# Patient Record
Sex: FEMALE | Race: WHITE | NOT HISPANIC OR LATINO | Employment: FULL TIME | URBAN - METROPOLITAN AREA
[De-identification: names, ages, dates, MRNs, and addresses within clinical notes are randomized per-mention and may not be internally consistent; named-entity substitution may affect disease eponyms.]

---

## 2017-01-06 ENCOUNTER — OFFICE VISIT (OUTPATIENT)
Dept: SURGERY | Facility: CLINIC | Age: 37
End: 2017-01-06

## 2017-01-06 VITALS
RESPIRATION RATE: 20 BRPM | WEIGHT: 153.4 LBS | BODY MASS INDEX: 24.08 KG/M2 | TEMPERATURE: 98.4 F | OXYGEN SATURATION: 99 % | DIASTOLIC BLOOD PRESSURE: 68 MMHG | SYSTOLIC BLOOD PRESSURE: 98 MMHG | HEIGHT: 67 IN | HEART RATE: 68 BPM

## 2017-01-06 DIAGNOSIS — K64.8 INTERNAL HEMORRHOIDS WITH COMPLICATION: Primary | ICD-10-CM

## 2017-01-06 DIAGNOSIS — K59.00 CONSTIPATION, UNSPECIFIED CONSTIPATION TYPE: ICD-10-CM

## 2017-01-06 PROCEDURE — 99024 POSTOP FOLLOW-UP VISIT: CPT | Performed by: COLON & RECTAL SURGERY

## 2017-01-06 RX ORDER — LEVOTHYROXINE SODIUM 0.1 MG/1
100 TABLET ORAL
COMMUNITY
Start: 2016-11-18 | End: 2017-12-19

## 2017-01-06 NOTE — PROGRESS NOTES
"Taty Romo is a 36 y.o. female in for follow up of   s/p colonoscopy and hemorrhoidectomy done on 11/21/16    \"I'm a lot better than I was\"    Taking 2 SS (colace) & fiber daily    Has had 1 BM almost every day since last visit    Still straining with BM    Denies pain or bleeding    Visit Vitals   • BP 98/68 (BP Location: Left arm, Patient Position: Sitting, Cuff Size: Adult)   • Pulse 68   • Temp 98.4 °F (36.9 °C)   • Resp 20   • Ht 67\" (170.2 cm)   • Wt 153 lb 6.4 oz (69.6 kg)   • SpO2 99%   • BMI 24.03 kg/m2     Body mass index is 24.03 kg/(m^2).       PE:  Physical Exam   Constitutional: She is oriented to person, place, and time. She appears well-developed. No distress.   HENT:   Head: Normocephalic and atraumatic.   Abdominal: Soft. She exhibits no distension.   Genitourinary:   Genitourinary Comments: -left lateral, right anterior, right posterior healing scar tissue  -posterior healing fissure  -no edema or erythema.  No fluctuance or induration.  No blood or drainage   Musculoskeletal: Normal range of motion.   Neurological: She is alert and oriented to person, place, and time.   Psychiatric: Thought content normal.   Vitals reviewed.        Assessment:   1. Anal fissure    2. Internal hemorrhoids with complication    3. Constipation, unspecified constipation type     s/p colonoscopy and hemorrhoidectomy done on 11/21/16    Plan:    -patient to try MiraLAX stool softener instead of Colace  -recall colonoscopy in 5 years: family history: polyps mother and father, colon cancer grandfather  -RTC prn    Scribed for Natalya Farias MD by Veronica Hunter PA-C. 1/6/2017  4:04 PM    EMR Dragon/Transcription disclaimer:   Much of this encounter note is an electronic transcription/translation of spoken language to printed text. The electronic translation of spoken language may permit erroneous, or at times, nonsensical words or phrases to be inadvertently transcribed; Although I have reviewed the note for " such errors, some may still exist.

## 2017-07-18 RX ORDER — DULOXETIN HYDROCHLORIDE 20 MG/1
20 CAPSULE, DELAYED RELEASE ORAL 2 TIMES DAILY
Qty: 60 CAPSULE | Refills: 11 | Status: SHIPPED | OUTPATIENT
Start: 2018-04-23 | End: 2018-08-22 | Stop reason: SDUPTHER

## 2017-08-16 RX ORDER — VALACYCLOVIR HYDROCHLORIDE 1 G/1
1000 TABLET, FILM COATED ORAL DAILY
Qty: 30 TABLET | Refills: 11 | Status: SHIPPED | OUTPATIENT
Start: 2017-08-16 | End: 2017-12-19

## 2017-08-16 RX ORDER — VALACYCLOVIR HYDROCHLORIDE 1 G/1
1000 TABLET, FILM COATED ORAL DAILY
Qty: 30 TABLET | Refills: 11 | Status: SHIPPED | OUTPATIENT
Start: 2017-08-16 | End: 2017-08-16 | Stop reason: SDUPTHER

## 2017-12-19 ENCOUNTER — OFFICE VISIT (OUTPATIENT)
Dept: SPORTS MEDICINE | Facility: CLINIC | Age: 37
End: 2017-12-19

## 2017-12-19 VITALS
OXYGEN SATURATION: 98 % | HEART RATE: 85 BPM | BODY MASS INDEX: 24.96 KG/M2 | HEIGHT: 67 IN | SYSTOLIC BLOOD PRESSURE: 102 MMHG | WEIGHT: 159 LBS | DIASTOLIC BLOOD PRESSURE: 70 MMHG

## 2017-12-19 DIAGNOSIS — M54.41 CHRONIC RIGHT-SIDED LOW BACK PAIN WITH RIGHT-SIDED SCIATICA: Primary | ICD-10-CM

## 2017-12-19 DIAGNOSIS — G89.29 CHRONIC RIGHT-SIDED LOW BACK PAIN WITH RIGHT-SIDED SCIATICA: Primary | ICD-10-CM

## 2017-12-19 DIAGNOSIS — M41.27 OTHER IDIOPATHIC SCOLIOSIS, LUMBOSACRAL REGION: ICD-10-CM

## 2017-12-19 DIAGNOSIS — R93.7 ABNORMAL X-RAY OF LUMBAR SPINE: ICD-10-CM

## 2017-12-19 PROCEDURE — 72100 X-RAY EXAM L-S SPINE 2/3 VWS: CPT | Performed by: FAMILY MEDICINE

## 2017-12-19 PROCEDURE — 99204 OFFICE O/P NEW MOD 45 MIN: CPT | Performed by: FAMILY MEDICINE

## 2017-12-19 RX ORDER — PREDNISONE 10 MG/1
TABLET ORAL
Qty: 30 TABLET | Refills: 0 | Status: SHIPPED | OUTPATIENT
Start: 2017-12-19 | End: 2018-05-14

## 2017-12-19 NOTE — PROGRESS NOTES
"Taty is a 37 y.o. year old female    Chief Complaint   Patient presents with   • Establish Care       History of Present Illness   HPI   Patient has had a long history of intermittent episodes of right-sided low back pain, no known trauma.  Patient sees a chiropractor and has been told she has scoliosis.  The chiropractic manipulation is somewhat helpful but not long lasting.  Patient also has noted that massage therapy every other week is somewhat helpful but not long lasting.  Pain is located in the right lower back described as a dull ache or throb, worse with forward flexion for any period of time.  Patient has also noted over the past couple weeks if she is having intermittent episodes of radicular pain down the right buttock to the mid time.  No GI or  complaints.      Works at [employer] with Dr De Los Santos.     I have reviewed the patient's medical, family, and social history in detail and updated the computerized patient record.    Review of Systems   Constitutional: Negative for fever.   Musculoskeletal: Positive for back pain.   Skin: Negative for wound.   Neurological: Positive for numbness.   All other systems reviewed and are negative.      /70  Pulse 85  Ht 170.2 cm (67\")  Wt 72.1 kg (159 lb)  SpO2 98%  BMI 24.9 kg/m2     Physical Exam   Constitutional: She is oriented to person, place, and time. She appears well-developed and well-nourished.   HENT:   Head: Normocephalic and atraumatic.   Eyes: Conjunctivae and EOM are normal. Pupils are equal, round, and reactive to light.   Cardiovascular:   No peripheral edema   Pulmonary/Chest: Effort normal.   Musculoskeletal:   Examination the lumbar spine, her right hip appears to sit higher than her left while standing.  In the supine position length was measured in her left leg measures 34-1/2 cancer right measures 30 for one quarter inch.  Negative straight leg raise.  Negative slump test.  Patient has no pain with back extension or lateral bending but " has pain on the right lower back and just above the sacrum with forward flexion past 30°.  Motor 5 out of 5 lower extremities.  Negative JOSSELYN testing.    Neurological: She is alert and oriented to person, place, and time.   Skin: Skin is warm and dry.   Psychiatric: She has a normal mood and affect. Her behavior is normal.   Vitals reviewed.  Lumbar Spine X-Ray  Indication: Pain  Views: AP and Lateral    Findings:  Lumbar scoliosis with apex to the left at approximately L2-L3.  Mild disc space narrowing between L5 and S1.  IUD in place.    No prior studies were available for comparison.       Diagnoses and all orders for this visit:    Chronic right-sided low back pain with right-sided sciatica  -     XR Spine Lumbar 2 or 3 View  -     MRI Lumbar Spine Without Contrast; Future  -     predniSONE (DELTASONE) 10 MG tablet; 4 tabs qd x 3 d, then 3 tabs qd x 3 d, then 2 tabs qd x 3 d, then 1 tab qd  x 3 d    Other idiopathic scoliosis, lumbosacral region  -     MRI Lumbar Spine Without Contrast; Future    Abnormal x-ray of lumbar spine  -     MRI Lumbar Spine Without Contrast; Future    Radicular low back pain, has not responded to conservative treatment.  We'll treat with tapering prednisone and check MRI lumbar spine.  Also suggest extra insert in her right shoe.  Follow-up after MRI.    EMR Dragon/Transcription disclaimer:    Much of this encounter note is an electronic transcription/translation of spoken language to printed text.  The electronic translation of spoken language may permit erroneous, or at times, nonsensical words or phrases to be inadvertently transcribed.  Although I have reviewed the note for such errors some may still exist.

## 2018-01-03 ENCOUNTER — TELEPHONE (OUTPATIENT)
Dept: SPORTS MEDICINE | Facility: CLINIC | Age: 38
End: 2018-01-03

## 2018-01-03 NOTE — TELEPHONE ENCOUNTER
Pt called and was wondering about the status of MRI. I checked the chart there hasn't been one input, can you please do so?

## 2018-01-04 DIAGNOSIS — R93.7 ABNORMAL X-RAY OF LUMBAR SPINE: ICD-10-CM

## 2018-01-04 DIAGNOSIS — M41.27 OTHER IDIOPATHIC SCOLIOSIS, LUMBOSACRAL REGION: ICD-10-CM

## 2018-01-04 DIAGNOSIS — M54.41 CHRONIC RIGHT-SIDED LOW BACK PAIN WITH RIGHT-SIDED SCIATICA: Primary | ICD-10-CM

## 2018-01-04 DIAGNOSIS — G89.29 CHRONIC RIGHT-SIDED LOW BACK PAIN WITH RIGHT-SIDED SCIATICA: Primary | ICD-10-CM

## 2018-01-10 RX ORDER — OSELTAMIVIR PHOSPHATE 75 MG/1
75 CAPSULE ORAL 2 TIMES DAILY
Qty: 10 CAPSULE | Refills: 0 | Status: SHIPPED | OUTPATIENT
Start: 2018-01-10 | End: 2018-05-14

## 2018-01-25 ENCOUNTER — HOSPITAL ENCOUNTER (OUTPATIENT)
Dept: MRI IMAGING | Facility: HOSPITAL | Age: 38
Discharge: HOME | End: 2018-01-25
Admitting: FAMILY MEDICINE

## 2018-01-25 DIAGNOSIS — M41.27 OTHER IDIOPATHIC SCOLIOSIS, LUMBOSACRAL REGION: ICD-10-CM

## 2018-01-25 DIAGNOSIS — R93.7 ABNORMAL X-RAY OF LUMBAR SPINE: ICD-10-CM

## 2018-01-25 DIAGNOSIS — G89.29 CHRONIC RIGHT-SIDED LOW BACK PAIN WITH RIGHT-SIDED SCIATICA: ICD-10-CM

## 2018-01-25 DIAGNOSIS — M54.41 CHRONIC RIGHT-SIDED LOW BACK PAIN WITH RIGHT-SIDED SCIATICA: ICD-10-CM

## 2018-01-25 PROCEDURE — 72148 MRI LUMBAR SPINE W/O DYE: CPT

## 2018-02-08 DIAGNOSIS — M47.816 FACET ARTHRITIS OF LUMBAR REGION: ICD-10-CM

## 2018-02-08 DIAGNOSIS — G89.29 CHRONIC RIGHT-SIDED LOW BACK PAIN WITH RIGHT-SIDED SCIATICA: Primary | ICD-10-CM

## 2018-02-08 DIAGNOSIS — M54.41 CHRONIC RIGHT-SIDED LOW BACK PAIN WITH RIGHT-SIDED SCIATICA: Primary | ICD-10-CM

## 2018-02-08 DIAGNOSIS — M51.36 BULGING OF LUMBAR INTERVERTEBRAL DISC: ICD-10-CM

## 2018-02-08 DIAGNOSIS — M41.27 OTHER IDIOPATHIC SCOLIOSIS, LUMBOSACRAL REGION: ICD-10-CM

## 2018-05-14 ENCOUNTER — HOSPITAL ENCOUNTER (EMERGENCY)
Facility: HOSPITAL | Age: 38
Discharge: HOME | End: 2018-05-14
Attending: EMERGENCY MEDICINE | Admitting: EMERGENCY MEDICINE

## 2018-05-14 VITALS
SYSTOLIC BLOOD PRESSURE: 120 MMHG | WEIGHT: 150 LBS | DIASTOLIC BLOOD PRESSURE: 70 MMHG | OXYGEN SATURATION: 99 % | BODY MASS INDEX: 24.99 KG/M2 | HEART RATE: 104 BPM | HEIGHT: 65 IN | RESPIRATION RATE: 15 BRPM | TEMPERATURE: 98.2 F

## 2018-05-14 DIAGNOSIS — M54.42 ACUTE RIGHT-SIDED LOW BACK PAIN WITH BILATERAL SCIATICA: Primary | ICD-10-CM

## 2018-05-14 DIAGNOSIS — G89.29 CHRONIC RIGHT-SIDED LOW BACK PAIN WITH RIGHT-SIDED SCIATICA: ICD-10-CM

## 2018-05-14 DIAGNOSIS — M54.41 CHRONIC RIGHT-SIDED LOW BACK PAIN WITH RIGHT-SIDED SCIATICA: ICD-10-CM

## 2018-05-14 DIAGNOSIS — M54.41 ACUTE RIGHT-SIDED LOW BACK PAIN WITH BILATERAL SCIATICA: Primary | ICD-10-CM

## 2018-05-14 PROCEDURE — 99282 EMERGENCY DEPT VISIT SF MDM: CPT

## 2018-05-14 RX ORDER — PREDNISONE 10 MG/1
TABLET ORAL
Qty: 30 TABLET | Refills: 0 | Status: SHIPPED | OUTPATIENT
Start: 2018-05-14 | End: 2019-01-30

## 2018-05-14 NOTE — ED NOTES
Pt c/o lower back pain that started on Saturday night. Denies injury. Pain radiates down bilateral legs.  Denies loss bowel or bladder function.     Ailyn Parikh RN  05/14/18 [id number]

## 2018-05-14 NOTE — ED TRIAGE NOTES
"Patient presents to ER via private vehicle from work.  Patient reports bilateral lower back pain without injury.  Describes pain as burning and radiates down bilateral legs.  \"the only comfortable position is laying flat\"  "

## 2018-05-14 NOTE — ED TRIAGE NOTES
Patient to er with c/o lower back pain, patient has already had an MRI on her back two months ago because of back pain. Patient reported increase in lower back pain since she woke up Sunday morning.

## 2018-05-14 NOTE — ED PROVIDER NOTES
[facility] EMERGENCY DEPARTMENT ENCOUNTER    CHIEF COMPLAINT  Chief Complaint: back pain  History given by: patient  History limited by: nothing  [facility] Room Number: 53/53  PMD: Stephane Florez MD      HPI:  Pt is a 38 y.o. female who presents complaining of worsening of her chronic back pain for the last three days. Pt states that the pain radiates down her posterior BLE. Pt denies fever, chills, abd pain, or recent injury. Pt states that her pain is improved with lying supine. Pt states that she has taken one Hydrocodone with mild relief and ibuprofen without relief.    Onset: gradual  Duration: chronic, worse over the last 3 days  Severity: severe  Associated symptoms: none  Previous treatment: Pt states that she has taken one Hydrocodone with mild relief and ibuprofen without relief.    PAST MEDICAL HISTORY  Active Ambulatory Problems     Diagnosis Date Noted   • No Active Ambulatory Problems     Resolved Ambulatory Problems     Diagnosis Date Noted   • No Resolved Ambulatory Problems     Past Medical History:   Diagnosis Date   • Anxiety    • Colon polyp    • Depression    • Disease of thyroid gland    • Frequent UTI    • Hemorrhoids    • Hypothyroidism    • Lupus    • Lupus    • Migraine    • Thrombocytopenia 2012       PAST SURGICAL HISTORY  Past Surgical History:   Procedure Laterality Date   • ANAL SPHINCTEROTOMY  2003       • APPENDECTOMY  2010   • COLONOSCOPY N/A 11/21/2016    Procedure: COLONOSCOPY;  Surgeon: Natalya Farias MD;  Location: [facility] OR;  Service:    • HEMORRHOIDECTOMY  2003       • HEMORRHOIDECTOMY N/A 11/21/2016    Procedure: HEMORRHOIDECTOMY;  Surgeon: Natalya Farias MD;  Location: [facility] OR;  Service:    • INTRAUTERINE DEVICE INSERTION  08/2016   • THYROIDECTOMY, PARTIAL Right 06/2015   • TONSILLECTOMY         FAMILY HISTORY  Family History   Problem Relation Age of Onset   • Colon polyps Mother    • Heart disease Mother    • Diabetes Mother    • Colon polyps  Father    • Atrial fibrillation Father    • Arrhythmia Father    • Hypertension Father    • No Known Problems Brother    • No Known Problems Daughter    • Dementia Maternal Uncle    • Dementia Maternal Grandmother    • Colon cancer Paternal Grandfather    • Colon cancer Maternal Uncle    • No Known Problems Daughter        SOCIAL HISTORY  Social History     Social History   • Marital status:      Spouse name: N/A   • Number of children: N/A   • Years of education: N/A     Occupational History   • Not on file.     Social History Main Topics   • Smoking status: Never Smoker   • Smokeless tobacco: Never Used   • Alcohol use Yes      Comment: RARELY   • Drug use: No   • Sexual activity: Defer     Other Topics Concern   • Not on file     Social History Narrative   • No narrative on file       ALLERGIES  Oxycodone    REVIEW OF SYSTEMS  Review of Systems   Constitutional: Negative for fever.   HENT: Negative for sore throat.    Eyes: Negative.    Respiratory: Negative for cough and shortness of breath.    Cardiovascular: Negative for chest pain.   Gastrointestinal: Negative for abdominal pain, diarrhea and vomiting.   Genitourinary: Negative for dysuria.   Musculoskeletal: Positive for back pain (lower back) and myalgias (BLE from lower back). Negative for neck pain.   Skin: Negative for rash.   Allergic/Immunologic: Negative.    Neurological: Negative for weakness, numbness and headaches.   Hematological: Negative.    Psychiatric/Behavioral: Negative.    All other systems reviewed and are negative.      PHYSICAL EXAM  ED Triage Vitals   Temp Heart Rate Resp BP SpO2   05/14/18 1003 05/14/18 1003 05/14/18 1003 05/14/18 1018 05/14/18 1003   98.2 °F (36.8 °C) 104 15 120/70 99 %      Temp src Heart Rate Source Patient Position BP Location FiO2 (%)   05/14/18 1003 05/14/18 1003 05/14/18 1018 -- --   Tympanic Monitor Sitting         Physical Exam   Constitutional: She is oriented to person, place, and time and  well-developed, well-nourished, and in no distress. No distress.   HENT:   Head: Normocephalic and atraumatic.   Eyes: EOM are normal. Pupils are equal, round, and reactive to light.   Neck: Normal range of motion. Neck supple.   Cardiovascular: Normal rate, regular rhythm, normal heart sounds and intact distal pulses.    Pulmonary/Chest: Effort normal and breath sounds normal. No respiratory distress.   Abdominal: Soft. There is no tenderness. There is no rebound and no guarding.   Musculoskeletal: Normal range of motion. She exhibits no edema.        Thoracic back: She exhibits no tenderness.   Tenderness over the right SI joint   Neurological: She is alert and oriented to person, place, and time. She has normal sensation and normal strength. She has an abnormal Straight Leg Raise Test (BLE).   Skin: Skin is warm and dry. No rash noted.   Psychiatric: Mood and affect normal.   Nursing note and vitals reviewed.    PROCEDURES  Procedures      PROGRESS AND CONSULTS  ED Course     1143- Notified pt and family that the pt's symptoms are c/w sciatica. Discussed the plan to discharge the pt home with a prescription for steroids for the pt's sciatica. I instructed the pt to follow up with Dr. Holbrook (sports medicine). Pt understands and agrees with the plan, all questions answered.        MEDICAL DECISION MAKING  Pt also made aware that follow up with PMD is necessary.     MDM  Number of Diagnoses or Management Options  Acute right-sided low back pain with bilateral sciatica:      Amount and/or Complexity of Data Reviewed  Decide to obtain previous medical records or to obtain history from someone other than the patient: yes  Obtain history from someone other than the patient: yes (spouse)  Review and summarize past medical records: yes (Pt had a MRI L-Spine on 1/25/18 that showed very mild lumbar spondylosis with mild bilateral facet overgrowth L4-S1, mild disc space narrowing, degenerative endplate changes at L5-S1  with 3 mm retrolisthesis of L5 on S1 and posterior central annular tear with the posterior central to left paracentral bulging disc material extending along the superior endplate of S1 that abuts the anterior medial aspect of the traversing left S1 nerve root but does not to deflect or compress it. There is minimal spurring into the left foramen with minimal left foraminal narrowing at L5-S1)    Patient Progress  Patient progress: stable         DIAGNOSIS  Final diagnoses:   Acute right-sided low back pain with bilateral sciatica       DISPOSITION  DISCHARGE    Patient discharged in stable condition.    Reviewed implications of results, diagnosis, meds, responsibility to follow up, warning signs and symptoms of possible worsening, potential complications and reasons to return to ER, including fever, worsening pain or any concerns.    Patient/Family voiced understanding of above instructions.    Discussed plan for discharge, as there is no emergent indication for admission. Patient referred to primary care provider for BP management due to today's BP. Pt/family is agreeable and understands need for follow up and repeat testing.  Pt is aware that discharge does not mean that nothing is wrong but it indicates no emergency is present that requires admission and they must continue care with follow-up as given below or physician of their choice.     FOLLOW-UP  Stephane Holbrook MD  [address]  [place] [address]  [phone]    Schedule an appointment as soon as possible for a visit            Medication List      Stop    HAVRIX 1440 EL U/ML vaccine  Generic drug:  hepatitis A     oseltamivir 75 MG capsule  Commonly known as:  TAMIFLU              Latest Documented Vital Signs:  As of 11:47 AM  BP- 120/70 HR- 104 Temp- 98.2 °F (36.8 °C) (Tympanic) O2 sat- 99%    --  Documentation assistance provided by london Daniels for Dr. Zavala.  Information recorded by the london was done at my  direction and has been verified and validated by me.     Lou Daniels  05/14/18 1147       Cyrus Zavala MD  05/16/18 0044

## 2018-08-22 RX ORDER — DULOXETIN HYDROCHLORIDE 20 MG/1
20 CAPSULE, DELAYED RELEASE ORAL 2 TIMES DAILY
Qty: 60 CAPSULE | Refills: 11 | Status: SHIPPED | OUTPATIENT
Start: 2018-08-22 | End: 2018-10-02 | Stop reason: SDUPTHER

## 2018-10-02 RX ORDER — DULOXETIN HYDROCHLORIDE 20 MG/1
20 CAPSULE, DELAYED RELEASE ORAL 2 TIMES DAILY
Qty: 180 CAPSULE | Refills: 3 | Status: SHIPPED | OUTPATIENT
Start: 2018-10-02 | End: 2019-01-30

## 2018-11-13 RX ORDER — LEVOTHYROXINE SODIUM 0.1 MG/1
100 TABLET ORAL DAILY
Qty: 30 TABLET | Refills: 6 | Status: CANCELLED | OUTPATIENT
Start: 2018-11-13

## 2018-11-19 RX ORDER — LEVOTHYROXINE SODIUM 0.1 MG/1
100 TABLET ORAL DAILY
Qty: 90 TABLET | Refills: 2 | Status: SHIPPED | OUTPATIENT
Start: 2018-11-19 | End: 2019-08-15 | Stop reason: SDUPTHER

## 2019-01-30 ENCOUNTER — OFFICE VISIT (OUTPATIENT)
Dept: INTERNAL MEDICINE | Age: 39
End: 2019-01-30

## 2019-01-30 VITALS
OXYGEN SATURATION: 98 % | HEART RATE: 64 BPM | DIASTOLIC BLOOD PRESSURE: 62 MMHG | TEMPERATURE: 98.4 F | WEIGHT: 158 LBS | SYSTOLIC BLOOD PRESSURE: 102 MMHG | HEIGHT: 65 IN | BODY MASS INDEX: 26.33 KG/M2

## 2019-01-30 DIAGNOSIS — F41.8 DEPRESSION WITH ANXIETY: Chronic | ICD-10-CM

## 2019-01-30 DIAGNOSIS — E06.3 HYPOTHYROIDISM DUE TO HASHIMOTO'S THYROIDITIS: Primary | Chronic | ICD-10-CM

## 2019-01-30 DIAGNOSIS — K58.1 IRRITABLE BOWEL SYNDROME WITH CONSTIPATION: ICD-10-CM

## 2019-01-30 DIAGNOSIS — F99 INSOMNIA DUE TO OTHER MENTAL DISORDER: Chronic | ICD-10-CM

## 2019-01-30 DIAGNOSIS — E03.8 HYPOTHYROIDISM DUE TO HASHIMOTO'S THYROIDITIS: Primary | Chronic | ICD-10-CM

## 2019-01-30 DIAGNOSIS — M32.9 SYSTEMIC LUPUS ERYTHEMATOSUS, UNSPECIFIED SLE TYPE, UNSPECIFIED ORGAN INVOLVEMENT STATUS (HCC): Chronic | ICD-10-CM

## 2019-01-30 DIAGNOSIS — F51.05 INSOMNIA DUE TO OTHER MENTAL DISORDER: Chronic | ICD-10-CM

## 2019-01-30 PROBLEM — F41.9 ANXIETY: Chronic | Status: ACTIVE | Noted: 2019-01-30

## 2019-01-30 PROBLEM — F32.A DEPRESSION: Status: ACTIVE | Noted: 2019-01-30

## 2019-01-30 PROBLEM — F41.9 ANXIETY: Status: ACTIVE | Noted: 2019-01-30

## 2019-01-30 PROCEDURE — 99204 OFFICE O/P NEW MOD 45 MIN: CPT | Performed by: INTERNAL MEDICINE

## 2019-01-30 RX ORDER — TRAZODONE HYDROCHLORIDE 50 MG/1
50 TABLET ORAL NIGHTLY
Qty: 30 TABLET | Refills: 2 | Status: SHIPPED | OUTPATIENT
Start: 2019-01-30 | End: 2019-04-26 | Stop reason: SDUPTHER

## 2019-01-30 RX ORDER — DULOXETIN HYDROCHLORIDE 20 MG/1
60 CAPSULE, DELAYED RELEASE ORAL
Qty: 180 CAPSULE | Refills: 3
Start: 2019-01-30 | End: 2019-02-18 | Stop reason: SDUPTHER

## 2019-01-30 NOTE — ASSESSMENT & PLAN NOTE
Stop diphenhydramine (taking 3 nightly) due to constipation and dry eyes/mouth.   Trial of trazodone 50 mg. Start 1/2 and titrate up as needed up to 50 mg. Start taking duloxetine 60 mg in the morning (was taking 20 mg BID).

## 2019-01-30 NOTE — PROGRESS NOTES
[facility] [place] [facility]  LIV MARTIN M.D.      Taty Romo / 38 y.o. / female  01/30/2019      ASSESSMENT & PLAN:    Problem List Items Addressed This Visit        High    Depression with anxiety (Chronic)    Overview     Has taken medication since 19.          Current Assessment & Plan     Suboptimal control. Increase duloxetine to 60 mg qAM (was taking 20 mg BID)         Relevant Medications    DULoxetine (CYMBALTA) 20 MG capsule       Medium    Hypothyroidism due to Hashimoto's thyroiditis - Primary (Chronic)    Overview     S/p partial thyroidectomy (right side) 2015 for adenoma.          Current Assessment & Plan     Check thyroid labs. May need to change dose of medication (currently on levothyroxine 100 mcg).          Relevant Medications    levothyroxine (SYNTHROID, LEVOTHROID) 100 MCG tablet    Other Relevant Orders    TSH+Free T4    Insomnia (Chronic)    Current Assessment & Plan     Stop diphenhydramine (taking 3 nightly) due to constipation and dry eyes/mouth.   Trial of trazodone 50 mg. Start 1/2 and titrate up as needed up to 50 mg. Start taking duloxetine 60 mg in the morning (was taking 20 mg BID).          Relevant Medications    traZODone (DESYREL) 50 MG tablet    Systemic lupus erythematosus (CMS/HCC) (Chronic)    Overview     Dx 2007 started on Plaquenil  *[place]    Clinically stable. Continue Plaquenil.          Current Assessment & Plan     Maintain regular eye exams. Discuss Restasis. Suggested omega-3 fish oil for dry eyes.          Relevant Orders    Comprehensive Metabolic Panel    CBC & Differential    Urinalysis With Microscopic If Indicated (No Culture) - Urine, Clean Catch    Irritable bowel syndrome with constipation (Chronic)    Current Assessment & Plan     Stop diphenhydramine. Start Miralax +/- colace daily. Consider Rx medication if not improved.              Orders Placed This Encounter   Procedures   • Comprehensive Metabolic Panel   • TSH+Free T4   • Urinalysis With  "Microscopic If Indicated (No Culture) - Urine, Clean Catch   • CBC & Differential     New Medications Ordered This Visit   Medications   • DULoxetine (CYMBALTA) 20 MG capsule     Sig: Take 3 capsules by mouth Every Morning Before Breakfast.     Dispense:  180 capsule     Refill:  3   • traZODone (DESYREL) 50 MG tablet     Sig: Take 1 tablet by mouth Every Night.     Dispense:  30 tablet     Refill:  2       Summary/Discussion:  Spent 45 minutes in face-to-face encounter time and >50% of time spent in counseling regarding above medical problems/issues.      Return in about 3 months (around 4/30/2019) for Reassess chronic medical problems.    ____________________________________________________________________    VITALS:    Visit Vitals  /62   Pulse 64   Temp 98.4 °F (36.9 °C)   Ht 165.1 cm (65\")   Wt 71.7 kg (158 lb)   SpO2 98%   BMI 26.29 kg/m²       BP Readings from Last 3 Encounters:   01/30/19 102/62   05/14/18 120/70   12/19/17 102/70     Wt Readings from Last 3 Encounters:   01/30/19 71.7 kg (158 lb)   05/14/18 68 kg (150 lb)   01/25/18 72.1 kg (159 lb)      Body mass index is 26.29 kg/m².    CC: Main reason(s) for today's visit[facility]      HPI:    Patient is a 38 y.o. female who is here to establish care.     SLE: diagnosed 2007, sees Dr. Blanoc, on Plaquenil, sees eye care specialist. Complications include ITP. Complains of chronic dry eyes/mouth, arthralgias.     Hypothyroidism: history of Hashimoto's, status post partial thyroidectomy for adenoma. On levothyroxine 100 mcg daily. Complains of chronic constipation, hair loss.    Depression/anxiety since age 19, history of postpartum depression, on duloxetine 20 mg BID. Complains of chronic sleeping problems. Takes diphenhydramine 25 mg 3 tabs nightly but still does not sleep well.     Complains of chronic constipation, hard/lumpy stool, cramps/discomfort, no diarrhea or blood in stool. Prior " hemorrhoid/fissure surgery related to constipation.       Patient Care Team:  Jeremy Mcdonald MD as PCP - General (Internal Medicine)  Shaheed Blanco MD as Consulting Physician (Rheumatology)  Angy Hunter MD as Consulting Physician (Obstetrics and Gynecology)  ____________________________________________________________________      REVIEW OF SYSTEMS    Review of Systems   Constitutional: Negative.    HENT: Negative.         Dry mouth, aphthous ulcers (recurrent)   Eyes: Negative.         Dry eyes   Respiratory: Negative.    Cardiovascular: Negative.    Gastrointestinal: Positive for constipation.   Endocrine: Negative.  Negative for cold intolerance and heat intolerance.   Genitourinary: Negative.  Negative for hematuria.   Musculoskeletal: Positive for arthralgias.   Skin: Negative.    Allergic/Immunologic: Negative.    Neurological: Negative.    Hematological: Negative.  Negative for adenopathy. Does not bruise/bleed easily.        History of low platelets, WBC   Psychiatric/Behavioral: Positive for sleep disturbance. Dysphoric mood: mild dysphoria.       PHYSICAL EXAMINATION    Physical Exam   Constitutional: She is oriented to person, place, and time. She appears well-developed and well-nourished. No distress.   HENT:   Head: Normocephalic and atraumatic.   Right Ear: Tympanic membrane normal.   Left Ear: Tympanic membrane normal.   Mouth/Throat: Oropharynx is clear and moist and mucous membranes are normal. No oral lesions.   Eyes: Conjunctivae are normal. Pupils are equal, round, and reactive to light. No scleral icterus.   Neck: Neck supple. No tracheal deviation present. No thyroid mass and no thyromegaly present.   Cardiovascular: Normal rate, regular rhythm, normal heart sounds and intact distal pulses.   Pulmonary/Chest: Effort normal and breath sounds normal.   Abdominal: Soft. Bowel sounds are normal. She exhibits no distension and no mass. There is no tenderness. No hernia.   Musculoskeletal:  She exhibits no edema.   Lymphadenopathy:     She has no cervical adenopathy.        Right: No supraclavicular adenopathy present.        Left: No supraclavicular adenopathy present.   Neurological: She is alert and oriented to person, place, and time. She has normal reflexes. No cranial nerve deficit. She exhibits normal muscle tone. Coordination normal.   Skin: Skin is warm. No rash noted. No pallor.   Psychiatric: She has a normal mood and affect. Her behavior is normal. Judgment and thought content normal.         REVIEWED DATA:    Labs:     2/2018 TSH 0.622, Free T 1.76  1/31/18 WBC 3.18, hemoglobin 13.3, platelets 111    Imaging:        Medical Tests:        Summary of old records / correspondence / consultant report:        Request outside records:        ______________________________________________________________________    ALLERGIES  Allergies   Allergen Reactions   • Oxycodone Nausea And Vomiting        MEDICATIONS  Current Outpatient Medications on File Prior to Visit   Medication Sig Dispense Refill   • Cholecalciferol (VITAMIN D) 2000 UNITS capsule Take 3,000 Units by mouth Every Evening.     • DiphenhydrAMINE HCl (BENADRYL ALLERGY PO) Take  by mouth.     • levothyroxine (SYNTHROID, LEVOTHROID) 100 MCG tablet Take 1 tablet by mouth Daily. 90 tablet 2   • Probiotic Product (PROBIOTIC-10 PO) Take  by mouth.     • [DISCONTINUED] DULoxetine (CYMBALTA) 20 MG capsule Take 1 capsule by mouth 2 (Two) Times a Day. 180 capsule 3   • [DISCONTINUED] hydroxychloroquine (PLAQUENIL) 200 MG tablet Take 1 tablet by mouth 2 (Two) Times a Day. 60 tablet 3       PFSH:     The following portions of the patient's history were reviewed and updated as appropriate: Allergies / Current Medications / Past Medical History / Surgical History / Social History / Family History    PROBLEM LIST   Patient Active Problem List   Diagnosis   • Depression with anxiety   • Hypothyroidism due to Hashimoto's thyroiditis   • Insomnia   •  Systemic lupus erythematosus (CMS/HCC)   • Vitamin D deficiency   • Irritable bowel syndrome with constipation       PAST MEDICAL HISTORY  Past Medical History:   Diagnosis Date   • Anxiety    • Colon polyp    • Depression    • Disease of thyroid gland     HYPOTHYROIDISM D/T PARTIAL THYROIDECTOMY   • Frequent UTI    • Hemorrhoids    • Hypothyroidism    • Lupus     DX 3-2007   • Migraine    • Thrombocytopenia (CMS/HCC) 2012    due to ITP       SURGICAL HISTORY  Past Surgical History:   Procedure Laterality Date   • ANAL SPHINCTEROTOMY  2003       • APPENDECTOMY  2010   • COLONOSCOPY N/A 11/21/2016    Procedure: COLONOSCOPY;  Surgeon: Natalya Farias MD;  Location: [facility] OR;  Service:    • HEMORRHOIDECTOMY  2003       • HEMORRHOIDECTOMY N/A 11/21/2016    Procedure: HEMORRHOIDECTOMY;  Surgeon: Natalya Farias MD;  Location: [facility];  Service:    • INTRAUTERINE DEVICE INSERTION  08/2016   • THYROIDECTOMY, PARTIAL Right 06/2015   • TONSILLECTOMY         SOCIAL HISTORY  Social History     Socioeconomic History   • Marital status:      Spouse name: Alexander   • Number of children: 2   • Years of education: Not on file   • Highest education level: Not on file   Occupational History   • Occupation: (Medical Assistant) [employer]   Tobacco Use   • Smoking status: Never Smoker   • Smokeless tobacco: Never Used   Substance and Sexual Activity   • Alcohol use: Yes     Frequency: Monthly or less     Comment: RARELY   • Drug use: No   • Sexual activity: Yes     Partners: Male     Birth control/protection: IUD       FAMILY HISTORY  Family History   Problem Relation Age of Onset   • Colon polyps Mother    • Coronary artery disease Mother 60        non-smokers   • Valvular heart disease Mother    • Diabetes type II Mother    • Other Mother         pulmonary hypertension   • Clotting disorder Mother         superficial dvt   • Depression Mother    • Colon polyps Father    • Atrial  fibrillation Father    • Hypertension Father    • Deep vein thrombosis Father    • Autoimmune disease Brother    • Depression Brother    • No Known Problems Daughter    • Dementia Maternal Uncle    • Dementia Maternal Grandmother    • Colon cancer Paternal Grandfather    • Colon cancer Maternal Uncle    • No Known Problems Daughter          **Olegario Disclaimer:   Much of this encounter note is an electronic transcription/translation of spoken language to printed text. The electronic translation of spoken language may permit erroneous, or at times, nonsensical words or phrases to be inadvertently transcribed. Although I have reviewed the note for such errors, some may still exist.

## 2019-01-31 LAB
ALBUMIN SERPL-MCNC: 4.8 G/DL (ref 3.5–5.5)
ALBUMIN/GLOB SERPL: 2.3 {RATIO} (ref 1.2–2.2)
ALP SERPL-CCNC: 48 IU/L (ref 39–117)
ALT SERPL-CCNC: 32 IU/L (ref 0–32)
APPEARANCE UR: ABNORMAL
AST SERPL-CCNC: 22 IU/L (ref 0–40)
BACTERIA #/AREA URNS HPF: ABNORMAL /[HPF]
BASOPHILS # BLD AUTO: 0 X10E3/UL (ref 0–0.2)
BASOPHILS NFR BLD AUTO: 0 %
BILIRUB SERPL-MCNC: 0.3 MG/DL (ref 0–1.2)
BILIRUB UR QL STRIP: NEGATIVE
BUN SERPL-MCNC: 13 MG/DL (ref 6–20)
BUN/CREAT SERPL: 14 (ref 9–23)
CALCIUM SERPL-MCNC: 9.2 MG/DL (ref 8.7–10.2)
CHLORIDE SERPL-SCNC: 104 MMOL/L (ref 96–106)
CO2 SERPL-SCNC: 21 MMOL/L (ref 20–29)
COLOR UR: YELLOW
CREAT SERPL-MCNC: 0.91 MG/DL (ref 0.57–1)
CRYSTALS URNS MICRO: ABNORMAL
EOSINOPHIL # BLD AUTO: 0.1 X10E3/UL (ref 0–0.4)
EOSINOPHIL NFR BLD AUTO: 2 %
EPI CELLS #/AREA URNS HPF: >10 /HPF
ERYTHROCYTE [DISTWIDTH] IN BLOOD BY AUTOMATED COUNT: 13.6 % (ref 12.3–15.4)
GLOBULIN SER CALC-MCNC: 2.1 G/DL (ref 1.5–4.5)
GLUCOSE SERPL-MCNC: 88 MG/DL (ref 65–99)
GLUCOSE UR QL: NEGATIVE
HCT VFR BLD AUTO: 40.7 % (ref 34–46.6)
HGB BLD-MCNC: 13.8 G/DL (ref 11.1–15.9)
HGB UR QL STRIP: NEGATIVE
IMM GRANULOCYTES # BLD AUTO: 0 X10E3/UL (ref 0–0.1)
IMM GRANULOCYTES NFR BLD AUTO: 0 %
KETONES UR QL STRIP: NEGATIVE
LEUKOCYTE ESTERASE UR QL STRIP: ABNORMAL
LYMPHOCYTES # BLD AUTO: 1.1 X10E3/UL (ref 0.7–3.1)
LYMPHOCYTES NFR BLD AUTO: 37 %
MCH RBC QN AUTO: 31 PG (ref 26.6–33)
MCHC RBC AUTO-ENTMCNC: 33.9 G/DL (ref 31.5–35.7)
MCV RBC AUTO: 92 FL (ref 79–97)
MICRO URNS: ABNORMAL
MONOCYTES # BLD AUTO: 0.4 X10E3/UL (ref 0.1–0.9)
MONOCYTES NFR BLD AUTO: 12 %
MUCOUS THREADS URNS QL MICRO: PRESENT
NEUTROPHILS # BLD AUTO: 1.4 X10E3/UL (ref 1.4–7)
NEUTROPHILS NFR BLD AUTO: 49 %
NITRITE UR QL STRIP: NEGATIVE
PH UR STRIP: 6 [PH] (ref 5–7.5)
PLATELET # BLD AUTO: 124 X10E3/UL (ref 150–379)
POTASSIUM SERPL-SCNC: 4.1 MMOL/L (ref 3.5–5.2)
PROT SERPL-MCNC: 6.9 G/DL (ref 6–8.5)
PROT UR QL STRIP: NEGATIVE
RBC # BLD AUTO: 4.45 X10E6/UL (ref 3.77–5.28)
RBC #/AREA URNS HPF: ABNORMAL /HPF
SODIUM SERPL-SCNC: 140 MMOL/L (ref 134–144)
SP GR UR: 1.02 (ref 1–1.03)
T4 FREE SERPL-MCNC: 1.35 NG/DL (ref 0.82–1.77)
TSH SERPL DL<=0.005 MIU/L-ACNC: 0.47 UIU/ML (ref 0.45–4.5)
UNIDENT CRYS URNS QL MICRO: PRESENT
UROBILINOGEN UR STRIP-MCNC: 0.2 MG/DL (ref 0.2–1)
WBC # BLD AUTO: 2.9 X10E3/UL (ref 3.4–10.8)
WBC #/AREA URNS HPF: ABNORMAL /HPF

## 2019-01-31 NOTE — PROGRESS NOTES
"MyChart:    Taty, here are the result(s) of your test(s):     Overall your labs are stable. WBC (white blood count) is somewhat lower but not critical. Platelets are stable.   Kidney and liver labs are within normal.   Thyroid is fine.   Urinalysis was not \"clean\" but no significant findings noted.     Will forward results to Dr. Blanco.    Please do not hesitate to contact me if you have questions.   "

## 2019-02-18 DIAGNOSIS — F41.8 DEPRESSION WITH ANXIETY: Chronic | ICD-10-CM

## 2019-02-19 ENCOUNTER — TELEPHONE (OUTPATIENT)
Dept: INTERNAL MEDICINE | Age: 39
End: 2019-02-19

## 2019-02-19 RX ORDER — DULOXETIN HYDROCHLORIDE 20 MG/1
60 CAPSULE, DELAYED RELEASE ORAL
Qty: 180 CAPSULE | Refills: 1 | Status: SHIPPED | OUTPATIENT
Start: 2019-02-19 | End: 2019-04-30

## 2019-02-19 NOTE — TELEPHONE ENCOUNTER
Regarding: Prescription Question  Contact: [phone]  ----- Message from [employer], Generic sent at 2/19/2019 10:10 AM EST -----    Good Morning!    Just writing to let you know so far so good with dose increase on Cymbalta and sleeping well tramadol.  Can you please send a new script to the [facility] for Cymbalta 60mg in AM.    Thank you so much!  See you for f/u in April.  Have a wonderful day!  TOMAS Romo

## 2019-02-22 ENCOUNTER — TELEPHONE (OUTPATIENT)
Dept: INTERNAL MEDICINE | Age: 39
End: 2019-02-22

## 2019-02-22 NOTE — TELEPHONE ENCOUNTER
Regarding: Non-Urgent Medical Question  Contact: [phone]  ----- Message from Mychart, Generic sent at 2/22/2019 10:06 AM EST -----    Dr. Mcdonald, my  is needing a new PCP, as his has retired.  I know that you are not technically accepting new patients, but I was wondering if you would be able to see him?  Pretty healthy, treated for HTN, HLD & ADHD.      Thank you in advance.  TOMAS Romo

## 2019-04-26 DIAGNOSIS — F99 INSOMNIA DUE TO OTHER MENTAL DISORDER: Chronic | ICD-10-CM

## 2019-04-26 DIAGNOSIS — F51.05 INSOMNIA DUE TO OTHER MENTAL DISORDER: Chronic | ICD-10-CM

## 2019-04-26 RX ORDER — TRAZODONE HYDROCHLORIDE 50 MG/1
50 TABLET ORAL NIGHTLY
Qty: 30 TABLET | Refills: 2 | Status: SHIPPED | OUTPATIENT
Start: 2019-04-26 | End: 2019-07-30 | Stop reason: SDUPTHER

## 2019-04-30 ENCOUNTER — OFFICE VISIT (OUTPATIENT)
Dept: INTERNAL MEDICINE | Age: 39
End: 2019-04-30

## 2019-04-30 VITALS
TEMPERATURE: 98.2 F | HEART RATE: 62 BPM | DIASTOLIC BLOOD PRESSURE: 64 MMHG | WEIGHT: 160 LBS | SYSTOLIC BLOOD PRESSURE: 108 MMHG | BODY MASS INDEX: 26.66 KG/M2 | HEIGHT: 65 IN | OXYGEN SATURATION: 98 %

## 2019-04-30 DIAGNOSIS — F99 INSOMNIA DUE TO OTHER MENTAL DISORDER: Chronic | ICD-10-CM

## 2019-04-30 DIAGNOSIS — M32.9 SYSTEMIC LUPUS ERYTHEMATOSUS, UNSPECIFIED SLE TYPE, UNSPECIFIED ORGAN INVOLVEMENT STATUS (HCC): Chronic | ICD-10-CM

## 2019-04-30 DIAGNOSIS — F51.05 INSOMNIA DUE TO OTHER MENTAL DISORDER: Chronic | ICD-10-CM

## 2019-04-30 DIAGNOSIS — J30.1 SEASONAL ALLERGIC RHINITIS DUE TO POLLEN: ICD-10-CM

## 2019-04-30 DIAGNOSIS — F41.8 DEPRESSION WITH ANXIETY: Primary | Chronic | ICD-10-CM

## 2019-04-30 DIAGNOSIS — M51.36 DDD (DEGENERATIVE DISC DISEASE), LUMBAR: ICD-10-CM

## 2019-04-30 DIAGNOSIS — E06.3 HYPOTHYROIDISM DUE TO HASHIMOTO'S THYROIDITIS: Chronic | ICD-10-CM

## 2019-04-30 DIAGNOSIS — E03.8 HYPOTHYROIDISM DUE TO HASHIMOTO'S THYROIDITIS: Chronic | ICD-10-CM

## 2019-04-30 PROBLEM — M51.369 DDD (DEGENERATIVE DISC DISEASE), LUMBAR: Chronic | Status: ACTIVE | Noted: 2019-04-30

## 2019-04-30 PROBLEM — M51.369 DDD (DEGENERATIVE DISC DISEASE), LUMBAR: Status: ACTIVE | Noted: 2019-04-30

## 2019-04-30 PROCEDURE — 99214 OFFICE O/P EST MOD 30 MIN: CPT | Performed by: INTERNAL MEDICINE

## 2019-04-30 RX ORDER — SENNOSIDES 8.6 MG
650 CAPSULE ORAL EVERY 8 HOURS PRN
COMMUNITY
Start: 2019-04-30 | End: 2020-05-03 | Stop reason: SDUPTHER

## 2019-04-30 RX ORDER — DULOXETIN HYDROCHLORIDE 60 MG/1
60 CAPSULE, DELAYED RELEASE ORAL
Qty: 90 CAPSULE | Refills: 1 | Status: SHIPPED | OUTPATIENT
Start: 2019-04-30 | End: 2019-11-06 | Stop reason: SDUPTHER

## 2019-04-30 RX ORDER — CETIRIZINE HYDROCHLORIDE 10 MG/1
10 TABLET ORAL NIGHTLY
COMMUNITY
Start: 2019-04-30 | End: 2020-05-22

## 2019-04-30 RX ORDER — AZELASTINE HYDROCHLORIDE, FLUTICASONE PROPIONATE 137; 50 UG/1; UG/1
2 SPRAY, METERED NASAL 2 TIMES DAILY
Qty: 23 G | Refills: 5 | Status: SHIPPED | OUTPATIENT
Start: 2019-04-30 | End: 2019-05-22

## 2019-04-30 NOTE — PATIENT INSTRUCTIONS
** IMPORTANT MESSAGE FROM DR. MARTIN **    In our office, your satisfaction is VERY important to us.     You may receive a survey from [facility] by mail or E-mail for you to provide feedback about your visit. This information is invaluable for me to know what we can do to improve our services.     I ask that you please take a few minutes to complete the survey and let us know how we are doing in serving your needs. (You may receive the survey more than once for multiple visits)    Thank You !    Dr. Martin & Staff    _____________________________________________________________________

## 2019-04-30 NOTE — ASSESSMENT & PLAN NOTE
With persistent PND and dependence on Afrin.   Symptoms of LPR.   Advised to discontinue Afrin.   Take Zyrtec D 12 hours BID for now.   Dymista nasal spray 2 sprays BID.

## 2019-04-30 NOTE — PROGRESS NOTES
[facility] [place] [facility] MEDICINE  LIV MARTIN M.D.      Taty Nolan Demetrius / 39 y.o. / female  04/30/2019    ASSESSMENT & PLAN     Problem List Items Addressed This Visit        High    Depression with anxiety - Primary (Chronic)    Overview     Has taken medication since 19.          Current Assessment & Plan     Stable. Continue duloxetine at 60 mg daily. New Rx sent to pharmacy for 60 mg dose.          Relevant Medications    DULoxetine (CYMBALTA) 60 MG capsule       Medium    Hypothyroidism due to Hashimoto's thyroiditis (Chronic)    Overview     S/p partial thyroidectomy (right side) 2015 for adenoma.     Stable. Continue current dose of levothyroxine at 100 mcg.           Relevant Medications    levothyroxine (SYNTHROID, LEVOTHROID) 100 MCG tablet    Insomnia (Chronic)    Current Assessment & Plan     Doing better with trazodone. May titrate up 100 mg qHS if needed.          Relevant Medications    traZODone (DESYREL) 50 MG tablet    Seasonal allergic rhinitis due to pollen (Chronic)    Current Assessment & Plan     With persistent PND and dependence on Afrin.   Symptoms of LPR.   Advised to discontinue Afrin.   Take Zyrtec D 12 hours BID for now.   Dymista nasal spray 2 sprays BID.            Relevant Medications    cetirizine (zyrTEC) 10 MG tablet    Azelastine-Fluticasone 137-50 MCG/ACT suspension       Low    Systemic lupus erythematosus (CMS/HCC) (Chronic)    Overview     Dx 2007 started on Plaquenil  *[place]    Clinically stable. Continue Plaquenil.          DDD (degenerative disc disease), lumbar (Chronic)    Overview     + Scoliosis of lumbar spine         Current Assessment & Plan     Tylenol Arthritis BID. Use Aleve PRN. Discussed ergonomics. May work with chiro or PT if helpful.          Relevant Medications    acetaminophen (TYLENOL 8 HOUR ARTHRITIS PAIN) 650 MG 8 hr tablet        No orders of the defined types were placed in this encounter.    New Medications Ordered This Visit   Medications   •  "cetirizine (zyrTEC) 10 MG tablet     Sig: Take 1 tablet by mouth Every Night.   • Azelastine-Fluticasone 137-50 MCG/ACT suspension     Sig: Place 2 sprays into the nostril(s) as directed by provider 2 (Two) Times a Day.     Dispense:  23 g     Refill:  5   • acetaminophen (TYLENOL 8 HOUR ARTHRITIS PAIN) 650 MG 8 hr tablet     Sig: Take 1 tablet by mouth Every 8 (Eight) Hours As Needed for Mild Pain  or Moderate Pain .   • DULoxetine (CYMBALTA) 60 MG capsule     Sig: Take 1 capsule by mouth Every Morning Before Breakfast.     Dispense:  90 capsule     Refill:  1       Summary/Discussion:  ·     Next Appointment with me: 8/30/2019    Return in about 4 months (around 8/30/2019) for Depression and chronic low back pain.      MEDICATIONS  Current Outpatient Medications   Medication Sig Dispense Refill   • Cholecalciferol (VITAMIN D) 2000 UNITS capsule Take 3,000 Units by mouth Every Evening.     • DiphenhydrAMINE HCl (BENADRYL ALLERGY PO) Take  by mouth.     • DULoxetine (CYMBALTA) 20 MG capsule Take 3 capsules by mouth Every Morning Before Breakfast. 90 capsule 1   • hydroxychloroquine (PLAQUENIL) 200 MG tablet Take 1 tablet by mouth 2 (Two) Times a Day. 60 tablet 11   • levothyroxine (SYNTHROID, LEVOTHROID) 100 MCG tablet Take 1 tablet by mouth Daily. 90 tablet 2   • Probiotic Product (PROBIOTIC-10 PO) Take  by mouth.     • traZODone (DESYREL) 50 MG tablet Take 1 tablet by mouth Every Night. 30 tablet 2         VITALS:    Visit Vitals  /64   Pulse 62   Temp 98.2 °F (36.8 °C)   Ht 165.1 cm (65\")   Wt 72.6 kg (160 lb)   SpO2 98%   BMI 26.63 kg/m²       BP Readings from Last 3 Encounters:   04/30/19 108/64   01/30/19 102/62   05/14/18 120/70     Wt Readings from Last 3 Encounters:   04/30/19 72.6 kg (160 lb)   01/30/19 71.7 kg (158 lb)   05/14/18 68 kg (150 lb)      Body mass index is 26.63 kg/m².        CC:  Main reason(s) for today's visit: Depression; Anxiety; and Hypothyroidism      HPI:     Overall depression " is stable on duloxetine 60 mg daily.   Sleep is better with start of trazodone.     Complains of oral dysphagia and globus, has chronic PND from allergic rhinitis. Dependent on Afrin. Takes Benadryl PRN.   History of Hashimoto's hypothyroidism s/p partial thyroidectomy in the past.     SLE is stable overall on plaquenil. Sees Dr. Blanco.     Complains of chronic low back pain from scoliosis and DDD. Takes Aleve as needed. Impact quality of life significantly.    Patient Care Team:  Jeremy Mcdonald MD as PCP - General (Internal Medicine)  Shaheed Blanco MD as Consulting Physician (Rheumatology)  Angy Hunter MD as Consulting Physician (Obstetrics and Gynecology)      REVIEW OF SYSTEMS     Review of Systems  Constitutional neg  Resp neg  CV neg  GI denies heartburn      PHYSICAL EXAMINATION     Physical Exam  Constitutional  No distress  Neck: No mass, thyroid nodule, thyromegaly or cervical LAD   Psychiatric  Alert. Judgment and thought content normal. Mood normal      REVIEWED DATA     Labs:     Lab Results   Component Value Date     01/30/2019    K 4.1 01/30/2019    CALCIUM 9.2 01/30/2019    AST 22 01/30/2019    ALT 32 01/30/2019    BUN 13 01/30/2019    CREATININE 0.91 01/30/2019    CREATININE 0.8 01/30/2018    CREATININE 0.91 01/04/2016    EGFRIFNONA 81 01/30/2019    EGFRIFAFRI 93 01/30/2019       Lab Results   Component Value Date    GLUCOSE 85 01/04/2016       No results found for: LDL, HDL, TRIG, CHOLHDLRATIO    Lab Results   Component Value Date    TSH 0.473 01/30/2019    FREET4 1.35 01/30/2019       Lab Results   Component Value Date    WBC 2.9 (L) 01/30/2019    HGB 13.8 01/30/2019    HGB 13.1 11/15/2016    HGB 13.6 01/04/2016     (L) 01/30/2019       Lab Results   Component Value Date    PROTEIN Negative 01/30/2019    GLUCOSEU Negative 01/30/2019    BLOODU Negative 01/30/2019    NITRITEU Negative 01/30/2019    LEUKOCYTESUR Trace (A) 01/30/2019       Imaging:   MRI OF LUMBAR SPINE WITHOUT  CONTRAST 01/25/2018     CLINICAL HISTORY: Chronic right-sided low back pain, pain that radiates  down legs, right-sided sciatica, idiopathic scoliosis lumbosacral  region.     TECHNIQUE: Sagittal T1, proton density and fat-suppressed T2-weighted  images were obtained of the lumbar spine. In addition axial T2-weighted  images were obtained from L1 to S1. Thin cut axial T1-weighted images  were obtained angled through the interspaces from L1 to S1.     There are no prior lumbar spine imaging studies from [facility] for comparison.     FINDINGS: The distal thoracic cord and the conus is normal in signal  intensity. The conus terminates at the level of the superior endplate of  L2 which is within normal limits. There is very mild levoscoliotic  curvature of the lumbar spine, its apex is at the L3 lumbar level.     The T11-12, T12-L1, L1-2, L2-3, L3-4 disc spaces and facets are normal  with no canal or foraminal narrowing from T11 to L4.     At L4-5 there is mild bilateral facet overgrowth. The disc space is  normal. There is no canal or foraminal narrowing.     At L5-S1 there is mild bilateral facet overgrowth. There is mild disc  space narrowing, diffuse disc desiccation, a 3 mm retrolisthesis of L5  on S1. There is a posterior central to left paracentral annular tear  with a minimal posterior central left paracentral broad-based disc  bulge, comes close the anteromedial aspect of the traversing S1 nerve  roots and may contact the anteromedial left S1 nerve root. There is no  canal narrowing and no foraminal narrowing.     IMPRESSION:  1.Very mild lumbar spondylosis with mild bilateral facet overgrowth  L4-S1, mild disc space narrowing, degenerative endplate changes at L5-S1  with 3 mm retrolisthesis of L5 on S1 and posterior central annular tear  with the posterior central to left paracentral bulging disc material  extending along the superior endplate of S1 that abuts the anterior  medial aspect of  the traversing left S1 nerve root but does not to  deflect or compress it. There is minimal spurring into the left foramen  with minimal left foraminal narrowing at L5-S1.     2. The remainder of the lumbar spine MRI is within normal limits.  Specifically no lumbar disc herniation, no lumbar canal or significant  foraminal narrowing is seen. The etiology of the lumbar radiculopathy is  not established on this exam.     This report was finalized on 1/26/2018      Medical Tests:         Summary of old records / correspondence / consultant report:         Request outside records:         ALLERGIES  Allergies   Allergen Reactions   • Oxycodone Nausea And Vomiting        PFSH:     The following portions of the patient's history were reviewed and updated as appropriate: Allergies / Current Medications / Past Medical History / Surgical History / Social History / Family History    PROBLEM LIST   Patient Active Problem List   Diagnosis   • Depression with anxiety   • Hypothyroidism due to Hashimoto's thyroiditis   • Insomnia   • Systemic lupus erythematosus (CMS/HCC)   • Vitamin D deficiency   • Irritable bowel syndrome with constipation   • Seasonal allergic rhinitis due to pollen   • DDD (degenerative disc disease), lumbar       PAST MEDICAL HISTORY  Past Medical History:   Diagnosis Date   • Anxiety    • Colon polyp    • Depression    • Disease of thyroid gland     HYPOTHYROIDISM D/T PARTIAL THYROIDECTOMY   • Frequent UTI    • Hemorrhoids    • Hypothyroidism    • Lupus     DX 3-2007   • Migraine    • Thrombocytopenia (CMS/HCC) 2012    due to ITP       SURGICAL HISTORY  Past Surgical History:   Procedure Laterality Date   • ANAL SPHINCTEROTOMY  2003       • APPENDECTOMY  2010   • COLONOSCOPY N/A 11/21/2016    Procedure: COLONOSCOPY;  Surgeon: Natalya Farias MD;  Location: [facility];  Service:    • HEMORRHOIDECTOMY  2003       • HEMORRHOIDECTOMY N/A 11/21/2016    Procedure: HEMORRHOIDECTOMY;   Surgeon: Natalya Farias MD;  Location: [facility] OR;  Service:    • INTRAUTERINE DEVICE INSERTION  08/2016   • THYROIDECTOMY, PARTIAL Right 06/2015   • TONSILLECTOMY         SOCIAL HISTORY  Social History     Socioeconomic History   • Marital status:      Spouse name: Alexander   • Number of children: 2   • Years of education: Not on file   • Highest education level: Not on file   Occupational History   • Occupation: (Medical Assistant) [employer]   Tobacco Use   • Smoking status: Never Smoker   • Smokeless tobacco: Never Used   Substance and Sexual Activity   • Alcohol use: Yes     Frequency: Monthly or less     Comment: RARELY   • Drug use: No   • Sexual activity: Yes     Partners: Male     Birth control/protection: IUD       FAMILY HISTORY  Family History   Problem Relation Age of Onset   • Colon polyps Mother    • Coronary artery disease Mother 60        non-smokers   • Valvular heart disease Mother    • Diabetes type II Mother    • Other Mother         pulmonary hypertension   • Clotting disorder Mother         superficial dvt   • Depression Mother    • Colon polyps Father    • Atrial fibrillation Father    • Hypertension Father    • Deep vein thrombosis Father    • Autoimmune disease Brother    • Depression Brother    • No Known Problems Daughter    • Dementia Maternal Uncle    • Dementia Maternal Grandmother    • Colon cancer Paternal Grandfather    • Colon cancer Maternal Uncle    • No Known Problems Daughter          **Dragon Disclaimer:   Much of this encounter note is an electronic transcription/translation of spoken language to printed text. The electronic translation of spoken language may permit erroneous, or at times, nonsensical words or phrases to be inadvertently transcribed. Although I have reviewed the note for such errors, some may still exist.       Template created by Adan Mcdonald MD

## 2019-04-30 NOTE — ASSESSMENT & PLAN NOTE
Tylenol Arthritis BID. Use Aleve PRN. Discussed ergonomics. May work with chiro or PT if helpful.

## 2019-05-13 DIAGNOSIS — K80.50 BILIARY COLIC: Primary | ICD-10-CM

## 2019-05-14 ENCOUNTER — TELEPHONE (OUTPATIENT)
Dept: INTERNAL MEDICINE | Age: 39
End: 2019-05-14

## 2019-05-14 NOTE — TELEPHONE ENCOUNTER
Regarding: Non-Urgent Medical Question  Contact: [phone]  ----- Message from [employer], Generic sent at 5/13/2019  7:43 PM EDT -----    Hi Dr Mcdonald!     I’ve been having some issues the last few weeks and quite a lot of pain as a matter of fact.   Dr. De Los Santos thinks it’s my gallbladder so he ordered an US which I’m having done at [facility] on Weds morning.   I just wanted to give you a heads up about this.  Dr. De Los Santos recommended seeing Dr Reardon for the surgery, but I guess that  all depends on test results.

## 2019-05-15 ENCOUNTER — HOSPITAL ENCOUNTER (OUTPATIENT)
Dept: ULTRASOUND IMAGING | Facility: HOSPITAL | Age: 39
Discharge: HOME | End: 2019-05-15
Admitting: INTERNAL MEDICINE

## 2019-05-15 DIAGNOSIS — K80.50 BILIARY COLIC: ICD-10-CM

## 2019-05-15 PROCEDURE — 76705 ECHO EXAM OF ABDOMEN: CPT

## 2019-05-21 ENCOUNTER — TELEPHONE (OUTPATIENT)
Dept: INTERNAL MEDICINE | Age: 39
End: 2019-05-21

## 2019-05-21 NOTE — TELEPHONE ENCOUNTER
Okay to split them into fluticasone nasal 1 spray BID and azelastine 0.1 % nasal spray 2 sprays BID. (dx: allergic rhinitis)

## 2019-05-21 NOTE — TELEPHONE ENCOUNTER
Regarding: Prescription Question  Contact: [phone]  ----- Message from [employer], Generic sent at 5/21/2019  2:31 PM EDT -----    Dr. Mcdonald, [facility] will not pay for the combination nose spray, but will pay for them separately.  Can you send in script for the 2 separate sprays to the [facility].    Thank you!

## 2019-05-22 ENCOUNTER — TELEPHONE (OUTPATIENT)
Dept: INTERNAL MEDICINE | Age: 39
End: 2019-05-22

## 2019-05-22 DIAGNOSIS — J30.89 ALLERGIC RHINITIS DUE TO OTHER ALLERGIC TRIGGER, UNSPECIFIED SEASONALITY: Primary | ICD-10-CM

## 2019-05-22 PROBLEM — J30.9 ALLERGIC RHINITIS: Status: ACTIVE | Noted: 2019-05-22

## 2019-05-22 RX ORDER — AZELASTINE 1 MG/ML
2 SPRAY, METERED NASAL 2 TIMES DAILY
Qty: 30 ML | Refills: 5 | Status: SHIPPED | OUTPATIENT
Start: 2019-05-22 | End: 2022-01-11

## 2019-07-30 DIAGNOSIS — F99 INSOMNIA DUE TO OTHER MENTAL DISORDER: Chronic | ICD-10-CM

## 2019-07-30 DIAGNOSIS — F51.05 INSOMNIA DUE TO OTHER MENTAL DISORDER: Chronic | ICD-10-CM

## 2019-07-30 RX ORDER — TRAZODONE HYDROCHLORIDE 100 MG/1
100 TABLET ORAL NIGHTLY
Qty: 30 TABLET | Refills: 2 | Status: SHIPPED | OUTPATIENT
Start: 2019-07-30 | End: 2019-10-07 | Stop reason: SDUPTHER

## 2019-08-14 RX ORDER — LEVOTHYROXINE SODIUM 0.1 MG/1
100 TABLET ORAL DAILY
Qty: 90 TABLET | Refills: 2 | OUTPATIENT
Start: 2019-08-14

## 2019-08-15 DIAGNOSIS — E03.8 HYPOTHYROIDISM DUE TO HASHIMOTO'S THYROIDITIS: Primary | Chronic | ICD-10-CM

## 2019-08-15 DIAGNOSIS — E06.3 HYPOTHYROIDISM DUE TO HASHIMOTO'S THYROIDITIS: Primary | Chronic | ICD-10-CM

## 2019-08-15 RX ORDER — LEVOTHYROXINE SODIUM 0.1 MG/1
100 TABLET ORAL DAILY
Qty: 90 TABLET | Refills: 2 | Status: SHIPPED | OUTPATIENT
Start: 2019-08-15 | End: 2019-11-06 | Stop reason: SDUPTHER

## 2019-09-18 ENCOUNTER — OFFICE VISIT (OUTPATIENT)
Dept: INTERNAL MEDICINE | Age: 39
End: 2019-09-18

## 2019-09-18 VITALS
DIASTOLIC BLOOD PRESSURE: 64 MMHG | TEMPERATURE: 97.5 F | SYSTOLIC BLOOD PRESSURE: 110 MMHG | HEART RATE: 71 BPM | BODY MASS INDEX: 25.96 KG/M2 | OXYGEN SATURATION: 98 % | WEIGHT: 156 LBS

## 2019-09-18 DIAGNOSIS — F41.8 DEPRESSION WITH ANXIETY: Primary | Chronic | ICD-10-CM

## 2019-09-18 DIAGNOSIS — M51.36 DDD (DEGENERATIVE DISC DISEASE), LUMBAR: ICD-10-CM

## 2019-09-18 DIAGNOSIS — M32.9 SYSTEMIC LUPUS ERYTHEMATOSUS, UNSPECIFIED SLE TYPE, UNSPECIFIED ORGAN INVOLVEMENT STATUS (HCC): Chronic | ICD-10-CM

## 2019-09-18 DIAGNOSIS — M54.41 LOW BACK PAIN WITH RIGHT-SIDED SCIATICA, UNSPECIFIED BACK PAIN LATERALITY, UNSPECIFIED CHRONICITY: ICD-10-CM

## 2019-09-18 DIAGNOSIS — E03.8 HYPOTHYROIDISM DUE TO HASHIMOTO'S THYROIDITIS: Chronic | ICD-10-CM

## 2019-09-18 DIAGNOSIS — E06.3 HYPOTHYROIDISM DUE TO HASHIMOTO'S THYROIDITIS: Chronic | ICD-10-CM

## 2019-09-18 LAB
T4 FREE SERPL-MCNC: 1.43 NG/DL (ref 0.93–1.7)
TSH SERPL DL<=0.005 MIU/L-ACNC: 1.78 UIU/ML (ref 0.27–4.2)

## 2019-09-18 PROCEDURE — 99214 OFFICE O/P EST MOD 30 MIN: CPT | Performed by: INTERNAL MEDICINE

## 2019-09-18 RX ORDER — DULOXETIN HYDROCHLORIDE 30 MG/1
30 CAPSULE, DELAYED RELEASE ORAL DAILY
Qty: 90 CAPSULE | Refills: 1 | Status: SHIPPED | OUTPATIENT
Start: 2019-09-18 | End: 2019-11-06 | Stop reason: SDUPTHER

## 2019-09-18 NOTE — PROGRESS NOTES
[facility]  LIV MARTIN M.D.      Taty Romo / 39 y.o. / female  09/18/2019      CHIEF COMPLAINT     Depression (5 mo f/u); Hypothyroidism; Insomnia; and Back Pain      ASSESSMENT & PLAN     Problem List Items Addressed This Visit        High    Depression with anxiety - Primary (Chronic)    Overview     Has taken medication since 19.          Current Assessment & Plan     Mildly decompensated.   Add duloxetine 30 mg to 60 mg daily.          Relevant Medications    DULoxetine (CYMBALTA) 60 MG capsule    DULoxetine (CYMBALTA) 30 MG capsule    Low back pain with right-sided sciatica    Current Assessment & Plan     Educational handout given on low back pain exercise.   Continue Tylenol and use NSAIDS OTC as needed.   Suggested PT if not improving.   Discussed ergonomics.             Medium    Hypothyroidism due to Hashimoto's thyroiditis (Chronic)    Overview     S/p partial thyroidectomy (right side) 2015 for adenoma.     Continue current dose of levothyroxine at 100 mcg.           Relevant Medications    levothyroxine (SYNTHROID, LEVOTHROID) 100 MCG tablet    Other Relevant Orders    TSH+Free T4       Low    Systemic lupus erythematosus (CMS/HCC) (Chronic)    Overview     Dx 2007 started on Plaquenil  *Bella    Clinically stable. Continue Plaquenil.          Current Assessment & Plan     To see Dr. Blanco tomorrow.          DDD (degenerative disc disease), lumbar (Chronic)    Overview     + Scoliosis of lumbar spine         Relevant Medications    acetaminophen (TYLENOL 8 HOUR ARTHRITIS PAIN) 650 MG 8 hr tablet    DULoxetine (CYMBALTA) 30 MG capsule        Orders Placed This Encounter   Procedures   • TSH+Free T4     New Medications Ordered This Visit   Medications   • DULoxetine (CYMBALTA) 30 MG capsule     Sig: Take 1 capsule by mouth Daily.     Dispense:  90 capsule     Refill:  1     To be taken together with 60 mg dose.       Summary/Discussion:  ·       Next Appointment with me:  Visit date not found    Return in about 4 months (around 1/18/2020) for Reassess chronic medical problems.      MEDICATIONS     Current Outpatient Medications   Medication Sig Dispense Refill   • acetaminophen (TYLENOL 8 HOUR ARTHRITIS PAIN) 650 MG 8 hr tablet Take 1 tablet by mouth Every 8 (Eight) Hours As Needed for Mild Pain  or Moderate Pain .     • azelastine (ASTELIN) 0.1 % nasal spray Place 2 sprays into the nostril(s) as directed by provider 2 (Two) Times a Day. 30 mL 5   • cetirizine (zyrTEC) 10 MG tablet Take 1 tablet by mouth Every Night.     • Cholecalciferol (VITAMIN D) 2000 UNITS capsule Take 3,000 Units by mouth Every Evening.     • DULoxetine (CYMBALTA) 60 MG capsule Take 1 capsule by mouth Every Morning Before Breakfast. 90 capsule 1   • fluticasone (VERAMYST) 27.5 MCG/SPRAY nasal spray Place 1 spray into the nostril(s) as directed by provider 2 (Two) Times a Day. 9.1 mL 5   • hydroxychloroquine (PLAQUENIL) 200 MG tablet Take 1 tablet by mouth 2 (Two) Times a Day. 60 tablet 11   • levothyroxine (SYNTHROID, LEVOTHROID) 100 MCG tablet Take 1 tablet by mouth Daily. 90 tablet 2   • Probiotic Product (PROBIOTIC-10 PO) Take  by mouth.     • traZODone (DESYREL) 100 MG tablet Take 1 tablet by mouth Every Night. 30 tablet 2     No current facility-administered medications for this visit.           VITAL SIGNS     Visit Vitals  /64   Pulse 71   Temp 97.5 °F (36.4 °C)   Wt 70.8 kg (156 lb)   SpO2 98%   BMI 25.96 kg/m²       BP Readings from Last 3 Encounters:   09/18/19 110/64   04/30/19 108/64   01/30/19 102/62     Wt Readings from Last 3 Encounters:   09/18/19 70.8 kg (156 lb)   04/30/19 72.6 kg (160 lb)   01/30/19 71.7 kg (158 lb)      Body mass index is 25.96 kg/m².      HISTORY OF PRESENT ILLNESS     Reviewed chief complaint and details of complaint as documented by staff.     Complains of persistent low back pain with right side sciatic symptoms. Prior MRI from 2018 reviewed with her. Standing or  pushing wheelchairs aggravate symptoms. Leaning forward for EKG also aggravates it. Takes Tylenol which helps modestly and uses NSAIDS as needed. Has appointment to see rheumatologist tomorrow for SLE. She complains of some night sweats and increased fatigue. Depression is mildly decompensated on duloxetine 60 mg daily. Sleeping fine with trazodone.       Patient Care Team:  Jeremy Mcdonald MD as PCP - General (Internal Medicine)  Shaheed Blanco MD as Consulting Physician (Rheumatology)  Angy Hunter MD as Consulting Physician (Obstetrics and Gynecology)      REVIEW OF SYSTEMS     Review of Systems  Constitutional neg x fatigue   Resp neg  CV neg  GI neg   neg  MuSk low back pain  Neuro right sciatic symptoms  Psych neg for suicidal ideation     PHYSICAL EXAMINATION     Physical Exam  Constitutional  No distress  Neck: No mass, thyroid nodule, thyromegaly or cervical LAD   Cardiovascular Rate  normal . Rhythm: regular . Heart sounds:  Normal  Pulmonary/Chest: Effort normal. Breath sounds normal.    MuSk no muscle weakness of legs   Neuro DTR achilles and patellar normal and strength normal bilateral lower extremities   Psychiatric  Alert. Judgment intact. Thought content normal. Mood mildly dysphoric       REVIEWED DATA     Labs:     Lab Results   Component Value Date     01/30/2019    K 4.1 01/30/2019    CALCIUM 9.2 01/30/2019    AST 22 01/30/2019    ALT 32 01/30/2019    BUN 13 01/30/2019    CREATININE 0.91 01/30/2019    CREATININE 0.8 01/30/2018    CREATININE 0.91 01/04/2016    EGFRIFNONA 81 01/30/2019    EGFRIFAFRI 93 01/30/2019       Lab Results   Component Value Date    GLU 88 01/30/2019       No results found for: LDL, HDL, TRIG, CHOLHDLRATIO    Lab Results   Component Value Date    TSH 0.473 01/30/2019    FREET4 1.35 01/30/2019       Lab Results   Component Value Date    WBC 2.9 (L) 01/30/2019    HGB 13.8 01/30/2019    HGB 13.1 11/15/2016    HGB 13.6 01/04/2016     (L) 01/30/2019       Lab  Results   Component Value Date    PROTEIN Negative 01/30/2019    GLUCOSEU Negative 01/30/2019    BLOODU Negative 01/30/2019    NITRITEU Negative 01/30/2019    LEUKOCYTESUR Trace (A) 01/30/2019       Imaging:   MRI OF LUMBAR SPINE WITHOUT CONTRAST 01/25/2018     CLINICAL HISTORY: Chronic right-sided low back pain, pain that radiates  down legs, right-sided sciatica, idiopathic scoliosis lumbosacral  region.     TECHNIQUE: Sagittal T1, proton density and fat-suppressed T2-weighted  images were obtained of the lumbar spine. In addition axial T2-weighted  images were obtained from L1 to S1. Thin cut axial T1-weighted images  were obtained angled through the interspaces from L1 to S1.     There are no prior lumbar spine imaging studies from [facility] for comparison.     FINDINGS: The distal thoracic cord and the conus is normal in signal  intensity. The conus terminates at the level of the superior endplate of  L2 which is within normal limits. There is very mild levoscoliotic  curvature of the lumbar spine, its apex is at the L3 lumbar level.     The T11-12, T12-L1, L1-2, L2-3, L3-4 disc spaces and facets are normal  with no canal or foraminal narrowing from T11 to L4.     At L4-5 there is mild bilateral facet overgrowth. The disc space is  normal. There is no canal or foraminal narrowing.     At L5-S1 there is mild bilateral facet overgrowth. There is mild disc  space narrowing, diffuse disc desiccation, a 3 mm retrolisthesis of L5  on S1. There is a posterior central to left paracentral annular tear  with a minimal posterior central left paracentral broad-based disc  bulge, comes close the anteromedial aspect of the traversing S1 nerve  roots and may contact the anteromedial left S1 nerve root. There is no  canal narrowing and no foraminal narrowing.     IMPRESSION:  1.Very mild lumbar spondylosis with mild bilateral facet overgrowth  L4-S1, mild disc space narrowing, degenerative endplate changes at  L5-S1  with 3 mm retrolisthesis of L5 on S1 and posterior central annular tear  with the posterior central to left paracentral bulging disc material  extending along the superior endplate of S1 that abuts the anterior  medial aspect of the traversing left S1 nerve root but does not to  deflect or compress it. There is minimal spurring into the left foramen  with minimal left foraminal narrowing at L5-S1.     2. The remainder of the lumbar spine MRI is within normal limits.  Specifically no lumbar disc herniation, no lumbar canal or significant  foraminal narrowing is seen. The etiology of the lumbar radiculopathy is  not established on this exam.     This report was finalized on 1/26/2018       Medical Tests:         Summary of old records / correspondence / consultant report:         Request outside records:         ALLERGIES  Allergies   Allergen Reactions   • Oxycodone Nausea And Vomiting        PFSH:     The following portions of the patient's history were reviewed and updated as appropriate: Allergies / Current Medications / Past Medical History / Surgical History / Social History / Family History    PROBLEM LIST   Patient Active Problem List   Diagnosis   • Depression with anxiety   • Hypothyroidism due to Hashimoto's thyroiditis   • Insomnia   • Systemic lupus erythematosus (CMS/HCC)   • Vitamin D deficiency   • Irritable bowel syndrome with constipation   • Seasonal allergic rhinitis due to pollen   • DDD (degenerative disc disease), lumbar   • Allergic rhinitis   • Low back pain with right-sided sciatica       PAST MEDICAL HISTORY  Past Medical History:   Diagnosis Date   • Anxiety    • Colon polyp    • Depression    • Disease of thyroid gland     HYPOTHYROIDISM D/T PARTIAL THYROIDECTOMY   • Frequent UTI    • Hemorrhoids    • Hypothyroidism    • Lupus     DX 3-2007   • Migraine    • Thrombocytopenia (CMS/HCC) 2012    due to ITP       SURGICAL HISTORY  Past Surgical History:   Procedure Laterality Date   •  ANAL SPHINCTEROTOMY  2003       • APPENDECTOMY  2010   • COLONOSCOPY N/A 11/21/2016    Procedure: COLONOSCOPY;  Surgeon: Natalya Farias MD;  Location: [facility] OR;  Service:    • HEMORRHOIDECTOMY  2003       • HEMORRHOIDECTOMY N/A 11/21/2016    Procedure: HEMORRHOIDECTOMY;  Surgeon: Natalya Farias MD;  Location: [facility] OR;  Service:    • INTRAUTERINE DEVICE INSERTION  08/2016   • THYROIDECTOMY, PARTIAL Right 06/2015   • TONSILLECTOMY         SOCIAL HISTORY  Social History     Socioeconomic History   • Marital status:      Spouse name: Alexander   • Number of children: 2   • Years of education: Not on file   • Highest education level: Not on file   Occupational History   • Occupation: (Medical Assistant) [employer]   Tobacco Use   • Smoking status: Never Smoker   • Smokeless tobacco: Never Used   Substance and Sexual Activity   • Alcohol use: Yes     Frequency: Monthly or less     Comment: RARELY   • Drug use: No   • Sexual activity: Yes     Partners: Male     Birth control/protection: IUD       FAMILY HISTORY  Family History   Problem Relation Age of Onset   • Colon polyps Mother    • Coronary artery disease Mother 60        non-smokers   • Valvular heart disease Mother    • Diabetes type II Mother    • Other Mother         pulmonary hypertension   • Clotting disorder Mother         superficial dvt   • Depression Mother    • Colon polyps Father    • Atrial fibrillation Father    • Hypertension Father    • Deep vein thrombosis Father    • Autoimmune disease Brother    • Depression Brother    • No Known Problems Daughter    • Dementia Maternal Uncle    • Dementia Maternal Grandmother    • Colon cancer Paternal Grandfather    • Colon cancer Maternal Uncle    • No Known Problems Daughter          **Dragon Disclaimer:   Much of this encounter note is an electronic transcription/translation of spoken language to printed text. The electronic translation of spoken language may  permit erroneous, or at times, nonsensical words or phrases to be inadvertently transcribed. Although I have reviewed the note for such errors, some may still exist.       Template created by Adan Mcdonald MD

## 2019-09-18 NOTE — ASSESSMENT & PLAN NOTE
Educational handout given on low back pain exercise.   Continue Tylenol and use NSAIDS OTC as needed.   Suggested PT if not improving.   Discussed ergonomics.

## 2019-10-07 ENCOUNTER — TELEPHONE (OUTPATIENT)
Dept: INTERNAL MEDICINE | Age: 39
End: 2019-10-07

## 2019-10-07 DIAGNOSIS — F99 INSOMNIA DUE TO OTHER MENTAL DISORDER: Chronic | ICD-10-CM

## 2019-10-07 DIAGNOSIS — F51.05 INSOMNIA DUE TO OTHER MENTAL DISORDER: Chronic | ICD-10-CM

## 2019-10-07 RX ORDER — TRAZODONE HYDROCHLORIDE 150 MG/1
150 TABLET ORAL NIGHTLY
Qty: 30 TABLET | Refills: 3 | Status: SHIPPED | OUTPATIENT
Start: 2019-10-07 | End: 2020-03-24 | Stop reason: SDUPTHER

## 2019-10-07 NOTE — TELEPHONE ENCOUNTER
Pt was taking 150 mg of trazodone instead 100 mg . Need new rx to be sent to [facility]  Last seen 9/18/19  Next maame 1/20/20

## 2019-10-07 NOTE — TELEPHONE ENCOUNTER
Regarding: Prescription Question  Contact: [phone]  ----- Message from [employer], Generic sent at 10/4/2019 10:19 PM EDT -----    I would like to request new script for trazodone. I been needing to take 150mg at night, so I have run out early. Could you please send a new script for 150mg every night to [facility] pharmacy.     Thank you!   TOMAS Romo

## 2019-10-10 ENCOUNTER — TRANSCRIBE ORDERS (OUTPATIENT)
Dept: PHYSICAL THERAPY | Facility: HOSPITAL | Age: 39
End: 2019-10-10

## 2019-10-10 DIAGNOSIS — M51.36 DEGENERATION OF LUMBAR INTERVERTEBRAL DISC: Primary | ICD-10-CM

## 2019-10-28 DIAGNOSIS — F41.8 DEPRESSION WITH ANXIETY: Chronic | ICD-10-CM

## 2019-10-28 RX ORDER — DULOXETIN HYDROCHLORIDE 60 MG/1
60 CAPSULE, DELAYED RELEASE ORAL
Qty: 90 CAPSULE | Refills: 1 | OUTPATIENT
Start: 2019-10-28

## 2019-10-29 ENCOUNTER — TELEPHONE (OUTPATIENT)
Dept: INTERNAL MEDICINE | Age: 39
End: 2019-10-29

## 2019-10-29 NOTE — TELEPHONE ENCOUNTER
Regarding: Non-Urgent Medical Question  Contact: [phone]  ----- Message from My[facility], Generic sent at 10/28/2019  8:13 PM EDT -----    I know we discussed my back pain at previous visits, but I’ve noticed that I have been swelling from the knees down, this is new. I’ve never experienced swelling before. My legs feel like they are the size of an elephant, and feel so tight that they could pop at any moment. Do you think this could be caused from the pinched nerve/sciatica that I have been experiencing? I am starting PT for my back on Friday. I saw Dr Blanco a few days after I saw you and discussed this with him, and he confirmed that I do have loss of sensation from my knees down. He ordered an MRI but insurance is making me do PT first. I’d just like your opinion on the swelling and if you think it could be back related.     Thank you.

## 2019-10-31 ENCOUNTER — OFFICE VISIT (OUTPATIENT)
Dept: INTERNAL MEDICINE | Age: 39
End: 2019-10-31

## 2019-10-31 VITALS
DIASTOLIC BLOOD PRESSURE: 60 MMHG | WEIGHT: 160 LBS | OXYGEN SATURATION: 98 % | HEART RATE: 81 BPM | BODY MASS INDEX: 26.66 KG/M2 | SYSTOLIC BLOOD PRESSURE: 100 MMHG | TEMPERATURE: 96.2 F | HEIGHT: 65 IN

## 2019-10-31 DIAGNOSIS — G62.9 PERIPHERAL POLYNEUROPATHY: ICD-10-CM

## 2019-10-31 DIAGNOSIS — R60.0 EDEMA OF BOTH LEGS: Primary | ICD-10-CM

## 2019-10-31 DIAGNOSIS — G89.29 CHRONIC RIGHT-SIDED LOW BACK PAIN WITH RIGHT-SIDED SCIATICA: ICD-10-CM

## 2019-10-31 DIAGNOSIS — M54.41 CHRONIC RIGHT-SIDED LOW BACK PAIN WITH RIGHT-SIDED SCIATICA: ICD-10-CM

## 2019-10-31 PROCEDURE — 99214 OFFICE O/P EST MOD 30 MIN: CPT | Performed by: INTERNAL MEDICINE

## 2019-10-31 NOTE — ASSESSMENT & PLAN NOTE
This is a new problem to this examiner.  Probably dependent edema. Reduce sodium. Taking Aleve is probably contributing. Recent renal function, ESR/CRP were normal.     Advised to wear compression stockings, reduce sodium, elevate legs.

## 2019-10-31 NOTE — ASSESSMENT & PLAN NOTE
Symptoms of neuropathy of BUE's. Referral to neurology.   Consider MRI Cspine and NCV/EMG studies.

## 2019-10-31 NOTE — PROGRESS NOTES
[facility] [place] [facility]ICINE  LIV MARTIN M.D.      Taty Romo / 39 y.o. / female  10/31/2019      CHIEF COMPLAINT     Leg Swelling (off and on for month , pain feel like going to pop from knee down)      ASSESSMENT & PLAN     Problem List Items Addressed This Visit        High    Low back pain with right-sided sciatica (Chronic)    Current Assessment & Plan     Reviewed MRI of lumbar spine from 2017. Symptoms do not correlate with findings.   Consider piriformis syndrome. To start PT this week. If not improving, consider either repeating MRI or referral to pain mgmt. Discussed.          Peripheral polyneuropathy    Current Assessment & Plan     Symptoms of neuropathy of BUE's. Referral to neurology.   Consider MRI Cspine and NCV/EMG studies.          Relevant Orders    Ambulatory Referral to Neurology    Edema of both legs - Primary    Current Assessment & Plan     This is a new problem to this examiner.  Probably dependent edema. Reduce sodium. Taking Aleve is probably contributing. Recent renal function, ESR/CRP were normal.     Advised to wear compression stockings, reduce sodium, elevate legs.              Orders Placed This Encounter   Procedures   • Ambulatory Referral to Neurology     No orders of the defined types were placed in this encounter.      Summary/Discussion:  ·       Next Appointment with me: 1/20/2020    Return for Return/call if not improving, Next scheduled follow up.      MEDICATIONS     Current Outpatient Medications   Medication Sig Dispense Refill   • acetaminophen (TYLENOL 8 HOUR ARTHRITIS PAIN) 650 MG 8 hr tablet Take 1 tablet by mouth Every 8 (Eight) Hours As Needed for Mild Pain  or Moderate Pain .     • azelastine (ASTELIN) 0.1 % nasal spray Place 2 sprays into the nostril(s) as directed by provider 2 (Two) Times a Day. 30 mL 5   • cetirizine (zyrTEC) 10 MG tablet Take 1 tablet by mouth Every Night.     • Cholecalciferol (VITAMIN D) 2000 UNITS capsule Take 3,000 Units by mouth Every  "Evening.     • DULoxetine (CYMBALTA) 30 MG capsule Take 1 capsule by mouth Daily. 90 capsule 1   • DULoxetine (CYMBALTA) 60 MG capsule Take 1 capsule by mouth Every Morning Before Breakfast. 90 capsule 1   • fluticasone (VERAMYST) 27.5 MCG/SPRAY nasal spray Place 1 spray into the nostril(s) as directed by provider 2 (Two) Times a Day. 9.1 mL 5   • hydroxychloroquine (PLAQUENIL) 200 MG tablet Take 1 tablet by mouth 2 (Two) Times a Day. 60 tablet 11   • KRILL OIL OMEGA-3 PO Take 350 mg by mouth Daily.     • levothyroxine (SYNTHROID, LEVOTHROID) 100 MCG tablet Take 1 tablet by mouth Daily. 90 tablet 2   • Probiotic Product (PROBIOTIC-10 PO) Take  by mouth.     • traZODone (DESYREL) 150 MG tablet Take 1 tablet by mouth Every Night. 30 tablet 3     No current facility-administered medications for this visit.           VITAL SIGNS     Visit Vitals  /60   Pulse 81   Temp 96.2 °F (35.7 °C)   Ht 165.1 cm (65\")   Wt 72.6 kg (160 lb)   SpO2 98%   BMI 26.63 kg/m²       BP Readings from Last 3 Encounters:   10/31/19 100/60   09/18/19 110/64   04/30/19 108/64     Wt Readings from Last 3 Encounters:   10/31/19 72.6 kg (160 lb)   09/18/19 70.8 kg (156 lb)   04/30/19 72.6 kg (160 lb)      Body mass index is 26.63 kg/m².      HISTORY OF PRESENT ILLNESS     Reviewed chief complaint and details of complaint as documented by staff.     Complains of edema of bilateral lower extremities worse by end of day and better by morning. On her feet all day at work.   Saw rheumatologist in September and creatinine, ESR/crp were normal.     Complains of BUE's feeling fatigable/weak worse with use. Also has numbness, paresthesia and pain. Has never had cervical MRI or seen by neurologist.     Complains of chronic right sacral pain with right side sciatica symptoms. Prior 2017 MRI of lumbar spine does not show abnormalities corresponding to her symptoms. She is to start PT tomorrow.       Patient Care Team:  Jeremy Mcdonald MD as PCP - General " (Internal Medicine)  Shaheed Blanco MD as Consulting Physician (Rheumatology)  Angy Hunter MD as Consulting Physician (Obstetrics and Gynecology)      REVIEW OF SYSTEMS     Review of Systems  Constitutional complains of weight gain   Resp neg  CV neg  MuSk per HPI   Neuro per HPI   Psych stable depression     PHYSICAL EXAMINATION     Physical Exam   Musculoskeletal:        Back:      Constitutional  No distress  MuSk : bilateral lower extremities trace edema up to mid-shin   Psychiatric  Alert. Judgment intact. Thought content normal. Mood normal      REVIEWED DATA     Labs:     9/2019: ESR, CRP, CPK , creatinine normal    Lab Results   Component Value Date     01/30/2019    K 4.1 01/30/2019    CALCIUM 9.2 01/30/2019    AST 26 09/19/2019    ALT 24 09/19/2019    BUN 13 01/30/2019    CREATININE 0.8 09/19/2019    CREATININE 0.91 01/30/2019    CREATININE 0.8 01/30/2018    EGFRIFNONA 81 01/30/2019    EGFRIFAFRI 93 01/30/2019       Lab Results   Component Value Date    GLU 88 01/30/2019       No results found for: LDL, HDL, TRIG, CHOLHDLRATIO    Lab Results   Component Value Date    TSH 1.780 09/18/2019    FREET4 1.43 09/18/2019       Lab Results   Component Value Date    WBC 2.65 (L) 09/19/2019    HGB 13.4 09/19/2019    HGB 13.8 01/30/2019    HGB 13.1 11/15/2016     (L) 09/19/2019       Lab Results   Component Value Date    PROTEIN Negative 01/30/2019    GLUCOSEU Negative 01/30/2019    BLOODU Negative 01/30/2019    NITRITEU Negative 01/30/2019    LEUKOCYTESUR Trace (A) 01/30/2019       Imaging:     MRI OF LUMBAR SPINE WITHOUT CONTRAST 01/25/2018     CLINICAL HISTORY: Chronic right-sided low back pain, pain that radiates  down legs, right-sided sciatica, idiopathic scoliosis lumbosacral  region.     TECHNIQUE: Sagittal T1, proton density and fat-suppressed T2-weighted  images were obtained of the lumbar spine. In addition axial T2-weighted  images were obtained from L1 to S1. Thin cut axial  T1-weighted images  were obtained angled through the interspaces from L1 to S1.     There are no prior lumbar spine imaging studies from [facility] for comparison.     FINDINGS: The distal thoracic cord and the conus is normal in signal  intensity. The conus terminates at the level of the superior endplate of  L2 which is within normal limits. There is very mild levoscoliotic  curvature of the lumbar spine, its apex is at the L3 lumbar level.     The T11-12, T12-L1, L1-2, L2-3, L3-4 disc spaces and facets are normal  with no canal or foraminal narrowing from T11 to L4.     At L4-5 there is mild bilateral facet overgrowth. The disc space is  normal. There is no canal or foraminal narrowing.     At L5-S1 there is mild bilateral facet overgrowth. There is mild disc  space narrowing, diffuse disc desiccation, a 3 mm retrolisthesis of L5  on S1. There is a posterior central to left paracentral annular tear  with a minimal posterior central left paracentral broad-based disc  bulge, comes close the anteromedial aspect of the traversing S1 nerve  roots and may contact the anteromedial left S1 nerve root. There is no  canal narrowing and no foraminal narrowing.     IMPRESSION:  1.Very mild lumbar spondylosis with mild bilateral facet overgrowth  L4-S1, mild disc space narrowing, degenerative endplate changes at L5-S1  with 3 mm retrolisthesis of L5 on S1 and posterior central annular tear  with the posterior central to left paracentral bulging disc material  extending along the superior endplate of S1 that abuts the anterior  medial aspect of the traversing left S1 nerve root but does not to  deflect or compress it. There is minimal spurring into the left foramen  with minimal left foraminal narrowing at L5-S1.     2. The remainder of the lumbar spine MRI is within normal limits.  Specifically no lumbar disc herniation, no lumbar canal or significant  foraminal narrowing is seen. The etiology of the lumbar  radiculopathy is  not established on this exam.     This report was finalized on 1/26/2018     Medical Tests:         Summary of old records / correspondence / consultant report:     Rheumatology note re: SLE    Request outside records:         ALLERGIES  Allergies   Allergen Reactions   • Oxycodone Nausea And Vomiting        PFSH:     The following portions of the patient's history were reviewed and updated as appropriate: Allergies / Current Medications / Past Medical History / Surgical History / Social History / Family History    PROBLEM LIST   Patient Active Problem List   Diagnosis   • Depression with anxiety   • Hypothyroidism due to Hashimoto's thyroiditis   • Insomnia   • Systemic lupus erythematosus (CMS/HCC)   • Vitamin D deficiency   • Irritable bowel syndrome with constipation   • Seasonal allergic rhinitis due to pollen   • DDD (degenerative disc disease), lumbar   • Allergic rhinitis   • Low back pain with right-sided sciatica   • Peripheral polyneuropathy   • Edema of both legs       PAST MEDICAL HISTORY  Past Medical History:   Diagnosis Date   • Anxiety    • Colon polyp    • Depression    • Disease of thyroid gland     HYPOTHYROIDISM D/T PARTIAL THYROIDECTOMY   • Frequent UTI    • Hemorrhoids    • Hypothyroidism    • Lupus (CMS/HCC)     DX 3-2007   • Migraine    • Thrombocytopenia (CMS/[facility]) 2012    due to ITP       SURGICAL HISTORY  Past Surgical History:   Procedure Laterality Date   • ANAL SPHINCTEROTOMY  2003       • APPENDECTOMY  2010   • COLONOSCOPY N/A 11/21/2016    Procedure: COLONOSCOPY;  Surgeon: Natalya Farias MD;  Location: [facility] OR;  Service:    • HEMORRHOIDECTOMY  2003       • HEMORRHOIDECTOMY N/A 11/21/2016    Procedure: HEMORRHOIDECTOMY;  Surgeon: Natalya Farias MD;  Location: [facility] OR;  Service:    • INTRAUTERINE DEVICE INSERTION  08/2016   • THYROIDECTOMY, PARTIAL Right 06/2015   • TONSILLECTOMY         SOCIAL HISTORY  Social History     Socioeconomic  History   • Marital status:      Spouse name: Alexander   • Number of children: 2   • Years of education: Not on file   • Highest education level: Not on file   Occupational History   • Occupation: (Medical Assistant) [employer]   Tobacco Use   • Smoking status: Never Smoker   • Smokeless tobacco: Never Used   Substance and Sexual Activity   • Alcohol use: Yes     Frequency: Monthly or less     Comment: RARELY   • Drug use: No   • Sexual activity: Yes     Partners: Male     Birth control/protection: IUD       FAMILY HISTORY  Family History   Problem Relation Age of Onset   • Colon polyps Mother    • Coronary artery disease Mother 60        non-smokers   • Valvular heart disease Mother    • Diabetes type II Mother    • Other Mother         pulmonary hypertension   • Clotting disorder Mother         superficial dvt   • Depression Mother    • Colon polyps Father    • Atrial fibrillation Father    • Hypertension Father    • Deep vein thrombosis Father    • Autoimmune disease Brother    • Depression Brother    • No Known Problems Daughter    • Dementia Maternal Uncle    • Dementia Maternal Grandmother    • Colon cancer Paternal Grandfather    • Colon cancer Maternal Uncle    • No Known Problems Daughter          **Dragon Disclaimer:   Much of this encounter note is an electronic transcription/translation of spoken language to printed text. The electronic translation of spoken language may permit erroneous, or at times, nonsensical words or phrases to be inadvertently transcribed. Although I have reviewed the note for such errors, some may still exist.       Template created by Adan Mcdonald MD

## 2019-10-31 NOTE — ASSESSMENT & PLAN NOTE
Reviewed MRI of lumbar spine from 2017. Symptoms do not correlate with findings.   Consider piriformis syndrome. To start PT this week. If not improving, consider either repeating MRI or referral to pain mgmt. Discussed.

## 2019-11-01 ENCOUNTER — HOSPITAL ENCOUNTER (OUTPATIENT)
Dept: PHYSICAL THERAPY | Facility: HOSPITAL | Age: 39
Setting detail: THERAPIES SERIES
Discharge: HOME | End: 2019-11-01

## 2019-11-01 DIAGNOSIS — M54.41 CHRONIC BILATERAL LOW BACK PAIN WITH BILATERAL SCIATICA: Primary | ICD-10-CM

## 2019-11-01 DIAGNOSIS — M54.42 CHRONIC BILATERAL LOW BACK PAIN WITH BILATERAL SCIATICA: Primary | ICD-10-CM

## 2019-11-01 DIAGNOSIS — G89.29 CHRONIC BILATERAL LOW BACK PAIN WITH BILATERAL SCIATICA: Primary | ICD-10-CM

## 2019-11-01 DIAGNOSIS — M79.89 SWELLING OF LOWER EXTREMITY: ICD-10-CM

## 2019-11-01 PROCEDURE — 97162 PT EVAL MOD COMPLEX 30 MIN: CPT

## 2019-11-01 PROCEDURE — 97110 THERAPEUTIC EXERCISES: CPT

## 2019-11-01 NOTE — THERAPY EVALUATION
Outpatient Physical Therapy Ortho Initial Evaluation  [facility]     Patient Name: Taty Romo  : 1980  MRN: [id number]  Today's Date: 2019      Visit Date: 2019    Patient Active Problem List   Diagnosis   • Depression with anxiety   • Hypothyroidism due to Hashimoto's thyroiditis   • Insomnia   • Systemic lupus erythematosus (CMS/HCC)   • Vitamin D deficiency   • Irritable bowel syndrome with constipation   • Seasonal allergic rhinitis due to pollen   • DDD (degenerative disc disease), lumbar   • Allergic rhinitis   • Low back pain with right-sided sciatica   • Peripheral polyneuropathy   • Edema of both legs        Past Medical History:   Diagnosis Date   • Anxiety    • Colon polyp    • Depression    • Disease of thyroid gland     HYPOTHYROIDISM D/T PARTIAL THYROIDECTOMY   • Frequent UTI    • Hemorrhoids    • Hypothyroidism    • Lupus (CMS/HCC)     DX 3-   • Migraine    • Thrombocytopenia (CMS/HCC)     due to ITP        Past Surgical History:   Procedure Laterality Date   • ANAL SPHINCTEROTOMY         • APPENDECTOMY     • COLONOSCOPY N/A 2016    Procedure: COLONOSCOPY;  Surgeon: Natalya Farias MD;  Location: [facility];  Service:    • HEMORRHOIDECTOMY         • HEMORRHOIDECTOMY N/A 2016    Procedure: HEMORRHOIDECTOMY;  Surgeon: Natalya Farias MD;  Location: [facility];  Service:    • INTRAUTERINE DEVICE INSERTION  2016   • THYROIDECTOMY, PARTIAL Right 2015   • TONSILLECTOMY         Visit Dx:     ICD-10-CM ICD-9-CM   1. Chronic bilateral low back pain with bilateral sciatica M54.42 724.2    M54.41 724.3    G89.29 338.29   2. Swelling of lower extremity M79.89 729.81         Patient History     Row Name 19 0700             History    Chief Complaint  Pain  -LB      Type of Pain  Back pain  -LB      Date Current Problem(s) Began  19  -LB      Brief Description of Current Complaint  Pt reports chronic RLE  "pain > LLE that was intermittent pain in BLE's for many years. She reports multiple falls down basement stairs and several hard landings on back but no fx or certain injury. One fall was harder than others and she reports BLE numbness that was instant on impact but did go away. Now she reports BLE pain that is \"chronic\" R > L to feet described as pain and numbness. The only relief she gets is lying flat on her back. She does wake up at night due to pain. Heat helps. SHe occasionally takes Tylenol Arthritis and was taking Aleve. In last 2 weeks she has also noticed BLE swelling of no known cause. She stopped taking Aleve to see if this would reduce swelling.  -LB      Previous treatment for THIS PROBLEM  Medication  -LB      Patient/Caregiver Goals  Relieve pain;Return to prior level of function;Know what to do to help the symptoms  -LB      Hand Dominance  right-handed  -LB      Occupation/sports/leisure activities  Pt works in [employer] building in [employer].  -LB      Patient seeing anyone else for problem(s)?  yes  -LB      How has patient tried to help current problem?  rest, medication  -LB      What clinical tests have you had for this problem?  MRI  -LB      Results of Clinical Tests  see chart symptoms do not correlate with findings per MD report  -LB      Surgery/Hospitalization  no prior surgeries  -LB         Pain     Pain Location  Back  -LB      Pain at Present  6  -LB      Pain at Best  6  -LB      Pain at Worst  6  -LB      Pain Frequency  Constant/continuous  -LB      Pain Description  Shooting;Discomfort;Numbness  -LB      What Performance Factors Make the Current Problem(s) WORSE?  sitting, bending forward, lifting   -LB      What Performance Factors Make the Current Problem(s) BETTER?  laying supine  -LB      Tolerance Time- Standing  no issue  -LB      Tolerance Time- Sitting  inc pain   -LB      Tolerance Time- Walking  no issue  -LB      Tolerance Time- Lying  reduced pain in supine   -LB      Is " your sleep disturbed?  Yes  -LB      What position do you sleep in?  Supine  -LB      Difficulties at work?  Pain with forward bending over patients.  -LB      Difficulties with ADL's?  Pain with lifting, cleaning tasks.  -LB      Difficulties with recreational activities?  Pt does not participate due to pain.  -LB         Fall Risk Assessment    Any falls in the past year:  No  -LB         Services    Prior Rehab/Home Health Experiences  No  -LB      Are you currently receiving Home Health services  No  -LB      Do you plan to receive Home Health services in the near future  No  -LB         Daily Activities    Primary Language  English  -LB      Pt Participated in POC and Goals  Yes  -LB         Safety    Are you being hurt, hit, or frightened by anyone at home or in your life?  No  -LB      Are you being neglected by a caregiver  No  -LB        User Key  (r) = Recorded By, (t) = Taken By, (c) = Cosigned By    Initials Name Provider Type    Janice Can PT Physical Therapist          PT Ortho     Row Name 11/01/19 0700       Subjective Comments    Subjective Comments  I have recently been having swelling in my lower legs. My LE's are sore.  -LB       Subjective Pain    Pre-Treatment Pain Level  6  -LB       Posture/Observations    Posture/Observations Comments  slight anterior pelvic tilt  -LB       DTR- Lower Quarter Clearing    Patellar tendon (L2-4)  Bilateral:;2- Normal response  -LB       Sensory Screen for Light Touch- Lower Quarter Clearing    L2 (anterior mid thigh)  Intact  -LB    L3 (distal anterior thigh)  Intact  -LB    L4 (medial lower leg/foot)  Bilateral:;Diminished  -LB    L5 (lateral lower leg/great toe)  Bilateral:;Diminished  -LB    S1 (bottom of foot)  Bilateral:;Diminished  -LB       Myotomal Screen- Lower Quarter Clearing    Hip flexion (L2)  Bilateral:;4 (Good)  -LB    Knee extension (L3)  Bilateral:;5 (Normal)  -LB    Ankle DF (L4)  Bilateral:;5 (Normal)  -LB    Ankle PF (S1)   Bilateral:;4 (Good)  -LB    Knee flexion (S2)  Bilateral:;5 (Normal)  -LB       Lumbar ROM Screen- Lower Quarter Clearing    Lumbar Flexion  Impaired full ROM but painful  -LB    Lumbar Extension  Normal reduced symptoms  -LB    Lumbar Lateral Flexion  Normal  -LB    Lumbar Rotation  Normal  -LB       Lumbar/SI Special Tests    Slump Test (Neural Tension)  Bilateral:;Positive  -LB    SLR (Neural Tension)  Right:;Positive  -LB    JOSSELYN (hip vs. SI Dysfunction)  Right:;Positive  -LB       Lumbosacral Palpation    Spinous Process  Tender  -LB    Piriformis  Guarded/taut  -LB    Gluteus Bassam  Guarded/taut  -LB    Erector Spinae (Paraspinals)  Guarded/taut  -LB       General ROM    GENERAL ROM COMMENTS  BLE WFL  -LB       Sensation    Light Touch  Partial deficits in the RLE;Partial deficits in the LLE  -LB       Lower Extremity Flexibility    Hamstrings  Bilateral:;Mildly limited  -LB    Hip External Rotators  Mildly limited  -LB    Hip Internal Rotators  WNL  -LB    Gastrocnemius  Bilateral:  -LB       Gait/Stairs Assessment/Training    Ocean Level (Gait)  independent  -LB      User Key  (r) = Recorded By, (t) = Taken By, (c) = Cosigned By    Initials Name Provider Type    LB Janice Mcfarland, PT Physical Therapist                      Therapy Education  Education Details: discussed changing position for work duties, incorporating standing extension into work day to reduce symptoms, stretching without overstretching/causing pain  Given: HEP, Symptoms/condition management, Posture/body mechanics, Mobility training  Program: New  How Provided: Verbal, Demonstration, Written  Provided to: Patient  Level of Understanding: Teach back education performed, Verbalized, Demonstrated     PT OP Goals     Row Name 11/01/19 0700          PT Short Term Goals    STG Date to Achieve  11/15/19  -LB     STG 1  Pt will demonstrate understanding and compliance with initial HEP.  -LB     STG 1 Progress  New  -LB     STG 2  Pt will  report modifications to work space when appropriate and posture during work day to reduce stress on lumbar spine.  -LB     STG 2 Progress  New  -LB     STG 3  Pt will trial compression socks or garment to reduce LE swelling and tenderness.  -LB     STG 3 Progress  New  -LB        Long Term Goals    LTG Date to Achieve  12/01/19  -LB     LTG 1  Pt will report pain at worse 4/10 or better.  -LB     LTG 1 Progress  New  -LB     LTG 2  Pt will demonstrate AROM lumbar flexion without inc LBP.  -LB     LTG 2 Progress  New  -LB     LTG 3  Pt will report centralization of symptoms from B feet to B knees or more proximal.  -LB     LTG 3 Progress  New  -LB        Time Calculation    PT Goal Re-Cert Due Date  01/30/20  -LB       User Key  (r) = Recorded By, (t) = Taken By, (c) = Cosigned By    Initials Name Provider Type    Janice Can, PT Physical Therapist          PT Assessment/Plan     Row Name 11/01/19 1205          PT Assessment    Functional Limitations  Limitation in home management;Performance in leisure activities;Performance in work activities;Limitations in community activities;Performance in self-care ADL;Impaired locomotion;Limitations in functional capacity and performance  -LB     Impairments  Edema;Impaired flexibility;Sensation;Poor body mechanics;Range of motion;Posture;Pain;Muscle strength;Joint mobility  -LB     Assessment Comments  Pt is 39 y.o. female referred to outpatient physical therapy for evaluation and treatment of  evolving  bilateral LE pain, numbness, swelling.  Patient presents with painful lumbar flexion AROM, slight LE flexibility deficits, dec core strength, and poor body mechanics with work duties. PMHx consistent with SLE, Hashimoto's, chronic LE pain. Personal factors affecting her care include complex nature of symptoms, job duties requiring forward flexion over patients for extended periods of time, sedentary lifestyle. Pt demonstrates signs and symptoms  consistent with lumbar  radiculopathy.  Pt scored 60% disability on the Modified Oswestry. Pt is limited in their ability to participate in recreational activities, tolerate sitting for prolonged periods, work duties, sleeping. She will benefit from continued skilled PT services to address functional deficits. Thank you for this referral.  -LB     Please refer to paper survey for additional self-reported information  Yes  -LB     Rehab Potential  Good  -LB     Patient/caregiver participated in establishment of treatment plan and goals  Yes  -LB     Patient would benefit from skilled therapy intervention  Yes  -LB        PT Plan    PT Frequency  2x/week  -LB     Predicted Duration of Therapy Intervention (Therapy Eval)  4 weeks   -LB     Planned CPT's?  PT EVAL MOD COMPLELITY: 56369;PT RE-EVAL: 17758;PT THER ACT EA 15 MIN: 72502;PT THER PROC EA 15 MIN: 93857;PT MANUAL THERAPY EA 15 MIN: 59101;PT GAIT TRAINING EA 15 MIN: 02758;PT HOT OR COLD PACK TREAT MCARE;PT HOT/COLD PACK WC NONMCARE: 67103;PT NEUROMUSC RE-EDUCATION EA 15 MIN: 93660;PT ELECTRICAL STIM UNATTEND: ;PT ULTRASOUND EA 15 MIN: 64867  -LB     PT Plan Comments  Assess tolerance to HEP, continue core/hip stabilization, education to improve body mechanics with work duties, consider extension based exercises, modalities for pain control.   -LB       User Key  (r) = Recorded By, (t) = Taken By, (c) = Cosigned By    Initials Name Provider Type    Janice Can, PT Physical Therapist            OP Exercises     Row Name 11/01/19 0700             Subjective Comments    Subjective Comments  I have recently been having swelling in my lower legs. My LE's are sore.  -LB         Subjective Pain    Able to rate subjective pain?  yes  -LB      Pre-Treatment Pain Level  6  -LB         Total Minutes    37066 - PT Therapeutic Exercise Minutes  15  -LB         Exercise 1    Exercise Name 1  piriformis stretch  -LB      Reps 1  3  -LB      Time 1  20  -LB         Exercise 2    Exercise Name  2  90/90 sciatic   -LB      Reps 2  3  -LB      Time 2  20  -LB         Exercise 3    Exercise Name 3  glute set + mini bridge  -LB      Reps 3  10  -LB      Time 3  5  -LB         Exercise 4    Exercise Name 4  TA activation in HL  -LB      Reps 4  10  -LB      Time 4  5  -LB        User Key  (r) = Recorded By, (t) = Taken By, (c) = Cosigned By    Initials Name Provider Type    Janice Can, PT Physical Therapist                        Outcome Measure Options: Modifed Owestry  Modified Oswestry  Modified Oswestry Score/Comments: 60% disability       Time Calculation:     Start Time: 0745  Stop Time: 0830  Time Calculation (min): 45 min  Total Timed Code Minutes- PT: 15 minute(s)     Therapy Charges for Today     Code Description Service Date Service Provider Modifiers Qty    28121749017 HC PT THER PROC EA 15 MIN 11/1/2019 Janice Mcfarland, PT GP 1    77547906998 HC PT EVAL MOD COMPLEXITY 2 11/1/2019 Janice Mcfarland, PT GP 1          PT G-Codes  Outcome Measure Options: Modifed Owestry  Modified Oswestry Score/Comments: 60% disability          Janice Mcfarland PT  11/1/2019

## 2019-11-06 DIAGNOSIS — M51.36 DDD (DEGENERATIVE DISC DISEASE), LUMBAR: ICD-10-CM

## 2019-11-06 DIAGNOSIS — E03.8 HYPOTHYROIDISM DUE TO HASHIMOTO'S THYROIDITIS: Chronic | ICD-10-CM

## 2019-11-06 DIAGNOSIS — F41.8 DEPRESSION WITH ANXIETY: Chronic | ICD-10-CM

## 2019-11-06 DIAGNOSIS — E06.3 HYPOTHYROIDISM DUE TO HASHIMOTO'S THYROIDITIS: Chronic | ICD-10-CM

## 2019-11-06 RX ORDER — DULOXETIN HYDROCHLORIDE 60 MG/1
60 CAPSULE, DELAYED RELEASE ORAL
Qty: 90 CAPSULE | Refills: 1 | Status: SHIPPED | OUTPATIENT
Start: 2019-11-06 | End: 2020-03-12 | Stop reason: SDUPTHER

## 2019-11-06 RX ORDER — LEVOTHYROXINE SODIUM 0.1 MG/1
100 TABLET ORAL DAILY
Qty: 90 TABLET | Refills: 3 | Status: SHIPPED | OUTPATIENT
Start: 2019-11-06 | End: 2020-11-05 | Stop reason: SDUPTHER

## 2019-11-06 RX ORDER — DULOXETIN HYDROCHLORIDE 30 MG/1
30 CAPSULE, DELAYED RELEASE ORAL DAILY
Qty: 90 CAPSULE | Refills: 1 | Status: SHIPPED | OUTPATIENT
Start: 2019-11-06 | End: 2020-03-12 | Stop reason: SDUPTHER

## 2019-11-06 NOTE — TELEPHONE ENCOUNTER
Regarding: Prescription Question  Contact: [phone]  ----- Message from [employer], Generic sent at 11/6/2019 11:05 AM EST -----    Recently Dr. Mcdonald added Cymbalta 30mg to take in addition to Cymbalta 60mg.  I am ready for refill on 60mg, but still have some 30mg, but would like to get them synchronized at the pharmacy.  Can you please send refill for both the 60mg and 30mg of Cymbalta, as well as refill levothyroxine.  Just trying to limit my pharmacy trips if possible.    Thank you!  TOMAS Romo

## 2019-11-12 ENCOUNTER — HOSPITAL ENCOUNTER (OUTPATIENT)
Dept: PHYSICAL THERAPY | Facility: HOSPITAL | Age: 39
Setting detail: THERAPIES SERIES
Discharge: HOME | End: 2019-11-12

## 2019-11-12 DIAGNOSIS — M79.89 SWELLING OF LOWER EXTREMITY: ICD-10-CM

## 2019-11-12 DIAGNOSIS — M54.42 CHRONIC BILATERAL LOW BACK PAIN WITH BILATERAL SCIATICA: Primary | ICD-10-CM

## 2019-11-12 DIAGNOSIS — G89.29 CHRONIC BILATERAL LOW BACK PAIN WITH BILATERAL SCIATICA: Primary | ICD-10-CM

## 2019-11-12 DIAGNOSIS — M54.41 CHRONIC BILATERAL LOW BACK PAIN WITH BILATERAL SCIATICA: Primary | ICD-10-CM

## 2019-11-12 PROCEDURE — 97110 THERAPEUTIC EXERCISES: CPT

## 2019-11-12 NOTE — THERAPY TREATMENT NOTE
Outpatient Physical Therapy Ortho Treatment Note  [facility]     Patient Name: Taty Romo  : 1980  MRN: [id number]  Today's Date: 2019      Visit Date: 2019    Visit Dx:    ICD-10-CM ICD-9-CM   1. Chronic bilateral low back pain with bilateral sciatica M54.42 724.2    M54.41 724.3    G89.29 338.29   2. Swelling of lower extremity M79.89 729.81       Patient Active Problem List   Diagnosis   • Depression with anxiety   • Hypothyroidism due to Hashimoto's thyroiditis   • Insomnia   • Systemic lupus erythematosus (CMS/HCC)   • Vitamin D deficiency   • Irritable bowel syndrome with constipation   • Seasonal allergic rhinitis due to pollen   • DDD (degenerative disc disease), lumbar   • Allergic rhinitis   • Low back pain with right-sided sciatica   • Peripheral polyneuropathy   • Edema of both legs        Past Medical History:   Diagnosis Date   • Anxiety    • Colon polyp    • Depression    • Disease of thyroid gland     HYPOTHYROIDISM D/T PARTIAL THYROIDECTOMY   • Frequent UTI    • Hemorrhoids    • Hypothyroidism    • Lupus (CMS/HCC)     DX 3-2007   • Migraine    • Thrombocytopenia (CMS/[facility])     due to ITP        Past Surgical History:   Procedure Laterality Date   • ANAL SPHINCTEROTOMY         • APPENDECTOMY     • COLONOSCOPY N/A 2016    Procedure: COLONOSCOPY;  Surgeon: Natalya Farias MD;  Location: [facility];  Service:    • HEMORRHOIDECTOMY         • HEMORRHOIDECTOMY N/A 2016    Procedure: HEMORRHOIDECTOMY;  Surgeon: Natalya Farias MD;  Location: [facility];  Service:    • INTRAUTERINE DEVICE INSERTION  2016   • THYROIDECTOMY, PARTIAL Right 2015   • TONSILLECTOMY                         PT Assessment/Plan     Row Name 19 0700          PT Assessment    Assessment Comments  Pt returns for first f/u visit stating no issues with initial HEP.  She did require verbal and tactile cuing for form and intensity but notes  that she feels some mild improvement from doing the HEP.  Progressed stretching and strengthening/stabilization and instructed in seated poitions for neural glide and piri stretch that she can perform at work periodically through her day.  Practiced body mechanics with reaching and discussed strategies for applying the EKG electrodes that could be less stressful to her LB.  Pt notes she began wearing comporerssion socks as recommended to her and that seems to also have helped.  -JA        PT Plan    PT Plan Comments  assess response to last visit.  add tband to lateral step if ready; work on body mechanics with work and home-related tasks; cont manual rolling, MH prn in 90/90 position  -XOCHILT       User Key  (r) = Recorded By, (t) = Taken By, (c) = Cosigned By    Initials Name Provider Type    Mar Tripp, PT Physical Therapist          Modalities     Row Name 11/12/19 0700             Moist Heat    MH Applied  Yes  -XOCHILT        User Key  (r) = Recorded By, (t) = Taken By, (c) = Cosigned By    Initials Name Provider Type    Mar Tripp, PT Physical Therapist        OP Exercises     Row Name 11/12/19 0700             Subjective Comments    Subjective Comments  No problem with the exercises.  -JA         Subjective Pain    Able to rate subjective pain?  yes  -JA      Pre-Treatment Pain Level  6  -JA         Total Minutes    77792 - PT Therapeutic Exercise Minutes  38  -JA         Exercise 1    Exercise Name 1  Nustep UE/LE  -JA      Time 1  5min  -JA      Additional Comments  L4  -JA         Exercise 2    Exercise Name 2  TA activation in HL  -JA      Cueing 2  Tactile;Verbal  -JA      Reps 2  10  -JA      Time 2  5sec  -JA         Exercise 3    Exercise Name 3  piriformis stretch  -JA      Cueing 3  Verbal;Tactile  -JA      Reps 3  3  -JA      Time 3  20sec  -JA      Additional Comments  jaun  -JA         Exercise 4    Exercise Name 4  90/90 sciatic   -JA      Reps 4  3  -JA      Time 4  20sec  -JA       Additional Comments  jaun  -JA         Exercise 5    Exercise Name 5  glute set + mini bridge  -JA      Reps 5  10  -JA      Time 5  5sec  -JA      Additional Comments  cued feet close together   -JA         Exercise 6    Exercise Name 6  LTR  -JA      Cueing 6  Verbal;Tactile  -JA      Reps 6  5  -JA      Time 6  5sec  -JA         Exercise 7    Exercise Name 7  added seated piriformis stretch  -JA      Cueing 7  Demo;Verbal  -JA      Reps 7  3  -JA      Time 7  20sec  -JA         Exercise 8    Exercise Name 8  added seated neural glide for sciatic n. LAQ position  -JA      Reps 8  3  -JA      Time 8  20sec  -JA      Additional Comments  fleex foot 10-20 times during 20 sec hold  -JA         Exercise 9    Exercise Name 9  lateral stepping (no resistance yet--pt felt the target muscle fatigue)  -JA      Reps 9  3 for 10'  -JA         Exercise 10    Exercise Name 10  prone over pillow for foam pool noodle rolling   -JA      Time 10  5 min  -JA      Additional Comments  very TTP jaun glutes/piri/lumbosacral pvm  -JA        User Key  (r) = Recorded By, (t) = Taken By, (c) = Cosigned By    Initials Name Provider Type    Mar Tripp, PT Physical Therapist                       PT OP Goals     Row Name 11/12/19 0800          PT Short Term Goals    STG Date to Achieve  11/15/19  -XOCHILT     STG 1  Pt will demonstrate understanding and compliance with initial HEP.  -XOCHILT     STG 1 Progress  Ongoing;Progressing  -XOCHILT     STG 1 Progress Comments  cuing for form and intensity, added positional options  -     STG 2  Pt will report modifications to work space when appropriate and posture during work day to reduce stress on lumbar spine.  -XOCHILT     STG 2 Progress  Ongoing  -     STG 2 Progress Comments  discussed with patient to explore changes in posture/body mechanics  -     STG 3  Pt will trial compression socks or garment to reduce LE swelling and tenderness.  -JA     STG 3 Progress  Met  -     STG 3 Progress  "Comments  pt is wearing compression socks now and reports good response  -XOCHILT        Long Term Goals    LTG Date to Achieve  12/01/19  -XOCHILT     LTG 1  Pt will report pain at worse 4/10 or better.  -XOCHILT     LTG 1 Progress  New  -XOCHILT     LTG 2  Pt will demonstrate AROM lumbar flexion without inc LBP.  -XOCHILT     LTG 2 Progress  New  -XOCHILT     LTG 3  Pt will report centralization of symptoms from B feet to B knees or more proximal.  -XOCHILT     LTG 3 Progress  New  -XOCHILT       User Key  (r) = Recorded By, (t) = Taken By, (c) = Cosigned By    Initials Name Provider Type    Mar Tripp, PT Physical Therapist          Therapy Education  Education Details: Discussed and practiced body mechanics for reaching across pt to apply electrodes and problem solved perhaps having some pts sit to allow her to apply them to L side before thy lie down and she stated she could also ask the pt to roll toward her if they are lying down. Also had her stand with one foot back to reduce stress across LB--she noted the difference. Added LTR, and added option of seated piri stretch and nerve glides and added lateral stepping -no resistance yet (pt noted feeling the muscle \"burn\").  Instruccted in 90/90 decompression.  Given: HEP, Symptoms/condition management, Posture/body mechanics, Mobility training  Program: Reinforced, Progressed  How Provided: Verbal, Demonstration, Written  Provided to: Patient  Level of Understanding: Teach back education performed              Time Calculation:   Start Time: 0700  Stop Time: 0746  Time Calculation (min): 46 min  Therapy Charges for Today     Code Description Service Date Service Provider Modifiers Qty    41838598111 HC PT THER PROC EA 15 MIN 11/12/2019 Mar Murphy, PT GP 3    [id number]  PT HOT OR COLD PACK TREAT MCARE 11/12/2019 Mar Murphy PT GP 1                    Mar Murphy PT  11/12/2019     "

## 2019-11-14 ENCOUNTER — HOSPITAL ENCOUNTER (OUTPATIENT)
Dept: PHYSICAL THERAPY | Facility: HOSPITAL | Age: 39
Setting detail: THERAPIES SERIES
Discharge: HOME | End: 2019-11-14

## 2019-11-14 DIAGNOSIS — M54.42 CHRONIC BILATERAL LOW BACK PAIN WITH BILATERAL SCIATICA: Primary | ICD-10-CM

## 2019-11-14 DIAGNOSIS — M54.41 CHRONIC BILATERAL LOW BACK PAIN WITH BILATERAL SCIATICA: Primary | ICD-10-CM

## 2019-11-14 DIAGNOSIS — M79.89 SWELLING OF LOWER EXTREMITY: ICD-10-CM

## 2019-11-14 DIAGNOSIS — G89.29 CHRONIC BILATERAL LOW BACK PAIN WITH BILATERAL SCIATICA: Primary | ICD-10-CM

## 2019-11-14 PROCEDURE — 97110 THERAPEUTIC EXERCISES: CPT

## 2019-11-14 PROCEDURE — 97012 MECHANICAL TRACTION THERAPY: CPT

## 2019-11-14 NOTE — THERAPY TREATMENT NOTE
Outpatient Physical Therapy Ortho Treatment Note  [facility]     Patient Name: Taty Romo  : 1980  MRN: [id number]  Today's Date: 2019      Visit Date: 2019    Visit Dx:    ICD-10-CM ICD-9-CM   1. Chronic bilateral low back pain with bilateral sciatica M54.42 724.2    M54.41 724.3    G89.29 338.29   2. Swelling of lower extremity M79.89 729.81       Patient Active Problem List   Diagnosis   • Depression with anxiety   • Hypothyroidism due to Hashimoto's thyroiditis   • Insomnia   • Systemic lupus erythematosus (CMS/HCC)   • Vitamin D deficiency   • Irritable bowel syndrome with constipation   • Seasonal allergic rhinitis due to pollen   • DDD (degenerative disc disease), lumbar   • Allergic rhinitis   • Low back pain with right-sided sciatica   • Peripheral polyneuropathy   • Edema of both legs        Past Medical History:   Diagnosis Date   • Anxiety    • Colon polyp    • Depression    • Disease of thyroid gland     HYPOTHYROIDISM D/T PARTIAL THYROIDECTOMY   • Frequent UTI    • Hemorrhoids    • Hypothyroidism    • Lupus (CMS/HCC)     DX 3-   • Migraine    • Thrombocytopenia (CMS/[facility])     due to ITP        Past Surgical History:   Procedure Laterality Date   • ANAL SPHINCTEROTOMY         • APPENDECTOMY     • COLONOSCOPY N/A 2016    Procedure: COLONOSCOPY;  Surgeon: Natalya Farias MD;  Location: [facility];  Service:    • HEMORRHOIDECTOMY         • HEMORRHOIDECTOMY N/A 2016    Procedure: HEMORRHOIDECTOMY;  Surgeon: Natalya Farias MD;  Location: [facility];  Service:    • INTRAUTERINE DEVICE INSERTION  2016   • THYROIDECTOMY, PARTIAL Right 2015   • TONSILLECTOMY                         PT Assessment/Plan     Row Name 19 0700          PT Assessment    Assessment Comments  Pt reports not sleeping well due to pain, wakes up every 2 hours and in the morning it takes her 20-30 minutes to get moving.  Added Lumbar  traction today.  She demonstrates good understanding of ther ex thus far and is receptive to education for body mechanics.  Pt reports performing the seated stretches at work.  -JA        PT Plan    PT Plan Comments  assess response to L-tx; cont foam rolling, L-tx, core andlumbar strength/stabilization; Next visit add quadruped t.a. if tolerated, add tband lateral stepping if tolerated  -JA       User Key  (r) = Recorded By, (t) = Taken By, (c) = Cosigned By    Initials Name Provider Type    Mar Tripp, PT Physical Therapist          Modalities     Row Name 11/14/19 0700             Moist Heat    MH Applied  Yes  -JA      Location  LB in  supine 90/90  -JA      Rx Minutes  12 mins  -JA         Traction 77222    Traction Type  Lumbar  -JA      Rx Minutes  12  -JA      Duration  Intermittent  -JA      Position  Supine  -JA      Weight  65 30#  -JA      Hold  40  -JA      Relax  10  -JA        User Key  (r) = Recorded By, (t) = Taken By, (c) = Cosigned By    Initials Name Provider Type    Mar Tripp, PT Physical Therapist        OP Exercises     Row Name 11/14/19 0700             Subjective Comments    Subjective Comments  The pain wakes me up every two hours.  -JA         Subjective Pain    Able to rate subjective pain?  yes  -JA      Pre-Treatment Pain Level  5  -JA         Total Minutes    47427 - PT Therapeutic Exercise Minutes  35  -JA         Exercise 1    Exercise Name 1  Nustep UE/LE  -JA      Time 1  5min  -JA      Additional Comments  L5  -JA         Exercise 2    Exercise Name 2  TA activation in HL  -JA      Cueing 2  Tactile;Verbal  -JA      Reps 2  10  -JA      Time 2  5sec  -JA         Exercise 3    Exercise Name 3  seated piriformis stretch  -JA      Cueing 3  Verbal;Tactile  -JA      Reps 3  3  -JA      Time 3  20sec  -JA         Exercise 4    Exercise Name 4  seated 90/90 sciatic in LAQ position  -JA      Reps 4  3  -JA      Time 4  20sec  -JA         Exercise 5    Exercise  Name 5  glute set + mini bridge  -JA      Reps 5  10  -JA      Time 5  5sec  -JA         Exercise 6    Exercise Name 6  LTR  -JA      Cueing 6  Verbal;Tactile  -JA      Reps 6  5  -JA      Time 6  5sec  -JA         Exercise 7    Exercise Name 7  LTR stretch  -JA      Cueing 7  --  -JA      Reps 7  3  -JA      Time 7  10-20sec  -JA         Exercise 8    Exercise Name 8  --  -JA      Reps 8  --  -JA      Time 8  --  -JA         Exercise 9    Exercise Name 9  added alt LE's w/t.a.  -JA      Reps 9  10 ea  -JA      Time 9  1sec ea  -JA         Exercise 10    Exercise Name 10  --  -JA      Time 10  --  -JA         Exercise 11    Exercise Name 11  lateral stepping (no resistance yet--pt felt the target muscle fatigue)  -JA      Sets 11  3 for 10'  -JA         Exercise 12    Exercise Name 12  prone over pillow for blue foam roller rolling jaun LB/glutes  -JA      Time 12  5 min  -JA        User Key  (r) = Recorded By, (t) = Taken By, (c) = Cosigned By    Initials Name Provider Type    Mar Tripp, PT Physical Therapist                           Therapy Education  Education Details: Discussed importance of flexibility in lumbar spine as well as muscular support from core muscles.  Used spine model for education of lumbar spine.  Given: Symptoms/condition management, Pain management, Posture/body mechanics, Mobility training  Program: Reinforced, Progressed  How Provided: Verbal, Demonstration, Written  Provided to: Patient  Level of Understanding: Teach back education performed              Time Calculation:   Start Time: 0700  Stop Time: 0755  Time Calculation (min): 55 min  Therapy Charges for Today     Code Description Service Date Service Provider Modifiers Qty    92878096504  PT THER PROC EA 15 MIN 11/14/2019 Mar Murphy, PT GP 2    [id number] HC PT TRACTION LUMBAR 11/14/2019 Mar Murphy, PT GP 1    [id number]  PT HOT OR COLD PACK TREAT MCARE 11/14/2019 Mar Murphy, PT GP 1                     Mar Murphy, PT  11/14/2019

## 2019-11-19 ENCOUNTER — APPOINTMENT (OUTPATIENT)
Dept: PHYSICAL THERAPY | Facility: HOSPITAL | Age: 39
End: 2019-11-19

## 2019-11-21 ENCOUNTER — APPOINTMENT (OUTPATIENT)
Dept: PHYSICAL THERAPY | Facility: HOSPITAL | Age: 39
End: 2019-11-21

## 2019-11-23 ENCOUNTER — HOSPITAL ENCOUNTER (EMERGENCY)
Facility: HOSPITAL | Age: 39
Discharge: HOME | End: 2019-11-23
Attending: EMERGENCY MEDICINE | Admitting: EMERGENCY MEDICINE

## 2019-11-23 ENCOUNTER — APPOINTMENT (OUTPATIENT)
Dept: GENERAL RADIOLOGY | Facility: HOSPITAL | Age: 39
End: 2019-11-23

## 2019-11-23 VITALS
RESPIRATION RATE: 16 BRPM | TEMPERATURE: 98.1 F | BODY MASS INDEX: 24.48 KG/M2 | SYSTOLIC BLOOD PRESSURE: 112 MMHG | OXYGEN SATURATION: 98 % | HEIGHT: 67 IN | DIASTOLIC BLOOD PRESSURE: 58 MMHG | HEART RATE: 67 BPM | WEIGHT: 156 LBS

## 2019-11-23 DIAGNOSIS — M54.42 ACUTE BILATERAL LOW BACK PAIN WITH BILATERAL SCIATICA: Primary | ICD-10-CM

## 2019-11-23 DIAGNOSIS — M54.41 ACUTE BILATERAL LOW BACK PAIN WITH BILATERAL SCIATICA: Primary | ICD-10-CM

## 2019-11-23 PROCEDURE — 99283 EMERGENCY DEPT VISIT LOW MDM: CPT

## 2019-11-23 PROCEDURE — 72110 X-RAY EXAM L-2 SPINE 4/>VWS: CPT

## 2019-11-23 RX ORDER — DIAZEPAM 5 MG/1
5 TABLET ORAL ONCE
Status: COMPLETED | OUTPATIENT
Start: 2019-11-23 | End: 2019-11-23

## 2019-11-23 RX ORDER — IBUPROFEN 600 MG/1
600 TABLET ORAL EVERY 8 HOURS PRN
Qty: 21 TABLET | Refills: 0 | Status: SHIPPED | OUTPATIENT
Start: 2019-11-23 | End: 2020-05-22

## 2019-11-23 RX ORDER — HYDROCODONE BITARTRATE AND ACETAMINOPHEN 5; 325 MG/1; MG/1
1 TABLET ORAL EVERY 6 HOURS PRN
Qty: 12 TABLET | Refills: 0 | Status: SHIPPED | OUTPATIENT
Start: 2019-11-23 | End: 2020-05-22

## 2019-11-23 RX ORDER — IBUPROFEN 800 MG/1
800 TABLET ORAL ONCE
Status: COMPLETED | OUTPATIENT
Start: 2019-11-23 | End: 2019-11-23

## 2019-11-23 RX ORDER — HYDROCODONE BITARTRATE AND ACETAMINOPHEN 7.5; 325 MG/1; MG/1
1 TABLET ORAL ONCE
Status: COMPLETED | OUTPATIENT
Start: 2019-11-23 | End: 2019-11-23

## 2019-11-23 RX ORDER — CYCLOBENZAPRINE HCL 10 MG
10 TABLET ORAL 3 TIMES DAILY PRN
Qty: 21 TABLET | Refills: 0 | Status: SHIPPED | OUTPATIENT
Start: 2019-11-23 | End: 2020-05-22

## 2019-11-23 RX ADMIN — HYDROCODONE BITARTRATE AND ACETAMINOPHEN 1 TABLET: 7.5; 325 TABLET ORAL at 10:10

## 2019-11-23 RX ADMIN — DIAZEPAM 5 MG: 5 TABLET ORAL at 08:31

## 2019-11-23 RX ADMIN — IBUPROFEN 800 MG: 800 TABLET, FILM COATED ORAL at 08:31

## 2019-11-23 NOTE — ED TRIAGE NOTES
"Pt reports chronic low back pain however yesterday turned weird and \"kind of jerked it\" and now hard to move. Tingling in BLE.   "

## 2019-11-23 NOTE — ED PROVIDER NOTES
" EMERGENCY DEPARTMENT ENCOUNTER    CHIEF COMPLAINT  Chief Complaint: back pain  History given by: patient  History limited by: none  Room Number: 02/02  PMD: Jeremy Mcdonald MD      HPI:  Pt is a 39 y.o. female with Hx of chronic back pain and Lupus, who presents complaining of acute-on-chronic lower back pain, described that began yesterday. She reports that she was in her garage when he foot slid a little, causing her to \"jerk my back\". She did not fall backwards, causing any direct trauma. Since then, she reports feeling like \"there is a cinder block on my lower back making it hard to move\". The pain is worsened with any movement. Pt also reports BLE tingling, but reports that she had had chronic radiculopathy for the past few months. Per pt, she has Hx of back pain and that she is currently in physical therapy, and is scheduled to see a Neurologist in the future. Currently, she denies urinary/fecal incontinence, but she does not that it is difficult to have a BM due to pain with sitting. She also denies saddle paraesthesia. Denies CP and SOA. Pt reports that she has only taken Tylenol arthritis for her pain. There are no other complaints.      PAST MEDICAL HISTORY  Active Ambulatory Problems     Diagnosis Date Noted   • Depression with anxiety 01/30/2019   • Hypothyroidism due to Hashimoto's thyroiditis 01/30/2019   • Insomnia 03/24/2015   • Systemic lupus erythematosus (CMS/[facility]) 03/01/2007   • Vitamin D deficiency 08/29/2015   • Irritable bowel syndrome with constipation 01/30/2019   • Seasonal allergic rhinitis due to pollen 04/30/2019   • DDD (degenerative disc disease), lumbar 04/30/2019   • Allergic rhinitis 05/22/2019   • Low back pain with right-sided sciatica 09/18/2019   • Peripheral polyneuropathy 10/31/2019   • Edema of both legs 10/31/2019     Resolved Ambulatory Problems     Diagnosis Date Noted   • No Resolved Ambulatory Problems     Past Medical History:   Diagnosis Date   • Anxiety    • Colon polyp  "   • Depression    • Disease of thyroid gland    • Frequent UTI    • Hemorrhoids    • Hypothyroidism    • Lupus (CMS/HCC)    • Migraine    • Thrombocytopenia (CMS/HCC) 2012       PAST SURGICAL HISTORY  Past Surgical History:   Procedure Laterality Date   • ANAL SPHINCTEROTOMY  2003       • APPENDECTOMY  2010   • COLONOSCOPY N/A 11/21/2016    Procedure: COLONOSCOPY;  Surgeon: Natalya Farias MD;  Location: [facility] OR;  Service:    • HEMORRHOIDECTOMY  2003       • HEMORRHOIDECTOMY N/A 11/21/2016    Procedure: HEMORRHOIDECTOMY;  Surgeon: Natalya Farias MD;  Location: [facility];  Service:    • INTRAUTERINE DEVICE INSERTION  08/2016   • THYROIDECTOMY, PARTIAL Right 06/2015   • TONSILLECTOMY         FAMILY HISTORY  Family History   Problem Relation Age of Onset   • Colon polyps Mother    • Coronary artery disease Mother 60        non-smokers   • Valvular heart disease Mother    • Diabetes type II Mother    • Other Mother         pulmonary hypertension   • Clotting disorder Mother         superficial dvt   • Depression Mother    • Colon polyps Father    • Atrial fibrillation Father    • Hypertension Father    • Deep vein thrombosis Father    • Autoimmune disease Brother    • Depression Brother    • No Known Problems Daughter    • Dementia Maternal Uncle    • Dementia Maternal Grandmother    • Colon cancer Paternal Grandfather    • Colon cancer Maternal Uncle    • No Known Problems Daughter        SOCIAL HISTORY  Social History     Socioeconomic History   • Marital status:      Spouse name: Alexander   • Number of children: 2   • Years of education: Not on file   • Highest education level: Not on file   Occupational History   • Occupation: (Medical Assistant) [employer]   Tobacco Use   • Smoking status: Never Smoker   • Smokeless tobacco: Never Used   Substance and Sexual Activity   • Alcohol use: Yes     Frequency: Monthly or less     Comment: RARELY   • Drug use: No   • Sexual  activity: Yes     Partners: Male     Birth control/protection: IUD       ALLERGIES  Oxycodone    REVIEW OF SYSTEMS  Review of Systems   Constitutional: Negative for fever.   HENT: Negative for sore throat.    Eyes: Negative.    Respiratory: Negative for cough and shortness of breath.    Cardiovascular: Negative for chest pain.   Gastrointestinal: Negative for abdominal pain, diarrhea and vomiting.   Genitourinary: Negative for dysuria.   Musculoskeletal: Positive for back pain (lower). Negative for neck pain.   Skin: Negative for rash.   Allergic/Immunologic: Negative.    Neurological: Positive for numbness (tingling, BLE). Negative for weakness and headaches.   Hematological: Negative.    Psychiatric/Behavioral: Negative.    All other systems reviewed and are negative.      PHYSICAL EXAM  ED Triage Vitals   Temp Heart Rate Resp BP SpO2   11/23/19 0812 11/23/19 0812 11/23/19 0812 11/23/19 0818 11/23/19 0812   98.1 °F (36.7 °C) 97 18 121/67 97 %      Temp src Heart Rate Source Patient Position BP Location FiO2 (%)   11/23/19 0812 11/23/19 0812 -- -- --   Tympanic Monitor            Physical Exam   Constitutional: She is oriented to person, place, and time. No distress.   HENT:   Head: Normocephalic and atraumatic.   Eyes: EOM are normal. Pupils are equal, round, and reactive to light.   Neck: Normal range of motion. Neck supple.   Cardiovascular: Normal rate, regular rhythm and normal heart sounds.   Pulmonary/Chest: Effort normal and breath sounds normal. No respiratory distress.   Abdominal: Soft. Bowel sounds are normal. There is no tenderness. There is no rebound and no guarding.   Musculoskeletal: She exhibits no edema.        Cervical back: She exhibits no tenderness.        Thoracic back: She exhibits no tenderness.        Lumbar back: She exhibits decreased range of motion and tenderness.   Paralumbar tenderness bilaterally with decreased ROM of L spine. Tenderness of the gluteus meatus bilaterally.     Neurological: She is alert and oriented to person, place, and time. She has normal sensation and normal strength. No sensory deficit. She has an abnormal Straight Leg Raise Test (90 degress bilaterally).   Reflex Scores:       Patellar reflexes are 2+ on the right side and 2+ on the left side.  Leg strength is 5/5 bilaterally.    Skin: Skin is warm and dry. No rash noted.   Psychiatric: Mood and affect normal.   Nursing note and vitals reviewed.    RADIOLOGY  Xr Spine Lumbar Complete 4+vw    Result Date: 11/23/2019  XR SPINE LUMBAR COMPLETE 4+VW-  INDICATIONS: Low back pain    TECHNIQUE: 5 views of the lumbar spine  COMPARISON: MRI from 01/25/2018  FINDINGS:  No acute is identified. Vertebral body heights appear preserved. Mild disc space narrowing at L5/S1. Slight/borderline retrolisthesis of L5 on S1. Mild lumbar levoscoliosis is apparent, as positioned.       No acute fracture is identified. Mild disc space narrowing at L5/S1. If there is further clinical concern, follow-up MRI could be considered for further evaluation.  This report was finalized on 11/23/2019 9:44 AM by Dr. Jairon Soni M.D.        I ordered the above noted radiological studies. Reviewed by me. See dictation for official radiology interpretation.      PROCEDURES  Procedures      PROGRESS AND CONSULTS     0828- Ordered Valium and ibuprofen for pt pain.     0953- Rechecked pt. Pt is resting comfortably. Her pain is unchanged with medication. Will order additional pain medication. Notified pt of her imaging results. Discussed the plan to discharge the pt home with prescriptions for Ibuprofen, Norco, and Flexeril. I instructed the pt to f/u with her previously set-up appointments. Advised a pt to use a foam roller/deep massage and ice to help with her pain. RTED instructions given. Pt understands and agrees with the plan, all questions answered.    MEDICAL DECISION MAKING  Results were reviewed/discussed with the patient and they were also  "made aware of online access. Pt also made aware that some labs, such as cultures, will not be resulted during ER visit and follow up with PMD is necessary.     MDM  Number of Diagnoses or Management Options     Amount and/or Complexity of Data Reviewed  Tests in the radiology section of CPT®: ordered and reviewed (XR L spine- No acute fracture is identified. Mild disc space narrowing at L5/S1)  Decide to obtain previous medical records or to obtain history from someone other than the patient: yes  Review and summarize past medical records: yes (Pt had an MRI of 1/2018 that showed \"a posterior, central annular tear with a posterior central to left paracentral disc bulge at L5-S1\".    Her last PT was 11/14/19 for chronic bilateral lower back pain with bilateral sciatica. )  Independent visualization of images, tracings, or specimens: yes    Patient Progress  Patient progress: stable         DIAGNOSIS  Final diagnoses:   Acute bilateral low back pain with bilateral sciatica       DISPOSITION  DISCHARGE    Patient discharged in stable condition.    Reviewed implications of results, diagnosis, meds, responsibility to follow up, warning signs and symptoms of possible worsening, potential complications and reasons to return to ER.    Patient/Family voiced understanding of above instructions.    Discussed plan for discharge, as there is no emergent indication for admission. Patient referred to primary care provider for BP management due to today's BP. Pt/family is agreeable and understands need for follow up and repeat testing.  Pt is aware that discharge does not mean that nothing is wrong but it indicates no emergency is present that requires admission and they must continue care with follow-up as given below or physician of their choice.     FOLLOW-UP  Jeremy Mcdonald MD  [address] [address]  [phone]    Schedule an appointment as soon as possible for a visit            Medication List      New " Prescriptions    cyclobenzaprine 10 MG tablet  Commonly known as:  FLEXERIL  Take 1 tablet by mouth 3 (Three) Times a Day As Needed for Muscle Spasms.     HYDROcodone-acetaminophen 5-325 MG per tablet  Commonly known as:  NORCO  Take 1 tablet by mouth Every 6 (Six) Hours As Needed for Moderate Pain .     ibuprofen 600 MG tablet  Commonly known as:  ADVIL,MOTRIN  Take 1 tablet by mouth Every 8 (Eight) Hours As Needed for Moderate Pain .            Latest Documented Vital Signs:  As of 4:27 PM  BP- 112/58 HR- 67 Temp- 98.1 °F (36.7 °C) (Tympanic) O2 sat- 98%    --  Documentation assistance provided by london Peterson for Dr. Ismael MD.  Information recorded by the scribe was done at my direction and has been verified and validated by me.     James Peterson  11/23/19 1000       Livan Guevara MD  11/23/19 9479

## 2019-11-23 NOTE — DISCHARGE INSTRUCTIONS
Take medications as directed and follow-up with your family doctor.  Do not lift anything greater than 10 pounds for the next 4 days.  Do try the physical therapy exercises given to you by your physical therapist.  It will help with the muscle spasm that is occurring.  Return to the emergency department if you develop a fever, weakness to either leg or if you develop urinary incontinence.

## 2019-11-23 NOTE — ED NOTES
"Pt states \"I have chronic back pain but yesterday I slipped and jerked my back, now I can't move. I feel like I have a concrete block across my lower back.\" Patient ambulated from triage without assistance, currently in NAD. Denies additional symptoms.     Beata Barron, RN  11/23/19 6223    "

## 2019-12-02 ENCOUNTER — APPOINTMENT (OUTPATIENT)
Dept: PHYSICAL THERAPY | Facility: HOSPITAL | Age: 39
End: 2019-12-02

## 2019-12-02 ENCOUNTER — OFFICE VISIT (OUTPATIENT)
Dept: NEUROLOGY | Facility: CLINIC | Age: 39
End: 2019-12-02

## 2019-12-02 VITALS
HEART RATE: 83 BPM | SYSTOLIC BLOOD PRESSURE: 110 MMHG | OXYGEN SATURATION: 98 % | HEIGHT: 67 IN | WEIGHT: 162 LBS | DIASTOLIC BLOOD PRESSURE: 74 MMHG | BODY MASS INDEX: 25.43 KG/M2

## 2019-12-02 DIAGNOSIS — M54.16 BILATERAL LUMBAR RADICULOPATHY: Primary | ICD-10-CM

## 2019-12-02 PROCEDURE — 99244 OFF/OP CNSLTJ NEW/EST MOD 40: CPT | Performed by: PSYCHIATRY & NEUROLOGY

## 2019-12-02 RX ORDER — NAPROXEN 500 MG/1
500 TABLET ORAL 2 TIMES DAILY WITH MEALS
Qty: 60 TABLET | Refills: 2 | Status: SHIPPED | OUTPATIENT
Start: 2019-12-02 | End: 2020-05-22

## 2019-12-02 RX ORDER — FEXOFENADINE HCL 180 MG/1
180 TABLET ORAL DAILY
COMMUNITY
End: 2022-01-11

## 2019-12-02 NOTE — PROGRESS NOTES
CC: Bilateral sciatica    HPI:  Taty Romo is a  39 y.o.  right-handed white female who was sent for neurological consultation by Dr. Mcdonald regarding bilateral sciatica.  The patient has previous history of sciatica and was evaluated January 2018 with MRI of lumbar spine.  I reviewed those films and the report which demonstrates some degenerative disc disease and bulging at L5/S1.  To my review there appears to be some retrolisthesis of L5 on S1.  The patient indicates that off-and-on she would strain her back but symptoms would martha.  Since March of this year she has had no relief and the pain is quite severe.  The pain is in the low back and it radiates down the buttocks into the legs down to the feet.  She notices some electrical feelings in the fronts of the legs also.  There is some numbness in both great toes.  She denies bowel or bladder trouble other than there is increased pain with Valsalva maneuver.  Most recently 3 days ago she slipped and jerked with notable increase in symptoms for 2 days followed by today which is a little better.  She has an apparent history of fibromyalgia    She has been going to physical therapy but she does not see any improvement.  She does her stretches every day on her own.    She has history of lupus and takes Plaquenil per Dr. Blanco.  She indicates most of her lupus symptoms involve joint pain.  She has no history of motor vehicle accidents or significant head traumas.  No history of meningitis, seizure stroke or syncope.  Her family history is positive for stroke in a maternal uncle and maternal grandfather.        Past Medical History:   Diagnosis Date   • Anxiety    • Colon polyp    • Depression    • Disease of thyroid gland     HYPOTHYROIDISM D/T PARTIAL THYROIDECTOMY   • Fibromyalgia, primary    • Frequent UTI    • Headache, tension-type    • Hemorrhoids    • Hypothyroidism    • Lupus (CMS/[facility])     DX 3-2007   • Migraine    • Peripheral neuropathy 2017   •  Thrombocytopenia (CMS/HCC) 2012    due to ITP         Past Surgical History:   Procedure Laterality Date   • ANAL SPHINCTEROTOMY  2003       • APPENDECTOMY  2010   • COLONOSCOPY N/A 11/21/2016    Procedure: COLONOSCOPY;  Surgeon: Natalya Farias MD;  Location: [facility] OR;  Service:    • HEMORRHOIDECTOMY  2003       • HEMORRHOIDECTOMY N/A 11/21/2016    Procedure: HEMORRHOIDECTOMY;  Surgeon: Natalya Farias MD;  Location: [facility] OR;  Service:    • INTRAUTERINE DEVICE INSERTION  08/2016   • THYROIDECTOMY, PARTIAL Right 06/2015   • TONSILLECTOMY             Current Outpatient Medications:   •  acetaminophen (TYLENOL 8 HOUR ARTHRITIS PAIN) 650 MG 8 hr tablet, Take 1 tablet by mouth Every 8 (Eight) Hours As Needed for Mild Pain  or Moderate Pain ., Disp: , Rfl:   •  azelastine (ASTELIN) 0.1 % nasal spray, Place 2 sprays into the nostril(s) as directed by provider 2 (Two) Times a Day., Disp: 30 mL, Rfl: 5  •  DULoxetine (CYMBALTA) 30 MG capsule, Take 1 capsule by mouth Daily., Disp: 90 capsule, Rfl: 1  •  DULoxetine (CYMBALTA) 60 MG capsule, Take 1 capsule by mouth Every Morning Before Breakfast., Disp: 90 capsule, Rfl: 1  •  fexofenadine (ALLEGRA) 180 MG tablet, Take 180 mg by mouth Daily., Disp: , Rfl:   •  hydroxychloroquine (PLAQUENIL) 200 MG tablet, Take 1 tablet by mouth 2 (Two) Times a Day., Disp: 60 tablet, Rfl: 11  •  levothyroxine (SYNTHROID, LEVOTHROID) 100 MCG tablet, Take 1 tablet by mouth Daily., Disp: 90 tablet, Rfl: 3  •  Omega-3 Fatty Acids (FISH OIL PO), Take 1,200 mg by mouth., Disp: , Rfl:   •  traZODone (DESYREL) 150 MG tablet, Take 1 tablet by mouth Every Night., Disp: 30 tablet, Rfl: 3  •  cetirizine (zyrTEC) 10 MG tablet, Take 1 tablet by mouth Every Night., Disp: , Rfl:   •  Cholecalciferol (VITAMIN D) 2000 UNITS capsule, Take 3,000 Units by mouth Every Evening., Disp: , Rfl:   •  cyclobenzaprine (FLEXERIL) 10 MG tablet, Take 1 tablet by mouth 3 (Three) Times a Day As  Needed for Muscle Spasms., Disp: 21 tablet, Rfl: 0  •  fluticasone (VERAMYST) 27.5 MCG/SPRAY nasal spray, Place 1 spray into the nostril(s) as directed by provider 2 (Two) Times a Day., Disp: 9.1 mL, Rfl: 5  •  HYDROcodone-acetaminophen (NORCO) 5-325 MG per tablet, Take 1 tablet by mouth Every 6 (Six) Hours As Needed for Moderate Pain ., Disp: 12 tablet, Rfl: 0  •  ibuprofen (ADVIL,MOTRIN) 600 MG tablet, Take 1 tablet by mouth Every 8 (Eight) Hours As Needed for Moderate Pain ., Disp: 21 tablet, Rfl: 0  •  KRILL OIL OMEGA-3 PO, Take 350 mg by mouth Daily., Disp: , Rfl:   •  naproxen (EC NAPROSYN) 500 MG EC tablet, Take 1 tablet by mouth 2 (Two) Times a Day With Meals., Disp: 60 tablet, Rfl: 2  •  Probiotic Product (PROBIOTIC-10 PO), Take  by mouth., Disp: , Rfl:       Family History   Problem Relation Age of Onset   • Colon polyps Mother    • Coronary artery disease Mother 60        non-smokers   • Valvular heart disease Mother    • Diabetes type II Mother    • Other Mother         pulmonary hypertension   • Clotting disorder Mother         superficial dvt   • Depression Mother    • Colon polyps Father    • Atrial fibrillation Father    • Hypertension Father    • Deep vein thrombosis Father    • Other Father         spinal stenosis, Degenerative disc disease    • Autoimmune disease Brother    • Depression Brother    • No Known Problems Daughter    • Dementia Maternal Uncle    • Dementia Maternal Grandmother    • Stroke Maternal Grandmother    • Colon cancer Paternal Grandfather    • Colon cancer Maternal Uncle    • No Known Problems Daughter          Social History     Socioeconomic History   • Marital status:      Spouse name: Alexander   • Number of children: 2   • Years of education: Not on file   • Highest education level: Not on file   Occupational History   • Occupation: (Medical Assistant) [employer]   Tobacco Use   • Smoking status: Never Smoker   • Smokeless tobacco: Never Used   Substance and  "Sexual Activity   • Alcohol use: Yes     Frequency: Monthly or less     Comment: Few times a year   • Drug use: No   • Sexual activity: Yes     Partners: Male     Birth control/protection: IUD         Allergies   Allergen Reactions   • Oxycodone Nausea And Vomiting     States can take as long as has zofran          Pain Scale: 6/10        ROS:  Review of Systems   Constitutional: Positive for activity change, appetite change, diaphoresis and fatigue.   HENT: Positive for mouth sores and rhinorrhea. Negative for ear discharge, ear pain and facial swelling.    Eyes: Negative for pain, redness and itching.   Respiratory: Negative for cough, choking and shortness of breath.    Cardiovascular: Positive for leg swelling. Negative for chest pain.   Gastrointestinal: Negative for abdominal pain, nausea and vomiting.   Endocrine: Negative for cold intolerance, heat intolerance and polydipsia.   Musculoskeletal: Positive for arthralgias, back pain, joint swelling, myalgias, neck pain and neck stiffness.   Skin: Negative for color change, pallor, rash and wound.   Allergic/Immunologic: Negative for environmental allergies and food allergies.   Neurological: Positive for weakness and numbness. Negative for dizziness, tremors, seizures, syncope, facial asymmetry, speech difficulty, light-headedness and headaches.   Psychiatric/Behavioral: Positive for agitation, decreased concentration and sleep disturbance. Negative for behavioral problems, confusion, dysphoric mood, hallucinations, self-injury and suicidal ideas. The patient is not nervous/anxious and is not hyperactive.          I have reviewed and agree with the above ROS completed by the medical assistant.      Physical Exam:  Vitals:    12/02/19 1500   BP: 110/74   Pulse: 83   SpO2: 98%   Weight: 73.5 kg (162 lb)   Height: 170.2 cm (67\")     Orthostatic BP:    Body mass index is 25.37 kg/m².    Physical Exam  General: Well-developed white female who appears to have " moderate back/bilateral leg pain  HEENT: Normocephalic no evidence of trauma.  Discs flat.  No AV nicking.  Throat negative  Neck: Supple.  No thyromegaly.  No cervical bruits.  Heart: Regular rate and rhythm no murmurs  Extremities: Radial pulses strong and simultaneous.  No pedal edema.  Bent leg raising was normal bilaterally.  Straight leg raising was abnormal on the right at 45 degrees and abnormal on the left at 30 degrees.  There is tenderness at the lumbosacral junction but not really in the paraspinals.  The sacroiliac and sciatic notch areas are tender on both sides.      Neurological Exam:   Mental Status: Awake, alert, oriented to person, place and time.  Conversant without evidence of an affective disorder, thought disorder, delusions or hallucinations.  Attention span and concentration are normal.  HCF: No aphasia, apraxia or dysarthria.  Recent and remote memory intact.  Knowledge of recent events intact.  CN: I:   II: Visual fields full without left inattention   III, IV, VI: Eye movements intact without nystagmus or ptosis.  Pupils equal round and reactive to light.   V,VII: Light touch and pinprick intact all 3 divisions of V.  Facial muscles symmetrical.   VIII: Hearing intact to finger rub   IX,X: Soft palate elevates symmetrically   XI: Sternomastoid and trapezius are strong.   XII: Tongue midline without atrophy or fasciculations  Motor: Normal tone and bulk in the upper and lower extremities   Power testing: Normal power in all muscles tested in the upper extremities.  Weakness noted in the great toe extensors.  She has pain inhibition with psoas and gluteus medius muscles.  Otherwise powerful in muscles tested in the legs  Reflexes: Upper extremities: +2 diffusely        Lower extremities: +2 diffusely        Toe signs: Downgoing  Sensory: Light touch: Reduced on the big toes otherwise normal in the arms and legs        Pinprick: Reduced on the big toes otherwise normal in the arms and  legs        Vibration: Intact at the ankles        Position: Intact at the great toes    Cerebellar: Finger-to-nose: Normal           Rapid movement: Normal           Heel-to-shin: Normal  Gait and Station: Casual, toe, heel and tandem walk intact no Romberg no drift    Results:      Lab Results   Component Value Date    GLUCOSE 85 01/04/2016    BUN 13 01/30/2019    CREATININE 0.8 09/19/2019    EGFRIFNONA 81 01/30/2019    EGFRIFAFRI 93 01/30/2019    BCR 14 01/30/2019    CO2 21 01/30/2019    CALCIUM 9.2 01/30/2019    PROTENTOTREF 6.9 01/30/2019    ALBUMIN 4.8 01/30/2019    LABIL2 2.3 (H) 01/30/2019    AST 26 09/19/2019    ALT 24 09/19/2019       Lab Results   Component Value Date    WBC 2.65 (L) 09/19/2019    HGB 13.4 09/19/2019    HCT 41.3 09/19/2019    MCV 95.4 09/19/2019     (L) 09/19/2019         .No results found for: RPR      Lab Results   Component Value Date    TSH 1.780 09/18/2019         No results found for: [id number]      No results found for: FOLATE      No results found for: HGBA1C      Lab Results   Component Value Date    GLUCOSE 85 01/04/2016    BUN 13 01/30/2019    CREATININE 0.8 09/19/2019    EGFRIFNONA 81 01/30/2019    EGFRIFAFRI 93 01/30/2019    BCR 14 01/30/2019    K 4.1 01/30/2019    CO2 21 01/30/2019    CALCIUM 9.2 01/30/2019    PROTENTOTREF 6.9 01/30/2019    ALBUMIN 4.8 01/30/2019    LABIL2 2.3 (H) 01/30/2019    AST 26 09/19/2019    ALT 24 09/19/2019         Lab Results   Component Value Date    WBC 2.65 (L) 09/19/2019    HGB 13.4 09/19/2019    HCT 41.3 09/19/2019    MCV 95.4 09/19/2019     (L) 09/19/2019       Previous MRI lumbar spine 1/2018 films reviewed as noted above      Assessment:   1.  Bilateral sciatica with bilateral L5 radiculopathies          Plan:  1.  Trial of naproxen 500 mg twice daily with food.  The patient indicates previously trying Aleve which caused some swelling in her feet.  I asked that she try it again and if not to severe to persist for now.  Side  effects reviewed  2.  Continue her physical therapy  3.  Lumbar spine x-rays with flexion and extension views  4.  MRI lumbar spine. She has a Mirena which is MRI compatible per website  5.  EMG both legs  6.  To follow-up at time of her EMG.                          Dictated utilizing Dragon dictation.

## 2019-12-05 ENCOUNTER — PROCEDURE VISIT (OUTPATIENT)
Dept: NEUROLOGY | Facility: CLINIC | Age: 39
End: 2019-12-05

## 2019-12-05 VITALS — BODY MASS INDEX: 25.43 KG/M2 | HEIGHT: 67 IN | WEIGHT: 162 LBS

## 2019-12-05 DIAGNOSIS — M54.16 BILATERAL LUMBAR RADICULOPATHY: ICD-10-CM

## 2019-12-05 PROCEDURE — 95886 MUSC TEST DONE W/N TEST COMP: CPT | Performed by: PSYCHIATRY & NEUROLOGY

## 2019-12-05 PROCEDURE — 95909 NRV CNDJ TST 5-6 STUDIES: CPT | Performed by: PSYCHIATRY & NEUROLOGY

## 2019-12-05 NOTE — PROGRESS NOTES
EMG and Nerve Conduction Studies    I.      Instrument used: Neuromax 1002  II.     Please see data sheets for tabular summary of NCS and details on methods, temperatures and lab standards.   III.    EMG muscles tested for upper extremity studies include the deltoid, biceps, triceps, pronator teres, extensor digitorum communis, first dorsal interosseous and abductor pollicis brevis.    IV.   EMG muscles tested for lower extremity studies include the vastus lateralis, tibialis anterior, peroneus longus, medial gastrocnemius and extensor digitorum brevis.    V.    Additional muscles tested as needed.  Paraspinal muscles tested as needed.   VI.   Please see data sheets for tabular summary of EMG findings.   VII. The complete report includes the data sheets.      Indication: Bilateral lumbar radiculopathies  History: 39-year-old white female with low back pain radiating into both legs.  X-rays show some retrolisthesis L5 on S1.      Ht: 170.2 cm  Wt: 73.5 kg; BMI 25.4  HbA1C: No results found for: HGBA1C  TSH:   Lab Results   Component Value Date    TSH 1.780 09/18/2019       Technical summary:  Nerve conduction studies were obtained in the left leg with 1 comparison on the right.  The feet were warmed prior to study but temperatures were still a bit cold below 32 °C.  No temperature correction was needed since the distal latencies were normal.  Needle examination was obtained on selected muscles in both legs.    Results:  1.  Normal left sural sensory study.  2.  Normal superficial peroneal sensory studies bilaterally.  3.  Normal left peroneal motor study.  4.  Normal left tibial motor study.  5.  Needle examination of selected muscles in both legs showed normal insertional activities throughout.  There were normal motor units and recruitment patterns.  Lumbar paraspinals at L5/S1 showed a few positive sharp waves on the left with no abnormalities on the right.    Impression:  Mildly abnormal study showing some  irritability in the left low lumbar paraspinal region.  Although this can be seen in lumbosacral radiculopathy, without confirmatory evidence on needle exam of muscles of the leg it is not entirely clear of its significance.  Clinical correlation is suggested.    Vijay Fragoso M.D.        Addendum: The patient has lumbar spine x-rays with flexion and extension along with MRI of the lumbar spine to be performed and she will call back for results on these tests.  GNS      Dictated utilizing Dragon dictation.

## 2019-12-23 ENCOUNTER — APPOINTMENT (OUTPATIENT)
Dept: MRI IMAGING | Facility: HOSPITAL | Age: 39
End: 2019-12-23

## 2020-01-10 ENCOUNTER — HOSPITAL ENCOUNTER (OUTPATIENT)
Dept: GENERAL RADIOLOGY | Facility: HOSPITAL | Age: 40
Discharge: HOME | End: 2020-01-10

## 2020-01-10 ENCOUNTER — HOSPITAL ENCOUNTER (OUTPATIENT)
Dept: MRI IMAGING | Facility: HOSPITAL | Age: 40
Discharge: HOME | End: 2020-01-10
Admitting: PSYCHIATRY & NEUROLOGY

## 2020-01-10 DIAGNOSIS — M54.16 BILATERAL LUMBAR RADICULOPATHY: ICD-10-CM

## 2020-01-10 PROCEDURE — 72148 MRI LUMBAR SPINE W/O DYE: CPT

## 2020-01-10 PROCEDURE — 72114 X-RAY EXAM L-S SPINE BENDING: CPT

## 2020-01-15 ENCOUNTER — TELEPHONE (OUTPATIENT)
Dept: NEUROLOGY | Facility: CLINIC | Age: 40
End: 2020-01-15

## 2020-01-15 DIAGNOSIS — M54.32 BILATERAL SCIATICA: Primary | ICD-10-CM

## 2020-01-15 DIAGNOSIS — M54.31 BILATERAL SCIATICA: Primary | ICD-10-CM

## 2020-01-15 NOTE — TELEPHONE ENCOUNTER
----- Message from Vijay Fragoso MD sent at 1/14/2020  7:09 PM EST -----  Dementia,  I reviewed the MRI.  I think she should be seen by neurosurgery.  She has had previous MRIs but I cannot find evidence she was seen by neurosurgery anywhere.  Please contact her and indicate that I think she probably should be seen by neurosurgery and if she had seen one previously I would send her back    GNS

## 2020-01-15 NOTE — TELEPHONE ENCOUNTER
S/w pt she has never been seen by a neurosurgeon but has agreed to see one if a referral can be placed.

## 2020-01-16 NOTE — TELEPHONE ENCOUNTER
Francisco Javier Arredondo the Dragon got your name wrong on prior note.  I placed consult to [facility] group    adrianas

## 2020-01-20 ENCOUNTER — OFFICE VISIT (OUTPATIENT)
Dept: INTERNAL MEDICINE | Age: 40
End: 2020-01-20

## 2020-01-20 VITALS
WEIGHT: 159 LBS | DIASTOLIC BLOOD PRESSURE: 50 MMHG | SYSTOLIC BLOOD PRESSURE: 92 MMHG | OXYGEN SATURATION: 97 % | HEART RATE: 90 BPM | BODY MASS INDEX: 24.96 KG/M2 | TEMPERATURE: 97.1 F | HEIGHT: 67 IN

## 2020-01-20 DIAGNOSIS — M51.36 DDD (DEGENERATIVE DISC DISEASE), LUMBAR: Chronic | ICD-10-CM

## 2020-01-20 DIAGNOSIS — G89.29 CHRONIC BILATERAL LOW BACK PAIN WITH RIGHT-SIDED SCIATICA: Primary | Chronic | ICD-10-CM

## 2020-01-20 DIAGNOSIS — F51.05 INSOMNIA DUE TO OTHER MENTAL DISORDER: Chronic | ICD-10-CM

## 2020-01-20 DIAGNOSIS — E06.3 HYPOTHYROIDISM DUE TO HASHIMOTO'S THYROIDITIS: Chronic | ICD-10-CM

## 2020-01-20 DIAGNOSIS — F99 INSOMNIA DUE TO OTHER MENTAL DISORDER: Chronic | ICD-10-CM

## 2020-01-20 DIAGNOSIS — M54.41 CHRONIC BILATERAL LOW BACK PAIN WITH RIGHT-SIDED SCIATICA: Primary | Chronic | ICD-10-CM

## 2020-01-20 DIAGNOSIS — F41.8 DEPRESSION WITH ANXIETY: Chronic | ICD-10-CM

## 2020-01-20 DIAGNOSIS — E03.8 HYPOTHYROIDISM DUE TO HASHIMOTO'S THYROIDITIS: Chronic | ICD-10-CM

## 2020-01-20 PROBLEM — M43.17 SPONDYLOLISTHESIS OF LUMBOSACRAL REGION: Chronic | Status: ACTIVE | Noted: 2020-01-20

## 2020-01-20 PROCEDURE — 99214 OFFICE O/P EST MOD 30 MIN: CPT | Performed by: INTERNAL MEDICINE

## 2020-01-20 NOTE — PROGRESS NOTES
[facility] [place]  LIV MARTIN M.D.      Taty Nolan Demetrius / 39 y.o. / female  01/20/2020      CHIEF COMPLAINT     Depression; Anxiety; Hypothyroidism; and Chronic low back pain with sciatica      ASSESSMENT & PLAN     Problem List Items Addressed This Visit        High    DDD (degenerative disc disease), lumbar (Chronic)    Overview     + Scoliosis of lumbar spine         Relevant Medications    acetaminophen (TYLENOL 8 HOUR ARTHRITIS PAIN) 650 MG 8 hr tablet    DULoxetine (CYMBALTA) 30 MG capsule    Chronic bilateral low back pain with right-sided sciatica - Primary (Chronic)       Medium    Depression with anxiety (Chronic)    Overview     Has taken medication since 19.          Current Assessment & Plan     Better. Continue duloxetine 90 mg daily (60+30).         Relevant Medications    traZODone (DESYREL) 150 MG tablet    DULoxetine (CYMBALTA) 30 MG capsule    DULoxetine (CYMBALTA) 60 MG capsule    Hypothyroidism due to Hashimoto's thyroiditis (Chronic)    Overview     S/p partial thyroidectomy (right side) 2015 for adenoma.     Continue current dose of levothyroxine         Relevant Medications    levothyroxine (SYNTHROID, LEVOTHROID) 100 MCG tablet    ibuprofen (ADVIL,MOTRIN) 600 MG tablet    naproxen (NAPROSYN) 500 MG tablet       Low    Insomnia (Chronic)    Overview     Continue trazodone         Current Assessment & Plan     Continue trazodone at 150 mg qHS.          Relevant Medications    traZODone (DESYREL) 150 MG tablet        No orders of the defined types were placed in this encounter.    No orders of the defined types were placed in this encounter.      Summary/Discussion:  ·       Next Appointment with me: Visit date not found    Return in about 6 months (around 7/20/2020) for Reassess chronic medical problems.      MEDICATIONS     Current Outpatient Medications   Medication Sig Dispense Refill   • acetaminophen (TYLENOL 8 HOUR ARTHRITIS PAIN) 650 MG 8 hr tablet Take 1 tablet by mouth Every  8 (Eight) Hours As Needed for Mild Pain  or Moderate Pain .     • azelastine (ASTELIN) 0.1 % nasal spray Place 2 sprays into the nostril(s) as directed by provider 2 (Two) Times a Day. 30 mL 5   • CBD (cannabidiol) oral oil 1 mL Daily.     • Cholecalciferol (VITAMIN D) 2000 UNITS capsule Take 3,000 Units by mouth Every Evening.     • DULoxetine (CYMBALTA) 30 MG capsule Take 1 capsule by mouth Daily. 90 capsule 1   • DULoxetine (CYMBALTA) 60 MG capsule Take 1 capsule by mouth Every Morning Before Breakfast. 90 capsule 1   • fexofenadine (ALLEGRA) 180 MG tablet Take 180 mg by mouth Daily.     • fluticasone (VERAMYST) 27.5 MCG/SPRAY nasal spray Place 1 spray into the nostril(s) as directed by provider 2 (Two) Times a Day. 9.1 mL 5   • hydroxychloroquine (PLAQUENIL) 200 MG tablet Take 1 tablet by mouth 2 (Two) Times a Day. 60 tablet 11   • ibuprofen (ADVIL,MOTRIN) 600 MG tablet Take 1 tablet by mouth Every 8 (Eight) Hours As Needed for Moderate Pain . 21 tablet 0   • KRILL OIL OMEGA-3 PO Take 350 mg by mouth Daily.     • levothyroxine (SYNTHROID, LEVOTHROID) 100 MCG tablet Take 1 tablet by mouth Daily. 90 tablet 3   • Omega-3 Fatty Acids (FISH OIL PO) Take 1,200 mg by mouth.     • Probiotic Product (PROBIOTIC-10 PO) Take  by mouth.     • traZODone (DESYREL) 150 MG tablet Take 1 tablet by mouth Every Night. 30 tablet 3   • cetirizine (zyrTEC) 10 MG tablet Take 1 tablet by mouth Every Night.     • cyclobenzaprine (FLEXERIL) 10 MG tablet Take 1 tablet by mouth 3 (Three) Times a Day As Needed for Muscle Spasms. 21 tablet 0   • HYDROcodone-acetaminophen (NORCO) 5-325 MG per tablet Take 1 tablet by mouth Every 6 (Six) Hours As Needed for Moderate Pain . 12 tablet 0   • naproxen (NAPROSYN) 500 MG tablet Take 1 tablet by mouth 2 (Two) Times a Day With Meals. 60 tablet 2     No current facility-administered medications for this visit.           VITAL SIGNS     Visit Vitals  BP 92/50   Pulse 90   Temp 97.1 °F (36.2 °C)   Ht 170.2  "cm (67\")   Wt 72.1 kg (159 lb)   SpO2 97%   BMI 24.90 kg/m²       BP Readings from Last 3 Encounters:   01/20/20 92/50   12/02/19 110/74   11/23/19 112/58     Wt Readings from Last 3 Encounters:   01/20/20 72.1 kg (159 lb)   12/05/19 73.5 kg (162 lb)   12/02/19 73.5 kg (162 lb)      Body mass index is 24.9 kg/m².      HISTORY OF PRESENT ILLNESS     Overall chronic low back pain is doing better. 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   Saw neurologist and was referred to neurosurgery for spondylolisthesis at L5-S1 with bilateral sciatic pain.   Previously increased duloxetine to 90 mg daily for depression but this may also be helping with the sciatic / back pain.   Sleep is stable on trazodone 150 mg nightly.   Hypothyroidism is stable on 100 mcg levothyroxine.       Patient Care Team:  Jeremy Mcdonald MD as PCP - General (Internal Medicine)  Shaheed Blanco MD as Consulting Physician (Rheumatology)  Angy Hunter MD as Consulting Physician (Obstetrics and Gynecology)      REVIEW OF SYSTEMS     Review of Systems  Constitutional neg  Resp neg  CV neg  Psych improved depression and sleep  MuSk improved low back pain       PHYSICAL EXAMINATION     Physical Exam  Constitutional  No distressPsychiatric  Alert. Judgment intact. Thought content normal. Mood normal      REVIEWED DATA     Labs:     Lab Results   Component Value Date     01/30/2019    K 4.1 01/30/2019    CALCIUM 9.2 01/30/2019    AST 26 09/19/2019    ALT 24 09/19/2019    BUN 13 01/30/2019    CREATININE 0.8 09/19/2019    CREATININE 0.91 01/30/2019    CREATININE 0.8 01/30/2018    EGFRIFNONA 81 01/30/2019    EGFRIFAFRI 93 01/30/2019       Lab Results   Component Value Date    GLU 88 01/30/2019       No results found for: LDL, HDL, TRIG, CHOLHDLRATIO    Lab Results   Component Value Date    TSH 1.780 09/18/2019    FREET4 1.43 09/18/2019       Lab Results   Component Value Date    WBC 2.65 (L) 09/19/2019    HGB 13.4 09/19/2019    HGB " 13.8 01/30/2019    HGB 13.3 01/30/2018     (L) 09/19/2019       Lab Results   Component Value Date    PROTEIN Negative 01/30/2019    GLUCOSEU Negative 01/30/2019    BLOODU Negative 01/30/2019    NITRITEU Negative 01/30/2019    LEUKOCYTESUR Trace (A) 01/30/2019       Imaging:     MRI OF THE LUMBAR SPINE WITHOUT CONTRAST 01/10/2020     CLINICAL HISTORY: Low back pain since October 2019, has bilateral lumbar  radiculopathy. Symptoms have been ongoing for greater than 6 weeks with  conservative treatment and are persistent and progressive and the  patient is a surgical candidate.     TECHNIQUE: Sagittal T1, proton density and fat-suppressed T2-weighted  images were obtained of the lumbar spine. In addition axial T2-weighted  images were obtained from L1 to S1. Thin cut axial T1-weighted images  were obtained angled through the interspaces from L2 to S1.     This is correlated to prior MRI of the lumbar spine [facility] 01/25/2018 and prior lumbar spine plain film series on  11/23/2019 and today 01/10/2020.       FINDINGS: The distal thoracic cord and the conus is normal in signal  intensity. The conus terminates at the L1-2 interspace level which is  normal.     There is a 1.6 cm T2 hyperintense lesion posterior aspect of the midpole  of the right kidney unchanged when compared to 01/25/2018 felt to be a  benign cyst.     The T11-12, T12-L1, L1-L2, L2-L3, L3-L4 posterior disc margin and facets  are normal with no canal or foraminal narrowing from T11 to L4.     At L4-5 there is mild bilateral facet overgrowth, very minimal posterior  disc bulge. There is no canal or foraminal narrowing.     At L5-S1 there is mild bilateral facet overgrowth. There is some disc  space narrowing and degenerative endplate changes and there is a 3-4 mm  retrolisthesis of L5 with respect to S1 with uncovering of the posterior  inferior aspect of the L5-S1 disc space and disc material diffusely  bulging or  protruding along the posterior superior endplate of S1  eccentric left paracentrally where left paracentral protruding disc  material measures up to 10 x 5 mm in mediolateral and anteroposterior  dimension, abuts the anterior medial aspect of the traversing left S1  nerve root and may also contact the far medial edge of the traversing  right S1 nerve root. There is no central canal narrowing and there is no  foraminal narrowing.     IMPRESSION:  At L5-S1 there is mild bilateral facet overgrowth. There is mild disc  space narrowing, diffuse disc desiccation, degenerative endplate changes  and there is a 3-4 mm retrolisthesis of L5 with respect to S1 and there  is a posterior central left paracentral annular tear and there is  uncovering of the posterior inferior aspect of L5-S1 disc space and  there is disc material diffusely bulging or protruding along the  posterior superior endplate of S1 eccentric posterior central to left  paracentral and it clearly abuts the anteromedial aspect of the  traversing left S1 nerve root and may contact the medial aspect of the  traversing right S1 nerve root. The degree of bulging or protruding disc  material along the posterior superior endplate of S1 is unchanged to  equivocally slightly increased when compared to prior MRI 01/25/2018.  The remainder of the lumbar spine MRI is unremarkable.     This report was finalized on 1/14/2020     Medical Tests:         Summary of old records / correspondence / consultant report:         Request outside records:         ALLERGIES  Allergies   Allergen Reactions   • Oxycodone Nausea And Vomiting     States can take as long as has zofran         PFSH:     The following portions of the patient's history were reviewed and updated as appropriate: Allergies / Current Medications / Past Medical History / Surgical History / Social History / Family History    PROBLEM LIST   Patient Active Problem List   Diagnosis   • Depression with anxiety   •  Hypothyroidism due to Hashimoto's thyroiditis   • Insomnia   • Systemic lupus erythematosus (CMS/HCC)   • Vitamin D deficiency   • Irritable bowel syndrome with constipation   • Seasonal allergic rhinitis due to pollen   • DDD (degenerative disc disease), lumbar   • Allergic rhinitis   • Chronic bilateral low back pain with right-sided sciatica   • Spondylolisthesis of lumbosacral region       PAST MEDICAL HISTORY  Past Medical History:   Diagnosis Date   • Anxiety    • Colon polyp    • Depression    • Disease of thyroid gland     HYPOTHYROIDISM D/T PARTIAL THYROIDECTOMY   • Fibromyalgia, primary    • Frequent UTI    • Headache, tension-type    • Hemorrhoids    • Hypothyroidism    • Lupus (CMS/HCC)     DX 3-2007   • Migraine    • Peripheral neuropathy 2017   • Thrombocytopenia (CMS/[facility]) 2012    due to ITP       SURGICAL HISTORY  Past Surgical History:   Procedure Laterality Date   • ANAL SPHINCTEROTOMY  2003       • APPENDECTOMY  2010   • COLONOSCOPY N/A 11/21/2016    Procedure: COLONOSCOPY;  Surgeon: Natalya Farias MD;  Location: [facility];  Service:    • HEMORRHOIDECTOMY  2003       • HEMORRHOIDECTOMY N/A 11/21/2016    Procedure: HEMORRHOIDECTOMY;  Surgeon: Natalya Farias MD;  Location: [facility];  Service:    • INTRAUTERINE DEVICE INSERTION  08/2016   • THYROIDECTOMY, PARTIAL Right 06/2015   • TONSILLECTOMY         SOCIAL HISTORY  Social History     Socioeconomic History   • Marital status:      Spouse name: Alexander*   • Number of children: 2   • Years of education: Not on file   • Highest education level: Not on file   Occupational History   • Occupation: (Medical Assistant) [employer]   Tobacco Use   • Smoking status: Never Smoker   • Smokeless tobacco: Never Used   Substance and Sexual Activity   • Alcohol use: Yes     Frequency: Monthly or less     Comment: Few times a year   • Drug use: No   • Sexual activity: Yes     Partners: Male     Birth control/protection:  IUD       FAMILY HISTORY  Family History   Problem Relation Age of Onset   • Colon polyps Mother    • Coronary artery disease Mother 60        non-smokers   • Valvular heart disease Mother    • Diabetes type II Mother    • Other Mother         pulmonary hypertension   • Clotting disorder Mother         superficial dvt   • Depression Mother    • Colon polyps Father    • Atrial fibrillation Father    • Hypertension Father    • Deep vein thrombosis Father    • Other Father         spinal stenosis, Degenerative disc disease    • Autoimmune disease Brother    • Depression Brother    • No Known Problems Daughter    • Dementia Maternal Uncle    • Dementia Maternal Grandmother    • Stroke Maternal Grandmother    • Colon cancer Paternal Grandfather    • Colon cancer Maternal Uncle    • No Known Problems Daughter          **Dragon Disclaimer:   Much of this encounter note is an electronic transcription/translation of spoken language to printed text. The electronic translation of spoken language may permit erroneous, or at times, nonsensical words or phrases to be inadvertently transcribed. Although I have reviewed the note for such errors, some may still exist.       Template created by Adan Mcdonald MD

## 2020-01-22 ENCOUNTER — APPOINTMENT (OUTPATIENT)
Dept: GENERAL RADIOLOGY | Facility: HOSPITAL | Age: 40
End: 2020-01-22

## 2020-01-22 ENCOUNTER — HOSPITAL ENCOUNTER (EMERGENCY)
Facility: HOSPITAL | Age: 40
Discharge: HOME | End: 2020-01-22
Attending: EMERGENCY MEDICINE | Admitting: EMERGENCY MEDICINE

## 2020-01-22 VITALS
HEART RATE: 92 BPM | BODY MASS INDEX: 24.96 KG/M2 | OXYGEN SATURATION: 98 % | WEIGHT: 159 LBS | HEIGHT: 67 IN | RESPIRATION RATE: 19 BRPM | SYSTOLIC BLOOD PRESSURE: 118 MMHG | DIASTOLIC BLOOD PRESSURE: 78 MMHG | TEMPERATURE: 97.2 F

## 2020-01-22 DIAGNOSIS — M25.332 SCAPHOLUNATE DISSOCIATION OF LEFT WRIST: ICD-10-CM

## 2020-01-22 DIAGNOSIS — S52.592A OTHER CLOSED FRACTURE OF DISTAL END OF LEFT RADIUS, INITIAL ENCOUNTER: Primary | ICD-10-CM

## 2020-01-22 PROCEDURE — 73110 X-RAY EXAM OF WRIST: CPT

## 2020-01-22 PROCEDURE — 99283 EMERGENCY DEPT VISIT LOW MDM: CPT

## 2020-01-22 RX ORDER — IBUPROFEN 800 MG/1
800 TABLET ORAL ONCE
Status: COMPLETED | OUTPATIENT
Start: 2020-01-22 | End: 2020-01-22

## 2020-01-22 RX ADMIN — IBUPROFEN 800 MG: 800 TABLET, FILM COATED ORAL at 10:01
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
## 2020-01-23 DIAGNOSIS — S62.102D CLOSED FRACTURE OF LEFT WRIST WITH ROUTINE HEALING, SUBSEQUENT ENCOUNTER: Primary | ICD-10-CM

## 2020-01-23 RX ORDER — ACETAMINOPHEN AND CODEINE PHOSPHATE 300; 30 MG/1; MG/1
1 TABLET ORAL EVERY 4 HOURS PRN
Qty: 10 TABLET | Refills: 0 | Status: SHIPPED | OUTPATIENT
Start: 2020-01-23 | End: 2020-05-22

## 2020-01-23 RX ORDER — ONDANSETRON 4 MG/1
4 TABLET, FILM COATED ORAL EVERY 8 HOURS PRN
Qty: 15 TABLET | Refills: 0 | Status: SHIPPED | OUTPATIENT
Start: 2020-01-23 | End: 2020-05-22

## 2020-01-23 NOTE — TELEPHONE ENCOUNTER
Pt broke her wrist yesterday and went to  ER. They offered to give her pain medication but she is now having a lot more pain today. She wanted to see if Dr. Mcdonald would send in just a few pain pills until she can see the hand surgeon tomorrow?

## 2020-01-23 NOTE — TELEPHONE ENCOUNTER
Per pt can take tylenol with codeine ,but she need phenergan  with it .. [facility] pharmacy

## 2020-01-23 NOTE — TELEPHONE ENCOUNTER
Can send zofran but I don't like prescribing phenergan with narcotic as can be sedating, increased risk of respiratory depression.

## 2020-03-12 DIAGNOSIS — M51.36 DDD (DEGENERATIVE DISC DISEASE), LUMBAR: ICD-10-CM

## 2020-03-12 DIAGNOSIS — F41.8 DEPRESSION WITH ANXIETY: Chronic | ICD-10-CM

## 2020-03-12 RX ORDER — DULOXETIN HYDROCHLORIDE 30 MG/1
30 CAPSULE, DELAYED RELEASE ORAL DAILY
Qty: 90 CAPSULE | Refills: 1 | Status: SHIPPED | OUTPATIENT
Start: 2020-03-12 | End: 2020-09-03 | Stop reason: SDUPTHER

## 2020-03-12 RX ORDER — DULOXETIN HYDROCHLORIDE 60 MG/1
60 CAPSULE, DELAYED RELEASE ORAL
Qty: 90 CAPSULE | Refills: 1 | Status: SHIPPED | OUTPATIENT
Start: 2020-03-12 | End: 2020-09-21

## 2020-03-12 NOTE — TELEPHONE ENCOUNTER
I need to get a refill on Cymbalta 30mg tablet please.  I take 1 per day in addition to the 60mg tablet.        lov 1/20/20  Nov 7/20/20

## 2020-03-24 ENCOUNTER — TELEPHONE (OUTPATIENT)
Dept: INTERNAL MEDICINE | Age: 40
End: 2020-03-24

## 2020-03-24 DIAGNOSIS — F99 INSOMNIA DUE TO OTHER MENTAL DISORDER: Chronic | ICD-10-CM

## 2020-03-24 DIAGNOSIS — F51.05 INSOMNIA DUE TO OTHER MENTAL DISORDER: Chronic | ICD-10-CM

## 2020-03-24 RX ORDER — TRAZODONE HYDROCHLORIDE 100 MG/1
200 TABLET ORAL NIGHTLY
Qty: 60 TABLET | Refills: 5 | Status: SHIPPED | OUTPATIENT
Start: 2020-03-24 | End: 2020-10-27 | Stop reason: SDUPTHER

## 2020-03-24 NOTE — TELEPHONE ENCOUNTER
----- Message from Taty Romo sent at 3/24/2020  1:44 PM EDT -----  Regarding: Prescription Question  Contact: [phone]  Dr. Mcdonald - I hope you are doing well and staying healthy. I wanted to request a refill on trazadone a little early. I have been taking 200mg lately, so I am running out early. Can you send a new script for 2 of the 100mg tabs to take each night. As previously discussed the 150mg tabs are awkward and large and have a really hard time swallowing them even after cutting into 1/3. Thank you very much!

## 2020-04-10 NOTE — PROGRESS NOTES
Subjective   History of Present Illness: Taty Romo is a 39 y.o. female is being seen for consultation today at the request of Vijay Fragoso MD for constant severe  low back and bilateral leg pain, bilateral leg weakness and numbness intermittently bilateral feet.  She denies urinary incontinence or problems with her balance and gait.  Patient states that she has had these symptoms intermittently for years, however, they have increased over the past year.  She has tried PT which made her pain worse.  She has not been to pain management or had GUILLAUME.      Patient had lumbar MRI and plain films at [facility]  1.10.20    She is not currently taking any pain meds or muscle relaxants    This very nice lady is referred for a long history of 10 years of low back pain, and more recently over the last year bilateral leg pain. 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 She generally has been very active, but when he legs started to both her a year ago, her activity level has diminished. She does have a history of lupus. The back still hurts her the worse. Physical therapy has been tried, but she did not seem to get much of it. She says that extension does not bother her that much, it is flexion. She has no motor deficits. We discussed the MRI and the nature of her problem, which is certainly mechanical with nerve root compression. I told her the goal is to treat her nonoperatively. An L5-S1 fusion is the ultimate treatment, but I would like to avoid that right now. Normally we would try some epidural blocks, but the pain clinics are all closed down because of the pandemic. She said that Flexeril and antiinflammatories really did not help her. I suggested a trial of gabapentin to see if that helps at least a bit of the leg symptoms. Overall, she says the back actually hurts worse than the legs. I suggested that she go on the Internet and look at some Marianne exercises which stress extension and to try  those, as well as to try and get back to things like core strengthening and planks. We will schedule a 2 month visit as a televisit, but when we can get an epidural block with the pain clinic, we will order that.       Back Pain   The current episode started more than 1 year ago. The problem occurs constantly. The pain is present in the lumbar spine. The pain radiates to the left thigh and right thigh. The pain is at a severity of 10/10. The pain is severe. The symptoms are aggravated by sitting, standing, twisting, lying down, position and bending. Associated symptoms include headaches, leg pain and weakness. Pertinent negatives include no abdominal pain, chest pain or fever.   Leg Pain    The incident occurred more than 1 week ago. The pain is present in the left leg and right leg. The pain is at a severity of 10/10. The pain is severe. The pain has been constant since onset.   Extremity Weakness    The pain is present in the right upper leg, right lower leg, left lower leg and left upper leg. The current episode started more than 1 year ago. The problem occurs intermittently. Pertinent negatives include no fever.       The following portions of the patient's history were reviewed and updated as appropriate: allergies, current medications, past family history, past medical history, past social history, past surgical history and problem list.    Review of Systems   Constitutional: Positive for diaphoresis and fatigue. Negative for fever.   Respiratory: Negative for apnea and shortness of breath.    Cardiovascular: Negative for chest pain and palpitations.   Gastrointestinal: Negative for abdominal pain, nausea and vomiting.   Genitourinary: Negative for urgency.   Musculoskeletal: Positive for back pain, extremity weakness and neck pain. Negative for arthralgias, gait problem and myalgias.   Neurological: Positive for weakness, light-headedness and headaches. Negative for dizziness and syncope.  "  Psychiatric/Behavioral: Negative for confusion.       Objective     Vitals:    04/15/20 1013   BP: 100/72   Pulse: 68   Temp: 98.2 °F (36.8 °C)   TempSrc: Tympanic   Weight: 69.4 kg (153 lb)   Height: 170 cm (66.93\")     Body mass index is 24.01 kg/m².      Physical Exam   Constitutional: She is oriented to person, place, and time. She appears well-developed and well-nourished.   HENT:   Head: Normocephalic and atraumatic.   Eyes: Pupils are equal, round, and reactive to light. Conjunctivae and EOM are normal.   Fundoscopic exam:       The right eye shows no papilledema. The right eye shows venous pulsations.        The left eye shows no papilledema. The left eye shows venous pulsations.   Neck: Carotid bruit is not present.   Neurological: She is oriented to person, place, and time. She has a normal Finger-Nose-Finger Test and a normal Heel to Shin Test. Gait normal.   Reflex Scores:       Tricep reflexes are 2+ on the right side and 2+ on the left side.       Bicep reflexes are 2+ on the right side and 2+ on the left side.       Brachioradialis reflexes are 2+ on the right side and 2+ on the left side.       Patellar reflexes are 2+ on the right side and 2+ on the left side.       Achilles reflexes are 2+ on the right side and 2+ on the left side.  Psychiatric: Her speech is normal.     Neurologic Exam     Mental Status   Oriented to person, place, and time.   Registration of memory: Good recent and remote memory.   Attention: normal. Concentration: normal.   Speech: speech is normal   Level of consciousness: alert  Knowledge: consistent with education.     Cranial Nerves     CN II   Visual fields full to confrontation.   Visual acuity: normal    CN III, IV, VI   Pupils are equal, round, and reactive to light.  Extraocular motions are normal.     CN V   Facial sensation intact.   Right corneal reflex: normal  Left corneal reflex: normal    CN VII   Facial expression full, symmetric.   Right facial weakness: " none  Left facial weakness: none    CN VIII   Hearing: intact    CN IX, X   Palate: symmetric    CN XI   Right sternocleidomastoid strength: normal  Left sternocleidomastoid strength: normal    CN XII   Tongue: not atrophic  Tongue deviation: none    Motor Exam   Muscle bulk: normal  Right arm tone: normal  Left arm tone: normal  Right leg tone: normal  Left leg tone: normal    Strength   Strength 5/5 except as noted.     Sensory Exam   Light touch normal.     Gait, Coordination, and Reflexes     Gait  Gait: normal    Coordination   Finger to nose coordination: normal  Heel to shin coordination: normal    Reflexes   Right brachioradialis: 2+  Left brachioradialis: 2+  Right biceps: 2+  Left biceps: 2+  Right triceps: 2+  Left triceps: 2+  Right patellar: 2+  Left patellar: 2+  Right achilles: 2+  Left achilles: 2+  Right : 2+  Left : 2+          Assessment/Plan   Independent Review of Radiographic Studies:      I personally reviewed the images from the following studies.    I reviewed the lumbar MRI and plain x-rays done on 1/10/2020.  There is a retrolisthesis of L5 upon S1.  There is a left paracentral annular tear and disc bulge.  There is some foraminal stenosis on both sides and some mild disc bulging at L4-L5.  Agree with the report.        Medical Decision Making:      Again, we are limited as to what we can do for her in terms of pain management but I told her to try some gabapentin at 1/2 mg twice daily and to look up Neymar exercises on the Internet and to try them and to do other core strengthening exercises such as planks.  We will schedule a total of visit in 2 months.  When the pain clinics open up we can try an epidural block.      Taty was seen today for back pain, leg pain, extremity weakness and numbness.    Diagnoses and all orders for this visit:    DDD (degenerative disc disease), lumbar  -     gabapentin (NEURONTIN) 100 MG capsule; Take 1 capsule by mouth every night at bedtime for  one week, then 1 capsule twice daily thereafter.    Herniation of lumbar intervertebral disc with radiculopathy  -     gabapentin (NEURONTIN) 100 MG capsule; Take 1 capsule by mouth every night at bedtime for one week, then 1 capsule twice daily thereafter.    Spondylolisthesis at L5-S1 level  -     gabapentin (NEURONTIN) 100 MG capsule; Take 1 capsule by mouth every night at bedtime for one week, then 1 capsule twice daily thereafter.      Return in about 2 months (around 6/15/2020) for as a televisit.         Answers for HPI/ROS submitted by the patient on 4/8/2020   Neurologic complaint  What is the primary reason for your visit?: Neurological Problem

## 2020-04-15 ENCOUNTER — OFFICE VISIT (OUTPATIENT)
Dept: NEUROSURGERY | Facility: CLINIC | Age: 40
End: 2020-04-15

## 2020-04-15 VITALS
TEMPERATURE: 98.2 F | WEIGHT: 153 LBS | HEART RATE: 68 BPM | HEIGHT: 67 IN | SYSTOLIC BLOOD PRESSURE: 100 MMHG | DIASTOLIC BLOOD PRESSURE: 72 MMHG | BODY MASS INDEX: 24.01 KG/M2

## 2020-04-15 DIAGNOSIS — M51.36 DDD (DEGENERATIVE DISC DISEASE), LUMBAR: Primary | ICD-10-CM

## 2020-04-15 DIAGNOSIS — M51.16 HERNIATION OF LUMBAR INTERVERTEBRAL DISC WITH RADICULOPATHY: ICD-10-CM

## 2020-04-15 DIAGNOSIS — M43.17 SPONDYLOLISTHESIS AT L5-S1 LEVEL: ICD-10-CM

## 2020-04-15 PROCEDURE — 99244 OFF/OP CNSLTJ NEW/EST MOD 40: CPT | Performed by: NEUROLOGICAL SURGERY

## 2020-04-15 RX ORDER — GABAPENTIN 100 MG/1
100 CAPSULE ORAL 2 TIMES DAILY
Qty: 60 CAPSULE | Refills: 3 | Status: SHIPPED | OUTPATIENT
Start: 2020-04-15 | End: 2020-08-28 | Stop reason: CLARIF

## 2020-05-03 DIAGNOSIS — M51.36 DDD (DEGENERATIVE DISC DISEASE), LUMBAR: ICD-10-CM

## 2020-05-04 DIAGNOSIS — M54.16 BILATERAL LUMBAR RADICULOPATHY: Primary | ICD-10-CM

## 2020-05-04 DIAGNOSIS — M54.32 BILATERAL SCIATICA: ICD-10-CM

## 2020-05-04 DIAGNOSIS — M54.31 BILATERAL SCIATICA: ICD-10-CM

## 2020-05-04 DIAGNOSIS — M51.16 HERNIATION OF LUMBAR INTERVERTEBRAL DISC WITH RADICULOPATHY: Primary | ICD-10-CM

## 2020-05-04 RX ORDER — NAPROXEN 500 MG/1
500 TABLET ORAL 2 TIMES DAILY WITH MEALS
Qty: 60 TABLET | Refills: 0 | Status: SHIPPED | OUTPATIENT
Start: 2020-05-04 | End: 2020-06-04

## 2020-05-04 RX ORDER — SENNOSIDES 8.6 MG
650 CAPSULE ORAL EVERY 8 HOURS PRN
Qty: 100 TABLET | Refills: 2 | COMMUNITY
Start: 2020-05-04

## 2020-05-04 NOTE — TELEPHONE ENCOUNTER
Please advise. Pt requesting refill on naproxen 500 mg take 1 tablet bid. Pt last seen on 12/05/2019 no f/u scheduled.     Sent in 1 month message put for pt to schedule an apt.

## 2020-05-22 ENCOUNTER — ANESTHESIA EVENT (OUTPATIENT)
Dept: PAIN MEDICINE | Facility: HOSPITAL | Age: 40
End: 2020-05-22

## 2020-05-22 ENCOUNTER — HOSPITAL ENCOUNTER (OUTPATIENT)
Dept: PAIN MEDICINE | Facility: HOSPITAL | Age: 40
Discharge: HOME | End: 2020-05-22
Admitting: ANESTHESIOLOGY

## 2020-05-22 ENCOUNTER — ANESTHESIA (OUTPATIENT)
Dept: PAIN MEDICINE | Facility: HOSPITAL | Age: 40
End: 2020-05-22

## 2020-05-22 ENCOUNTER — HOSPITAL ENCOUNTER (OUTPATIENT)
Dept: GENERAL RADIOLOGY | Facility: HOSPITAL | Age: 40
Discharge: HOME | End: 2020-05-22

## 2020-05-22 VITALS
HEART RATE: 76 BPM | RESPIRATION RATE: 16 BRPM | DIASTOLIC BLOOD PRESSURE: 63 MMHG | TEMPERATURE: 98.4 F | HEIGHT: 67 IN | OXYGEN SATURATION: 96 % | BODY MASS INDEX: 24.33 KG/M2 | SYSTOLIC BLOOD PRESSURE: 114 MMHG | WEIGHT: 155 LBS

## 2020-05-22 DIAGNOSIS — R52 PAIN: ICD-10-CM

## 2020-05-22 DIAGNOSIS — M43.17 SPONDYLOLISTHESIS AT L5-S1 LEVEL: Primary | Chronic | ICD-10-CM

## 2020-05-22 PROCEDURE — 25010000002 MIDAZOLAM PER 1 MG: Performed by: ANESTHESIOLOGY

## 2020-05-22 PROCEDURE — 25010000002 METHYLPREDNISOLONE PER 80 MG: Performed by: ANESTHESIOLOGY

## 2020-05-22 PROCEDURE — C1755 CATHETER, INTRASPINAL: HCPCS

## 2020-05-22 PROCEDURE — 77003 FLUOROGUIDE FOR SPINE INJECT: CPT

## 2020-05-22 PROCEDURE — 0 IOPAMIDOL 41 % SOLUTION: Performed by: ANESTHESIOLOGY

## 2020-05-22 RX ORDER — LIDOCAINE HYDROCHLORIDE 10 MG/ML
1 INJECTION, SOLUTION INFILTRATION; PERINEURAL ONCE
Status: DISCONTINUED | OUTPATIENT
Start: 2020-05-22 | End: 2020-05-23 | Stop reason: HOSPADM

## 2020-05-22 RX ORDER — FENTANYL CITRATE 50 UG/ML
50 INJECTION, SOLUTION INTRAMUSCULAR; INTRAVENOUS AS NEEDED
Status: DISCONTINUED | OUTPATIENT
Start: 2020-05-22 | End: 2020-05-23 | Stop reason: HOSPADM

## 2020-05-22 RX ORDER — METHYLPREDNISOLONE ACETATE 80 MG/ML
80 INJECTION, SUSPENSION INTRA-ARTICULAR; INTRALESIONAL; INTRAMUSCULAR; SOFT TISSUE ONCE
Status: COMPLETED | OUTPATIENT
Start: 2020-05-22 | End: 2020-05-22

## 2020-05-22 RX ORDER — MIDAZOLAM HYDROCHLORIDE 1 MG/ML
1 INJECTION INTRAMUSCULAR; INTRAVENOUS
Status: DISCONTINUED | OUTPATIENT
Start: 2020-05-22 | End: 2020-05-23 | Stop reason: HOSPADM

## 2020-05-22 RX ORDER — SODIUM CHLORIDE 0.9 % (FLUSH) 0.9 %
3 SYRINGE (ML) INJECTION EVERY 12 HOURS SCHEDULED
Status: DISCONTINUED | OUTPATIENT
Start: 2020-05-22 | End: 2020-05-23 | Stop reason: HOSPADM

## 2020-05-22 RX ORDER — SODIUM CHLORIDE 0.9 % (FLUSH) 0.9 %
3-10 SYRINGE (ML) INJECTION AS NEEDED
Status: DISCONTINUED | OUTPATIENT
Start: 2020-05-22 | End: 2020-05-23 | Stop reason: HOSPADM

## 2020-05-22 RX ADMIN — IOPAMIDOL 10 ML: 408 INJECTION, SOLUTION INTRATHECAL at 13:44

## 2020-05-22 RX ADMIN — METHYLPREDNISOLONE ACETATE 80 MG: 80 INJECTION, SUSPENSION INTRA-ARTICULAR; INTRALESIONAL; INTRAMUSCULAR; SOFT TISSUE at 13:45

## 2020-05-22 RX ADMIN — MIDAZOLAM 2 MG: 1 INJECTION INTRAMUSCULAR; INTRAVENOUS at 13:42

## 2020-05-22 NOTE — ANESTHESIA PROCEDURE NOTES
PAIN Epidural block    Pre-sedation assessment completed: 5/22/2020 1:28 PM    Patient reassessed immediately prior to procedure    Patient location during procedure: pain clinic  Start Time: 5/22/2020 1:28 PM  Stop Time: 5/22/2020 1:46 PM  Indication:procedure for pain  Performed By  Anesthesiologist: Sandro Mckeon MD  Preanesthetic Checklist  Completed: patient identified, site marked, surgical consent, pre-op evaluation, timeout performed, risks and benefits discussed and monitors and equipment checked  Additional Notes  Depomedrol - 80mg    Needle position confirmed by fluoroscopy and epidurogram using 2cc of [id number].    Diagnosis  Post-Op Diagnosis Codes:     * Lumbar radiculopathy (M54.16)     * Lumbar disc disease (M51.9)    Prep:  Pt Position:prone  Sterile Tech:cap, gloves, mask and sterile barrier  Prep:chlorhexidine gluconate and isopropyl alcohol  Monitoring:blood pressure monitoring, continuous pulse oximetry and EKG  Procedure:Sedation: yes     Approach:left paramedian  Guidance: fluoroscopy  Location:lumbar  Level:L5-S1  Needle Type:Tuohy  Needle Gauge:20  Aspiration:negative  Medications:  Depomedrol:80 mg  Preservative Free Saline:2mL  Isovue:2mL    Post Assessment:  Post-procedure: bandaide.  Pt Tolerance:patient tolerated the procedure well with no apparent complications  Complications:no

## 2020-05-22 NOTE — H&P
Psychiatric    History and Physical    Patient Name: Taty Romo  :  1980  MRN:  [id number]  Date of Admission: 2020    Subjective     Patient is a 40 y.o. female presents with chief complaint of chronic, intermitent, moderate low back and leg: bilateral pain.  Onset of symptoms was gradual starting several months ago.  Symptoms are associated/aggravated by activity, exercise, lifting or standing. Symptoms improve with rest    The following portions of the patients history were reviewed and updated as appropriate: current medications, allergies, past medical history, past surgical history, past family history, past social history and problem list                Objective     Past Medical History:   Past Medical History:   Diagnosis Date   • Anxiety    • Colon polyp    • Depression    • Disease of thyroid gland     HYPOTHYROIDISM D/T PARTIAL THYROIDECTOMY   • Fibromyalgia, primary    • Frequent UTI    • Headache, tension-type    • Hemorrhoids    • Hypothyroidism    • Lupus (CMS/[facility])     DX 3-   • Migraine    • Peripheral neuropathy    • Thrombocytopenia (CMS/[facility])     due to ITP     Past Surgical History:   Past Surgical History:   Procedure Laterality Date   • ANAL SPHINCTEROTOMY         • APPENDECTOMY     • COLONOSCOPY N/A 2016    Procedure: COLONOSCOPY;  Surgeon: Natalya Farias MD;  Location: [facility];  Service:    • HEMORRHOIDECTOMY         • HEMORRHOIDECTOMY N/A 2016    Procedure: HEMORRHOIDECTOMY;  Surgeon: Natalya Farias MD;  Location: [facility];  Service:    • INTRAUTERINE DEVICE INSERTION  2016   • THYROIDECTOMY, PARTIAL Right 2015   • TONSILLECTOMY       Family History:   Family History   Problem Relation Age of Onset   • Colon polyps Mother    • Coronary artery disease Mother 60        non-smokers   • Valvular heart disease Mother    • Diabetes type II Mother    • Other Mother         pulmonary hypertension   •  "Clotting disorder Mother         superficial dvt   • Depression Mother    • Colon polyps Father    • Atrial fibrillation Father    • Hypertension Father    • Deep vein thrombosis Father    • Other Father         spinal stenosis, Degenerative disc disease    • Autoimmune disease Brother    • Depression Brother    • No Known Problems Daughter    • Dementia Maternal Uncle    • Dementia Maternal Grandmother    • Stroke Maternal Grandmother    • Colon cancer Paternal Grandfather    • Colon cancer Maternal Uncle    • No Known Problems Daughter      Social History:   Social History     Tobacco Use   • Smoking status: Never Smoker   • Smokeless tobacco: Never Used   Substance Use Topics   • Alcohol use: Yes     Frequency: Monthly or less     Comment: Few times a year   • Drug use: No       Vital Signs Range for the last 24 hours  Temperature:     Temp Source:     BP:     Pulse:     Respirations:     SPO2:     O2 Amount (l/min):     O2 Devices     Weight: Weight:  [70.3 kg (155 lb)] 70.3 kg (155 lb)     Flowsheet Rows      First Filed Value   Admission Height  170.2 cm (67\") Documented at 05/22/2020 1318   Admission Weight  70.3 kg (155 lb) Documented at 05/22/2020 1318          --------------------------------------------------------------------------------    Current Outpatient Medications   Medication Sig Dispense Refill   • acetaminophen (Tylenol 8 Hour Arthritis Pain) 650 MG 8 hr tablet Take 1 tablet by mouth Every 8 (Eight) Hours As Needed for Mild Pain  or Moderate Pain . 100 tablet 2   • azelastine (ASTELIN) 0.1 % nasal spray Place 2 sprays into the nostril(s) as directed by provider 2 (Two) Times a Day. 30 mL 5   • CBD (cannabidiol) oral oil 1 mL Daily.     • Cholecalciferol (VITAMIN D) 2000 UNITS capsule Take 3,000 Units by mouth Every Evening.     • DULoxetine (CYMBALTA) 30 MG capsule Take 1 capsule by mouth Daily. 90 capsule 1   • DULoxetine (CYMBALTA) 60 MG capsule Take 1 capsule by mouth Every Morning Before " Breakfast. 90 capsule 1   • fexofenadine (ALLEGRA) 180 MG tablet Take 180 mg by mouth Daily.     • fluticasone (VERAMYST) 27.5 MCG/SPRAY nasal spray Place 1 spray into the nostril(s) as directed by provider 2 (Two) Times a Day. 9.1 mL 5   • gabapentin (NEURONTIN) 100 MG capsule Take 1 capsule by mouth every night at bedtime for one week, then 1 capsule twice daily thereafter. 60 capsule 3   • hydroxychloroquine (PLAQUENIL) 200 MG tablet Take 1 tablet by mouth 2 (Two) times a day. 180 tablet 0   • levothyroxine (SYNTHROID, LEVOTHROID) 100 MCG tablet Take 1 tablet by mouth Daily. 90 tablet 3   • naproxen (NAPROSYN) 500 MG tablet Take 1 tablet by mouth 2 (Two) Times a Day With Meals for 30 days. 60 tablet 0   • traZODone (DESYREL) 100 MG tablet Take 2 tablets by mouth Every Night. 60 tablet 5   • Omega-3 Fatty Acids (FISH OIL PO) Take 1,200 mg by mouth.       No current facility-administered medications for this encounter.        --------------------------------------------------------------------------------  Assessment/Plan      Anesthesia Evaluation     Patient summary reviewed and Nursing notes reviewed         Pain impairs ability to perform ADLs: Sleeping  Modalities previously tried to control pain with limited effectiveness within the last 4-6 weeks: Rest     Airway   Mallampati: II  TM distance: >3 FB  Neck ROM: full  Dental - normal exam     Pulmonary - negative pulmonary ROS and normal exam   Cardiovascular - negative cardio ROS and normal exam        Neuro/Psych- neuro exam normal  (+) headaches, numbness, psychiatric history,     GI/Hepatic/Renal/Endo - negative ROS     Musculoskeletal (-) normal exam    (+) back pain, myalgias, radiculopathy  Abdominal  - normal exam   Substance History - negative use     OB/GYN negative ob/gyn ROS         Other   arthritis,                 Diagnosis and Plan    Treatment Plan  ASA 2      Procedures: Lumbar Epidural Steroid Injection(LESI), With fluoroscopy,        Anesthetic plan and risks discussed with patient.          Diagnosis     * Lumbar radiculopathy [M54.16]     * Lumbar disc disease [M51.9]

## 2020-06-11 NOTE — PROGRESS NOTES
Subjective   History of Present Illness: Taty Romo is a 40 y.o. female for televisit follow up after lumbar GUILLAUME at [facility]     Patient was last seen 4.15.20 for low back and bilateral leg pain, weakness and numbness in her feet.    She reports that she received 50% relief from the L-GUILLAUME with the bilateral leg pain. She reports that she is still having lower back pain. She denies leg pain, numbness, tingling and weakness.     You have chosen to receive care through a telephone visit. Do you consent to use a telephone visit for your medical care today? Yes    This was a TeleVisit from my office.  The patient was at home.  The visit lasted 10 minutes.  We have been following her for an L5-S1 spondylolisthesis with a superimposed annular tear and disk bulge.  The block helped quite a bit.  The gabapentin, now at 100 mg twice daily, seems to be helping as well.  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  I told her to keep doing the exercises and to hold another block in reserve.  If things flare-up, she can certainly call the pain clinic to get another one, but I asked her to let me know.  We will keep an eye on her and have her come in in about 6 months.  I have been giving her the gabapentin.  If things worsen, she will let us know.      Back Pain   The current episode started more than 1 month ago. The problem occurs constantly. The pain is present in the lumbar spine. The quality of the pain is described as aching, cramping, shooting and stabbing. The pain does not radiate. The pain is at a severity of 5/10. The pain is moderate. The symptoms are aggravated by position, sitting, standing and lying down. Pertinent negatives include no bladder incontinence, bowel incontinence, chest pain, leg pain, numbness, tingling or weakness. Treatments tried: L-ESIx1. The treatment provided significant relief.        The following portions of the patient's history were reviewed and updated as appropriate: allergies, current medications, past family history, past medical history, past social history, past surgical history and problem list.    Review of Systems   Respiratory: Negative for chest tightness and shortness of breath.    Cardiovascular: Negative for chest pain.   Gastrointestinal: Negative for bowel incontinence.   Genitourinary: Negative for bladder incontinence.   Musculoskeletal: Positive for back pain.   Neurological: Negative for tingling, weakness and numbness.       Objective             Physical Exam   Deferred  Neurologic Exam   Deferred        Assessment/Plan   Independent Review of Radiographic Studies:      I personally reviewed the images from the following studies.    I reviewed the lumbar MRI and plain x-rays done on 1/10/2020.  There is a retrolisthesis of L5 upon S1.  There is a left paracentral annular tear and disc bulge.  There is some foraminal stenosis on both sides and some mild disc bulging at L4-L5.  Agree with the report.    Medical Decision Making:      She is doing much better and will keep an eye on her.  I asked her to come and see me in 6 months for a face-to-face visit.      Taty was seen today for follow-up and back pain.    Diagnoses and all orders for this visit:    Herniation of lumbar intervertebral disc with radiculopathy    DDD (degenerative disc disease), lumbar    Spondylolisthesis at L5-S1 level      Return in about 6 months (around 12/17/2020) for Face-to-face.

## 2020-06-17 ENCOUNTER — OFFICE VISIT (OUTPATIENT)
Dept: NEUROSURGERY | Facility: CLINIC | Age: 40
End: 2020-06-17

## 2020-06-17 DIAGNOSIS — M43.17 SPONDYLOLISTHESIS AT L5-S1 LEVEL: ICD-10-CM

## 2020-06-17 DIAGNOSIS — M51.36 DDD (DEGENERATIVE DISC DISEASE), LUMBAR: ICD-10-CM

## 2020-06-17 DIAGNOSIS — M51.16 HERNIATION OF LUMBAR INTERVERTEBRAL DISC WITH RADICULOPATHY: Primary | ICD-10-CM

## 2020-06-17 PROCEDURE — 99441 PR PHYS/QHP TELEPHONE EVALUATION 5-10 MIN: CPT | Performed by: NEUROLOGICAL SURGERY

## 2020-07-30 ENCOUNTER — OFFICE VISIT (OUTPATIENT)
Dept: INTERNAL MEDICINE | Age: 40
End: 2020-07-30

## 2020-07-30 VITALS
BODY MASS INDEX: 24.8 KG/M2 | DIASTOLIC BLOOD PRESSURE: 60 MMHG | HEIGHT: 67 IN | OXYGEN SATURATION: 97 % | TEMPERATURE: 97.8 F | SYSTOLIC BLOOD PRESSURE: 100 MMHG | HEART RATE: 94 BPM | WEIGHT: 158 LBS

## 2020-07-30 DIAGNOSIS — E03.8 HYPOTHYROIDISM DUE TO HASHIMOTO'S THYROIDITIS: Chronic | ICD-10-CM

## 2020-07-30 DIAGNOSIS — F41.8 DEPRESSION WITH ANXIETY: Chronic | ICD-10-CM

## 2020-07-30 DIAGNOSIS — R53.83 FATIGUE, UNSPECIFIED TYPE: Primary | ICD-10-CM

## 2020-07-30 DIAGNOSIS — M32.9 SYSTEMIC LUPUS ERYTHEMATOSUS, UNSPECIFIED SLE TYPE, UNSPECIFIED ORGAN INVOLVEMENT STATUS (HCC): Chronic | ICD-10-CM

## 2020-07-30 DIAGNOSIS — E06.3 HYPOTHYROIDISM DUE TO HASHIMOTO'S THYROIDITIS: Chronic | ICD-10-CM

## 2020-07-30 LAB
BASOPHILS # BLD AUTO: 0.01 10*3/MM3 (ref 0–0.2)
BASOPHILS NFR BLD AUTO: 0.3 % (ref 0–1.5)
CRP SERPL-MCNC: <0.03 MG/DL (ref 0–0.5)
EOSINOPHIL # BLD AUTO: 0.06 10*3/MM3 (ref 0–0.4)
EOSINOPHIL NFR BLD AUTO: 2 % (ref 0.3–6.2)
ERYTHROCYTE [DISTWIDTH] IN BLOOD BY AUTOMATED COUNT: 12.7 % (ref 12.3–15.4)
ERYTHROCYTE [SEDIMENTATION RATE] IN BLOOD BY WESTERGREN METHOD: 6 MM/HR (ref 0–20)
HCT VFR BLD AUTO: 38 % (ref 34–46.6)
HGB BLD-MCNC: 13 G/DL (ref 12–15.9)
IMM GRANULOCYTES # BLD AUTO: 0.01 10*3/MM3 (ref 0–0.05)
IMM GRANULOCYTES NFR BLD AUTO: 0.3 % (ref 0–0.5)
LYMPHOCYTES # BLD AUTO: 0.85 10*3/MM3 (ref 0.7–3.1)
LYMPHOCYTES NFR BLD AUTO: 28.4 % (ref 19.6–45.3)
MCH RBC QN AUTO: 32.1 PG (ref 26.6–33)
MCHC RBC AUTO-ENTMCNC: 34.2 G/DL (ref 31.5–35.7)
MCV RBC AUTO: 93.8 FL (ref 79–97)
MONOCYTES # BLD AUTO: 0.3 10*3/MM3 (ref 0.1–0.9)
MONOCYTES NFR BLD AUTO: 10 % (ref 5–12)
NEUTROPHILS # BLD AUTO: 1.76 10*3/MM3 (ref 1.7–7)
NEUTROPHILS NFR BLD AUTO: 59 % (ref 42.7–76)
NRBC BLD AUTO-RTO: 0 /100 WBC (ref 0–0.2)
PLATELET # BLD AUTO: 105 10*3/MM3 (ref 140–450)
RBC # BLD AUTO: 4.05 10*6/MM3 (ref 3.77–5.28)
T4 FREE SERPL-MCNC: 1.49 NG/DL (ref 0.93–1.7)
TSH SERPL DL<=0.005 MIU/L-ACNC: 2.53 UIU/ML (ref 0.27–4.2)
WBC # BLD AUTO: 2.99 10*3/MM3 (ref 3.4–10.8)

## 2020-07-30 PROCEDURE — 99214 OFFICE O/P EST MOD 30 MIN: CPT | Performed by: INTERNAL MEDICINE

## 2020-07-30 NOTE — PROGRESS NOTES
"[facility] [place] INTERNAL MEDICINE  LIV MARTIN M.D.      Taty Nolan Demetrius / 40 y.o. / female  07/30/2020    Examiner was wearing KN95 mask, face shield and exam gloves during the entire duration of the visit.   Patient was masked the entire time.   Minimum social distance of 6 ft maintained entire visit except if physical contact was necessary as documented.      VITAL SIGNS     Visit Vitals  /60   Pulse 94   Temp 97.8 °F (36.6 °C)   Ht 170.2 cm (67\")   Wt 71.7 kg (158 lb)   SpO2 97%   BMI 24.75 kg/m²       BP Readings from Last 3 Encounters:   07/30/20 100/60   05/22/20 114/63   04/15/20 100/72     Wt Readings from Last 3 Encounters:   07/30/20 71.7 kg (158 lb)   05/22/20 70.3 kg (155 lb)   04/15/20 69.4 kg (153 lb)     Body mass index is 24.75 kg/m².      MEDICATIONS     Current Outpatient Medications   Medication Sig Dispense Refill   • acetaminophen (Tylenol 8 Hour Arthritis Pain) 650 MG 8 hr tablet Take 1 tablet by mouth Every 8 (Eight) Hours As Needed for Mild Pain  or Moderate Pain . 100 tablet 2   • azelastine (ASTELIN) 0.1 % nasal spray Place 2 sprays into the nostril(s) as directed by provider 2 (Two) Times a Day. 30 mL 5   • CBD (cannabidiol) oral oil 1 mL Daily.     • Cholecalciferol (VITAMIN D) 2000 UNITS capsule Take 3,000 Units by mouth Every Evening.     • DULoxetine (CYMBALTA) 30 MG capsule Take 1 capsule by mouth Daily. 90 capsule 1   • DULoxetine (CYMBALTA) 60 MG capsule Take 1 capsule by mouth Every Morning Before Breakfast. 90 capsule 1   • fexofenadine (ALLEGRA) 180 MG tablet Take 180 mg by mouth Daily.     • fluticasone (VERAMYST) 27.5 MCG/SPRAY nasal spray Place 1 spray into the nostril(s) as directed by provider 2 (Two) Times a Day. 9.1 mL 5   • gabapentin (NEURONTIN) 100 MG capsule Take 1 capsule by mouth every night at bedtime for one week, then 1 capsule twice daily thereafter. 60 capsule 3   • hydroxychloroquine (PLAQUENIL) 200 MG tablet Take 1 tablet by mouth 2 (Two) times a day. 180 " tablet 0   • levothyroxine (SYNTHROID, LEVOTHROID) 100 MCG tablet Take 1 tablet by mouth Daily. 90 tablet 3   • traZODone (DESYREL) 100 MG tablet Take 2 tablets by mouth Every Night. 60 tablet 5   • Omega-3 Fatty Acids (FISH OIL PO) Take 1,200 mg by mouth.       No current facility-administered medications for this visit.        CHIEF COMPLAINT     Increased fatigue; Hypothyroidism due to Hashimoto's thyroiditis; Depression; and Lupus      ASSESSMENT & PLAN     Problem List Items Addressed This Visit        Medium    Depression with anxiety (Chronic)    Overview     Has taken medication since 19.   - caregiver stress (mom)         Current Assessment & Plan     Caregiver stress (mom with CHF and ESRD)    Continue duloxetine. Consider increasing dose if needed.          Relevant Medications    DULoxetine (CYMBALTA) 30 MG capsule    DULoxetine (CYMBALTA) 60 MG capsule    traZODone (DESYREL) 100 MG tablet    Hypothyroidism due to Hashimoto's thyroiditis (Chronic)    Overview     S/p partial thyroidectomy (right side) 2015 for adenoma.          Current Assessment & Plan     Increased fatigue. Check TSH and Free T4.          Relevant Medications    levothyroxine (SYNTHROID, LEVOTHROID) 100 MCG tablet    Other Relevant Orders    TSH+Free T4       Low    Systemic lupus erythematosus (CMS/HCC) (Chronic)    Overview     Dx 2007 started on Plaquenil  *Takagishi         Current Assessment & Plan     Increased fatigue. Check ESR and CRP.          Relevant Orders    C-reactive Protein    Sedimentation Rate    CBC & Differential      Other Visit Diagnoses     Fatigue, unspecified type    -  Primary        Orders Placed This Encounter   Procedures   • C-reactive Protein   • Sedimentation Rate   • TSH+Free T4   • CBC & Differential     No orders of the defined types were placed in this encounter.      Summary/Discussion:  • Increased fatigue is likely multifactorial. Check ESR/CRP, complete blood count, TSH and Free T4. May be  related to start of gabapentin in May for back pain.     Next Appointment with me: Visit date not found    Return in about 6 months (around 1/30/2021) for Reassess chronic medical problems.    _____________________________________________________________________________________    HISTORY OF PRESENT ILLNESS     Started gabapentin for spine related pain by neurosurgery. Had GUILLAUME previously.   Complains of increased fatigue and some cognitive slowing.     Depression: increased caregiver stress with mom who has end stage heart failure and ESRD.     Hypothyroidism: on levothyroxine 100 mcg.   Lab Results   Component Value Date    TSH 1.780 09/18/2019    TSH 0.473 01/30/2019    TSH 0.622 02/09/2018    FREET4 1.43 09/18/2019    FREET4 1.35 01/30/2019    FREET4 1.76 (H) 02/09/2018      SLE: sees rheumatologist on plaquenil. Denies worsening joint stiffness/pain.     Patient Care Team:  Jeremy Mcdonald MD as PCP - General (Internal Medicine)  Shaheed Blanco MD as Consulting Physician (Rheumatology)  Angy Hnuter MD as Consulting Physician (Obstetrics and Gynecology)      REVIEW OF SYSTEMS     Review of Systems  Fatigue, no fever or chills  Mild cognitive slowing  Increased stress      PHYSICAL EXAMINATION     Physical Exam  Alert with normal thought and judgment. Depression somewhat worse.     REVIEWED DATA     Labs:     Lab Results   Component Value Date     01/30/2019    K 4.1 01/30/2019    CALCIUM 9.2 01/30/2019    AST 26 01/28/2020    ALT 23 01/28/2020    BUN 13 01/30/2019    CREATININE 0.8 01/28/2020    CREATININE 0.8 09/19/2019    CREATININE 0.91 01/30/2019    EGFRIFNONA 81 01/30/2019    EGFRIFAFRI 93 01/30/2019       Lab Results   Component Value Date    GLU 88 01/30/2019       No results found for: LDL, HDL, TRIG, CHOLHDLRATIO    Lab Results   Component Value Date    TSH 1.780 09/18/2019    FREET4 1.43 09/18/2019       Lab Results   Component Value Date    WBC 2.91 (L) 01/28/2020    HGB 13.0 01/28/2020     HGB 13.4 09/19/2019    HGB 13.8 01/30/2019     (L) 01/28/2020       Lab Results   Component Value Date    PROTEIN Negative 01/30/2019    GLUCOSEU Negative 01/30/2019    BLOODU Negative 01/30/2019    NITRITEU Negative 01/30/2019    LEUKOCYTESUR Trace (A) 01/30/2019       Imaging:         Medical Tests:         Summary of old records / correspondence / consultant report:         Request outside records:         ALLERGIES  Allergies   Allergen Reactions   • Oxycodone Nausea And Vomiting     States can take as long as has zofran         PFSH:     The following portions of the patient's history were reviewed and updated as appropriate: Allergies / Current Medications / Past Medical History / Surgical History / Social History / Family History    PROBLEM LIST   Patient Active Problem List   Diagnosis   • Depression with anxiety   • Hypothyroidism due to Hashimoto's thyroiditis   • Insomnia   • Systemic lupus erythematosus (CMS/HCC)   • Vitamin D deficiency   • Irritable bowel syndrome with constipation   • Seasonal allergic rhinitis due to pollen   • DDD (degenerative disc disease), lumbar   • Allergic rhinitis   • Chronic bilateral low back pain with right-sided sciatica   • Spondylolisthesis at L5-S1 level   • Herniation of lumbar intervertebral disc with radiculopathy       PAST MEDICAL HISTORY  Past Medical History:   Diagnosis Date   • Anxiety    • Colon polyp    • Depression    • Disease of thyroid gland     HYPOTHYROIDISM D/T PARTIAL THYROIDECTOMY   • Fibromyalgia, primary    • Frequent UTI    • Headache, tension-type    • Hemorrhoids    • Hypothyroidism    • Lupus (CMS/HCC)     DX 3-2007   • Migraine    • Peripheral neuropathy 2017   • Thrombocytopenia (CMS/HCC) 2012    due to ITP       SURGICAL HISTORY  Past Surgical History:   Procedure Laterality Date   • ANAL SPHINCTEROTOMY  2003       • APPENDECTOMY  2010   • COLONOSCOPY N/A 11/21/2016    Procedure: COLONOSCOPY;  Surgeon: Natalya Farias MD;   Location: [facility] OR;  Service:    • HEMORRHOIDECTOMY  2003       • HEMORRHOIDECTOMY N/A 11/21/2016    Procedure: HEMORRHOIDECTOMY;  Surgeon: Natalya Farias MD;  Location: [facility];  Service:    • INTRAUTERINE DEVICE INSERTION  08/2016   • THYROIDECTOMY, PARTIAL Right 06/2015   • TONSILLECTOMY         SOCIAL HISTORY  Social History     Socioeconomic History   • Marital status:      Spouse name: Alexander*   • Number of children: 2   • Years of education: Not on file   • Highest education level: Not on file   Occupational History   • Occupation: (Medical Assistant) [employer]   Tobacco Use   • Smoking status: Never Smoker   • Smokeless tobacco: Never Used   Substance and Sexual Activity   • Alcohol use: Yes     Frequency: Monthly or less     Comment: Few times a year   • Drug use: No   • Sexual activity: Yes     Partners: Male     Birth control/protection: IUD       FAMILY HISTORY  Family History   Problem Relation Age of Onset   • Colon polyps Mother    • Coronary artery disease Mother 60        non-smokers   • Valvular heart disease Mother    • Diabetes type II Mother    • Other Mother         pulmonary hypertension   • Clotting disorder Mother         superficial dvt   • Depression Mother    • Kidney failure Mother         due to diabetes   • Colon polyps Father    • Atrial fibrillation Father    • Hypertension Father    • Deep vein thrombosis Father    • Other Father         spinal stenosis, Degenerative disc disease    • Autoimmune disease Brother    • Depression Brother    • No Known Problems Daughter    • Dementia Maternal Uncle    • Dementia Maternal Grandmother    • Stroke Maternal Grandmother    • Colon cancer Paternal Grandfather    • Colon cancer Maternal Uncle    • No Known Problems Daughter          **Dragon Disclaimer:   Much of this encounter note is an electronic transcription/translation of spoken language to printed text. The electronic translation of spoken  language may permit erroneous, or at times, nonsensical words or phrases to be inadvertently transcribed. Although I have reviewed the note for such errors, some may still exist.     Template created by Adan Mcdonald MD

## 2020-07-30 NOTE — ASSESSMENT & PLAN NOTE
Caregiver stress (mom with CHF and ESRD)    Continue duloxetine. Consider increasing dose if needed.    No answer.  Message left to call office for x-ray results.

## 2020-08-02 ENCOUNTER — PATIENT MESSAGE (OUTPATIENT)
Dept: NEUROSURGERY | Facility: CLINIC | Age: 40
End: 2020-08-02

## 2020-08-02 DIAGNOSIS — M54.2 NECK PAIN: Primary | ICD-10-CM

## 2020-08-03 RX ORDER — METHYLPREDNISOLONE 4 MG/1
TABLET ORAL
Qty: 21 TABLET | Refills: 0 | Status: SHIPPED | OUTPATIENT
Start: 2020-08-03 | End: 2020-08-14

## 2020-08-03 NOTE — TELEPHONE ENCOUNTER
Go ahead and give her a Medrol Dosepak and order AP and lateral cervical spine x-rays and have her do a tele-visit with me sometime next week.

## 2020-08-04 ENCOUNTER — HOSPITAL ENCOUNTER (OUTPATIENT)
Dept: GENERAL RADIOLOGY | Facility: HOSPITAL | Age: 40
Discharge: HOME | End: 2020-08-04
Admitting: NEUROLOGICAL SURGERY

## 2020-08-04 DIAGNOSIS — M54.2 NECK PAIN: ICD-10-CM

## 2020-08-04 PROCEDURE — 72050 X-RAY EXAM NECK SPINE 4/5VWS: CPT

## 2020-08-06 NOTE — PROGRESS NOTES
Subjective   History of Present Illness: Taty Romo is a 40 y.o. female for televisit follow-up for intermittent N/T bilateral hands and fingers and left arm is numb at night, she has also noticed numbness in her neck and back of head    Patient had televisit 6.17.20 for low back and bilateral leg pain    You have chosen to receive care through a telephone visit. Do you consent to use a telephone visit for your medical care today? Yes    This was a televisit from my office. The patient was at home. It lasted 8 minutes. I had been following her for some low back pain from a degenerative listhesis at L5-S1. Over the last 2 or 3 months, she has been having more and more neck pain. It bothers her at night, in fact keeps her up. She has numbness and tingling, but not pain in her arms and her hands.  She does feel that her head is too heavy for her neck, but she does not have any outright weakness in the limbs. We gave her a Medrol Dosepak and it helped while she was on it, but the pain is coming back. Aleve makes her swell too much, she says. I suggested that she try some ibuprofen and some Flexeril. I discussed the nature of the x-rays. I think this is a manifestation of some degenerative disc disease. I suggested trying some physical therapy. I do not think it is necessary to get an MRI just yet, but that might be necessary if she does not get much better. We will do a televisit for follow up in about 3 months.       History of Present Illness    The following portions of the patient's history were reviewed and updated as appropriate: allergies, current medications, past family history, past medical history, past social history, past surgical history and problem list.    Review of Systems   Respiratory: Negative for chest tightness and shortness of breath.    Cardiovascular: Negative for chest pain.   Musculoskeletal: Positive for back pain.        Bilateral leg pain   Neurological: Negative.        Objective              Physical Exam   Deferred  Neurologic Exam   Deferred        Assessment/Plan   Independent Review of Radiographic Studies:      I personally reviewed the images from the following studies.    The plain x-rays we did on 8/4/2020 show some disc degeneration with a mild retrolisthesis at C4 on C5.  There is some loss of the cervical lordosis.  Agree with the report.        Medical Decision Making:      We will try something simple and start with some physical therapy.  She will switch over to ibuprofen and try some Flexeril as needed.  Will do a tele-visit in about 3 months.  If there is not much improvement will get a cervical MRI.  I do not think it is necessary quite yet.      Taty was seen today for follow-up.    Diagnoses and all orders for this visit:    Neck pain  -     Ambulatory Referral to Physical Therapy Evaluate and treat    Degeneration of intervertebral disc at C4-C5 level  -     Ambulatory Referral to Physical Therapy Evaluate and treat    Other orders  -     cyclobenzaprine (FLEXERIL) 10 MG tablet; Take 1 tablet by mouth 3 (Three) Times a Day As Needed for Muscle Spasms.      Return in about 3 months (around 11/14/2020) for As a tele-visit.

## 2020-08-14 ENCOUNTER — OFFICE VISIT (OUTPATIENT)
Dept: NEUROSURGERY | Facility: CLINIC | Age: 40
End: 2020-08-14

## 2020-08-14 DIAGNOSIS — M54.2 NECK PAIN: Primary | ICD-10-CM

## 2020-08-14 DIAGNOSIS — M50.321 DEGENERATION OF INTERVERTEBRAL DISC AT C4-C5 LEVEL: ICD-10-CM

## 2020-08-14 PROCEDURE — 99441 PR PHYS/QHP TELEPHONE EVALUATION 5-10 MIN: CPT | Performed by: NEUROLOGICAL SURGERY

## 2020-08-14 RX ORDER — CYCLOBENZAPRINE HCL 10 MG
10 TABLET ORAL 3 TIMES DAILY PRN
Qty: 60 TABLET | Refills: 3 | Status: ON HOLD | OUTPATIENT
Start: 2020-08-14 | End: 2021-01-08 | Stop reason: SDUPTHER

## 2020-08-28 ENCOUNTER — ANESTHESIA (OUTPATIENT)
Dept: PAIN MEDICINE | Facility: HOSPITAL | Age: 40
End: 2020-08-28

## 2020-08-28 ENCOUNTER — ANESTHESIA EVENT (OUTPATIENT)
Dept: PAIN MEDICINE | Facility: HOSPITAL | Age: 40
End: 2020-08-28

## 2020-08-28 ENCOUNTER — HOSPITAL ENCOUNTER (OUTPATIENT)
Dept: PAIN MEDICINE | Facility: HOSPITAL | Age: 40
Discharge: HOME | End: 2020-08-28
Admitting: ANESTHESIOLOGY

## 2020-08-28 ENCOUNTER — HOSPITAL ENCOUNTER (OUTPATIENT)
Dept: GENERAL RADIOLOGY | Facility: HOSPITAL | Age: 40
Discharge: HOME | End: 2020-08-28

## 2020-08-28 VITALS
TEMPERATURE: 98 F | RESPIRATION RATE: 16 BRPM | OXYGEN SATURATION: 97 % | DIASTOLIC BLOOD PRESSURE: 62 MMHG | SYSTOLIC BLOOD PRESSURE: 117 MMHG | HEART RATE: 80 BPM

## 2020-08-28 DIAGNOSIS — M51.16 HERNIATION OF LUMBAR INTERVERTEBRAL DISC WITH RADICULOPATHY: ICD-10-CM

## 2020-08-28 DIAGNOSIS — M50.321 DEGENERATION OF INTERVERTEBRAL DISC AT C4-C5 LEVEL: Primary | ICD-10-CM

## 2020-08-28 DIAGNOSIS — M51.36 DDD (DEGENERATIVE DISC DISEASE), LUMBAR: ICD-10-CM

## 2020-08-28 DIAGNOSIS — M43.17 SPONDYLOLISTHESIS AT L5-S1 LEVEL: ICD-10-CM

## 2020-08-28 DIAGNOSIS — R52 PAIN: ICD-10-CM

## 2020-08-28 PROCEDURE — 77003 FLUOROGUIDE FOR SPINE INJECT: CPT

## 2020-08-28 PROCEDURE — C1755 CATHETER, INTRASPINAL: HCPCS

## 2020-08-28 PROCEDURE — 25010000002 METHYLPREDNISOLONE PER 80 MG: Performed by: ANESTHESIOLOGY

## 2020-08-28 PROCEDURE — 0 IOPAMIDOL 41 % SOLUTION: Performed by: ANESTHESIOLOGY

## 2020-08-28 RX ORDER — LIDOCAINE HYDROCHLORIDE 10 MG/ML
1 INJECTION, SOLUTION INFILTRATION; PERINEURAL ONCE AS NEEDED
Status: DISCONTINUED | OUTPATIENT
Start: 2020-08-28 | End: 2020-08-29 | Stop reason: HOSPADM

## 2020-08-28 RX ORDER — SODIUM CHLORIDE 0.9 % (FLUSH) 0.9 %
1-10 SYRINGE (ML) INJECTION AS NEEDED
Status: DISCONTINUED | OUTPATIENT
Start: 2020-08-28 | End: 2020-08-29 | Stop reason: HOSPADM

## 2020-08-28 RX ORDER — METHYLPREDNISOLONE ACETATE 80 MG/ML
80 INJECTION, SUSPENSION INTRA-ARTICULAR; INTRALESIONAL; INTRAMUSCULAR; SOFT TISSUE ONCE
Status: COMPLETED | OUTPATIENT
Start: 2020-08-28 | End: 2020-08-28

## 2020-08-28 RX ORDER — GABAPENTIN 600 MG/1
600 TABLET ORAL 3 TIMES DAILY
Qty: 90 TABLET | Refills: 3 | Status: SHIPPED | OUTPATIENT
Start: 2020-08-28 | End: 2021-02-25

## 2020-08-28 RX ORDER — FENTANYL CITRATE 50 UG/ML
50 INJECTION, SOLUTION INTRAMUSCULAR; INTRAVENOUS AS NEEDED
Status: DISCONTINUED | OUTPATIENT
Start: 2020-08-28 | End: 2020-08-29 | Stop reason: HOSPADM

## 2020-08-28 RX ORDER — GABAPENTIN 100 MG/1
100 CAPSULE ORAL 2 TIMES DAILY
Qty: 60 CAPSULE | Refills: 3 | Status: CANCELLED | OUTPATIENT
Start: 2020-08-28

## 2020-08-28 RX ORDER — MIDAZOLAM HYDROCHLORIDE 1 MG/ML
1 INJECTION INTRAMUSCULAR; INTRAVENOUS AS NEEDED
Status: DISCONTINUED | OUTPATIENT
Start: 2020-08-28 | End: 2020-08-29 | Stop reason: HOSPADM

## 2020-08-28 RX ADMIN — METHYLPREDNISOLONE ACETATE 80 MG: 80 INJECTION, SUSPENSION INTRA-ARTICULAR; INTRALESIONAL; INTRAMUSCULAR; SOFT TISSUE at 14:19

## 2020-08-28 RX ADMIN — IOPAMIDOL 2 ML: 408 INJECTION, SOLUTION INTRATHECAL at 14:19

## 2020-08-28 NOTE — ANESTHESIA PROCEDURE NOTES
PAIN Epidural block    Pre-sedation assessment completed: 8/28/2020 2:14 PM    Patient reassessed immediately prior to procedure    Patient location during procedure: pain clinic  Start Time: 8/28/2020 2:14 PM  Stop Time: 8/28/2020 2:22 PM  Indication:at surgeon's request and procedure for pain  Performed By  Anesthesiologist: Dony Waite MD  Preanesthetic Checklist  Completed: patient identified, site marked, surgical consent, pre-op evaluation, timeout performed, risks and benefits discussed and monitors and equipment checked  Additional Notes  Post-Op Diagnosis Codes:     * Lumbar neuritis (M54.16)     * Degeneration of lumbar intervertebral disc (M51.36)    Prep:  Pt Position:prone  Sterile Tech:sterile barrier, mask and gloves  Prep:chlorhexidine gluconate and isopropyl alcohol  Monitoring:blood pressure monitoring, continuous pulse oximetry and EKG  Procedure:  Approach:right paramedian  Guidance: fluoroscopy  Location:lumbar  Level:L5-S1  Needle Gauge:20 G  Aspiration:negative  Test Dose:negative  Medications:  Depomedrol:80 mg  Preservative Free Saline:2mL  Isovue:2mL    Post Assessment:  Pt Tolerance:patient tolerated the procedure well with no apparent complications  Complications:no

## 2020-08-28 NOTE — H&P
[facility]    History and Physical    Patient Name: Taty Romo  :  1980  MRN:  [id number]  Date of Admission: 2020    Subjective     Patient is a 40 y.o. female presents with chief complaint of chronic low back pain.  Onset of symptoms was gradual starting several months ago.  Symptoms are associated/aggravated by standing. Symptoms improve with nothing    The following portions of the patients history were reviewed and updated as appropriate: current medications, allergies, past medical history, past surgical history, past family history, past social history and problem list                Objective     Past Medical History:   Past Medical History:   Diagnosis Date   • Anxiety    • Colon polyp    • Depression    • Disease of thyroid gland     HYPOTHYROIDISM D/T PARTIAL THYROIDECTOMY   • Fibromyalgia, primary    • Frequent UTI    • Headache, tension-type    • Hemorrhoids    • Hypothyroidism    • Lupus (CMS/HCC)     DX 3-   • Migraine    • Peripheral neuropathy    • Thrombocytopenia (CMS/[facility])     due to ITP     Past Surgical History:   Past Surgical History:   Procedure Laterality Date   • ANAL SPHINCTEROTOMY         • APPENDECTOMY     • COLONOSCOPY N/A 2016    Procedure: COLONOSCOPY;  Surgeon: Natalya Farias MD;  Location: [facility];  Service:    • HEMORRHOIDECTOMY         • HEMORRHOIDECTOMY N/A 2016    Procedure: HEMORRHOIDECTOMY;  Surgeon: Natalya Farias MD;  Location: [facility];  Service:    • INTRAUTERINE DEVICE INSERTION  2016   • THYROIDECTOMY, PARTIAL Right 2015   • TONSILLECTOMY       Family History:   Family History   Problem Relation Age of Onset   • Colon polyps Mother    • Coronary artery disease Mother 60        non-smokers   • Valvular heart disease Mother    • Diabetes type II Mother    • Other Mother         pulmonary hypertension   • Clotting disorder Mother         superficial dvt   • Depression  Mother    • Kidney failure Mother         due to diabetes   • Colon polyps Father    • Atrial fibrillation Father    • Hypertension Father    • Deep vein thrombosis Father    • Other Father         spinal stenosis, Degenerative disc disease    • Autoimmune disease Brother    • Depression Brother    • No Known Problems Daughter    • Dementia Maternal Uncle    • Dementia Maternal Grandmother    • Stroke Maternal Grandmother    • Colon cancer Paternal Grandfather    • Colon cancer Maternal Uncle    • No Known Problems Daughter      Social History:   Social History     Tobacco Use   • Smoking status: Never Smoker   • Smokeless tobacco: Never Used   Substance Use Topics   • Alcohol use: Yes     Frequency: Monthly or less     Comment: Few times a year   • Drug use: No       Vital Signs Range for the last 24 hours  Temperature: Temp:  [36.7 °C (98 °F)] 36.7 °C (98 °F)   Temp Source: Temp src: Infrared   BP: BP: (117)/(72) 117/72   Pulse: Heart Rate:  [95] 95   Respirations: Resp:  [14] 14   SPO2: SpO2:  [93 %] 93 %   O2 Amount (l/min):     O2 Devices Device (Oxygen Therapy): room air   Weight:           --------------------------------------------------------------------------------    Current Outpatient Medications   Medication Sig Dispense Refill   • acetaminophen (Tylenol 8 Hour Arthritis Pain) 650 MG 8 hr tablet Take 1 tablet by mouth Every 8 (Eight) Hours As Needed for Mild Pain  or Moderate Pain . 100 tablet 2   • azelastine (ASTELIN) 0.1 % nasal spray Place 2 sprays into the nostril(s) as directed by provider 2 (Two) Times a Day. 30 mL 5   • Cholecalciferol (VITAMIN D) 2000 UNITS capsule Take 3,000 Units by mouth Every Evening.     • cyclobenzaprine (FLEXERIL) 10 MG tablet Take 1 tablet by mouth 3 (Three) Times a Day As Needed for Muscle Spasms. 60 tablet 3   • DULoxetine (CYMBALTA) 30 MG capsule Take 1 capsule by mouth Daily. 90 capsule 1   • DULoxetine (CYMBALTA) 60 MG capsule Take 1 capsule by mouth Every Morning  Before Breakfast. 90 capsule 1   • fexofenadine (ALLEGRA) 180 MG tablet Take 180 mg by mouth Daily.     • fluticasone (VERAMYST) 27.5 MCG/SPRAY nasal spray Place 1 spray into the nostril(s) as directed by provider 2 (Two) Times a Day. 9.1 mL 5   • gabapentin (NEURONTIN) 600 MG tablet Take 1 tablet by mouth 3 (Three) Times a Day. 90 tablet 3   • hydroxychloroquine (PLAQUENIL) 200 MG tablet Take 1 tablet by mouth 2 (Two) Times a Day. 60 tablet 5   • levothyroxine (SYNTHROID, LEVOTHROID) 100 MCG tablet Take 1 tablet by mouth Daily. 90 tablet 3   • traZODone (DESYREL) 100 MG tablet Take 2 tablets by mouth Every Night. 60 tablet 5     No current facility-administered medications for this encounter.        --------------------------------------------------------------------------------  Assessment/Plan      Anesthesia Evaluation     NPO Solid Status: > 8 hours             Airway   Mallampati: II  TM distance: >3 FB  Neck ROM: full  Dental - normal exam     Pulmonary - negative pulmonary ROS and normal exam   Cardiovascular - negative cardio ROS    Rhythm: regular    (-) murmur    PE comment: Dorsal pedal pulses are palpable bilaterally    Neuro/Psych  (+) numbness,   left straight leg raise test,   right straight leg raise test, Sensory Deficit,    GI/Hepatic/Renal/Endo - negative ROS     Musculoskeletal (-) normal exam    Abdominal    Substance History      OB/GYN          Other                   Diagnosis and Plan    Treatment Plan  ASA 2   Patient has had previous injection/procedure with % improvement.   Procedures: Lumbar Epidural Steroid Injection(LESI), With fluoroscopy,               Diagnosis     * Lumbar neuritis [M54.16]     * Degeneration of lumbar intervertebral disc [M51.36]

## 2020-09-03 DIAGNOSIS — M51.36 DDD (DEGENERATIVE DISC DISEASE), LUMBAR: ICD-10-CM

## 2020-09-03 DIAGNOSIS — F41.8 DEPRESSION WITH ANXIETY: Chronic | ICD-10-CM

## 2020-09-04 RX ORDER — DULOXETIN HYDROCHLORIDE 30 MG/1
30 CAPSULE, DELAYED RELEASE ORAL DAILY
Qty: 90 CAPSULE | Refills: 1 | Status: SHIPPED | OUTPATIENT
Start: 2020-09-04 | End: 2020-09-21 | Stop reason: DRUGHIGH

## 2020-09-21 DIAGNOSIS — F41.8 DEPRESSION WITH ANXIETY: Chronic | ICD-10-CM

## 2020-09-21 RX ORDER — DULOXETIN HYDROCHLORIDE 60 MG/1
120 CAPSULE, DELAYED RELEASE ORAL
Qty: 60 CAPSULE | Refills: 2 | Status: SHIPPED | OUTPATIENT
Start: 2020-09-21 | End: 2021-01-03 | Stop reason: SDUPTHER

## 2020-09-21 NOTE — ASSESSMENT & PLAN NOTE
Appears to be doing better with duloxetine 120 mg daily.   Continue at 60 mg two daily, monitor blood pressure.

## 2020-10-04 PROBLEM — R11.2 NAUSEA AND VOMITING: Status: ACTIVE | Noted: 2020-10-04

## 2020-10-06 ENCOUNTER — APPOINTMENT (OUTPATIENT)
Dept: PHYSICAL THERAPY | Facility: HOSPITAL | Age: 40
End: 2020-10-06

## 2020-10-27 DIAGNOSIS — F99 INSOMNIA DUE TO OTHER MENTAL DISORDER: Chronic | ICD-10-CM

## 2020-10-27 DIAGNOSIS — F51.05 INSOMNIA DUE TO OTHER MENTAL DISORDER: Chronic | ICD-10-CM

## 2020-10-28 RX ORDER — TRAZODONE HYDROCHLORIDE 100 MG/1
200 TABLET ORAL NIGHTLY
Qty: 60 TABLET | Refills: 5 | Status: SHIPPED | OUTPATIENT
Start: 2020-10-28 | End: 2021-04-26 | Stop reason: SDUPTHER

## 2020-11-05 DIAGNOSIS — E06.3 HYPOTHYROIDISM DUE TO HASHIMOTO'S THYROIDITIS: Chronic | ICD-10-CM

## 2020-11-05 DIAGNOSIS — E03.8 HYPOTHYROIDISM DUE TO HASHIMOTO'S THYROIDITIS: Chronic | ICD-10-CM

## 2020-11-05 RX ORDER — LEVOTHYROXINE SODIUM 0.1 MG/1
100 TABLET ORAL DAILY
Qty: 90 TABLET | Refills: 3 | Status: SHIPPED | OUTPATIENT
Start: 2020-11-05 | End: 2021-09-21 | Stop reason: SDUPTHER

## 2020-11-06 ENCOUNTER — HOSPITAL ENCOUNTER (OUTPATIENT)
Dept: PAIN MEDICINE | Facility: HOSPITAL | Age: 40
Discharge: HOME | End: 2020-11-06

## 2020-11-06 ENCOUNTER — ANESTHESIA (OUTPATIENT)
Dept: PAIN MEDICINE | Facility: HOSPITAL | Age: 40
End: 2020-11-06

## 2020-11-06 ENCOUNTER — HOSPITAL ENCOUNTER (OUTPATIENT)
Dept: GENERAL RADIOLOGY | Facility: HOSPITAL | Age: 40
Discharge: HOME | End: 2020-11-06

## 2020-11-06 ENCOUNTER — ANESTHESIA EVENT (OUTPATIENT)
Dept: PAIN MEDICINE | Facility: HOSPITAL | Age: 40
End: 2020-11-06

## 2020-11-06 VITALS
RESPIRATION RATE: 14 BRPM | DIASTOLIC BLOOD PRESSURE: 76 MMHG | OXYGEN SATURATION: 97 % | HEART RATE: 85 BPM | SYSTOLIC BLOOD PRESSURE: 121 MMHG | TEMPERATURE: 98 F

## 2020-11-06 DIAGNOSIS — M51.36 DDD (DEGENERATIVE DISC DISEASE), LUMBAR: Primary | Chronic | ICD-10-CM

## 2020-11-06 DIAGNOSIS — R52 PAIN: ICD-10-CM

## 2020-11-06 PROCEDURE — C1755 CATHETER, INTRASPINAL: HCPCS

## 2020-11-06 PROCEDURE — 77003 FLUOROGUIDE FOR SPINE INJECT: CPT

## 2020-11-06 PROCEDURE — 25010000002 METHYLPREDNISOLONE PER 80 MG: Performed by: ANESTHESIOLOGY

## 2020-11-06 RX ORDER — LIDOCAINE HYDROCHLORIDE 10 MG/ML
1 INJECTION, SOLUTION INFILTRATION; PERINEURAL ONCE
Status: DISCONTINUED | OUTPATIENT
Start: 2020-11-06 | End: 2020-11-07 | Stop reason: HOSPADM

## 2020-11-06 RX ORDER — MIDAZOLAM HYDROCHLORIDE 1 MG/ML
1 INJECTION INTRAMUSCULAR; INTRAVENOUS
Status: DISCONTINUED | OUTPATIENT
Start: 2020-11-06 | End: 2020-11-07 | Stop reason: HOSPADM

## 2020-11-06 RX ORDER — METHYLPREDNISOLONE ACETATE 80 MG/ML
80 INJECTION, SUSPENSION INTRA-ARTICULAR; INTRALESIONAL; INTRAMUSCULAR; SOFT TISSUE ONCE
Status: COMPLETED | OUTPATIENT
Start: 2020-11-06 | End: 2020-11-06

## 2020-11-06 RX ORDER — FENTANYL CITRATE 50 UG/ML
50 INJECTION, SOLUTION INTRAMUSCULAR; INTRAVENOUS AS NEEDED
Status: DISCONTINUED | OUTPATIENT
Start: 2020-11-06 | End: 2020-11-07 | Stop reason: HOSPADM

## 2020-11-06 RX ADMIN — METHYLPREDNISOLONE ACETATE 80 MG: 80 INJECTION, SUSPENSION INTRA-ARTICULAR; INTRALESIONAL; INTRAMUSCULAR; SOFT TISSUE at 14:46

## 2020-11-06 NOTE — H&P
INTERVAL HISTORY:    The patient returns for another Lumbar epidural steroid injection 3 today.  They have received 50 % improvement since their last injection with a pain level of 5-9 /10 at its worst recently.    Conservative measures tried in the interim. Daily activities are still affected by the pain.    Radiology reports and/or previous notes have been reviewed and are consistent with their diagnosis of Post-Op Diagnosis Codes:     * Lumbar degenerative disc disease [M51.36]     * Lumbar neuritis [M54.16]    Alert and oriented  MP - 2  Lungs - clear  CV - rrr    Diagnosis:  Post-Op Diagnosis Codes:     * Lumbar degenerative disc disease [M51.36]     * Lumbar neuritis [M54.16]      Plan:  Lumbar epidural steroid injection under fluoroscopic guidance        Target : L4-5    I have encouraged them to continue:  1.  Physical therapy exercises at home as prescribed by physical therapy or from the pain clinic handout (given to the patient).  Continuation of these exercises every day, or multiple times per week, even when the patient has good pain relief, was stressed to the patient as a preventative measure to decrease the frequency and severity of future pain episodes.  2.  Continue pain medicines as already prescribed.  If patient not currently taking any, it is recommended to begin Acetaminophen 1000 mg po q 8 hours.  If other medicines containing Acetaminophen are currently prescribed, maintain daily dose at 3000mg.    3.  If they can tolerate NSAIDS, it is recommended to take Ibuprofen 600 mg po q 6 hours for 7 days during pain exacerbations.   Alternatively, they may substitute an NSAID of their choice (e.g. Aleve)  4.  Heat and ice to the affected area as tolerated for pain control.  It was discussed that heating pads can cause burns.  5.  Low impact exercise such as walking or water exercise was recommended to maintain overall health and aid in weight control.   6.  Follow up as needed for subsequent  injections.  7.  Patient was counseled to abstain from tobacco products.

## 2020-11-06 NOTE — ANESTHESIA PROCEDURE NOTES
PAIN Epidural block    Pre-sedation assessment completed: 11/6/2020 2:38 PM    Patient reassessed immediately prior to procedure    Patient location during procedure: pain clinic  Start Time: 11/6/2020 2:38 PM  Stop Time: 11/6/2020 2:49 PM  Indication:at surgeon's request and procedure for pain  Performed By  Anesthesiologist: Carlos Srinivasan MD  Preanesthetic Checklist  Completed: patient identified, site marked, surgical consent, pre-op evaluation, timeout performed, IV checked, risks and benefits discussed and monitors and equipment checked  Additional Notes  Dx:  Post-Op Diagnosis Codes:     * Lumbar degenerative disc disease (M51.36)     * Lumbar neuritis (M54.16)  80 mg depomedrol in epid    Plan : return to clinic as needed  Prep:  Pt Position:prone (prone)  Sterile Tech:cap, gloves, mask and sterile barrier  Prep:chlorhexidine gluconate and isopropyl alcohol  Monitoring:blood pressure monitoring, EKG and continuous pulse oximetry  Procedure:Sedation: no     Approach:left paramedian  Guidance: fluoroscopy and c arm pa and lat and loss of resistance  Location:lumbar  Level:L5-S1 (interlaminar)  Needle Type:Hazel  Needle Gauge:20  Aspiration:negative  Medications:  Depomedrol:80 mg  Preservative Free Saline:3mL    Post Assessment:  Post-procedure: bandaid.  Pt Tolerance:patient tolerated the procedure well with no apparent complications  Complications:no

## 2020-11-17 NOTE — PROGRESS NOTES
Subjective   History of Present Illness: Taty Romo is a 40 y.o. female is here today for televisit follow-up.    She was last seen in office 8/14/20 as televisit for intermittent numbness and tingling in neck, occipital. Physical therapy and Cyclobenzaprine was ordered.    Patient reports that she is currently not having any pain N/T currently. Patient had GUILLAUME 11.6.2020 and she states it helped with her pain. Patient reports the GUILLAUME prior to this one did not help her at all with pain.Patient reports that PT made her pain worst.     Patient is currently taking Gabapentin 600 mg TID for pain.    You have chosen to receive care through a telephone visit. Do you consent to use a telephone visit for your medical care today? Yes    This was a televisit from my office lasting 7 minutes.  The patient was at home.  Physical therapy unfortunately did not help.  She has had some lumbar epidural blocks for her L5-S1 spondylolisthesis and it helps the back and the legs but the neck is bothering her more including now pain in both arms.  Last time it was just numbness and tingling.  I think we need to get an MRI image and have her come back to talk to me.  Perhaps we can consider epidural blocks but I would like to see if there is nerve root compression and to reexamine her.      Neck Pain   This is a recurrent problem. The problem occurs intermittently. The problem has been gradually improving. The pain is present in the left side. The quality of the pain is described as burning and stabbing. The pain is at a severity of 0/10. The patient is experiencing no pain. Associated symptoms include trouble swallowing. Pertinent negatives include no fever, headaches, numbness, pain with swallowing, tingling or weakness. The treatment provided moderate relief.       The following portions of the patient's history were reviewed and updated as appropriate: allergies, current medications, past family history, past medical history,  past social history, past surgical history and problem list.    Review of Systems   Constitutional: Negative for chills and fever.   HENT: Positive for trouble swallowing. Negative for congestion.    Musculoskeletal: Positive for neck pain. Negative for gait problem and neck stiffness.   Neurological: Negative for tingling, weakness, numbness and headaches.           Objective     There were no vitals filed for this visit.  There is no height or weight on file to calculate BMI.      Physical Exam   Deferred  Neurologic Exam   Deferred        Assessment/Plan   Independent Review of Radiographic Studies:      I personally reviewed the images from the following studies.    The plain x-rays we did on 8/4/2020 show some disc degeneration with a mild retrolisthesis at C4 on C5.  There is some loss of the cervical lordosis.  Agree with the report    Medical Decision Making:      We will go ahead and get an MRI of the cervical spine and flexion-extension views.  We will have her come back so I can examine her.  We could consider epidural blocks assuming there is no weakness in the arms.      Diagnoses and all orders for this visit:    1. Neck pain (Primary)  -     MRI Cervical Spine Without Contrast; Future  -     XR spine cervical ap and lat w flex and ext; Future    2. Cervical radiculopathy  -     MRI Cervical Spine Without Contrast; Future  -     XR spine cervical ap and lat w flex and ext; Future    3. Spondylolisthesis at L5-S1 level      Return in about 19 days (around 12/7/2020) for After MRI, as a face-to-face visit.

## 2020-11-18 ENCOUNTER — OFFICE VISIT (OUTPATIENT)
Dept: NEUROSURGERY | Facility: CLINIC | Age: 40
End: 2020-11-18

## 2020-11-18 DIAGNOSIS — M54.2 NECK PAIN: Primary | ICD-10-CM

## 2020-11-18 DIAGNOSIS — M43.17 SPONDYLOLISTHESIS AT L5-S1 LEVEL: ICD-10-CM

## 2020-11-18 DIAGNOSIS — M54.12 CERVICAL RADICULOPATHY: ICD-10-CM

## 2020-11-18 PROCEDURE — 99441 PR PHYS/QHP TELEPHONE EVALUATION 5-10 MIN: CPT | Performed by: NEUROLOGICAL SURGERY

## 2020-12-01 DIAGNOSIS — M54.12 CERVICAL RADICULOPATHY: Primary | ICD-10-CM

## 2020-12-01 RX ORDER — METHYLPREDNISOLONE 4 MG/1
TABLET ORAL
Qty: 21 TABLET | Refills: 0 | Status: SHIPPED | OUTPATIENT
Start: 2020-12-01 | End: 2021-01-05

## 2020-12-01 NOTE — TELEPHONE ENCOUNTER
Dr. WINTERS please advise      ----- Message from Rosa Isela Marx MA sent at 12/1/2020  7:56 AM EST -----  Regarding: FW: Visit Follow-Up Question  Contact: [phone]    ----- Message -----  From: Taty Cooperday Romo  Sent: 11/30/2020  11:14 AM EST  To: [facility]  Subject: Visit Follow-Up Question                         Dr. WINTERS, I tweaked my back yesterday bending down while doing laundry.  I felt it as soon as I did it.  I wasn't lifting anything, just simply bent down.  Felt like a firecracker went off on the right side lumbar region and pain radiated down into my legs.  I am at work today but in a lot of pain, and moving slow.  I can hardly bend at all.  Very very painful.    Any advice would be greatly appreciated.

## 2020-12-09 ENCOUNTER — OFFICE VISIT (OUTPATIENT)
Dept: NEUROSURGERY | Facility: CLINIC | Age: 40
End: 2020-12-09

## 2020-12-09 VITALS
HEART RATE: 79 BPM | DIASTOLIC BLOOD PRESSURE: 64 MMHG | BODY MASS INDEX: 24.8 KG/M2 | WEIGHT: 158 LBS | TEMPERATURE: 99.4 F | HEIGHT: 67 IN | SYSTOLIC BLOOD PRESSURE: 103 MMHG

## 2020-12-09 DIAGNOSIS — M54.2 NECK PAIN: ICD-10-CM

## 2020-12-09 DIAGNOSIS — M43.17 SPONDYLOLISTHESIS AT L5-S1 LEVEL: Primary | ICD-10-CM

## 2020-12-09 DIAGNOSIS — M51.16 HERNIATION OF LUMBAR INTERVERTEBRAL DISC WITH RADICULOPATHY: ICD-10-CM

## 2020-12-09 PROCEDURE — 99214 OFFICE O/P EST MOD 30 MIN: CPT | Performed by: NEUROLOGICAL SURGERY

## 2020-12-09 RX ORDER — HYDROCODONE BITARTRATE AND ACETAMINOPHEN 5; 325 MG/1; MG/1
1 TABLET ORAL EVERY 6 HOURS PRN
Qty: 30 TABLET | Refills: 0 | Status: ON HOLD | OUTPATIENT
Start: 2020-12-09 | End: 2021-01-08 | Stop reason: SDUPTHER

## 2020-12-09 RX ORDER — ONDANSETRON 4 MG/1
4 TABLET, FILM COATED ORAL EVERY 8 HOURS PRN
Qty: 30 TABLET | Refills: 0 | Status: SHIPPED | OUTPATIENT
Start: 2020-12-09 | End: 2021-01-10 | Stop reason: SDUPTHER

## 2020-12-09 NOTE — PROGRESS NOTES
Subjective   History of Present Illness: Taty Romo is a 40 y.o. female is here today for follow-up.    She was seen last in office 11/18/20 for neck and occipital pain. Cervical MRI and XR was ordered and scheduled for 12/16/20.       She called 12/1/20 after having back and bilateral leg pain after doing laundry. MDP was prescribed. She states MDP did not help the back pain or leg pain L>R.  She takes Gabapentin 300 mg BID.     We have been following this patient for an L5-S1 spondylolisthesis and a herniated disc more toward the left, but that had settled down after 3 epidural blocks, and the last time I talked to her her neck was flaring up and we were getting some imaging of the neck. But, she was doing some laundry and leaned forward and began having recurrent pain in the left buttock and leg. It is quite severe, almost as bad as the original episode earlier this year. She does not have any overt motor deficits, but she does have a positive straight leg raising sign on the left and some subjective sense of numbness and tingling down both legs. She has not yet gotten the cervical MRI, and I told her we should prioritize the imaging and hold off on that and now get the lumbar MRI to see if the herniated disc has gotten worse, which it probably has given the mechanism of the worsening. She also has some right buttock pain but not radiating left buttock pain, which indicates that she is probably symptomatic from both the spondylolisthesis and the herniated disc and might actually, if we move forward with surgery, need a fusion at L5-S1, as opposed to a microdiskectomy. The Medrol Dosepak did not really help. I do not think blocks will help as much any more. Only the 1st one helped enough and the 2nd and 3rd did not. Will get new imaging and have her come back. Again, we are probably heading toward surgery. I have given her some pain medications to tide her over. Whether or not it is a  "microdiskectomy/fusion remains to be seen.         Back Pain  The pain is present in the lumbar spine. The quality of the pain is described as aching. The pain is at a severity of 6/10. The pain is moderate. Associated symptoms include leg pain. Pertinent negatives include no bladder incontinence, bowel incontinence or fever. She has tried heat (MDP) for the symptoms. The treatment provided no relief.   Leg Pain   The pain is present in the left leg (L>R). The pain is at a severity of 6/10. The pain is moderate. Treatments tried: MDP    Neck Pain   The problem occurs intermittently. The problem has been unchanged. The pain is present in the occipital region. Associated symptoms include leg pain. Pertinent negatives include no fever.       The following portions of the patient's history were reviewed and updated as appropriate: allergies, current medications, past family history, past medical history, past social history, past surgical history and problem list.    Review of Systems   Constitutional: Negative for fever.   Gastrointestinal: Negative for bowel incontinence.   Genitourinary: Negative for bladder incontinence.   Musculoskeletal: Positive for back pain and neck pain.   All other systems reviewed and are negative.          Objective     Vitals:    12/09/20 0943   BP: 103/64   Pulse: 79   Temp: 99.4 °F (37.4 °C)   Weight: 71.7 kg (158 lb)   Height: 170.2 cm (67\")     Body mass index is 24.75 kg/m².      Physical Exam  Constitutional:       Appearance: She is well-developed.   HENT:      Head: Normocephalic and atraumatic.   Eyes:      Extraocular Movements: EOM normal.      Conjunctiva/sclera: Conjunctivae normal.      Pupils: Pupils are equal, round, and reactive to light.      Funduscopic exam:     Right eye: No papilledema.         Left eye: No papilledema.   Neck:      Vascular: No carotid bruit.   Neurological:      Mental Status: She is oriented to person, place, and time.      Coordination: " Finger-Nose-Finger Test and Heel to [place] Test normal.      Gait: Gait is intact.      Deep Tendon Reflexes:      Reflex Scores:       Tricep reflexes are 2+ on the right side and 2+ on the left side.       Bicep reflexes are 2+ on the right side and 2+ on the left side.       Brachioradialis reflexes are 2+ on the right side and 2+ on the left side.       Patellar reflexes are 2+ on the right side and 2+ on the left side.       Achilles reflexes are 2+ on the right side and 2+ on the left side.  Psychiatric:         Speech: Speech normal.       Neurologic Exam     Mental Status   Oriented to person, place, and time.   Registration of memory: Good recent and remote memory.   Attention: normal. Concentration: normal.   Speech: speech is normal   Level of consciousness: alert  Knowledge: consistent with education.     Cranial Nerves     CN II   Visual fields full to confrontation.   Visual acuity: normal    CN III, IV, VI   Pupils are equal, round, and reactive to light.  Extraocular motions are normal.     CN V   Facial sensation intact.   Right corneal reflex: normal  Left corneal reflex: normal    CN VII   Facial expression full, symmetric.   Right facial weakness: none  Left facial weakness: none    CN VIII   Hearing: intact    CN IX, X   Palate: symmetric    CN XI   Right sternocleidomastoid strength: normal  Left sternocleidomastoid strength: normal    CN XII   Tongue: not atrophic  Tongue deviation: none    Motor Exam   Muscle bulk: normal  Right arm tone: normal  Left arm tone: normal  Right leg tone: normal  Left leg tone: normal    Strength   Strength 5/5 except as noted.     Sensory Exam   Light touch normal.     Gait, Coordination, and Reflexes     Gait  Gait: normal    Coordination   Finger to nose coordination: normal  Heel to shin coordination: normal    Reflexes   Right brachioradialis: 2+  Left brachioradialis: 2+  Right biceps: 2+  Left biceps: 2+  Right triceps: 2+  Left triceps: 2+  Right patellar:  2+  Left patellar: 2+  Right achilles: 2+  Left achilles: 2+  Right : 2+  Left : 2+          Assessment/Plan   Independent Review of Radiographic Studies:      I personally reviewed the images from the following studies.    I reviewed the lumbar MRI done on January 2020 which shows spondylolisthesis at L5-S1 with a left L5-S1 herniated disc.  Agree with the report.        Medical Decision Making:      We will go ahead and get a new lumbar MRI and plain x-rays.  I given her some pain medications and Zofran to take along with it to tide her over.  She is likely going to need surgery possibly an L5-S1 fusion depending upon the flexion-extension films.  Alternatively we we might consider left L5-S1 microdiscectomy.      Diagnoses and all orders for this visit:    1. Spondylolisthesis at L5-S1 level (Primary)  -     MRI Lumbar Spine Without Contrast; Future  -     XR Spine Lumbar Complete With Flex & Ext; Future  -     HYDROcodone-acetaminophen (NORCO) 5-325 MG per tablet; Take 1 tablet by mouth Every 6 (Six) Hours As Needed for Severe Pain .  Dispense: 30 tablet; Refill: 0    2. Herniation of lumbar intervertebral disc with radiculopathy  -     MRI Lumbar Spine Without Contrast; Future  -     XR Spine Lumbar Complete With Flex & Ext; Future  -     HYDROcodone-acetaminophen (NORCO) 5-325 MG per tablet; Take 1 tablet by mouth Every 6 (Six) Hours As Needed for Severe Pain .  Dispense: 30 tablet; Refill: 0    3. Neck pain  -     HYDROcodone-acetaminophen (NORCO) 5-325 MG per tablet; Take 1 tablet by mouth Every 6 (Six) Hours As Needed for Severe Pain .  Dispense: 30 tablet; Refill: 0    Other orders  -     ondansetron (Zofran) 4 MG tablet; Take 1 tablet by mouth Every 8 (Eight) Hours As Needed for Nausea or Vomiting.  Dispense: 30 tablet; Refill: 0      Return in about 1 week (around 12/16/2020) for After tests.

## 2020-12-16 ENCOUNTER — HOSPITAL ENCOUNTER (OUTPATIENT)
Dept: GENERAL RADIOLOGY | Facility: HOSPITAL | Age: 40
Discharge: HOME | End: 2020-12-16

## 2020-12-16 ENCOUNTER — APPOINTMENT (OUTPATIENT)
Dept: MRI IMAGING | Facility: HOSPITAL | Age: 40
End: 2020-12-16

## 2020-12-16 ENCOUNTER — HOSPITAL ENCOUNTER (OUTPATIENT)
Dept: MRI IMAGING | Facility: HOSPITAL | Age: 40
Discharge: HOME | End: 2020-12-16

## 2020-12-16 DIAGNOSIS — M51.16 HERNIATION OF LUMBAR INTERVERTEBRAL DISC WITH RADICULOPATHY: ICD-10-CM

## 2020-12-16 DIAGNOSIS — M43.17 SPONDYLOLISTHESIS AT L5-S1 LEVEL: ICD-10-CM

## 2020-12-16 DIAGNOSIS — M54.12 CERVICAL RADICULOPATHY: ICD-10-CM

## 2020-12-16 DIAGNOSIS — M54.2 NECK PAIN: ICD-10-CM

## 2020-12-16 PROCEDURE — 72148 MRI LUMBAR SPINE W/O DYE: CPT

## 2020-12-16 PROCEDURE — 72050 X-RAY EXAM NECK SPINE 4/5VWS: CPT

## 2020-12-16 PROCEDURE — 72114 X-RAY EXAM L-S SPINE BENDING: CPT

## 2020-12-24 NOTE — PROGRESS NOTES
Subjective   Patient ID: Taty Romo is a 40 y.o. female is here today for follow-up after lumbar MRI and XR- [facility].      She was last seen in office 12/9/20 for back, left buttock and bilateral leg pain.     She denies any incontinence but has had diarrhea.  Back and leg pain is unchanged since last visit. She takes Gabapentin 600 mg BID and Hydrocodone 5/325 prn,  Cyclobenzaprine 10 mg prn for pain.     She has been pretty miserable in her left buttock and leg ever since she leaned forward doing her laundry. The MRI did show worsening of her known left L5-S1 herniated disk. She has a central disk protrusion also at L4-L5 but I do not think that is causing as much of the leg pain on the left as the L5-S1 level. Also the right side pain has gotten much better and she really does not have much back pain. She does have the spondylolisthesis but given that I would just prefer to do a minimally invasive left L5-S1 microdiskectomy knowing that she could develop the need for fusion in the future. She has no motor deficits but she has a markedly positive straight leg raising sign on the left and again is quite miserable. Will move forward as soon as we can with an outpatient left L5-S1 METRx microdiskectomy. We have been evaluating her neck before the low back flared up. [facility]'s can order a cervical MRI during the first 3 months and I will see her at about the 3 month derek and we can switch our attention to the neck which had been also bothering her although not quite as much as the low back.       Back Pain  This is a recurrent problem. The pain is present in the lumbar spine. The quality of the pain is described as aching. The pain is at a severity of 7/10. The pain is moderate. The pain is the same all the time. The symptoms are aggravated by position, bending and twisting. Associated symptoms include leg pain. Pertinent negatives include no bladder incontinence or bowel incontinence. She has tried heat (MDP)  "for the symptoms. The treatment provided no relief.   Leg Pain   The pain is present in the left leg (L>R). The pain is at a severity of 6/10. The pain is moderate. Treatments tried: MDP    Neck Pain   The problem occurs intermittently. The problem has been unchanged. The pain is present in the occipital region. Associated symptoms include leg pain.       The following portions of the patient's history were reviewed and updated as appropriate: allergies, current medications, past family history, past medical history, past social history, past surgical history and problem list.    Review of Systems   Gastrointestinal: Positive for diarrhea. Negative for bowel incontinence.   Genitourinary: Negative for bladder incontinence.   Musculoskeletal: Positive for back pain and neck pain.   All other systems reviewed and are negative.          Objective     Vitals:    12/28/20 1325   BP: 115/67   Pulse: 74   Temp: 99.6 °F (37.6 °C)   Weight: 71.7 kg (158 lb)   Height: 170.2 cm (67\")     Body mass index is 24.75 kg/m².      Physical Exam  Constitutional:       Appearance: She is well-developed.   HENT:      Head: Normocephalic and atraumatic.   Eyes:      Extraocular Movements: EOM normal.      Conjunctiva/sclera: Conjunctivae normal.      Pupils: Pupils are equal, round, and reactive to light.      Funduscopic exam:     Right eye: No papilledema.         Left eye: No papilledema.   Neck:      Vascular: No carotid bruit.   Neurological:      Mental Status: She is oriented to person, place, and time.      Coordination: Finger-Nose-Finger Test and Heel to Shin Test normal.      Gait: Gait is intact.      Deep Tendon Reflexes:      Reflex Scores:       Tricep reflexes are 2+ on the right side and 2+ on the left side.       Bicep reflexes are 2+ on the right side and 2+ on the left side.       Brachioradialis reflexes are 2+ on the right side and 2+ on the left side.       Patellar reflexes are 2+ on the right side and 2+ on the " left side.       Achilles reflexes are 2+ on the right side and 2+ on the left side.  Psychiatric:         Speech: Speech normal.       Neurologic Exam     Mental Status   Oriented to person, place, and time.   Registration of memory: Good recent and remote memory.   Attention: normal. Concentration: normal.   Speech: speech is normal   Level of consciousness: alert  Knowledge: consistent with education.     Cranial Nerves     CN II   Visual fields full to confrontation.   Visual acuity: normal    CN III, IV, VI   Pupils are equal, round, and reactive to light.  Extraocular motions are normal.     CN V   Facial sensation intact.   Right corneal reflex: normal  Left corneal reflex: normal    CN VII   Facial expression full, symmetric.   Right facial weakness: none  Left facial weakness: none    CN VIII   Hearing: intact    CN IX, X   Palate: symmetric    CN XI   Right sternocleidomastoid strength: normal  Left sternocleidomastoid strength: normal    CN XII   Tongue: not atrophic  Tongue deviation: none    Motor Exam   Muscle bulk: normal  Right arm tone: normal  Left arm tone: normal  Right leg tone: normal  Left leg tone: normal    Strength   Strength 5/5 except as noted.     Sensory Exam   Light touch normal.     Gait, Coordination, and Reflexes     Gait  Gait: normal    Coordination   Finger to nose coordination: normal  Heel to shin coordination: normal    Reflexes   Right brachioradialis: 2+  Left brachioradialis: 2+  Right biceps: 2+  Left biceps: 2+  Right triceps: 2+  Left triceps: 2+  Right patellar: 2+  Left patellar: 2+  Right achilles: 2+  Left achilles: 2+  Right : 2+  Left : 2+          Assessment/Plan   Independent Review of Radiographic Studies:      I personally reviewed the images from the following studies.    I reviewed the lumbar MRI and plain x-rays done on 12/16/2020.  The left L4-5 central disc protrusion was a little bit worse but the left L5-S1 herniated disc is much more worse than  it was earlier in the year.  She still has a very mild listhesis and mild disc space collapse at L5-S1.  Agree with the report.        Medical Decision Making:      I described and recommended an outpatient left L5/S1 Metrx microdiscectomy. The goal of surgery is relief of radiating leg pain, and improvement of numbness, tingling, and weakness. This will not help or hinder midline low back pain. The risks include, but are not limited to, infection, hemorrhage requiring transfusion or reoperation, CSF leak requiring reoperation, incomplete relief of symptoms, potential need for additional surgery in the future, stroke, paralysis, coma, and death. The patient agrees to proceed.      Diagnoses and all orders for this visit:    1. Herniation of lumbar intervertebral disc with radiculopathy (Primary)  -     Case Request; Standing  -     ceFAZolin (ANCEF) 2 g in sodium chloride 0.9 % 100 mL IVPB  -     Case Request    2. Spondylolisthesis at L5-S1 level  -     Case Request; Standing  -     ceFAZolin (ANCEF) 2 g in sodium chloride 0.9 % 100 mL IVPB  -     Case Request    Other orders  -     Follow anesthesia standing orders.  -     Obtain informed consent  -     Provide NPO Instructions to Patient; Future  -     Chlorhexidine Skin Prep; Future  -     Follow anesthesia standing orders.; Standing  -     SCD (sequential compression device)- to be placed on patient in Pre-op; Standing  -     Verify / Perform Chlorhexidine Skin Prep; Standing      Return for 2 weeks with an APC.

## 2020-12-28 ENCOUNTER — OFFICE VISIT (OUTPATIENT)
Dept: NEUROSURGERY | Facility: CLINIC | Age: 40
End: 2020-12-28

## 2020-12-28 VITALS
HEART RATE: 74 BPM | DIASTOLIC BLOOD PRESSURE: 67 MMHG | BODY MASS INDEX: 24.8 KG/M2 | SYSTOLIC BLOOD PRESSURE: 115 MMHG | TEMPERATURE: 99.6 F | WEIGHT: 158 LBS | HEIGHT: 67 IN

## 2020-12-28 DIAGNOSIS — M43.17 SPONDYLOLISTHESIS AT L5-S1 LEVEL: ICD-10-CM

## 2020-12-28 DIAGNOSIS — M51.16 HERNIATION OF LUMBAR INTERVERTEBRAL DISC WITH RADICULOPATHY: Primary | ICD-10-CM

## 2020-12-28 PROCEDURE — 99214 OFFICE O/P EST MOD 30 MIN: CPT | Performed by: NEUROLOGICAL SURGERY

## 2020-12-28 RX ORDER — CEFAZOLIN SODIUM 2 G/100ML
2 INJECTION, SOLUTION INTRAVENOUS ONCE
Status: CANCELLED | OUTPATIENT
Start: 2021-01-08 | End: 2020-12-28

## 2021-01-03 DIAGNOSIS — F41.8 DEPRESSION WITH ANXIETY: Chronic | ICD-10-CM

## 2021-01-04 DIAGNOSIS — F41.8 DEPRESSION WITH ANXIETY: Chronic | ICD-10-CM

## 2021-01-04 RX ORDER — DULOXETIN HYDROCHLORIDE 60 MG/1
120 CAPSULE, DELAYED RELEASE ORAL
Qty: 60 CAPSULE | Refills: 5 | Status: SHIPPED | OUTPATIENT
Start: 2021-01-04 | End: 2021-06-18 | Stop reason: SDUPTHER

## 2021-01-04 RX ORDER — DULOXETIN HYDROCHLORIDE 60 MG/1
120 CAPSULE, DELAYED RELEASE ORAL
Qty: 60 CAPSULE | Refills: 5 | Status: CANCELLED | OUTPATIENT
Start: 2021-01-04

## 2021-01-05 ENCOUNTER — APPOINTMENT (OUTPATIENT)
Dept: PREADMISSION TESTING | Facility: HOSPITAL | Age: 41
End: 2021-01-05

## 2021-01-05 VITALS
TEMPERATURE: 97.8 F | BODY MASS INDEX: 23.39 KG/M2 | OXYGEN SATURATION: 96 % | SYSTOLIC BLOOD PRESSURE: 105 MMHG | DIASTOLIC BLOOD PRESSURE: 64 MMHG | HEART RATE: 81 BPM | WEIGHT: 149 LBS | RESPIRATION RATE: 16 BRPM | HEIGHT: 67 IN

## 2021-01-05 LAB
ANION GAP SERPL CALCULATED.3IONS-SCNC: 11.1 MMOL/L (ref 5–15)
BUN SERPL-MCNC: 14 MG/DL (ref 6–20)
BUN/CREAT SERPL: 20.9 (ref 7–25)
CALCIUM SPEC-SCNC: 8.8 MG/DL (ref 8.6–10.5)
CHLORIDE SERPL-SCNC: 103 MMOL/L (ref 98–107)
CO2 SERPL-SCNC: 23.9 MMOL/L (ref 22–29)
CREAT SERPL-MCNC: 0.67 MG/DL (ref 0.57–1)
DEPRECATED RDW RBC AUTO: 42.4 FL (ref 37–54)
ERYTHROCYTE [DISTWIDTH] IN BLOOD BY AUTOMATED COUNT: 12.3 % (ref 12.3–15.4)
GFR SERPL CREATININE-BSD FRML MDRD: 98 ML/MIN/1.73
GLUCOSE SERPL-MCNC: 84 MG/DL (ref 65–99)
HCG SERPL QL: NEGATIVE
HCT VFR BLD AUTO: 37.7 % (ref 34–46.6)
HGB BLD-MCNC: 12.8 G/DL (ref 12–15.9)
MCH RBC QN AUTO: 32.1 PG (ref 26.6–33)
MCHC RBC AUTO-ENTMCNC: 34 G/DL (ref 31.5–35.7)
MCV RBC AUTO: 94.5 FL (ref 79–97)
PLATELET # BLD AUTO: 117 10*3/MM3 (ref 140–450)
PMV BLD AUTO: 12.4 FL (ref 6–12)
POTASSIUM SERPL-SCNC: 3.8 MMOL/L (ref 3.5–5.2)
RBC # BLD AUTO: 3.99 10*6/MM3 (ref 3.77–5.28)
SODIUM SERPL-SCNC: 138 MMOL/L (ref 136–145)
WBC # BLD AUTO: 3.08 10*3/MM3 (ref 3.4–10.8)

## 2021-01-05 PROCEDURE — 36415 COLL VENOUS BLD VENIPUNCTURE: CPT

## 2021-01-05 PROCEDURE — U0004 COV-19 TEST NON-CDC HGH THRU: HCPCS | Performed by: NURSE PRACTITIONER

## 2021-01-05 PROCEDURE — 84703 CHORIONIC GONADOTROPIN ASSAY: CPT

## 2021-01-05 PROCEDURE — C9803 HOPD COVID-19 SPEC COLLECT: HCPCS | Performed by: NURSE PRACTITIONER

## 2021-01-05 PROCEDURE — 80048 BASIC METABOLIC PNL TOTAL CA: CPT

## 2021-01-05 PROCEDURE — 85027 COMPLETE CBC AUTOMATED: CPT

## 2021-01-05 NOTE — PROGRESS NOTES
Subjective   Patient ID: Taty Romo is a 40 y.o. female is here today for follow-up. She is 2 weeks out from outpatient left L5-S1 metrx microdiscectomy with Dr. Alessandro Thomas MD on 1/08/2021. She had recent surgery r/t left buttock and leg pain .  Conservative treatment was tried, including PT and GUILLAUME.      The incision looks clean, w/o redness, drainage. The incision is a little bumpy, and she is concerned, but I believe this is part of the granulation process, as the incision area is still mildly tender with palpation, does not feel hot. She is not reporting any pain.  Today, patient reports no pain. She reports occasional pain with worst being at 3-4/10 and this is in her back, not necessarily incisional. Patient denies numbness, tingling and weakness. She states that she did over do her activity yesterday with making her bed, and pulled the fitted sheet, which was a tight fit, and she had some pain after this.      With testing her strength at visit today, I noticed that she had some atrophy in LLE.  She states that she is still taking her pain medication, on occasion, and no longer taking her flexeril.  She is moving all extremities with equal strength.    Patient is wearing mask in office today.     History of Present Illness    The following portions of the patient's history were reviewed and updated as appropriate: allergies, current medications, past family history, past medical history, past social history, past surgical history and problem list.    Review of Systems   Constitutional: Negative for activity change, chills and fever.   Respiratory: Negative for chest tightness and shortness of breath.    Cardiovascular: Negative for chest pain.   Genitourinary: Negative for difficulty urinating.   Musculoskeletal: Positive for back pain.        Incision, occasional    Neurological: Negative for weakness and numbness.   Psychiatric/Behavioral: Negative for sleep disturbance.   All other systems  "reviewed and are negative.      Objective     Vitals:    01/28/21 1339   BP: 90/64   BP Location: Left arm   Patient Position: Sitting   Pulse: 64   Temp: 97.7 °F (36.5 °C)   TempSrc: Temporal   Weight: 67.6 kg (149 lb)   Height: 170.2 cm (67\")     Body mass index is 23.34 kg/m².      Physical Exam  Vitals signs and nursing note reviewed.   Constitutional:       Appearance: Normal appearance.   HENT:      Head: Normocephalic and atraumatic.      Nose: Nose normal.   Neck:      Musculoskeletal: Normal range of motion.   Cardiovascular:      Rate and Rhythm: Normal rate.   Pulmonary:      Effort: Pulmonary effort is normal.   Skin:     General: Skin is warm and dry.          Neurological:      General: No focal deficit present.      Mental Status: She is alert and oriented to person, place, and time. Mental status is at baseline.      GCS: GCS eye subscore is 4. GCS verbal subscore is 5. GCS motor subscore is 6.   Psychiatric:         Attention and Perception: Attention and perception normal.         Mood and Affect: Mood and affect normal.         Speech: Speech normal.         Behavior: Behavior is cooperative.         Thought Content: Thought content normal.         Cognition and Memory: Cognition normal.         Judgment: Judgment normal.       Neurologic Exam     Mental Status   Oriented to person, place, and time.   Speech: speech is normal     Motor Exam   Muscle bulk: normal  Overall muscle tone: normal  Right arm tone: normal  Left arm tone: normal  Right leg tone: normal  Left leg tone: decreased    Gait, Coordination, and Reflexes     Tremor   Resting tremor: absent  Intention tremor: absent  Action tremor: absent    Reflexes   Right plantar: normal  Left plantar: normal          Assessment/Plan   Independent Review of Radiographic Studies:      No new imaging      Medical Decision Making:    Will order PT for 1-2 visits to be able to develop a HEP program of stretching, strength training and increasing her " mobility.  Explained to continue to not lift anything heavier than 10 pounds, until after her next follow up.  She is to return in 4 weeks for a recheck. Will discuss return to work at the next f/up.  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      Diagnoses and all orders for this visit:    1. DDD (degenerative disc disease), lumbar (Primary)  -     Ambulatory Referral to Physical Therapy Evaluate and treat, POST OP; Stretching, ROM, Strengthening; 2 (1)    2. Status post surgery  -     Ambulatory Referral to Physical Therapy Evaluate and treat, POST OP; Stretching, ROM, Strengthening; 2 (1)      Return in about 4 weeks (around 2/25/2021) for follow up with [facility], post op visit.

## 2021-01-05 NOTE — DISCHARGE INSTRUCTIONS
Take the following medications the morning of surgery:    CYMBALTA, NEURONTIN, PLAQUENIL, SYNTHROID    If you are on prescription narcotic pain medication to control your pain you may also take that medication the morning of surgery.    General Instructions:  • Do not eat solid food after midnight the night before surgery.  • You may drink clear liquids day of surgery but must stop at least one hour before your hospital arrival time.  • It is beneficial for you to have a clear drink that contains carbohydrates the day of surgery.  We suggest a 12 to 20 ounce bottle of Gatorade or Powerade for non-diabetic patients     Clear liquids are liquids you can see through.  Nothing red in color.     Plain water                               Sports drinks  Sodas                                   Gelatin (Jell-O)  Fruit juices without pulp such as white grape juice and apple juice  Popsicles that contain no fruit or yogurt  Tea or coffee (no cream or milk added)  Gatorade / Powerade  G2 / Powerade Zero    • I  • Bring any papers given to you in the doctor’s office.  • Wear clean comfortable clothes.  • Do not wear contact lenses, false eyelashes or make-up.  Bring a case for your glasses.   • Remove all piercings.  Leave jewelry and any other valuables at home.  • Hair extensions with metal clips must be removed prior to surgery.  • The Pre-Admission Testing nurse will instruct you to bring medications if unable to obtain an accurate list in Pre-Admission Testing.    • REPORT TO MAIN SURGERY ON 1-8-2021 AT 0600          Preventing a Surgical Site Infection:  • For 2 to 3 days before surgery, avoid shaving with a razor because the razor can irritate skin and make it easier to develop an infection.    • Any areas of open skin can increase the risk of a post-operative wound infection by allowing bacteria to enter and travel throughout the body.  Notify your surgeon if you have any skin wounds / rashes even if it is not near the  expected surgical site.  The area will need assessed to determine if surgery should be delayed until it is healed.  • The night prior to surgery shower using a fresh bar of anti-bacterial soap (such as Dial) and clean washcloth.  Sleep in a clean bed with clean clothing.  Do not allow pets to sleep with you.  • Shower on the morning of surgery using a fresh bar of anti-bacterial soap (such as Dial) and clean washcloth.  Dry with a clean towel and dress in clean clothing.  • Ask your surgeon if you will be receiving antibiotics prior to surgery.  • Make sure you, your family, and all healthcare providers clean their hands with soap and water or an alcohol based hand  before caring for you or your wound.    Day of surgery:  Your arrival time is approximately two hours before your scheduled surgery time.  Upon arrival, a Pre-op nurse and Anesthesiologist will review your health history, obtain vital signs, and answer questions you may have.  The only belongings needed at this time will be a list of your home medications and if applicable your C-PAP/BI-PAP machine.  A Pre-op nurse will start an IV and you may receive medication in preparation for surgery, including something to help you relax.     Please be aware that surgery does come with discomfort.  We want to make every effort to control your discomfort so please discuss any uncontrolled symptoms with your nurse.   Your doctor will most likely have prescribed pain medications.      If you are going home after surgery you will receive individualized written care instructions before being discharged.  A responsible adult must drive you to and from the hospital on the day of your surgery and stay with you for 24 hours.  Discharge prescriptions can be filled by the hospital pharmacy during regular pharmacy hours.  If you are having surgery late in the day/evening your prescription may be e-prescribed to your pharmacy.  Please verify your pharmacy hours or  chose a 24 hour pharmacy to avoid not having access to your prescription because your pharmacy has closed for the day.      CHLORHEXIDINE CLOTH INSTRUCTIONS  The morning of surgery follow these instructions using the Chlorhexidine cloths you've been given.  These steps reduce bacteria on the body.  Do not use the cloths near your eyes, ears mouth, genitalia or on open wounds.  Throw the cloths away after use but do not try to flush them down a toilet.      • Open and remove one cloth at a time from the package.    • Leave the cloth unfolded and begin the bathing.  • Massage the skin with the cloths using gentle pressure to remove bacteria.  Do not scrub harshly.   • Follow the steps below with one 2% CHG cloth per area (6 total cloths).  • One cloth for neck, shoulders and chest.  • One cloth for both arms, hands, fingers and underarms (do underarms last).  • One cloth for the abdomen followed by groin.  • One cloth for right leg and foot including between the toes.  • One cloth for left leg and foot including between the toes.  • The last cloth is to be used for the back of the neck, back and buttocks.    Allow the CHG to air dry 3 minutes on the skin which will give it time to work and decrease the chance of irritation.  The skin may feel sticky until it is dry.  Do not rinse with water or any other liquid or you will lose the beneficial effects of the CHG.  If mild skin irritation occurs, do rinse the skin to remove the CHG.  Report this to the nurse at time of admission.  Do not apply lotions, creams, ointments, deodorants or perfumes after using the clothes. Dress in clean clothes before coming to the hospital.    If you have any questions please call Pre-Admission Testing at [phone].  Deductibles and co-payments are collected on the day of service. Please be prepared to pay the required co-pay, deductible or deposit on the day of service as defined by your plan.      Patient Education for Self-Quarantine  Process    Following your COVID testing, we strongly recommend that you do not leave your home after you have been tested for COVID except to get medical care. This includes not going to work, school or to public areas.  If this is not possible for you to do please limit your activities to only required outings.  Be sure to wear a mask when you are with other people, practice social distancing and wash your hands frequently.      The following items provide additional details to keep you safe.  • Wash your hands with soap and water frequently for at least 20 seconds.   • Avoid touching your eyes, nose and mouth with unwashed hands.  • Do not share anything - utensils, towels, food from the same bowl.   • Have your own utensils, drinking glass, dishes, towels and bedding.   • Do not have visitors.   • Do use FaceTime to stay in touch with family and friends.  • You should stay in a specific room away from others if possible.   • Stay at least 6 feet away from others in the home if you cannot have a dedicated room to yourself.   • Do not snuggle with your pet. While the CDC says there is no evidence that pets can spread COVID-19 or be infected from humans, it is probably best to avoid “petting, snuggling, being kissed or licked and sharing food (during self-quarantine)”, according to the CDC.   • Sanitize household surfaces daily. Include all high touch areas (door handles, light switches, phones, countertops, etc.)  • Do not share a bathroom with others, if possible.   • Wear a mask around others in your home if you are unable to stay in a separate room or 6 feet apart. If  you are unable to wear a mask, have your family member wear a mask if they must be within 6 feet of you.   Call your surgeon immediately if you experience any of the following symptoms:  • Sore Throat  • Shortness of Breath or difficulty breathing  • Cough  • Chills  • Body soreness or muscle pain  • Headache  • Fever  • New loss of taste or  smell  • Do not arrive for your surgery ill.  Your procedure will need to be rescheduled to another time.  You will need to call your physician before the day of surgery to avoid any unnecessary exposure to hospital staff as well as other patients.

## 2021-01-06 LAB — SARS-COV-2 RNA RESP QL NAA+PROBE: NOT DETECTED

## 2021-01-08 ENCOUNTER — HOSPITAL ENCOUNTER (OUTPATIENT)
Dept: GENERAL RADIOLOGY | Facility: HOSPITAL | Age: 41
Discharge: HOME | End: 2021-01-08
Admitting: NEUROLOGICAL SURGERY

## 2021-01-08 ENCOUNTER — ANESTHESIA (OUTPATIENT)
Dept: PERIOP | Facility: HOSPITAL | Age: 41
End: 2021-01-08

## 2021-01-08 ENCOUNTER — HOSPITAL ENCOUNTER (OUTPATIENT)
Facility: HOSPITAL | Age: 41
Setting detail: HOSPITAL OUTPATIENT SURGERY
Discharge: HOME | End: 2021-01-08
Attending: NEUROLOGICAL SURGERY | Admitting: NEUROLOGICAL SURGERY

## 2021-01-08 ENCOUNTER — ANESTHESIA EVENT (OUTPATIENT)
Dept: PERIOP | Facility: HOSPITAL | Age: 41
End: 2021-01-08

## 2021-01-08 VITALS
HEIGHT: 67 IN | HEART RATE: 82 BPM | WEIGHT: 149.47 LBS | BODY MASS INDEX: 23.46 KG/M2 | RESPIRATION RATE: 16 BRPM | DIASTOLIC BLOOD PRESSURE: 70 MMHG | OXYGEN SATURATION: 94 % | TEMPERATURE: 97.5 F | SYSTOLIC BLOOD PRESSURE: 111 MMHG

## 2021-01-08 DIAGNOSIS — M54.2 NECK PAIN: ICD-10-CM

## 2021-01-08 DIAGNOSIS — R52 PAIN: ICD-10-CM

## 2021-01-08 DIAGNOSIS — M51.16 HERNIATION OF LUMBAR INTERVERTEBRAL DISC WITH RADICULOPATHY: ICD-10-CM

## 2021-01-08 DIAGNOSIS — M43.17 SPONDYLOLISTHESIS AT L5-S1 LEVEL: ICD-10-CM

## 2021-01-08 PROCEDURE — 25010000002 METHYLPREDNISOLONE PER 80 MG: Performed by: NEUROLOGICAL SURGERY

## 2021-01-08 PROCEDURE — 76000 FLUOROSCOPY <1 HR PHYS/QHP: CPT

## 2021-01-08 PROCEDURE — 25010000002 FENTANYL CITRATE (PF) 100 MCG/2ML SOLUTION: Performed by: NURSE ANESTHETIST, CERTIFIED REGISTERED

## 2021-01-08 PROCEDURE — 25010000003 MEPERIDINE PER 100 MG: Performed by: ANESTHESIOLOGY

## 2021-01-08 PROCEDURE — 25010000002 PHENYLEPHRINE PER 1 ML: Performed by: NURSE ANESTHETIST, CERTIFIED REGISTERED

## 2021-01-08 PROCEDURE — 25010000002 SUCCINYLCHOLINE PER 20 MG: Performed by: NURSE ANESTHETIST, CERTIFIED REGISTERED

## 2021-01-08 PROCEDURE — 72100 X-RAY EXAM L-S SPINE 2/3 VWS: CPT

## 2021-01-08 PROCEDURE — 63030 LAMOT DCMPRN NRV RT 1 LMBR: CPT | Performed by: SPECIALIST/TECHNOLOGIST, OTHER

## 2021-01-08 PROCEDURE — 25010000002 DEXAMETHASONE PER 1 MG: Performed by: NURSE ANESTHETIST, CERTIFIED REGISTERED

## 2021-01-08 PROCEDURE — 25010000002 KETOROLAC TROMETHAMINE PER 15 MG: Performed by: ANESTHESIOLOGY

## 2021-01-08 PROCEDURE — 25010000002 NEOSTIGMINE PER 0.5 MG: Performed by: NURSE ANESTHETIST, CERTIFIED REGISTERED

## 2021-01-08 PROCEDURE — 25010000003 CEFAZOLIN PER 500 MG: Performed by: NEUROLOGICAL SURGERY

## 2021-01-08 PROCEDURE — 63030 LAMOT DCMPRN NRV RT 1 LMBR: CPT | Performed by: NEUROLOGICAL SURGERY

## 2021-01-08 PROCEDURE — 25010000002 PROPOFOL 10 MG/ML EMULSION: Performed by: NURSE ANESTHETIST, CERTIFIED REGISTERED

## 2021-01-08 PROCEDURE — 25010000002 ONDANSETRON PER 1 MG: Performed by: NURSE ANESTHETIST, CERTIFIED REGISTERED

## 2021-01-08 PROCEDURE — 25010000002 HYDROMORPHONE PER 4 MG: Performed by: NURSE ANESTHETIST, CERTIFIED REGISTERED

## 2021-01-08 PROCEDURE — 25010000003 CEFAZOLIN IN DEXTROSE 2-4 GM/100ML-% SOLUTION: Performed by: NEUROLOGICAL SURGERY

## 2021-01-08 DEVICE — SSC BONE WAX
Type: IMPLANTABLE DEVICE | Site: SPINE LUMBAR | Status: FUNCTIONAL
Brand: SSC BONE WAX

## 2021-01-08 RX ORDER — NALOXONE HCL 0.4 MG/ML
0.2 VIAL (ML) INJECTION AS NEEDED
Status: DISCONTINUED | OUTPATIENT
Start: 2021-01-08 | End: 2021-01-08 | Stop reason: HOSPADM

## 2021-01-08 RX ORDER — CEFAZOLIN SODIUM 2 G/100ML
2 INJECTION, SOLUTION INTRAVENOUS ONCE
Status: COMPLETED | OUTPATIENT
Start: 2021-01-08 | End: 2021-01-08

## 2021-01-08 RX ORDER — GLYCOPYRROLATE 0.2 MG/ML
INJECTION INTRAMUSCULAR; INTRAVENOUS AS NEEDED
Status: DISCONTINUED | OUTPATIENT
Start: 2021-01-08 | End: 2021-01-08 | Stop reason: SURG

## 2021-01-08 RX ORDER — SUCCINYLCHOLINE CHLORIDE 20 MG/ML
INJECTION INTRAMUSCULAR; INTRAVENOUS AS NEEDED
Status: DISCONTINUED | OUTPATIENT
Start: 2021-01-08 | End: 2021-01-08 | Stop reason: SURG

## 2021-01-08 RX ORDER — EPHEDRINE SULFATE 50 MG/ML
5 INJECTION, SOLUTION INTRAVENOUS ONCE AS NEEDED
Status: DISCONTINUED | OUTPATIENT
Start: 2021-01-08 | End: 2021-01-08 | Stop reason: HOSPADM

## 2021-01-08 RX ORDER — ALBUTEROL SULFATE 2.5 MG/3ML
2.5 SOLUTION RESPIRATORY (INHALATION) ONCE AS NEEDED
Status: DISCONTINUED | OUTPATIENT
Start: 2021-01-08 | End: 2021-01-08 | Stop reason: HOSPADM

## 2021-01-08 RX ORDER — FENTANYL CITRATE 50 UG/ML
INJECTION, SOLUTION INTRAMUSCULAR; INTRAVENOUS AS NEEDED
Status: DISCONTINUED | OUTPATIENT
Start: 2021-01-08 | End: 2021-01-08 | Stop reason: SURG

## 2021-01-08 RX ORDER — PROPOFOL 10 MG/ML
VIAL (ML) INTRAVENOUS AS NEEDED
Status: DISCONTINUED | OUTPATIENT
Start: 2021-01-08 | End: 2021-01-08 | Stop reason: SURG

## 2021-01-08 RX ORDER — WOUND DRESSING ADHESIVE - LIQUID
LIQUID MISCELLANEOUS AS NEEDED
Status: DISCONTINUED | OUTPATIENT
Start: 2021-01-08 | End: 2021-01-08 | Stop reason: HOSPADM

## 2021-01-08 RX ORDER — ONDANSETRON 2 MG/ML
INJECTION INTRAMUSCULAR; INTRAVENOUS AS NEEDED
Status: DISCONTINUED | OUTPATIENT
Start: 2021-01-08 | End: 2021-01-08 | Stop reason: SURG

## 2021-01-08 RX ORDER — FENTANYL CITRATE 50 UG/ML
50 INJECTION, SOLUTION INTRAMUSCULAR; INTRAVENOUS
Status: DISCONTINUED | OUTPATIENT
Start: 2021-01-08 | End: 2021-01-08 | Stop reason: HOSPADM

## 2021-01-08 RX ORDER — MEPERIDINE HYDROCHLORIDE 25 MG/ML
12.5 INJECTION INTRAMUSCULAR; INTRAVENOUS; SUBCUTANEOUS ONCE
Status: COMPLETED | OUTPATIENT
Start: 2021-01-08 | End: 2021-01-08

## 2021-01-08 RX ORDER — HYDROCODONE BITARTRATE AND ACETAMINOPHEN 5; 325 MG/1; MG/1
1 TABLET ORAL EVERY 4 HOURS PRN
Qty: 40 TABLET | Refills: 0 | Status: SHIPPED | OUTPATIENT
Start: 2021-01-08 | End: 2021-03-18 | Stop reason: SDUPTHER

## 2021-01-08 RX ORDER — ROCURONIUM BROMIDE 10 MG/ML
INJECTION, SOLUTION INTRAVENOUS AS NEEDED
Status: DISCONTINUED | OUTPATIENT
Start: 2021-01-08 | End: 2021-01-08 | Stop reason: SURG

## 2021-01-08 RX ORDER — BUPIVACAINE HYDROCHLORIDE AND EPINEPHRINE 2.5; 5 MG/ML; UG/ML
INJECTION, SOLUTION INFILTRATION; PERINEURAL AS NEEDED
Status: DISCONTINUED | OUTPATIENT
Start: 2021-01-08 | End: 2021-01-08 | Stop reason: HOSPADM

## 2021-01-08 RX ORDER — LIDOCAINE HYDROCHLORIDE 10 MG/ML
0.5 INJECTION, SOLUTION EPIDURAL; INFILTRATION; INTRACAUDAL; PERINEURAL ONCE AS NEEDED
Status: DISCONTINUED | OUTPATIENT
Start: 2021-01-08 | End: 2021-01-08 | Stop reason: HOSPADM

## 2021-01-08 RX ORDER — KETOROLAC TROMETHAMINE 30 MG/ML
30 INJECTION, SOLUTION INTRAMUSCULAR; INTRAVENOUS ONCE AS NEEDED
Status: COMPLETED | OUTPATIENT
Start: 2021-01-08 | End: 2021-01-08

## 2021-01-08 RX ORDER — METHYLPREDNISOLONE ACETATE 80 MG/ML
INJECTION, SUSPENSION INTRA-ARTICULAR; INTRALESIONAL; INTRAMUSCULAR; SOFT TISSUE AS NEEDED
Status: DISCONTINUED | OUTPATIENT
Start: 2021-01-08 | End: 2021-01-08 | Stop reason: HOSPADM

## 2021-01-08 RX ORDER — CEFAZOLIN SODIUM 2 G/100ML
2 INJECTION, SOLUTION INTRAVENOUS ONCE
Status: DISCONTINUED | OUTPATIENT
Start: 2021-01-08 | End: 2021-01-08

## 2021-01-08 RX ORDER — HYDROCODONE BITARTRATE AND ACETAMINOPHEN 10; 325 MG/1; MG/1
1 TABLET ORAL EVERY 4 HOURS PRN
Status: DISCONTINUED | OUTPATIENT
Start: 2021-01-08 | End: 2021-01-08 | Stop reason: HOSPADM

## 2021-01-08 RX ORDER — CYCLOBENZAPRINE HCL 10 MG
10 TABLET ORAL 3 TIMES DAILY PRN
Qty: 30 TABLET | Refills: 0 | Status: SHIPPED | OUTPATIENT
Start: 2021-01-08 | End: 2021-01-15 | Stop reason: SDUPTHER

## 2021-01-08 RX ORDER — FLUMAZENIL 0.1 MG/ML
0.2 INJECTION INTRAVENOUS AS NEEDED
Status: DISCONTINUED | OUTPATIENT
Start: 2021-01-08 | End: 2021-01-08 | Stop reason: HOSPADM

## 2021-01-08 RX ORDER — SODIUM CHLORIDE 0.9 % (FLUSH) 0.9 %
3 SYRINGE (ML) INJECTION EVERY 12 HOURS SCHEDULED
Status: DISCONTINUED | OUTPATIENT
Start: 2021-01-08 | End: 2021-01-08 | Stop reason: HOSPADM

## 2021-01-08 RX ORDER — HYDROMORPHONE HYDROCHLORIDE 1 MG/ML
0.25 INJECTION, SOLUTION INTRAMUSCULAR; INTRAVENOUS; SUBCUTANEOUS
Status: DISCONTINUED | OUTPATIENT
Start: 2021-01-08 | End: 2021-01-08 | Stop reason: HOSPADM

## 2021-01-08 RX ORDER — MIDAZOLAM HYDROCHLORIDE 1 MG/ML
1 INJECTION INTRAMUSCULAR; INTRAVENOUS
Status: DISCONTINUED | OUTPATIENT
Start: 2021-01-08 | End: 2021-01-08 | Stop reason: HOSPADM

## 2021-01-08 RX ORDER — LIDOCAINE HYDROCHLORIDE 20 MG/ML
INJECTION, SOLUTION INFILTRATION; PERINEURAL AS NEEDED
Status: DISCONTINUED | OUTPATIENT
Start: 2021-01-08 | End: 2021-01-08 | Stop reason: SURG

## 2021-01-08 RX ORDER — SODIUM CHLORIDE 0.9 % (FLUSH) 0.9 %
3-10 SYRINGE (ML) INJECTION AS NEEDED
Status: DISCONTINUED | OUTPATIENT
Start: 2021-01-08 | End: 2021-01-08 | Stop reason: HOSPADM

## 2021-01-08 RX ORDER — PROMETHAZINE HYDROCHLORIDE 25 MG/1
25 SUPPOSITORY RECTAL ONCE AS NEEDED
Status: DISCONTINUED | OUTPATIENT
Start: 2021-01-08 | End: 2021-01-08 | Stop reason: HOSPADM

## 2021-01-08 RX ORDER — SODIUM CHLORIDE, SODIUM LACTATE, POTASSIUM CHLORIDE, CALCIUM CHLORIDE 600; 310; 30; 20 MG/100ML; MG/100ML; MG/100ML; MG/100ML
9 INJECTION, SOLUTION INTRAVENOUS CONTINUOUS
Status: DISCONTINUED | OUTPATIENT
Start: 2021-01-08 | End: 2021-01-08 | Stop reason: HOSPADM

## 2021-01-08 RX ORDER — SCOLOPAMINE TRANSDERMAL SYSTEM 1 MG/1
1 PATCH, EXTENDED RELEASE TRANSDERMAL ONCE
Status: DISCONTINUED | OUTPATIENT
Start: 2021-01-08 | End: 2021-01-08 | Stop reason: HOSPADM

## 2021-01-08 RX ORDER — LABETALOL HYDROCHLORIDE 5 MG/ML
5 INJECTION, SOLUTION INTRAVENOUS
Status: DISCONTINUED | OUTPATIENT
Start: 2021-01-08 | End: 2021-01-08 | Stop reason: HOSPADM

## 2021-01-08 RX ORDER — DIPHENHYDRAMINE HCL 25 MG
25 CAPSULE ORAL
Status: DISCONTINUED | OUTPATIENT
Start: 2021-01-08 | End: 2021-01-08 | Stop reason: HOSPADM

## 2021-01-08 RX ORDER — DEXAMETHASONE SODIUM PHOSPHATE 10 MG/ML
INJECTION INTRAMUSCULAR; INTRAVENOUS AS NEEDED
Status: DISCONTINUED | OUTPATIENT
Start: 2021-01-08 | End: 2021-01-08 | Stop reason: SURG

## 2021-01-08 RX ORDER — PROMETHAZINE HYDROCHLORIDE 25 MG/1
25 TABLET ORAL ONCE AS NEEDED
Status: DISCONTINUED | OUTPATIENT
Start: 2021-01-08 | End: 2021-01-08 | Stop reason: HOSPADM

## 2021-01-08 RX ORDER — DIPHENHYDRAMINE HYDROCHLORIDE 50 MG/ML
12.5 INJECTION INTRAMUSCULAR; INTRAVENOUS
Status: DISCONTINUED | OUTPATIENT
Start: 2021-01-08 | End: 2021-01-08 | Stop reason: HOSPADM

## 2021-01-08 RX ORDER — ONDANSETRON 2 MG/ML
4 INJECTION INTRAMUSCULAR; INTRAVENOUS ONCE AS NEEDED
Status: DISCONTINUED | OUTPATIENT
Start: 2021-01-08 | End: 2021-01-08 | Stop reason: HOSPADM

## 2021-01-08 RX ORDER — HYDROCODONE BITARTRATE AND ACETAMINOPHEN 7.5; 325 MG/1; MG/1
1 TABLET ORAL ONCE AS NEEDED
Status: COMPLETED | OUTPATIENT
Start: 2021-01-08 | End: 2021-01-08

## 2021-01-08 RX ORDER — ACETAMINOPHEN 500 MG
1000 TABLET ORAL ONCE
Status: COMPLETED | OUTPATIENT
Start: 2021-01-08 | End: 2021-01-08

## 2021-01-08 RX ADMIN — FENTANYL CITRATE 50 MCG: 50 INJECTION, SOLUTION INTRAMUSCULAR; INTRAVENOUS at 10:05

## 2021-01-08 RX ADMIN — PROPOFOL 150 MG: 10 INJECTION, EMULSION INTRAVENOUS at 08:13

## 2021-01-08 RX ADMIN — SODIUM CHLORIDE, POTASSIUM CHLORIDE, SODIUM LACTATE AND CALCIUM CHLORIDE 9 ML/HR: 600; 310; 30; 20 INJECTION, SOLUTION INTRAVENOUS at 10:39

## 2021-01-08 RX ADMIN — GLYCOPYRROLATE 0.2 MG: 0.2 INJECTION INTRAMUSCULAR; INTRAVENOUS at 08:11

## 2021-01-08 RX ADMIN — GLYCOPYRROLATE 0.4 MG: 0.2 INJECTION INTRAMUSCULAR; INTRAVENOUS at 09:28

## 2021-01-08 RX ADMIN — ONDANSETRON HYDROCHLORIDE 4 MG: 2 SOLUTION INTRAMUSCULAR; INTRAVENOUS at 09:26

## 2021-01-08 RX ADMIN — FENTANYL CITRATE 50 MCG: 50 INJECTION INTRAMUSCULAR; INTRAVENOUS at 09:07

## 2021-01-08 RX ADMIN — HYDROCODONE BITARTRATE AND ACETAMINOPHEN 1 TABLET: 7.5; 325 TABLET ORAL at 10:50

## 2021-01-08 RX ADMIN — PHENYLEPHRINE HYDROCHLORIDE 100 MCG: 10 INJECTION INTRAVENOUS at 08:54

## 2021-01-08 RX ADMIN — FENTANYL CITRATE 50 MCG: 50 INJECTION INTRAMUSCULAR; INTRAVENOUS at 08:11

## 2021-01-08 RX ADMIN — SODIUM CHLORIDE, POTASSIUM CHLORIDE, SODIUM LACTATE AND CALCIUM CHLORIDE 9 ML/HR: 600; 310; 30; 20 INJECTION, SOLUTION INTRAVENOUS at 07:36

## 2021-01-08 RX ADMIN — FENTANYL CITRATE 50 MCG: 50 INJECTION, SOLUTION INTRAMUSCULAR; INTRAVENOUS at 10:25

## 2021-01-08 RX ADMIN — CEFAZOLIN SODIUM 2 G: 2 INJECTION, SOLUTION INTRAVENOUS at 08:09

## 2021-01-08 RX ADMIN — LIDOCAINE HYDROCHLORIDE 80 MG: 20 INJECTION, SOLUTION INFILTRATION; PERINEURAL at 08:13

## 2021-01-08 RX ADMIN — ROCURONIUM BROMIDE 45 MG: 10 INJECTION INTRAVENOUS at 08:22

## 2021-01-08 RX ADMIN — HYDROMORPHONE HYDROCHLORIDE 0.25 MG: 1 INJECTION, SOLUTION INTRAMUSCULAR; INTRAVENOUS; SUBCUTANEOUS at 10:12

## 2021-01-08 RX ADMIN — ACETAMINOPHEN 1000 MG: 500 TABLET ORAL at 07:35

## 2021-01-08 RX ADMIN — NEOSTIGMINE METHYLSULFATE 2.5 MG: 1 INJECTION INTRAMUSCULAR; INTRAVENOUS; SUBCUTANEOUS at 09:30

## 2021-01-08 RX ADMIN — MEPERIDINE HYDROCHLORIDE 12.5 MG: 25 INJECTION INTRAMUSCULAR; INTRAVENOUS; SUBCUTANEOUS at 10:39

## 2021-01-08 RX ADMIN — HYDROMORPHONE HYDROCHLORIDE 0.25 MG: 1 INJECTION, SOLUTION INTRAMUSCULAR; INTRAVENOUS; SUBCUTANEOUS at 10:45

## 2021-01-08 RX ADMIN — HYDROMORPHONE HYDROCHLORIDE 0.25 MG: 1 INJECTION, SOLUTION INTRAMUSCULAR; INTRAVENOUS; SUBCUTANEOUS at 10:17

## 2021-01-08 RX ADMIN — SCOPALAMINE 1 PATCH: 1 PATCH, EXTENDED RELEASE TRANSDERMAL at 07:36

## 2021-01-08 RX ADMIN — PROPOFOL 50 MCG/KG/MIN: 10 INJECTION, EMULSION INTRAVENOUS at 08:35

## 2021-01-08 RX ADMIN — HYDROMORPHONE HYDROCHLORIDE 0.25 MG: 1 INJECTION, SOLUTION INTRAMUSCULAR; INTRAVENOUS; SUBCUTANEOUS at 10:50

## 2021-01-08 RX ADMIN — PROPOFOL 50 MCG/KG/MIN: 10 INJECTION, EMULSION INTRAVENOUS at 08:39

## 2021-01-08 RX ADMIN — SUCCINYLCHOLINE CHLORIDE 120 MG: 20 INJECTION, SOLUTION INTRAMUSCULAR; INTRAVENOUS; PARENTERAL at 08:13

## 2021-01-08 RX ADMIN — PHENYLEPHRINE HYDROCHLORIDE 100 MCG: 10 INJECTION INTRAVENOUS at 09:03

## 2021-01-08 RX ADMIN — DEXAMETHASONE SODIUM PHOSPHATE 8 MG: 10 INJECTION INTRAMUSCULAR; INTRAVENOUS at 08:25

## 2021-01-08 RX ADMIN — KETOROLAC TROMETHAMINE 30 MG: 30 INJECTION, SOLUTION INTRAMUSCULAR at 10:26

## 2021-01-08 RX ADMIN — PHENYLEPHRINE HYDROCHLORIDE 100 MCG: 10 INJECTION INTRAVENOUS at 09:15

## 2021-01-08 RX ADMIN — ROCURONIUM BROMIDE 5 MG: 10 INJECTION INTRAVENOUS at 08:13

## 2021-01-08 NOTE — ANESTHESIA PROCEDURE NOTES
Airway  Urgency: elective    Date/Time: 1/8/2021 8:17 AM  Airway not difficult    General Information and Staff    Patient location during procedure: OR  Anesthesiologist: Carlos Manley MD  CRNA: Mk Honeycutt CRNA    Indications and Patient Condition  Indications for airway management: airway protection    Preoxygenated: yes  MILS maintained throughout  Mask difficulty assessment: 1 - vent by mask    Final Airway Details  Final airway type: endotracheal airway      Successful airway: ETT  Cuffed: yes   Successful intubation technique: direct laryngoscopy  Facilitating devices/methods: cricoid pressure  Endotracheal tube insertion site: oral  Blade: Carmelita  Blade size: 3  ETT size (mm): 7.0  Cormack-Lehane Classification: grade I - full view of glottis  Placement verified by: chest auscultation and capnometry   Inital cuff pressure (cm H2O): 22  Cuff volume (mL): 6  Measured from: lips  ETT/EBT  to lips (cm): 22  Number of attempts at approach: 1

## 2021-01-08 NOTE — ANESTHESIA POSTPROCEDURE EVALUATION
"Patient: Taty Romo    Procedure Summary     Date: 01/08/21 Room / Location: [facility] OR [address] [place] [address] MAIN OR    Anesthesia Start: 0805 Anesthesia Stop: 0954    Procedure: Outpatient left lumbar five/sacral one Metrx microdiscectomy (Left Spine Lumbar) Diagnosis:       Herniation of lumbar intervertebral disc with radiculopathy      Spondylolisthesis at L5-S1 level      (Herniation of lumbar intervertebral disc with radiculopathy [M51.16])      (Spondylolisthesis at L5-S1 level [M43.17])    Surgeon: Alessandro Thomas MD Provider: Carlos Manley MD    Anesthesia Type: general ASA Status: 2          Anesthesia Type: general    Vitals  Vitals Value Taken Time   /78 01/08/21 1110   Temp 36.7 °C (98.1 °F) 01/08/21 0952   Pulse 62 01/08/21 1113   Resp 16 01/08/21 1055   SpO2 93 % 01/08/21 1113   Vitals shown include unvalidated device data.        Post Anesthesia Care and Evaluation    Patient location during evaluation: bedside  Patient participation: complete - patient participated  Level of consciousness: awake and alert  Pain management: adequate  Airway patency: patent  Anesthetic complications: No anesthetic complications  PONV Status: none  Cardiovascular status: acceptable  Respiratory status: acceptable  Hydration status: acceptable    Comments: /64   Pulse 67   Temp 36.7 °C (98.1 °F) (Oral)   Resp 16   Ht 170.2 cm (67\")   Wt 67.8 kg (149 lb 7.6 oz)   [facility] 12/29/2020   SpO2 94%   BMI 23.41 kg/m²         "

## 2021-01-08 NOTE — ANESTHESIA PREPROCEDURE EVALUATION
Anesthesia Evaluation     Patient summary reviewed and Nursing notes reviewed   no history of anesthetic complications:  NPO Solid Status: > 8 hours  NPO Liquid Status: > 8 hours           Airway   Mallampati: II  TM distance: >3 FB  Neck ROM: full  No difficulty expected  Dental - normal exam     Pulmonary    (-) COPD, sleep apnea, rhonchi, decreased breath sounds, wheezes, not a smoker  Cardiovascular   Exercise tolerance: good (4-7 METS)    Rhythm: regular  Rate: normal    (-) hypertension, CAD, angina, AGUILERA, murmur      Neuro/Psych  (+) headaches, numbness, psychiatric history Depression and Anxiety,     (-) CVA  GI/Hepatic/Renal/Endo    (+)   thyroid problem hypothyroidism  (-) liver disease, no renal disease, diabetes    Musculoskeletal     (+) myalgias, neck pain,   Abdominal     Abdomen: soft.   Substance History      OB/GYN          Other   arthritis,                      Anesthesia Plan    ASA 2     general     intravenous induction     Anesthetic plan, all risks, benefits, and alternatives have been provided, discussed and informed consent has been obtained with: patient.

## 2021-01-08 NOTE — OP NOTE
Preoperative diagnosis: Left L5-S1 herniated lumbar disc with radiculopathy    Postoperative diagnosis: Same as above    Procedures performed: Outpatient left L5-S1 Metrx microdiscectomy    Surgeon: William    First Assistant: Porsha Bradshaw  (She greatly assisted in the exposure, visualization of neural structures, control of bleeding, retraction, and closure of the incision. Her skilled assistance was necessary for the success of this case.)    Anesthesia: GET    EBL: minimal    Complications: none    Specimen sent: none    Drains: none    Findings: disc extrusion    Postoperative condition: good    Indications for the operation:The patient is a healthy 40-year-old female with about a year to year and one-half history of left buttock and leg pain attributable to herniated lumbar disc.  Conservative treatment was tried including physical therapy and blocks and she was actually improving until recently when she bent over and began having severe and worsening left buttock and leg pain attributable to the worsening of the herniated disc which is now an extrusion with significant S1 root compression.  Because of that and the absence of back pain or contralateral pain she was brought to the operating room for an outpatient microdisectomy.            Informed consent: She understood that the goal of surgery is relief of radiating leg pain, and improvement of numbness, tingling, and weakness. This will not help or hinder midline low back pain. The risks include, but are not limited to, infection, hemorrhage requiring transfusion or reoperation, CSF leak requiring reoperation, incomplete relief of symptoms, potential need for additional surgery in the future, stroke, paralysis, coma, and death. The patient agrees to proceed.     Details of the operation: The patient was taken to the operating room and remained in her gurney in the supine position. After induction and endotracheal intubation, she got 2 g of Kefzol as per  the SCIP protocol. SCDs were placed. No Low was needed. Venous access was secured. She was rolled over in the prone position on a radiolucent Calin spinal table. All pressure points were padded including the brachial plexus. We brought in the C-arm and marked out the incision on the left at L5-S1 about 1 cm out laterally. The lumbar region was then prepped and draped in the usual sterile fashion. We did a surgical timeout. I injected 10 mL of 0.25% Marcaine with epinephrine into the paraspinous musculature. A paramedian incision was made with a #10 skin knife at the left L5-S1 level. Hemostasis was obtained with monopolar cautery. Using a K wire I dilated up to 16 mm of tubular retraction and then used a 5 cm x 16 mm tubular retractor and affixed it to the table with a flexible snake arm. The position at the left L5-S1 was verified both in PA and lateral C-arm views. The C-arm was removed. The microscope was draped and brought into the field. Its use was essential to the performance of microneurosurgical technique. Using a 3 mm matchstick on the Medtronic electric drill and under magnification, I did a left L5-S1 hemilaminectomy, partial medial facetectomy and foraminotomy. The yellow ligament was divided with a #15 blade and the remainder of the bony and ligamentous removal was done with 2 mm and 3 mm angled Cloward punches. I identified the left S1 nerve root and retracted it medially. There was a fairly substantial, as expected, disc extrusion at L5-S1. I incised the disc space with a #15 blade, and using pituitaries, reverse angle curettes and even the drill itself, I did an internal decompression, removing the extruded portion which was underneath the S1 nerve root, decompressing the S1 root considerably. I made sure that there were no free fragments lying around. Hemostasis was obtained. No CSF leakage was seen; 80 mg of Depo-Medrol was left in the epidural space. The tubular retractor was removed. The  subcutaneous layer was closed with 3-0 interrupted Vicryl suture. The skin was closed with a running 4-0 Vicryl subcuticular. Steri-Strips and a clean, dry dressing were placed. The patient tolerated the procedure well. She was rolled over in the supine position and extubated, taken to the recovery room in satisfactory condition. This is an outpatient surgery and she should be going home.

## 2021-01-08 NOTE — BRIEF OP NOTE
LUMBAR DISCECTOMY POSTERIOR WITH METRIX  Progress Note    Taty Romo  1/8/2021    Pre-op Diagnosis:   Herniation of lumbar intervertebral disc with radiculopathy [M51.16]  Spondylolisthesis at L5-S1 level [M43.17]       Post-Op Diagnosis Codes:     * Herniation of lumbar intervertebral disc with radiculopathy [M51.16]     * Spondylolisthesis at L5-S1 level [M43.17]    Procedure/CPT® Codes:        Procedure(s):  Outpatient left lumbar five/sacral one Metrx microdiscectomy    Surgeon(s):  Alessandro Thomas MD    Anesthesia: General    Staff:   Circulator: Araceli Still RN  Radiology Technologist: Irais Meza  Scrub Person: Nicole Vázquez  Assistant: Porsha Bradshaw CSA  Assistant: Porsha Bradshaw CSA      Estimated Blood Loss: Minimal    Urine Voided: None    Specimens:                None          Drains: * No LDAs found *    Findings: Disc extrusion    Complications: None    Assistant: Porsha Bradshaw CSA  was responsible for performing the following activities: Retraction, Suction, Irrigation, Suturing, Closing and Placing Dressing and their skilled assistance was necessary for the success of this case.    Alessandro Thomas MD     Date: 1/8/2021  Time: 09:28 EST

## 2021-01-08 NOTE — DISCHARGE INSTRUCTIONS
Alessandro Thomas M.D., F.A.C.S      Lumbar Discectomy or Decompression  Post-Operative Instructions      1. You should have a post-operative visit scheduled with the Physician Assistant or Nurse Practitioner for approximately 2 to 2 ½ weeks from your date of surgery.  If you do not have one, please call the office when you return home to schedule a visit.  You should also be off work at least until your first visit.    2. Do not lift anything over five (5) pounds.  No bending, twisting, or squatting should be done until the first visit.  However, walking is allowed and encouraged.  Walking should be done on a flat surface.  The speed and distance should be chosen according to your comfort level and stamina.  In general, short frequent walks are preferred.  Climbing stairs is allowed, but should be minimized.  In general, activity restrictions will be lessened following the first visit.    3. Sitting, while allowed, should be kept to a minimum until the first visit because it may increase your discomfort.  No more than 15 to 20 minutes, three times a day should be done - enough time to eat your meals and go to the bathroom.  Otherwise, if you are not walking or standing, you would be lying down on your back or side.  A reclining chair is acceptable.    4. Please do not drive until the first visit, although you may be driven.    5. Refrain from any sexual activity until after your first visit with the physician assistant or nurse practitioner.      6. Take off your dressings and leave them off after the first day home.  You may get the incisions wet during showering and pat them dry, but do not soak the incisions or rub them vigorously with a towel.  No tub baths or hot tubs are allowed.  The incision should be cleaned three times daily with hydrogen peroxide unless otherwise directed by the nurse or physician.    7. A prescription for pain medication will be provided at discharge.  This medication is  primarily for any pain related to the incision and should be used only on an as-needed basis.  Sometimes, antibiotics and medication for spasms are also given.  Take medications only as directed by the doctor. If a refill of your pain medication is required, due to changes in federal law, in order to have this medication refilled you must contact the office four days prior to the due date and make arrangements to pick-up the prescription in our office. The prescription refill cannot be called in to the pharmacy. Your prescription will be ready for pick-up the day the refill is due.     8. If there are any problems, such as excessive back or leg pain, persistent temperature of 100 degrees or greater despite Tylenol, chills, excessive bloody discharge from the wound, redness and hot skin around the incision, clear or yellowish discharge from the wound, or severe headaches, particularly when sitting or standing, please call the office.  A small amount of bleeding from the incision during the first few days is not unusual.    9. We look forward to seeing you again for follow-up.  Do not hesitate to call if any questions or concerns arise regarding your surgery.  We would rather you communicate with us regarding such issues early.      Thank You.        Scopolamine Patch  This patch has been applied to the skin behind one of your ears.  It may stay in place up to 24 hours. You may remove it at any time after your surgery; however, it should be removed after you are up and walking around the next day.  This medicine reduces stomach upset. Side effects may include: dry mouth, dizziness, sleepiness, constipation, or upset stomach.  An allergy would show up as: a rash, itching, wheezing or shortness of breath.  Follow these instructions:  1. Do not drink alcohol, drive or operate machinery while taking this medicine.  2. Wear only 1 patch at a time. You can leave the patch on for up to 24 hours.  3. When you remove the  patch, fold it in half with the sticky sides together and throw it away. Wash your hands and the area under the patch.  4. Do not touch your eye with your hand if it has touched the patch.  5. Wash your hands well before and after touching the patch.  6. Sit or stand slowly to avoid dizziness.  Call your doctor if you have:  1. Any sign of allergy  2. No relief  3. Trouble passing urine  Any new or severe symptoms

## 2021-01-11 RX ORDER — ONDANSETRON 4 MG/1
4 TABLET, FILM COATED ORAL EVERY 8 HOURS PRN
Qty: 30 TABLET | Refills: 0 | Status: SHIPPED | OUTPATIENT
Start: 2021-01-11 | End: 2021-01-21 | Stop reason: SDUPTHER

## 2021-01-15 ENCOUNTER — TELEPHONE (OUTPATIENT)
Dept: NEUROSURGERY | Facility: CLINIC | Age: 41
End: 2021-01-15

## 2021-01-15 RX ORDER — CYCLOBENZAPRINE HCL 10 MG
10 TABLET ORAL 3 TIMES DAILY PRN
Qty: 30 TABLET | Refills: 0 | Status: SHIPPED | OUTPATIENT
Start: 2021-01-15 | End: 2021-02-11 | Stop reason: SDUPTHER

## 2021-01-15 NOTE — TELEPHONE ENCOUNTER
Caller: TESS WEINSTEIN    Relationship: PT    Best call back number: [phone]    What form or medical record are you requesting: [facility] PAPERWORK    Who is requesting this form or medical record from you: LIANA    How would you like to receive the form or medical records (pick-up, mail, fax): FAX  If fax, what is the fax number: [phone]    Timeframe paperwork needed: 1/27/21    Additional notes: PLEASE CALL PT TO ADVISE IF FMLA PAPERWORK HAS BEEN RECEIVED FROM [facility].    THANK YOU.

## 2021-01-15 NOTE — TELEPHONE ENCOUNTER
After checking with Aguila I notified Ms. Romo that he has not received [facility] paperwork for her.

## 2021-01-21 RX ORDER — ONDANSETRON 4 MG/1
4 TABLET, FILM COATED ORAL EVERY 8 HOURS PRN
Qty: 30 TABLET | Refills: 0 | Status: SHIPPED | OUTPATIENT
Start: 2021-01-21 | End: 2022-01-11

## 2021-01-27 ENCOUNTER — TELEPHONE (OUTPATIENT)
Dept: NEUROSURGERY | Facility: CLINIC | Age: 41
End: 2021-01-27

## 2021-01-28 ENCOUNTER — OFFICE VISIT (OUTPATIENT)
Dept: NEUROSURGERY | Facility: CLINIC | Age: 41
End: 2021-01-28

## 2021-01-28 VITALS
TEMPERATURE: 97.7 F | DIASTOLIC BLOOD PRESSURE: 64 MMHG | SYSTOLIC BLOOD PRESSURE: 90 MMHG | HEIGHT: 67 IN | BODY MASS INDEX: 23.39 KG/M2 | HEART RATE: 64 BPM | WEIGHT: 149 LBS

## 2021-01-28 DIAGNOSIS — M51.36 DDD (DEGENERATIVE DISC DISEASE), LUMBAR: Primary | Chronic | ICD-10-CM

## 2021-01-28 DIAGNOSIS — Z98.890 STATUS POST SURGERY: ICD-10-CM

## 2021-01-28 PROCEDURE — 99024 POSTOP FOLLOW-UP VISIT: CPT | Performed by: NURSE PRACTITIONER

## 2021-02-01 ENCOUNTER — OFFICE VISIT (OUTPATIENT)
Dept: INTERNAL MEDICINE | Age: 41
End: 2021-02-01

## 2021-02-01 VITALS
OXYGEN SATURATION: 98 % | HEIGHT: 67 IN | HEART RATE: 73 BPM | BODY MASS INDEX: 23.39 KG/M2 | DIASTOLIC BLOOD PRESSURE: 52 MMHG | WEIGHT: 149 LBS | TEMPERATURE: 97.3 F | SYSTOLIC BLOOD PRESSURE: 96 MMHG

## 2021-02-01 DIAGNOSIS — M32.9 SYSTEMIC LUPUS ERYTHEMATOSUS, UNSPECIFIED SLE TYPE, UNSPECIFIED ORGAN INVOLVEMENT STATUS (HCC): Chronic | ICD-10-CM

## 2021-02-01 DIAGNOSIS — E03.8 HYPOTHYROIDISM DUE TO HASHIMOTO'S THYROIDITIS: Chronic | ICD-10-CM

## 2021-02-01 DIAGNOSIS — G47.00 INSOMNIA, UNSPECIFIED TYPE: Chronic | ICD-10-CM

## 2021-02-01 DIAGNOSIS — F41.8 DEPRESSION WITH ANXIETY: Primary | Chronic | ICD-10-CM

## 2021-02-01 DIAGNOSIS — E06.3 HYPOTHYROIDISM DUE TO HASHIMOTO'S THYROIDITIS: Chronic | ICD-10-CM

## 2021-02-01 PROCEDURE — 99214 OFFICE O/P EST MOD 30 MIN: CPT | Performed by: INTERNAL MEDICINE

## 2021-02-01 NOTE — ASSESSMENT & PLAN NOTE
Going through separation. Continue duloxetine 120 mg qd. Suggested counseling if needed.   Follow-up 6 months.

## 2021-02-01 NOTE — PROGRESS NOTES
"    I N T E R N A L  M E D I C I N E  J U N O H  K I M,  M D      ENCOUNTER DATE:  02/01/2021    Taty Romo / 40 y.o. / female      CHIEF COMPLAINT / REASON FOR OFFICE VISIT     Depression with anxiety; Hypothyroidism; and DDD (degenerative disc disease), lumbar      ASSESSMENT & PLAN     Problem List Items Addressed This Visit     Depression with anxiety - Primary (Chronic)    Overview     Has taken medication since 19.   - caregiver stress (mom)         Current Assessment & Plan     Going through separation. Continue duloxetine 120 mg qd. Suggested counseling if needed.   Follow-up 6 months.         Relevant Medications    traZODone (DESYREL) 100 MG tablet    DULoxetine (CYMBALTA) 60 MG capsule    Hypothyroidism due to Hashimoto's thyroiditis (Chronic)    Overview     S/p partial thyroidectomy (right side) 2015 for adenoma.          Current Assessment & Plan     Lab Results   Component Value Date    TSH 2.530 07/30/2020    TSH 1.780 09/18/2019    TSH 0.473 01/30/2019    FREET4 1.49 07/30/2020    FREET4 1.43 09/18/2019    FREET4 1.35 01/30/2019        Continue levothyroxine 100 mcg qd.          Relevant Medications    levothyroxine (SYNTHROID, LEVOTHROID) 100 MCG tablet    Insomnia (Chronic)    Current Assessment & Plan     Continue trazodone 200 mg qHS.          Relevant Medications    traZODone (DESYREL) 100 MG tablet    Systemic lupus erythematosus (CMS/HCC) (Chronic)    Overview     Dx 2007 started on Plaquenil  *[place]         Current Assessment & Plan     Recommended getting the COVID-19 vaccine.              No orders of the defined types were placed in this encounter.    No orders of the defined types were placed in this encounter.      SUMMARY/DISCUSSION  •       Next Appointment with me: Visit date not found    Return in about 6 months (around 8/1/2021) for Reassess chronic medical problems.      VITAL SIGNS     Visit Vitals  BP 96/52   Pulse 73   Temp 97.3 °F (36.3 °C)   Ht 170.2 cm (67\")   Wt " 67.6 kg (149 lb)   LMP 01/24/2021 (Exact Date)   SpO2 98%   BMI 23.34 kg/m²       BP Readings from Last 3 Encounters:   02/01/21 96/52   01/28/21 90/64   01/08/21 111/70     Wt Readings from Last 3 Encounters:   02/01/21 67.6 kg (149 lb)   01/28/21 67.6 kg (149 lb)   01/08/21 67.8 kg (149 lb 7.6 oz)     Body mass index is 23.34 kg/m².        HISTORY OF PRESENT ILLNESS     S/p lumbar discectomy in January and doing well.   Separation with , on duloxetine 120 mg for depression and trazodone 200 mg qHS for sleep. Denies significant worsening of depression. Sleeping okay. Stable on levothyroxine 100 mcg for thyroid.         REVIEW OF SYSTEMS     Constitutional neg except per HPI   Resp neg  CV neg   GI neg  Psych stable mood       PHYSICAL EXAMINATION     Physical Exam  Wt loss noted  Psych: Normal mood and affect. Alert and intact judgment.       REVIEWED DATA     Labs:     Lab Results   Component Value Date     01/05/2021    K 3.8 01/05/2021    AST 26 01/28/2020    ALT 23 01/28/2020    BUN 14 01/05/2021    CREATININE 0.67 01/05/2021    CREATININE 0.8 01/28/2020    CREATININE 0.8 09/19/2019    EGFRIFNONA 98 01/05/2021    EGFRIFAFRI 93 01/30/2019       No results found for: HGBA1C    No results found for: LDL, HDL, TRIG    Lab Results   Component Value Date    TSH 2.530 07/30/2020    FREET4 1.49 07/30/2020       Lab Results   Component Value Date    WBC 3.08 (L) 01/05/2021    HGB 12.8 01/05/2021     (L) 01/05/2021         Imaging:           Medical Tests:           Summary of old records / correspondence / consultant report:           Request outside records:           MEDICATIONS AT THE TIME OF OFFICE VISIT       Current Outpatient Medications:   •  acetaminophen (Tylenol 8 Hour Arthritis Pain) 650 MG 8 hr tablet, Take 1 tablet by mouth Every 8 (Eight) Hours As Needed for Mild Pain  or Moderate Pain ., Disp: 100 tablet, Rfl: 2  •  azelastine (ASTELIN) 0.1 % nasal spray, Place 2 sprays into the  nostril(s) as directed by provider 2 (Two) Times a Day. (Patient taking differently: 2 sprays into the nostril(s) as directed by provider As Needed.), Disp: 30 mL, Rfl: 5  •  Calcium Polycarbophil (FIBER-CAPS PO), Take 2 capsules by mouth Daily., Disp: , Rfl:   •  Cholecalciferol (VITAMIN D) 2000 UNITS capsule, Take 3,000 Units by mouth Every Evening., Disp: , Rfl:   •  cyclobenzaprine (FLEXERIL) 10 MG tablet, Take 1 tablet by mouth 3 (Three) Times a Day As Needed for Muscle Spasms., Disp: 30 tablet, Rfl: 0  •  DULoxetine (CYMBALTA) 60 MG capsule, Take 2 capsules by mouth Every Morning Before Breakfast., Disp: 60 capsule, Rfl: 5  •  fexofenadine (ALLEGRA) 180 MG tablet, Take 180 mg by mouth Daily., Disp: , Rfl:   •  fluticasone (VERAMYST) 27.5 MCG/SPRAY nasal spray, Place 1 spray into the nostril(s) as directed by provider 2 (Two) Times a Day. (Patient taking differently: 1 spray into the nostril(s) as directed by provider As Needed.), Disp: 9.1 mL, Rfl: 5  •  gabapentin (NEURONTIN) 600 MG tablet, Take 1 tablet by mouth 3 (Three) Times a Day. (Patient taking differently: Take 300 mg by mouth 2 (Two) Times a Day. TRYING TO REDUCE DOSE), Disp: 90 tablet, Rfl: 3  •  HYDROcodone-acetaminophen (NORCO) 5-325 MG per tablet, Take 1 tablet by mouth Every 4 (Four) Hours As Needed for Severe Pain., Disp: 40 tablet, Rfl: 0  •  hydroxychloroquine (PLAQUENIL) 200 MG tablet, Take 1 tablet by mouth 2 (two) times a day., Disp: 60 tablet, Rfl: 5  •  levothyroxine (SYNTHROID, LEVOTHROID) 100 MCG tablet, Take 1 tablet by mouth Daily., Disp: 90 tablet, Rfl: 3  •  ondansetron (Zofran) 4 MG tablet, Take 1 tablet by mouth Every 8 (Eight) Hours As Needed for Nausea or Vomiting., Disp: 30 tablet, Rfl: 0  •  traZODone (DESYREL) 100 MG tablet, Take 2 tablets by mouth Every Night. (Patient taking differently: Take 200 mg by mouth Every Night. TAKES 2 100 MG TABLETS), Disp: 60 tablet, Rfl: 5         *Examiner was wearing KN95 mask, face shield  and exam gloves during the entire duration of the visit. Patient was masked the entire time.   Minimum social distance of 6 ft maintained entire visit except if physical contact was necessary as documented.     **Dragon Disclaimer:   Much of this encounter note is an electronic transcription/translation of spoken language to printed text. The electronic translation of spoken language may permit erroneous, or at times, nonsensical words or phrases to be inadvertently transcribed. Although I have reviewed the note for such errors, some may still exist.     Template created by Adan Mcdonald MD

## 2021-02-01 NOTE — ASSESSMENT & PLAN NOTE
Lab Results   Component Value Date    TSH 2.530 07/30/2020    TSH 1.780 09/18/2019    TSH 0.473 01/30/2019    FREET4 1.49 07/30/2020    FREET4 1.43 09/18/2019    FREET4 1.35 01/30/2019        Continue levothyroxine 100 mcg qd.

## 2021-02-08 NOTE — PROGRESS NOTES
Subjective   History of present illness: Taty Romo is a 40 y.o. female is here today for follow-up via telephone visit.  She had surgery with Dr. Thomas on 1/8/2021 for a left L5-S1 Metrix microdiscectomy.  After surgery she was doing well although recent reported occasional pain with worst being at 3-4/10 that was in her back and not necessarily incisional.  She denied any numbness, tingling, and weakness.  She was then sent to physical therapy.  She was no longer taking any Flexeril, was still taking her pain medication, and we were going to discuss returning to work at this visit.    You have chosen to receive care through a telephone visit. Do you consent to use a telephone visit for your medical care today? Yes     Patient is 4 weeks out from having outpatient Left L5-S1 metrx microdiscectomy on 1/8/21 with Dr. Alessandro Thomas MD. Patient was last seen in the office on 1/28/21 with RENE Rodarte for post op f/u.     Physical Therapy was ordered at 1/28/21 visit. Today, the patient reports that her most recent physical therapy was canceled due to the weather. She only states going once and would like to continue. Patient reports having a dull ache in the lumbar region but not nearly as bad as before surgery. Patient denies numbness and tingling. She is continuing to do her HEP 3 times a week.  She states that she is not having sciatica, or leg pain.  She denies any weakness, n/t and feels that pain that she is having now is nothing like the pain she had before surgery.  She does feel that her lumbar pain is most likely having pain r/t getting a new puppy and bending over, which causes her pain.  Incision is healed, no problems or complaints at site.    I was in the office, and she was in her house, at the time of this appointment lasted approximately 10 minutes.    The following portions of the patient's history were reviewed and updated as appropriate: allergies, current medications, past family  history, past medical history, past social history, past surgical history and problem list.    Review of Systems   Constitutional: Negative for activity change.   Respiratory: Negative for chest tightness and shortness of breath.    Cardiovascular: Negative for chest pain.   Gastrointestinal: Negative for constipation.   Genitourinary: Negative for difficulty urinating.   Musculoskeletal: Positive for back pain and myalgias.   Neurological: Negative for weakness and numbness.   Psychiatric/Behavioral: Negative for sleep disturbance.   All other systems reviewed and are negative.          Objective     There were no vitals filed for this visit.  There is no height or weight on file to calculate BMI.      Physical Exam  Vitals reviewed: Furred related to telephone visit.   Neurological:      Mental Status: She is oriented to person, place, and time.       Neurologic Exam     Mental Status   Oriented to person, place, and time.   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  I explained that she needs to be squatting, to lift up her puppy, as bending is putting strain on her back.  In addition, I explained not to do any jarring, or high impact activities for another couple of months.  She is going to continue going to physical therapy as she has missed some sessions related to the weather recently.  She is to continue working on her home exercise program, and squatting in front of a mirror, as this will help her correct her stance as she has been told by physical therapy that she is leaning on her right leg when she brings herself up from a squat.  She states that she will call  our office for any future needs, increased pain, new pain, or new numbness tingling, or weakness in her extremities.    There are no diagnoses linked to this encounter.  No follow-ups on file.

## 2021-02-09 ENCOUNTER — HOSPITAL ENCOUNTER (OUTPATIENT)
Dept: PHYSICAL THERAPY | Facility: HOSPITAL | Age: 41
Setting detail: THERAPIES SERIES
Discharge: HOME | End: 2021-02-09

## 2021-02-09 DIAGNOSIS — G89.29 CHRONIC BILATERAL LOW BACK PAIN WITH BILATERAL SCIATICA: ICD-10-CM

## 2021-02-09 DIAGNOSIS — R29.898 WEAKNESS OF LEFT LOWER EXTREMITY: ICD-10-CM

## 2021-02-09 DIAGNOSIS — M54.42 CHRONIC BILATERAL LOW BACK PAIN WITH BILATERAL SCIATICA: ICD-10-CM

## 2021-02-09 DIAGNOSIS — M54.41 CHRONIC BILATERAL LOW BACK PAIN WITH BILATERAL SCIATICA: ICD-10-CM

## 2021-02-09 DIAGNOSIS — Z98.890 STATUS POST LUMBAR DISCECTOMY: Primary | ICD-10-CM

## 2021-02-09 PROCEDURE — 97161 PT EVAL LOW COMPLEX 20 MIN: CPT

## 2021-02-09 PROCEDURE — 97110 THERAPEUTIC EXERCISES: CPT

## 2021-02-09 PROCEDURE — 97112 NEUROMUSCULAR REEDUCATION: CPT

## 2021-02-09 NOTE — THERAPY EVALUATION
Outpatient Physical Therapy Ortho Initial Evaluation  [facility]     Patient Name: Taty Romo  : 1980  MRN: [id number]  Today's Date: 2021      Visit Date: 2021    Patient Active Problem List   Diagnosis   • Depression with anxiety   • Hypothyroidism due to Hashimoto's thyroiditis   • Insomnia   • Systemic lupus erythematosus (CMS/HCC)   • Vitamin D deficiency   • Irritable bowel syndrome with constipation   • Seasonal allergic rhinitis due to pollen   • DDD (degenerative disc disease), lumbar   • Allergic rhinitis   • Chronic bilateral low back pain with right-sided sciatica   • Spondylolisthesis at L5-S1 level   • Herniation of lumbar intervertebral disc with radiculopathy   • Neck pain   • Degeneration of intervertebral disc at C4-C5 level   • Nausea and vomiting   • Cervical radiculopathy   • Status post surgery        Past Medical History:   Diagnosis Date   • Anxiety    • Bulging lumbar disc    • Colon polyp    • Depression    • Disease of thyroid gland     HYPOTHYROIDISM D/T PARTIAL THYROIDECTOMY   • Fibromyalgia, primary    • Frequent UTI    • Headache, tension-type    • Hemorrhoids    • Hypothyroidism    • Lupus (CMS/HCC)     DX 3-   • Migraine    • Peripheral neuropathy     KNEES DOWN   • Seasonal allergies    • Spondylolisthesis    • Thrombocytopenia (CMS/HCC)     due to ITP        Past Surgical History:   Procedure Laterality Date   • ANAL SPHINCTEROTOMY         • APPENDECTOMY     • COLONOSCOPY N/A 2016    Procedure: COLONOSCOPY;  Surgeon: Natalya Farias MD;  Location: [facility];  Service:    • HEMORRHOIDECTOMY         • HEMORRHOIDECTOMY N/A 2016    Procedure: HEMORRHOIDECTOMY;  Surgeon: Natalya Farias MD;  Location: [facility];  Service:    • INTRAUTERINE DEVICE INSERTION  2016   • LUMBAR DISCECTOMY Left 2021    Procedure: Outpatient left lumbar five/sacral one Metrx microdiscectomy;  Surgeon:  Alessanrdo Thomas MD;  Location: [facility] MAIN OR;  Service: Neurosurgery;  Laterality: Left;   • THYROIDECTOMY, PARTIAL Right 06/2015   • TONSILLECTOMY         Visit Dx:     ICD-10-CM ICD-9-CM   1. Status post lumbar discectomy  Z98.890 V45.89   2. Chronic bilateral low back pain with bilateral sciatica  M54.42 724.2    M54.41 724.3    G89.29 338.29   3. Weakness of left lower extremity  R29.898 729.89         Patient History     Row Name 02/09/21 1008             History    Chief Complaint  Decreased flexibility/weakness-RP      Hx Chief Complaint Comment 1   Pt presents to therapy s/p lumbar discectomy on 1/8/21. Pt has a hx of epidural/steroid injections, and has been to PT in the past for chonic LBP. 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   (Pended)   -RP      Date Current Problem(s) Began  01/08/21  (Pended)   -RP      Brief Description of Current Complaint  Chronic LBP, pt has hx of epidural/steroid injections, and has attempted prior to receiving surgery.  (Pended)   -RP      Previous treatment for THIS PROBLEM  Injections;Rehabilitation  (Pended)   -RP      Patient/Caregiver Goals  Relieve pain;Return to prior level of function;Return to work;Other (comment)  (Pended)   -RP      Patient/Caregiver Goals Comment  Pt wants to return to regular exercise, and to be able to return to work w/ minimal to no symptoms  (Pended)   -RP      Current Tobacco Use  no  (Pended)   -RP      Smoking Status  no  (Pended)   -RP      Patient's Rating of General Health  Good  (Pended)   -RP      Hand Dominance  right-handed  (Pended)   -RP      Occupation/sports/leisure activities  Pt is employed at [employer] on the 6th floor, return to regular exercise/physical activity at home  (Pended)   -RP      How has patient tried to help current  problem?  Medication, epidural/steroid injections, rehab  (Pended)   -RP      History of Previous Related Injuries  Pt has a hx of chronic LBP  (Pended)   -RP      Are you or can you be pregnant  No  (Pended)   -RP         Pain     Pain Location  Other (Comment)  (Pended)  Lower back  -RP      Pain at Present  0  (Pended)   -RP      Pain at Best  0  (Pended)   -RP      Pain Frequency  Intermittent  (Pended)   -RP      Pain Description  Discomfort;Pressure  (Pended)   -RP      What Performance Factors Make the Current Problem(s) WORSE?  Sitting in prolonged positions (i.e. driving in car)  (Pended)   -RP      What Performance Factors Make the Current Problem(s) BETTER?  Lying flat on back  (Pended)   -RP      Pain Comments  Pt reported that she is currently feeling no pain, but does notice that she occasionally will feel discomfort w/ prolonged positioning  (Pended)   -RP      Is your sleep disturbed?  No  (Pended)   -RP      Is medication used to assist with sleep?  No  (Pended)   -RP      What position do you sleep in?  Supine;Right sidelying;Left sidelying  (Pended)   -RP      Difficulties at work?  Pt is not currently working but is employed at [employer] on the 6th floor  (Pended)   -RP      Difficulties with ADL's?  Pt experience discomfort w/ prlonged sitting activities  (Pended)   -RP      Difficulties with recreational activities?  Pt would like to return to regular exercise/physical activity  (Pended)   -RP         Fall Risk Assessment    Any falls in the past year:  No  (Pended)   -RP         Services    Prior Rehab/Home Health Experiences  No  (Pended)   -RP      Are you currently receiving Home Health services  No  (Pended)   -RP      Do you plan to receive Home Health services in the near future  No  (Pended)   -RP         Daily Activities    Primary Language  English  (Pended)   -RP      Are you able to read  Yes  (Pended)   -RP      Are you able to write  Yes  (Pended)   -RP      How does patient  learn best?  Listening;Reading;Demonstration;Pictures/Video  (Pended)   -RP      Teaching needs identified  Home Exercise Program;Management of Condition  (Pended)   -RP      Patient is concerned about/has problems with  Flexibility;Performing sports, recreation, and play activities;Sitting;Standing;Walking  (Pended)   -RP      Barriers to learning  None  (Pended)   -RP      Pt Participated in POC and Goals  Yes  (Pended)   -RP         Safety    Are you being hurt, hit, or frightened by anyone at home or in your life?  No  (Pended)   -RP      Are you being neglected by a caregiver  No  (Pended)   -RP      Have you had any of the following issues with  N/A  (Pended)   -RP        User Key  (r) = Recorded By, (t) = Taken By, (c) = Cosigned By    Initials Name Provider Type    RP Keesha Torres, PT Student PT Student          PT Ortho     Row Name 02/09/21 1100       Subjective Comments    Subjective Comments  Pt reported that she is feeling good today, and is currently experiencing no pain at this time.   (Pended)   -RP       Precautions and Contraindications    Precautions/Limitations  other (see comments)  (Pended)   -RP    Precautions  Per pt's chart NP indicated that pt is not to lift anything >/= 10 lbs.   (Pended)   -RP       Subjective Pain    Able to rate subjective pain?  yes  (Pended)   -RP    Pre-Treatment Pain Level  0  (Pended)   -RP    Post-Treatment Pain Level  2  (Pended)   -RP    Subjective Pain Comment  Discomfort/muscle burn  (Pended)   -RP       Posture/Observations    Alignment Options  Forward head;Rounded shoulders;Genu valgus;Foot pronation  (Pended)   -RP    Forward Head  Mild  (Pended)   -RP    Rounded Shoulders  Mild;Moderate  (Pended)   -RP    Genu valgus  Mild  (Pended)   -RP    Foot pronation  Mild  (Pended)   -RP       General ROM    Head/Neck/Trunk  Trunk Flexion;Trunk Extension;Trunk Lt Lateral Flexion;Trunk Rt Lateral Flexion;Trunk Lt Rotation;Trunk Rt Rotation  (Pended)   -RP        Head/Neck/Trunk    Trunk Extension AROM  25%  (Pended)   -RP    Trunk Flexion AROM  100%  (Pended)   -RP    Trunk Lt Lateral Flexion AROM  75%  (Pended)   -RP    Trunk Rt Lateral Flexion AROM  75%  (Pended)   -RP    Trunk Lt Rotation AROM  15%  (Pended)   -RP    Trunk Rt Rotation AROM  15%  (Pended)   -RP    Head/Neck/Trunk Comments  Pt experiencing no pain/discomfort w/ ROM testing  (Pended)   -RP       MMT (Manual Muscle Testing)    Rt Lower Ext  Rt Hip WNL;Rt Hip ABduction  (Pended)   -RP    Lt Lower Ext  Lt Hip Flexion;Lt Hip ABduction;Lt Knee WNL;Lt Ankle Plantarflexion;Lt Ankle Dorsiflexion  (Pended)   -RP       MMT Right Lower Ext    Rt Lower Extremity Comments   Hip Abduction 4-/5  (Pended)   -RP       MMT Left Lower Ext    Lt Hip ABduction MMT, Gross Movement  (3+/5) fair plus  (Pended)   -RP    Lt Ankle Plantarflexion MMT, Gross Movement  (3+/5) fair plus  (Pended)   -RP    Lt Ankle Dorsiflexion MMT, Gross Movement  (3+/5) fair plus  (Pended)   -RP       Flexibility    Flexibility Tested?  Lower Extremity  (Pended)   -RP       Gait/Stairs (Locomotion)    Gait/Stairs Locomotion  gait/ambulation independence  (Pended)   -RP    Lipscomb Level (Gait)  independent  (Pended)   -RP      User Key  (r) = Recorded By, (t) = Taken By, (c) = Cosigned By    Initials Name Provider Type    RP Keesha Torres, PT Student PT Student                      Therapy Education  Education Details: (P) Wt. lifting precautions, administered initial HEP, educated on the benefits of cont. therapy for job retraining and return to regular exercise, monitor symptoms when performing exercise  Given: (P) HEP, Mobility training, Symptoms/condition management, Posture/body mechanics, Other (comment), Pain management  Program: (P) New  How Provided: (P) Verbal, Demonstration, Written  Provided to: (P) Patient  Level of Understanding: (P) Teach back education performed, Verbalized, Demonstrated     PT OP Goals     Row Name 02/09/21 9590           PT Short Term Goals    STG Date to Achieve  02/23/21  (Pended)   -RP     STG 1  Pt will demonstrated ind w/ HEP  (Pended)   -RP     STG 1 Progress  New  (Pended)   -RP     STG 2  Pt will achieve bilateral trunk rotation to 30% in preparation for rotational demands of job  (Pended)   -RP     STG 2 Progress  New  (Pended)   -RP     STG 3  Pt will achieve L MMT 4-/5  (Pended)   -RP     STG 3 Progress  New  (Pended)   -RP     STG 4  Pt will perform standing gastroc/soleus strength test 15/25  (Pended)   -RP     STG 4 Progress  New  (Pended)   -RP     STG 5  Pt's will demonstrated understanding of diaphragmatic breathing technique for pain control  (Pended)   -RP     STG 5 Progress  New  (Pended)   -RP        Long Term Goals    LTG Date to Achieve  03/09/21  (Pended)   -RP     LTG 1  Pt will achieve MCKAY score of 10%  (Pended)   -RP     LTG 1 Progress  New  (Pended)   -RP     LTG 2  Pt will achieve bilateral trunk rotation to 75% in preparation of rotational demands of job  (Pended)   -RP     LTG 2 Progress  New  (Pended)   -RP     LTG 3  PT will achieve L MMT 4+/5  (Pended)   -RP     LTG 3 Progress  New  (Pended)   -RP     LTG 4  Pt will be ind w/ HEP and demonstrate consistency w/ use and how to monitor and manage pain related symptoms  (Pended)   -RP     LTG 4 Progress  New  (Pended)   -RP        Time Calculation    PT Goal Re-Cert Due Date  03/09/21  (Pended)   -RP       User Key  (r) = Recorded By, (t) = Taken By, (c) = Cosigned By    Initials Name Provider Type    RP Keesha Torres, PT Student PT Student          PT Assessment/Plan     Row Name 02/09/21 1008          PT Assessment    Functional Limitations  Performance in leisure activities;Impaired locomotion;Limitation in home management;Performance in sport activities;Limitations in community activities;Performance in work activities  (Pended)   -RP     Impairments  Gait;Impaired flexibility;Joint mobility;Muscle strength;Pain;Peripheral nerve  integrity;Poor body mechanics;Posture;Range of motion  (Pended)   -RP     Assessment Comments  Pt is a 41 y/o female that presents to therapy s/p a lumbar discectomy on 1/8/21. Pt has a hx of chronic LBP and has recieved epidural/steroid injections, and has undergone previous PT for pain management. Per pt's chart, pt has precautions to not lift anything greater than 10# but otherwise was given no limitations to activity. Pt presents to therapy w/ a self-reported pain level of 0/10 w/ pt indicating that she does experience discomfort w/ prolonged activity (i.e. sitting in a car/driving). Pt demonstrated marked LLE weakness when compared to the RLE, and pt demonstrated decreased bilateral rotation. Pt would benefit from skilled therapy in order to address LE strength discrepency, improve ROM, and perform occupational retraining to prevent any further injury or aggravation of symptoms prior to surgery.  (Pended)   -RP     Rehab Potential  Good  (Pended)   -RP     Patient/caregiver participated in establishment of treatment plan and goals  Yes  (Pended)   -RP     Patient would benefit from skilled therapy intervention  Yes  (Pended)   -RP        PT Plan    Predicted Duration of Therapy Intervention (PT)  20  (Pended)   -RP     Planned CPT's?  PT EVAL LOW COMPLEXITY: 46847;PT RE-EVAL: 13647;PT THER PROC EA 15 MIN: 39541;PT THER ACT EA 15 MIN: 52056;PT MANUAL THERAPY EA 15 MIN: 88629;PT NEUROMUSC RE-EDUCATION EA 15 MIN: 07612;PT GAIT TRAINING EA 15 MIN: 56702;PT SELF CARE/HOME MGMT/TRAIN EA 15: 55635;PT HOT OR COLD PACK TREAT MCARE;PT ELECTRICAL STIM UNATTEND: ;PT ELECTRICAL STIM ATTD EA 15 MIN: 92696;PT ULTRASOUND EA 15 MIN: 48501;PT SELF CARE/MGMT/TRAIN 15 MIN: 67104  (Pended)   -RP    PT Plan: assess and progress HEP and work simulation/education/ergonomics education, pain monitor/education   User Key  (r) = Recorded By, (t) = Taken By, (c) = Cosigned By    Initials Name Provider Type    RP Keesha Torres, PT  Student PT Student            OP Exercises     Row Name 02/09/21 1100             Subjective Comments    Subjective Comments  Pt reported that she is feeling good today, and is currently experiencing no pain at this time.   (Pended)   -RP         Subjective Pain    Able to rate subjective pain?  yes  (Pended)   -RP      Pre-Treatment Pain Level  0  (Pended)   -RP      Post-Treatment Pain Level  2  (Pended)   -RP      Subjective Pain Comment  Discomfort/muscle burn  (Pended)   -RP         Total Minutes    17674 -  PT Neuromuscular Reeducation Minutes  15  (Pended)   -RP      92330 - OT Therapeutic Exercise Minutes  15  (Pended)   -RP         Exercise 1    Exercise Name 1  Clamshells  (Pended)   -RP      Cueing 1  Verbal;Tactile  (Pended)   -RP      Sets 1  1  (Pended)   -RP      Reps 1  10  (Pended)   -RP         Exercise 2    Exercise Name 2  Supine bridges  (Pended)   -RP      Cueing 2  Verbal;Tactile  (Pended)   -RP      Sets 2  1  (Pended)   -RP      Reps 2  10  (Pended)   -RP         Exercise 3    Exercise Name 3  Posterior Pelvic Tilts  (Pended)   -RP      Cueing 3  Verbal;Tactile  (Pended)   -RP      Sets 3  1  (Pended)   -RP      Reps 3  10  (Pended)   -RP         Exercise 4    Exercise Name 4  Supine 90/90 nerve glides  (Pended)   -RP      Cueing 4  Verbal;Tactile  (Pended)   -RP      Sets 4  1  (Pended)   -RP      Reps 4  10  (Pended)   -RP         Exercise 5    Exercise Name 5  Squats  (Pended)   -RP      Cueing 5  Verbal;Tactile;Other (comment)  (Pended)  Empahsis on equal WBing   -RP      Sets 5  1  (Pended)   -RP      Reps 5  10  (Pended)   -RP      Additional Comments  Emphais on control  (Pended)   -RP         Exercise 6    Exercise Name 6  Mini Lunge  (Pended)   -RP      Cueing 6  Verbal;Tactile  (Pended)   -RP      Sets 6  1  (Pended)   -RP      Reps 6  10  (Pended)   -RP      Additional Comments  --  (Pended)  Emphasis on control  -RP        User Key  (r) = Recorded By, (t) = Taken By, (c) =  Cosigned By    Initials Name Provider Type    RP Keesha Torres, PT Student PT Student                        Outcome Measure Options: (P) Modifed Owestry  Modified Oswestry  Modified Oswestry Score/Comments: (P) 22      Time Calculation:     Start Time: (P) 1008  Stop Time: (P) 1040  Time Calculation (min): (P) 32 min     Therapy Charges for Today     Code Description Service Date Service Provider Modifiers Qty    59744474393 HC PT EVAL LOW COMPLEXITY 2 2/9/2021 Keesha Torres, PT Student GP 1    39604949380 HC PT NEUROMUSC RE EDUCATION EA 15 MIN 2/9/2021 Keesha Torres, PT Student GP 1    67989302560 HC PT THER PROC EA 15 MIN 2/9/2021 Keesha Torres, PT Student GP 1          PT G-Codes  Outcome Measure Options: (P) Modifed Owestry  Modified Oswestry Score/Comments: (P) 22     Session was completed in direct supervision with Irais Mcgraw DPT CLT UE    Keesha Torres, PT Student  2/9/2021

## 2021-02-09 NOTE — THERAPY EVALUATION
Outpatient Physical Therapy Ortho Initial Evaluation  [facility]     Patient Name: Taty Romo  : 1980  MRN: [id number]  Today's Date: 2021      Visit Date: 2021    Patient Active Problem List   Diagnosis   • Depression with anxiety   • Hypothyroidism due to Hashimoto's thyroiditis   • Insomnia   • Systemic lupus erythematosus (CMS/HCC)   • Vitamin D deficiency   • Irritable bowel syndrome with constipation   • Seasonal allergic rhinitis due to pollen   • DDD (degenerative disc disease), lumbar   • Allergic rhinitis   • Chronic bilateral low back pain with right-sided sciatica   • Spondylolisthesis at L5-S1 level   • Herniation of lumbar intervertebral disc with radiculopathy   • Neck pain   • Degeneration of intervertebral disc at C4-C5 level   • Nausea and vomiting   • Cervical radiculopathy   • Status post surgery        Past Medical History:   Diagnosis Date   • Anxiety    • Bulging lumbar disc    • Colon polyp    • Depression    • Disease of thyroid gland     HYPOTHYROIDISM D/T PARTIAL THYROIDECTOMY   • Fibromyalgia, primary    • Frequent UTI    • Headache, tension-type    • Hemorrhoids    • Hypothyroidism    • Lupus (CMS/HCC)     DX 3-   • Migraine    • Peripheral neuropathy     KNEES DOWN   • Seasonal allergies    • Spondylolisthesis    • Thrombocytopenia (CMS/HCC)     due to ITP        Past Surgical History:   Procedure Laterality Date   • ANAL SPHINCTEROTOMY         • APPENDECTOMY     • COLONOSCOPY N/A 2016    Procedure: COLONOSCOPY;  Surgeon: Natalya Farias MD;  Location: [facility];  Service:    • HEMORRHOIDECTOMY         • HEMORRHOIDECTOMY N/A 2016    Procedure: HEMORRHOIDECTOMY;  Surgeon: Natalya Farias MD;  Location: [facility];  Service:    • INTRAUTERINE DEVICE INSERTION  2016   • LUMBAR DISCECTOMY Left 2021    Procedure: Outpatient left lumbar five/sacral one Metrx microdiscectomy;  Surgeon:  Alessandro Thomas MD;  Location: [facility] MAIN OR;  Service: Neurosurgery;  Laterality: Left;   • THYROIDECTOMY, PARTIAL Right 06/2015   • TONSILLECTOMY         Visit Dx:     ICD-10-CM ICD-9-CM   1. Status post lumbar discectomy  Z98.890 V45.89   2. Chronic bilateral low back pain with bilateral sciatica  M54.42 724.2    M54.41 724.3    G89.29 338.29   3. Weakness of left lower extremity  R29.898 729.89         Patient History     Row Name 02/09/21 1008             History    Chief Complaint  Other 1 (comment)  (Pended)   -RP      Hx Chief Complaint Comment 1   Pt presents to therapy s/p lumbar discectomy on 1/8/21. Pt has a hx of epidural/steroid injections, and has been to PT in the past for chonic LBP. 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   (Pended)   -RP      Date Current Problem(s) Began  01/08/21  (Pended)   -RP      Brief Description of Current Complaint  Chronic LBP, pt has hx of epidural/steroid injections, and has attempted prior to reciving surgery.  (Pended)   -RP      Previous treatment for THIS PROBLEM  Injections;Rehabilitation  (Pended)   -RP      Patient/Caregiver Goals  Relieve pain;Return to prior level of function;Return to work;Other (comment)  (Pended)   -RP      Patient/Caregiver Goals Comment  Pt wants to return to regular exercise, and to be able to return to work w/ minimal to no symptoms  (Pended)   -RP      Current Tobacco Use  no  (Pended)   -RP      Smoking Status  no  (Pended)   -RP      Patient's Rating of General Health  Good  (Pended)   -RP      Hand Dominance  right-handed  (Pended)   -RP      Occupation/sports/leisure activities  Pt is employed at [employer] on the 6th floor, return to regular exercise/physical activity at home  (Pended)   -RP      How has patient tried to help current problem?  Medication,  epidural/steroid injections, rehab  (Pended)   -RP      History of Previous Related Injuries  Pt has a hx of chronic LBP  (Pended)   -RP      Are you or can you be pregnant  No  (Pended)   -RP         Pain     Pain Location  Other (Comment)  (Pended)  Lower back  -RP      Pain at Present  0  (Pended)   -RP      Pain at Best  0  (Pended)   -RP      Pain Frequency  Intermittent  (Pended)   -RP      Pain Description  Discomfort;Pressure  (Pended)   -RP      What Performance Factors Make the Current Problem(s) WORSE?  Sitting in prolonged positions (i.e. driving in car)  (Pended)   -RP      What Performance Factors Make the Current Problem(s) BETTER?  Lying flat on back  (Pended)   -RP      Pain Comments  Pt reported that she is currently feeling no pain, but does notice that she occasionally will feel discomfort w/ prolonged positioning  (Pended)   -RP      Is your sleep disturbed?  No  (Pended)   -RP      Is medication used to assist with sleep?  No  (Pended)   -RP      What position do you sleep in?  Supine;Right sidelying;Left sidelying  (Pended)   -RP      Difficulties at work?  Pt is not currently working but is employed at [employer] on the 6th floor  (Pended)   -RP      Difficulties with ADL's?  Pt experience discomfort w/ prlonged sitting activities  (Pended)   -RP      Difficulties with recreational activities?  Pt would like to return to regular exercise/physical activity  (Pended)   -RP         Fall Risk Assessment    Any falls in the past year:  No  (Pended)   -RP         Services    Prior Rehab/Home Health Experiences  No  (Pended)   -RP      Are you currently receiving Home Health services  No  (Pended)   -RP      Do you plan to receive Home Health services in the near future  No  (Pended)   -RP         Daily Activities    Primary Language  English  (Pended)   -RP      Are you able to read  Yes  (Pended)   -RP      Are you able to write  Yes  (Pended)   -RP      How does patient learn best?   Listening;Reading;Demonstration;Pictures/Video  (Pended)   -RP      Teaching needs identified  Home Exercise Program;Management of Condition  (Pended)   -RP      Patient is concerned about/has problems with  Flexibility;Performing sports, recreation, and play activities;Sitting;Standing;Walking  (Pended)   -RP      Barriers to learning  None  (Pended)   -RP      Pt Participated in POC and Goals  Yes  (Pended)   -RP         Safety    Are you being hurt, hit, or frightened by anyone at home or in your life?  No  (Pended)   -RP      Are you being neglected by a caregiver  No  (Pended)   -RP      Have you had any of the following issues with  N/A  (Pended)   -RP        User Key  (r) = Recorded By, (t) = Taken By, (c) = Cosigned By    Initials Name Provider Type    RP Keesha Torres, PT Student PT Student          PT Ortho     Row Name 02/09/21 1100       Subjective Comments    Subjective Comments  Pt reported that she is feeling good today, and is currently experiencing no pain at this time.   (Pended)   -RP       Precautions and Contraindications    Precautions/Limitations  other (see comments)  (Pended)   -RP    Precautions  Per pt's chart NP indicated that pt is not to lift anything >/= 10 lbs.   (Pended)   -RP       Subjective Pain    Able to rate subjective pain?  yes  (Pended)   -RP    Pre-Treatment Pain Level  0  (Pended)   -RP    Post-Treatment Pain Level  2  (Pended)   -RP    Subjective Pain Comment  Discomfort/muscle burn  (Pended)   -RP       Posture/Observations    Alignment Options  Forward head;Rounded shoulders;Genu valgus;Foot pronation  (Pended)   -RP    Forward Head  Mild  (Pended)   -RP    Rounded Shoulders  Mild;Moderate  (Pended)   -RP    Genu valgus  Mild  (Pended)   -RP    Foot pronation  Mild  (Pended)   -RP       General ROM    Head/Neck/Trunk  Trunk Flexion;Trunk Extension;Trunk Lt Lateral Flexion;Trunk Rt Lateral Flexion;Trunk Lt Rotation;Trunk Rt Rotation  (Pended)   -RP        Head/Neck/Trunk    Trunk Extension AROM  25%  (Pended)   -RP    Trunk Flexion AROM  100%  (Pended)   -RP    Trunk Lt Lateral Flexion AROM  75%  (Pended)   -RP    Trunk Rt Lateral Flexion AROM  75%  (Pended)   -RP    Trunk Lt Rotation AROM  15%  (Pended)   -RP    Trunk Rt Rotation AROM  15%  (Pended)   -RP    Head/Neck/Trunk Comments  Pt experiencing no pain/discomfort w/ ROM testing  (Pended)   -RP       MMT (Manual Muscle Testing)    Rt Lower Ext  Rt Hip WNL;Rt Hip ABduction  (Pended)   -RP    Lt Lower Ext  Lt Hip Flexion;Lt Hip ABduction;Lt Knee WNL;Lt Ankle Plantarflexion;Lt Ankle Dorsiflexion  (Pended)   -RP       MMT Right Lower Ext    Rt Lower Extremity Comments   Hip Abduction 4-/5  (Pended)   -RP       MMT Left Lower Ext    Lt Hip ABduction MMT, Gross Movement  (3+/5) fair plus  (Pended)   -RP    Lt Ankle Plantarflexion MMT, Gross Movement  (3+/5) fair plus  (Pended)   -RP    Lt Ankle Dorsiflexion MMT, Gross Movement  (3+/5) fair plus  (Pended)   -RP       Flexibility    Flexibility Tested?  Lower Extremity  (Pended)   -RP       Gait/Stairs (Locomotion)    Gait/Stairs Locomotion  gait/ambulation independence  (Pended)   -RP    [place] Level (Gait)  independent  (Pended)   -RP      User Key  (r) = Recorded By, (t) = Taken By, (c) = Cosigned By    Initials Name Provider Type    RP Keesha Torres, PT Student PT Student                      Therapy Education  Education Details: (P) Wt. lifting precautions, administered initial HEP, educated on the benefits of cont. therapy for job retraining and return to regular exercise, monitor symptoms when performing exercise  Given: (P) HEP, Mobility training, Symptoms/condition management, Posture/body mechanics, Other (comment), Pain management  Program: (P) New  How Provided: (P) Verbal, Demonstration, Written  Provided to: (P) Patient  Level of Understanding: (P) Teach back education performed, Verbalized, Demonstrated     PT OP Goals     Row Name 02/09/21 0146           PT Short Term Goals    STG Date to Achieve  02/23/21  (Pended)   -RP     STG 1  Pt will demonstrated ind w/ HEP  (Pended)   -RP     STG 1 Progress  New  (Pended)   -RP     STG 2  Pt will achieve bilateral trunk rotation to 30% in preparation for rotational demands of job  (Pended)   -RP     STG 2 Progress  New  (Pended)   -RP     STG 3  Pt will achieve L MMT 4-/5  (Pended)   -RP     STG 3 Progress  New  (Pended)   -RP     STG 4  Pt will perform standing gastroc/soleus strength test 15/25  (Pended)   -RP     STG 4 Progress  New  (Pended)   -RP     STG 5  Pt's will demonstrated understanding of diaphragmatic breathing technique for pain control  (Pended)   -RP     STG 5 Progress  New  (Pended)   -RP        Long Term Goals    LTG Date to Achieve  03/09/21  (Pended)   -RP     LTG 1  Pt will achieve MCKAY score of 10%  (Pended)   -RP     LTG 1 Progress  New  (Pended)   -RP     LTG 2  Pt will achieve bilateral trunk rotation to 75% in preparation of rotational demands of job  (Pended)   -RP     LTG 2 Progress  New  (Pended)   -RP     LTG 3  PT will achieve L MMT 4+/5  (Pended)   -RP     LTG 3 Progress  New  (Pended)   -RP     LTG 4  Pt will be ind w/ HEP and demonstrate consistency w/ use and how to monitor and manage pain related symptoms  (Pended)   -RP     LTG 4 Progress  New  (Pended)   -RP        Time Calculation    PT Goal Re-Cert Due Date  03/09/21  (Pended)   -RP       User Key  (r) = Recorded By, (t) = Taken By, (c) = Cosigned By    Initials Name Provider Type    RP Keesha Torres, PT Student PT Student          PT Assessment/Plan     Row Name 02/09/21 1008          PT Assessment    Functional Limitations  Performance in leisure activities;Impaired locomotion;Limitation in home management;Performance in sport activities;Limitations in community activities;Performance in work activities  (Pended)   -RP     Impairments  Gait;Impaired flexibility;Joint mobility;Muscle strength;Pain;Peripheral nerve  integrity;Poor body mechanics;Posture;Range of motion  (Pended)   -RP     Assessment Comments  Pt is a 39 y/o female that presents to therapy s/p a lumbar discectomy on 1/8/21. Pt has a hx of chronic LBP and has recieved epidural/steroid injections, and has undergone previous PT for pain management. Per pt's chart, pt has precautions to not lift anything greater than 10# but otherwise was given no limitations to activity. Pt presents to therapy w/ a self-reported pain level of 0/10 w/ pt indicating that she does experience discomfort w/ prolonged activity (i.e. sitting in a car/driving). Pt demonstrated marked LLE weakness when compared to the RLE, and pt demonstrated decreased bilateral rotation. Pt would benefit from skilled therapy in order to address LE strength discrepency, improve ROM, and perform occupational retraining to prevent any further injury or aggravation of symptoms prior to surgery.  (Pended)   -RP     Rehab Potential  Good  (Pended)   -RP     Patient/caregiver participated in establishment of treatment plan and goals  Yes  (Pended)   -RP     Patient would benefit from skilled therapy intervention  Yes  (Pended)   -RP        PT Plan    Predicted Duration of Therapy Intervention (PT)  20  (Pended)   -RP     Planned CPT's?  PT EVAL LOW COMPLEXITY: 93562;PT RE-EVAL: 13498;PT THER PROC EA 15 MIN: 01381;PT THER ACT EA 15 MIN: 10048;PT MANUAL THERAPY EA 15 MIN: 53916;PT NEUROMUSC RE-EDUCATION EA 15 MIN: 50844;PT GAIT TRAINING EA 15 MIN: 80568;PT SELF CARE/HOME MGMT/TRAIN EA 15: 18595;PT HOT OR COLD PACK TREAT MCARE;PT ELECTRICAL STIM UNATTEND: ;PT ELECTRICAL STIM ATTD EA 15 MIN: 10582;PT ULTRASOUND EA 15 MIN: 64586;PT SELF CARE/MGMT/TRAIN 15 MIN: 10755  (Pended)   -RP       User Key  (r) = Recorded By, (t) = Taken By, (c) = Cosigned By    Initials Name Provider Type    Keesha Silveira, PT Student PT Student            OP Exercises     Row Name 02/09/21 1100             Subjective Comments     Subjective Comments  Pt reported that she is feeling good today, and is currently experiencing no pain at this time.   (Pended)   -RP         Subjective Pain    Able to rate subjective pain?  yes  (Pended)   -RP      Pre-Treatment Pain Level  0  (Pended)   -RP      Post-Treatment Pain Level  2  (Pended)   -RP      Subjective Pain Comment  Discomfort/muscle burn  (Pended)   -RP        User Key  (r) = Recorded By, (t) = Taken By, (c) = Cosigned By    Initials Name Provider Type    RP Keesha Torres, PT Student PT Student                        Outcome Measure Options: (P) Modifed [place]  Modified Oswestry  Modified Oswestry Score/Comments: (P) 22      Time Calculation:     Start Time: (P) 1008  Stop Time: (P) 1040  Time Calculation (min): (P) 32 min     Therapy Charges for Today     Code Description Service Date Service Provider Modifiers Qty    [id number]  PT EVAL LOW COMPLEXITY 2 2/9/2021 Keesha Torres, PT Student GP 1    [id number]  PT NEUROMUSC RE EDUCATION EA 15 MIN 2/9/2021 Keesha Torres, PT Student GP 1    [id number]  PT THER PROC EA 15 MIN 2/9/2021 Keesha Torres, PT Student GP 1          PT G-Codes  Outcome Measure Options: (P) Modifed [place]  Modified Oswestry Score/Comments: (P) 22         Keesha Torres PT Student  2/9/2021

## 2021-02-12 RX ORDER — CYCLOBENZAPRINE HCL 10 MG
10 TABLET ORAL 3 TIMES DAILY PRN
Qty: 30 TABLET | Refills: 0 | Status: SHIPPED | OUTPATIENT
Start: 2021-02-12 | End: 2021-02-26 | Stop reason: SDUPTHER

## 2021-02-16 ENCOUNTER — APPOINTMENT (OUTPATIENT)
Dept: PHYSICAL THERAPY | Facility: HOSPITAL | Age: 41
End: 2021-02-16

## 2021-02-23 ENCOUNTER — TELEMEDICINE (OUTPATIENT)
Dept: INTERNAL MEDICINE | Age: 41
End: 2021-02-23

## 2021-02-23 DIAGNOSIS — Z20.822 SUSPECTED COVID-19 VIRUS INFECTION: Primary | ICD-10-CM

## 2021-02-23 DIAGNOSIS — J02.8 ACUTE PHARYNGITIS DUE TO OTHER SPECIFIED ORGANISMS: ICD-10-CM

## 2021-02-23 LAB
EXPIRATION DATE: NORMAL
EXPIRATION DATE: NORMAL
FLUAV AG NPH QL: NEGATIVE
FLUBV AG NPH QL: NEGATIVE
INTERNAL CONTROL: NORMAL
INTERNAL CONTROL: NORMAL
Lab: NORMAL
Lab: NORMAL
S PYO AG THROAT QL: NEGATIVE

## 2021-02-23 PROCEDURE — 87804 INFLUENZA ASSAY W/OPTIC: CPT | Performed by: INTERNAL MEDICINE

## 2021-02-23 PROCEDURE — 87880 STREP A ASSAY W/OPTIC: CPT | Performed by: INTERNAL MEDICINE

## 2021-02-23 PROCEDURE — 99214 OFFICE O/P EST MOD 30 MIN: CPT | Performed by: INTERNAL MEDICINE

## 2021-02-23 RX ORDER — AZITHROMYCIN 250 MG/1
TABLET, FILM COATED ORAL
Qty: 6 TABLET | Refills: 0 | Status: SHIPPED | OUTPATIENT
Start: 2021-02-23 | End: 2021-04-15

## 2021-02-23 NOTE — PROGRESS NOTES
MyChart:    Taty, here are the result(s) of your test(s):     Rapid strep and flu A/B negative. Await COVID-19 test result.   Start Z-[place].     Please do not hesitate to contact me if you have questions.

## 2021-02-23 NOTE — PROGRESS NOTES
I N T E R N A L  M E D I C I N E  J U N O H  K I M,  M D      ENCOUNTER DATE:  02/23/2021    Taty Romo / 40 y.o. / female      THIS WAS A MYCHART TELEHEALTH ENCOUNTER NECESSITATED BY CURRENT COVID-19 CRISIS.      You have chosen to receive care through a telehealth visit.  Do you consent to use a video/audio connection for your medical care today? Yes      CHIEF COMPLAINT / REASON FOR OFFICE VISIT     TELEHEALTH ENCOUNTER:  Productive cough and sore throat      ASSESSMENT & PLAN     1. Suspected COVID-19 virus infection    2. Acute pharyngitis due to other specified organisms         Orders Placed This Encounter   Procedures   • COVID-19,[facility] ROUTINE, NP/OP SWAB IN TRANSPORT MEDIA OR ESWAB 72 HR TAT - Swab, Nasopharynx   • POC Influenza A / B   • POC Rapid Strep A     New Medications Ordered This Visit   Medications   • azithromycin (Zithromax Z-[place]) 250 MG tablet     Sig: Take 2 tablets by mouth on day 1, then 1 tablet daily on days 2-5     Dispense:  6 tablet     Refill:  0       SUMMARY/DISCUSSION  • Check for COVID-19, flu and strep today curbside   • Continue Mucinex DM BID   • Empiric antibiotic for bronchitis/pna with Z-[place]ston.   • Consider chest xray if cough, fever, or shortness of breath         Time spent: 13 minutes    Next Appointment with me: Visit date not found    No follow-ups on file.        HISTORY OF PRESENT ILLNESS     Last week developed productive cough without shortness of breath. 2 days later severe sore throat and fullness of ears. No sinus pressure or pain. No anosmia and ageusia. Not improving. Her kids have similar symptoms. Recent back surgery and coughing makes her back hurt more. Started Mucinex DM yesterday with moderate improvement in cough.         REVIEWED DATA     Labs:           Imaging:           Medical Tests:           Summary of old records / correspondence / consultant report:           Request outside records:           **Olegario Disclaimer:   Much of this  encounter note is an electronic transcription/translation of spoken language to printed text. The electronic translation of spoken language may permit erroneous, or at times, nonsensical words or phrases to be inadvertently transcribed. Although I have reviewed the note for such errors, some may still exist.     Template created by Adan Mcdonald MD

## 2021-02-24 LAB — SARS-COV-2 RNA RESP QL NAA+PROBE: NOT DETECTED

## 2021-02-24 NOTE — PROGRESS NOTES
Call patient with results:    Covid test was negative   If cough gets worse will plan to check a chest X-ray. Take Zpak as prescribed yesterday.

## 2021-02-25 ENCOUNTER — OFFICE VISIT (OUTPATIENT)
Dept: NEUROSURGERY | Facility: CLINIC | Age: 41
End: 2021-02-25

## 2021-02-25 DIAGNOSIS — Z98.890 STATUS POST SURGERY: Primary | ICD-10-CM

## 2021-02-25 PROBLEM — G89.29 CHRONIC MIDLINE LOW BACK PAIN WITHOUT SCIATICA: Status: ACTIVE | Noted: 2019-09-18

## 2021-02-25 PROBLEM — M54.50 CHRONIC MIDLINE LOW BACK PAIN WITHOUT SCIATICA: Status: ACTIVE | Noted: 2019-09-18

## 2021-02-25 PROCEDURE — 99024 POSTOP FOLLOW-UP VISIT: CPT | Performed by: NURSE PRACTITIONER

## 2021-02-26 RX ORDER — CYCLOBENZAPRINE HCL 10 MG
10 TABLET ORAL 3 TIMES DAILY PRN
Qty: 30 TABLET | Refills: 0 | Status: SHIPPED | OUTPATIENT
Start: 2021-02-26 | End: 2021-03-12 | Stop reason: SDUPTHER

## 2021-03-05 RX ORDER — METHYLPREDNISOLONE 4 MG/1
TABLET ORAL
Qty: 21 TABLET | Refills: 0 | Status: SHIPPED | OUTPATIENT
Start: 2021-03-05 | End: 2021-04-15

## 2021-03-09 ENCOUNTER — TELEPHONE (OUTPATIENT)
Dept: NEUROSURGERY | Facility: CLINIC | Age: 41
End: 2021-03-09

## 2021-03-09 ENCOUNTER — TELEPHONE (OUTPATIENT)
Dept: PHYSICAL THERAPY | Facility: HOSPITAL | Age: 41
End: 2021-03-09

## 2021-03-09 NOTE — TELEPHONE ENCOUNTER
I spoke to patient regarding medrol dosepak. She has been on it since Saturday, 3.6.21.  She has gotten no relief and her sciatica has returned on her left side, not past her buttock yet.

## 2021-03-09 NOTE — TELEPHONE ENCOUNTER
Patient called and canceled scheduled appointment today 03/09/21 at 1700 due to increased low back pain. Per Notes demonstrated increased back pain started Friday 03/05/21, started medrol dose pack. Called patient due to cancellation. Reviewed current symptoms, discussed HEP and encouraged nerve glides very gentle, heat or ice as needed, and rescheduled to next Tuesday 03/16/21, encouraged to attend PT regardless of pain to assist with symptoms and return to work.

## 2021-03-11 ENCOUNTER — OFFICE VISIT (OUTPATIENT)
Dept: NEUROSURGERY | Facility: CLINIC | Age: 41
End: 2021-03-11

## 2021-03-11 ENCOUNTER — TELEPHONE (OUTPATIENT)
Dept: NEUROSURGERY | Facility: CLINIC | Age: 41
End: 2021-03-11

## 2021-03-11 DIAGNOSIS — M51.16 HERNIATION OF LUMBAR INTERVERTEBRAL DISC WITH RADICULOPATHY: ICD-10-CM

## 2021-03-11 DIAGNOSIS — Z98.890 STATUS POST SURGERY: Primary | ICD-10-CM

## 2021-03-11 PROCEDURE — 99024 POSTOP FOLLOW-UP VISIT: CPT | Performed by: NEUROLOGICAL SURGERY

## 2021-03-11 NOTE — TELEPHONE ENCOUNTER
Patient was put on schedule at last minute for tele visit and she was not aware.  I called and left two messages and there was no answer although I was able to leave messages.  I asked her to call and let our office know the best time for her to do a tele visit.

## 2021-03-11 NOTE — PROGRESS NOTES
Subjective   Patient ID: Taty Romo is a 40 y.o. female is here today for follow-up. Patient was last seen by RENE Rodarte 2/25/21 for post surgery visit.    3/5/21 Patient called our office and had been having a flare with sciatica and she was given flexeril 10 mg 1 tid and a MDP.  Patient states she does not feel any better.    You have chosen to receive care through a telephone visit. Do you consent to use a telephone visit for your medical care today? Yes    This was a televisit from the hospital lasting 9 minutes.  The patient was at work.  Its been 2 months since she had a left L5-S1 microdiscectomy and did quite well but within the last few weeks she began having recurrent left buttock and leg pain down the leg as it was before.  Not quite as bad but the Medrol Dosepak did not help.  She is a young person with a risk of recurrent herniated disc.  She was to continue working but will go ahead and get a lumbar MRI and plain x-rays and have her come back to discuss it with me.    Back Pain  The pain is present in the lumbar spine. The pain radiates to the left knee and left thigh. The pain is at a severity of 5/10. The pain is worse during the day. The symptoms are aggravated by bending, sitting and standing. Associated symptoms include bladder incontinence. Pertinent negatives include no bowel incontinence, fever, numbness or tingling. She has tried muscle relaxant (MDP,PT) for the symptoms. The treatment provided no relief.       The following portions of the patient's history were reviewed and updated as appropriate: allergies, current medications, past family history, past medical history, past social history, past surgical history and problem list.    Review of Systems   Constitutional: Negative for chills and fever.   HENT: Negative for congestion.    Gastrointestinal: Negative for bowel incontinence.   Genitourinary: Positive for bladder incontinence.   Musculoskeletal: Positive for back pain and  gait problem.   Neurological: Negative for tingling and numbness.           Objective     There were no vitals filed for this visit.  There is no height or weight on file to calculate BMI.      Physical Exam   Deferred  Neurologic Exam   Deferred        Assessment/Plan   Independent Review of Radiographic Studies:      I personally reviewed the images from the following studies.        Medical Decision Making:      Because of the persistence of the same radiculopathy we will go ahead and repeat the lumbar MRI and x-rays to see if there is any evidence of instability or recurrent herniated disc.  She will continue working and we will discuss this when she comes back in.      Diagnoses and all orders for this visit:    1. Status post surgery (Primary)  -     MRI Lumbar Spine With & Without Contrast; Future  -     XR Spine Lumbar Complete With Flex & Ext; Future    2. Herniation of lumbar intervertebral disc with radiculopathy  -     MRI Lumbar Spine With & Without Contrast; Future  -     XR Spine Lumbar Complete With Flex & Ext; Future      Return in about 13 days (around 3/24/2021) for After MRI and x-ray.

## 2021-03-16 ENCOUNTER — HOSPITAL ENCOUNTER (OUTPATIENT)
Dept: PHYSICAL THERAPY | Facility: HOSPITAL | Age: 41
Setting detail: THERAPIES SERIES
Discharge: HOME | End: 2021-03-16

## 2021-03-16 DIAGNOSIS — M54.42 CHRONIC BILATERAL LOW BACK PAIN WITH BILATERAL SCIATICA: Primary | ICD-10-CM

## 2021-03-16 DIAGNOSIS — G89.29 CHRONIC BILATERAL LOW BACK PAIN WITH BILATERAL SCIATICA: Primary | ICD-10-CM

## 2021-03-16 DIAGNOSIS — R29.898 WEAKNESS OF LEFT LOWER EXTREMITY: ICD-10-CM

## 2021-03-16 DIAGNOSIS — Z98.890 STATUS POST LUMBAR DISCECTOMY: ICD-10-CM

## 2021-03-16 DIAGNOSIS — M54.41 CHRONIC BILATERAL LOW BACK PAIN WITH BILATERAL SCIATICA: Primary | ICD-10-CM

## 2021-03-16 PROCEDURE — 97110 THERAPEUTIC EXERCISES: CPT

## 2021-03-16 PROCEDURE — 97140 MANUAL THERAPY 1/> REGIONS: CPT

## 2021-03-16 RX ORDER — CYCLOBENZAPRINE HCL 10 MG
10 TABLET ORAL 3 TIMES DAILY PRN
Qty: 30 TABLET | Refills: 0 | Status: SHIPPED | OUTPATIENT
Start: 2021-03-16 | End: 2021-03-24 | Stop reason: SDUPTHER

## 2021-03-17 NOTE — THERAPY TREATMENT NOTE
Outpatient Physical Therapy Ortho Progress Note  [facility]     Patient Name: Taty Romo  : 1980  MRN: [id number]  Today's Date: 3/17/2021      Visit Date: 2021    Visit Dx:    ICD-10-CM ICD-9-CM   1. Chronic bilateral low back pain with bilateral sciatica  M54.42 724.2    M54.41 724.3    G89.29 338.29   2. Weakness of left lower extremity  R29.898 729.89   3. Status post lumbar discectomy  Z98.890 V45.89       Patient Active Problem List   Diagnosis   • Depression with anxiety   • Hypothyroidism due to Hashimoto's thyroiditis   • Insomnia   • Systemic lupus erythematosus (CMS/HCC)   • Vitamin D deficiency   • Irritable bowel syndrome with constipation   • Seasonal allergic rhinitis due to pollen   • DDD (degenerative disc disease), lumbar   • Allergic rhinitis   • Chronic midline low back pain without sciatica   • Spondylolisthesis at L5-S1 level   • Herniation of lumbar intervertebral disc with radiculopathy   • Neck pain   • Degeneration of intervertebral disc at C4-C5 level   • Nausea and vomiting   • Cervical radiculopathy   • Status post surgery        Past Medical History:   Diagnosis Date   • Anxiety    • Bulging lumbar disc    • Colon polyp    • Depression    • Disease of thyroid gland     HYPOTHYROIDISM D/T PARTIAL THYROIDECTOMY   • Fibromyalgia, primary    • Frequent UTI    • Headache, tension-type    • Hemorrhoids    • Hypothyroidism    • Lupus (CMS/HCC)     DX 3-   • Migraine    • Peripheral neuropathy 2017    KNEES DOWN   • Seasonal allergies    • Spondylolisthesis    • Thrombocytopenia (CMS/HCC) 2012    due to ITP        Past Surgical History:   Procedure Laterality Date   • ANAL SPHINCTEROTOMY         • APPENDECTOMY     • COLONOSCOPY N/A 2016    Procedure: COLONOSCOPY;  Surgeon: Natalya Farias MD;  Location: [facility] OR;  Service:    • HEMORRHOIDECTOMY         • HEMORRHOIDECTOMY N/A 2016    Procedure: HEMORRHOIDECTOMY;   Surgeon: Natalya Farias MD;  Location: [facility] MAIN OR;  Service:    • INTRAUTERINE DEVICE INSERTION  08/2016   • LUMBAR DISCECTOMY Left 1/8/2021    Procedure: Outpatient left lumbar five/sacral one Metrx microdiscectomy;  Surgeon: Alessandro Thomas MD;  Location: [facility] MAIN OR;  Service: Neurosurgery;  Laterality: Left;   • THYROIDECTOMY, PARTIAL Right 06/2015   • TONSILLECTOMY         PT Ortho     Row Name 03/16/21 0700       Subjective Comments    Subjective Comments  Pt reported that she was in a lot of nerve like pain that was going all the way down her leg. Pt reported that this has been ongoing for several weeks now. Pt did report that she has been having to do a lot of forward bending to  her puppy, or do household chores.   -SC (r) RP (t) SC (c)       Precautions and Contraindications    Precautions/Limitations  other (see comments)  -SC (r) RP (t) SC (c)    Precautions  Per pt's chart NP indicated that pt is not to lift anything >/= 10 lbs.   -SC (r) RP (t) SC (c)       Subjective Pain    Able to rate subjective pain?  yes  -SC (r) RP (t) SC (c)    Pre-Treatment Pain Level  6  -SC (r) RP (t) SC (c)    Subjective Pain Comment  Nerve/zinging  -SC (r) RP (t) SC (c)       Posture/Observations    Alignment Options  Forward head;Rounded shoulders;Genu valgus;Foot pronation  -SC (r) RP (t) SC (c)    Forward Head  Mild  -SC (r) RP (t) SC (c)    Rounded Shoulders  Mild;Moderate  -SC (r) RP (t) SC (c)    Genu valgus  Mild  -SC (r) RP (t) SC (c)    Foot pronation  Mild  -SC (r) RP (t) SC (c)       Head/Neck/Trunk    Head/Neck/Trunk Comments  Pt experiencing significant pain and discomfort. Pt ambulated into clinic w/ very stiff and guarded posture and struggled to sit or stand effectively.   -SC (r) RP (t) SC (c)       MMT Left Lower Ext    Lt Hip Flexion MMT, Gross Movement  (3+/5) fair plus  -SC (r) RP (t) SC (c)    Lt Hip ABduction MMT, Gross Movement  (3+/5) fair plus  -SC (r) RP (t) SC (c)    Lt Ankle  Plantarflexion MMT, Gross Movement  (3+/5) fair plus  -SC (r) RP (t) SC (c)    Lt Ankle Dorsiflexion MMT, Gross Movement  (3+/5) fair plus  -SC (r) RP (t) SC (c)    Lt Lower Extremity Comments   Tested in supine position d/t pain  -SC (r) RP (t) SC (c)       Flexibility    Flexibility Tested?  Lower Extremity  -SC (r) RP (t) SC (c)       Gait/Stairs (Locomotion)    Gait/Stairs Locomotion  gait/ambulation independence  -SC (r) RP (t) SC (c)    Schuyler Level (Gait)  independent  -SC (r) RP (t) SC (c)    Pattern (Gait)  other (see comments) Antalgic gait pattern d/t elevated pain levels and guarding  -SC (r) RP (t) SC (c)      User Key  (r) = Recorded By, (t) = Taken By, (c) = Cosigned By    Initials Name Provider Type    SC Irais Mcgraw, PT Physical Therapist    Keesha Silveira PT Student PT Student                      PT Assessment/Plan     Row Name 03/16/21 1700          PT Assessment    Functional Limitations  Limitation in home management;Performance in leisure activities;Performance in work activities;Limitations in community activities;Performance in self-care ADL;Impaired locomotion;Limitations in functional capacity and performance  -SC (r) RP (t) SC (c)     Impairments  Edema;Impaired flexibility;Sensation;Poor body mechanics;Range of motion;Posture;Pain;Muscle strength;Joint mobility  -SC (r) RP (t) SC (c)     Assessment Comments  First time follow-up w/ pt d/t scheduling conflicts and inclement weather. Pt experiencing significant elevated pain levels d/t an acute flare up from repetitive forward bending, lifting, and returning to work. Altered pt's HEP to focus on pain free exercises and nerve flossing for nerve irritation, introduced basic stretching, and discussed methods of how to minimize pain when at work and at home. Discussed potential mechanical traction during next visit as pt responded well to manual distraction. Pt is experiencing symptoms consistent w/ an acute flare up, but  feel that w/ consistent participation in HEP and therapy that the pain can be minimized and pt will improve.  -SC (r) RP (t) SC (c)        PT Plan    PT Plan Comments  update and alter HEP as indicated, lifting mechanics, office and leigh ergonomics, positional distraction at home for pain relief, mechanical traction  -SC (r) RP (t) SC (c)       User Key  (r) = Recorded By, (t) = Taken By, (c) = Cosigned By    Initials Name Provider Type    Irais Day, PT Physical Therapist    Keesha Silveira, PT Student PT Student                            Manual Rx (last 36 hours)      Manual Treatments     Row Name 03/17/21 0500 03/16/21 1700          Manual Rx 1    Manual Rx 1 Location  --  -SC (r) RP (t) SC (c)  Supine 90/90 nerve flossing  -SC (r) RP (t) SC (c)     Manual Rx 1 Grade  --  -SC (r) RP (t) SC (c)  Gentle manual DF/PF to floss the nerve  -SC (r) RP (t) SC (c)        Manual Rx 2    Manual Rx 2 Location  --  -SC (r) RP (t) SC (c)  [place] LLE distraction  -SC (r) RP (t) SC (c)     Manual Rx 2 Type  --  -SC (r) RP (t) SC (c)  Oscillations  -SC (r) RP (t) SC (c)     Manual Rx 2 Grade  --  -SC (r) RP (t) SC (c)  I-II  -SC (r) RP (t) SC (c)     Manual Rx 2 Duration  --  -SC (r) RP (t) SC (c)  5x 10-15 sec holds  -SC (r) RP (t) SC (c)       User Key  (r) = Recorded By, (t) = Taken By, (c) = Cosigned By    Initials Name Provider Type    Irais Day, PT Physical Therapist    Keesha Silveira, PT Student PT Student          PT OP Goals     Row Name 03/16/21 1700          PT Short Term Goals    STG Date to Achieve  04/14/21  -SC     STG 1  Pt will demonstrated ind w/ HEP  -SC (r) RP (t) SC (c)     STG 1 Progress  Ongoing  -SC (r) RP (t) SC (c)     STG 1 Progress Comments  Altered HEP d/t pain flare up to emphasize pain free exercises and nerve flossing; included lift mechanics, office and home ergonomics  -SC (r) RP (t) SC (c)     STG 2  Pt will achieve bilateral trunk rotation to 30% in  preparation for rotational demands of job  -SC (r) RP (t) SC (c)     STG 2 Progress  Ongoing  -SC (r) RP (t) SC (c)     STG 2 Progress Comments  Pt ambulated w/ very stiff and guarded posture d/t pain  -SC (r) RP (t) SC (c)     STG 3  Pt will achieve L MMT 4-/5  -SC (r) RP (t) SC (c)     STG 3 Progress  Ongoing  -SC (r) RP (t) SC (c)     STG 3 Progress Comments  Pt limited d/t pain  -SC (r) RP (t) SC (c)     STG 4  Pt will perform standing gastroc/soleus strength test 15/25  -SC (r) RP (t) SC (c)     STG 4 Progress  Ongoing  -SC (r) RP (t) SC (c)     STG 5  Pt's will demonstrated understanding of diaphragmatic breathing technique for pain control  -SC (r) RP (t) SC (c)     STG 5 Progress  Ongoing  -SC (r) RP (t) SC (c)        Long Term Goals    LTG Date to Achieve  06/16/21  -SC     LTG 1  Pt will achieve MCKAY score of 10%  -SC (r) RP (t) SC (c)     LTG 1 Progress  Ongoing  -SC (r) RP (t) SC (c)     LTG 2  Pt will achieve bilateral trunk rotation to 75% in preparation of rotational demands of job  -SC (r) RP (t) SC (c)     LTG 2 Progress  Ongoing  -SC (r) RP (t) SC (c)     LTG 2 Progress Comments  Pt ambulated w/ very stiff and guarded posture d/t pain  -SC (r) RP (t) SC (c)     LTG 3  PT will achieve L MMT 4+/5  -SC (r) RP (t) SC (c)     LTG 3 Progress  Ongoing  -SC (r) RP (t) SC (c)     LTG 3 Progress Comments  Pt limited d/t pain  -SC (r) RP (t) SC (c)     LTG 4  Pt will be ind w/ HEP and demonstrate consistency w/ use and how to monitor and manage pain related symptoms  -SC (r) RP (t) SC (c)     LTG 4 Progress  Ongoing  -SC (r) RP (t) SC (c)     LTG 4 Progress Comments  Altered HEP d/t pain flare up to emphasize pain free exercises and nerve flossing; included lift mechanics, office and home ergonomics  -SC (r) RP (t) SC (c)        Time Calculation    PT Goal Re-Cert Due Date  06/16/21  -SC       User Key  (r) = Recorded By, (t) = Taken By, (c) = Cosigned By    Initials Name Provider Type    Irais Day  MARLEEN, PT Physical Therapist    Keesha Silveira, PT Student PT Student          Therapy Education  Education Details: acute flare up, lifting mechanics, office ergonomics, household chore alterations, altered HEP, mechanical traction, importance of protecting the back to calm flare up and then progress to return to PLOF  Given: Symptoms/condition management, Pain management, Posture/body mechanics, Mobility training, HEP, Other (comment)  Program: New, Reinforced, Modified  How Provided: Verbal, Demonstration, Written  Provided to: Patient  Level of Understanding: Teach back education performed, Verbalized, Demonstrated          Care provided by Keesha Torres PT Student under direct supervision of Irais Mcgraw, DPT, CLT UE    Time Calculation:   Start Time: 1700  Stop Time: 1743  Time Calculation (min): 43 min                Irais Booker, PT  3/17/2021

## 2021-03-18 RX ORDER — HYDROCODONE BITARTRATE AND ACETAMINOPHEN 5; 325 MG/1; MG/1
1 TABLET ORAL 2 TIMES DAILY
Qty: 30 TABLET | Refills: 0 | Status: CANCELLED | OUTPATIENT
Start: 2021-03-18

## 2021-03-18 RX ORDER — HYDROCODONE BITARTRATE AND ACETAMINOPHEN 5; 325 MG/1; MG/1
TABLET ORAL
Qty: 30 TABLET | Refills: 0 | Status: SHIPPED | OUTPATIENT
Start: 2021-03-18 | End: 2021-04-20 | Stop reason: SDUPTHER

## 2021-03-18 NOTE — TELEPHONE ENCOUNTER
----- Message from Alessandro Thomas MD sent at 3/17/2021  6:29 PM EDT -----  Regarding: FW: Prescription Question      ----- Message -----  From: Kina Queen MA  Sent: 3/17/2021   8:53 AM EDT  To: Alessandro Thomas MD  Subject: FW: Prescription Question                        Please advise.   ----- Message -----  From: Taty Romo  Sent: 3/16/2021   7:49 PM EDT  To: [facility] [place] [facility]  Subject: Prescription Question                            Dr WINTERS, I have been using the pain medication lately as I have been having pretty intense pain/sciatica on the left side, down into the butt cheek and a little past that. I have been taking 2 of the 5-325mg tabs once daily, and that does seem to take the edge off. 1 tab by itself offered no relief. While I am hesitant to ask for a refill, I do realize that it helps me tremendously. If agreeable, I am asking for a refill on the pain medication and zofran to take with.    I have MRI scheduled for April 5th and then see you on April 9th.    Thank you.

## 2021-03-23 ENCOUNTER — HOSPITAL ENCOUNTER (OUTPATIENT)
Dept: PHYSICAL THERAPY | Facility: HOSPITAL | Age: 41
Setting detail: THERAPIES SERIES
Discharge: HOME | End: 2021-03-23

## 2021-03-23 DIAGNOSIS — R29.898 WEAKNESS OF LEFT LOWER EXTREMITY: ICD-10-CM

## 2021-03-23 DIAGNOSIS — Z98.890 STATUS POST LUMBAR DISCECTOMY: ICD-10-CM

## 2021-03-23 DIAGNOSIS — G89.29 CHRONIC BILATERAL LOW BACK PAIN WITH BILATERAL SCIATICA: Primary | ICD-10-CM

## 2021-03-23 DIAGNOSIS — M79.89 SWELLING OF LOWER EXTREMITY: ICD-10-CM

## 2021-03-23 DIAGNOSIS — M54.42 CHRONIC BILATERAL LOW BACK PAIN WITH BILATERAL SCIATICA: Primary | ICD-10-CM

## 2021-03-23 DIAGNOSIS — M54.41 CHRONIC BILATERAL LOW BACK PAIN WITH BILATERAL SCIATICA: Primary | ICD-10-CM

## 2021-03-23 PROCEDURE — 97012 MECHANICAL TRACTION THERAPY: CPT

## 2021-03-23 PROCEDURE — 97110 THERAPEUTIC EXERCISES: CPT

## 2021-03-23 NOTE — THERAPY TREATMENT NOTE
Outpatient Physical Therapy Ortho Treatment Note  [facility]     Patient Name: Taty Romo  : 1980  MRN: [id number]  Today's Date: 3/23/2021      Visit Date: 2021    Visit Dx:    ICD-10-CM ICD-9-CM   1. Chronic bilateral low back pain with bilateral sciatica  M54.42 724.2    M54.41 724.3    G89.29 338.29   2. Weakness of left lower extremity  R29.898 729.89   3. Status post lumbar discectomy  Z98.890 V45.89   4. Swelling of lower extremity  M79.89 729.81       Patient Active Problem List   Diagnosis   • Depression with anxiety   • Hypothyroidism due to Hashimoto's thyroiditis   • Insomnia   • Systemic lupus erythematosus (CMS/HCC)   • Vitamin D deficiency   • Irritable bowel syndrome with constipation   • Seasonal allergic rhinitis due to pollen   • DDD (degenerative disc disease), lumbar   • Allergic rhinitis   • Chronic midline low back pain without sciatica   • Spondylolisthesis at L5-S1 level   • Herniation of lumbar intervertebral disc with radiculopathy   • Neck pain   • Degeneration of intervertebral disc at C4-C5 level   • Nausea and vomiting   • Cervical radiculopathy   • Status post surgery        Past Medical History:   Diagnosis Date   • Anxiety    • Bulging lumbar disc    • Colon polyp    • Depression    • Disease of thyroid gland     HYPOTHYROIDISM D/T PARTIAL THYROIDECTOMY   • Fibromyalgia, primary    • Frequent UTI    • Headache, tension-type    • Hemorrhoids    • Hypothyroidism    • Lupus (CMS/HCC)     DX 3-   • Migraine    • Peripheral neuropathy 2017    KNEES DOWN   • Seasonal allergies    • Spondylolisthesis    • Thrombocytopenia (CMS/HCC)     due to ITP        Past Surgical History:   Procedure Laterality Date   • ANAL SPHINCTEROTOMY         • APPENDECTOMY     • COLONOSCOPY N/A 2016    Procedure: COLONOSCOPY;  Surgeon: Natalya Farias MD;  Location: [facility] OR;  Service:    • HEMORRHOIDECTOMY         • HEMORRHOIDECTOMY N/A  11/21/2016    Procedure: HEMORRHOIDECTOMY;  Surgeon: Natalya Farias MD;  Location: [facility] OR;  Service:    • INTRAUTERINE DEVICE INSERTION  08/2016   • LUMBAR DISCECTOMY Left 1/8/2021    Procedure: Outpatient left lumbar five/sacral one Metrx microdiscectomy;  Surgeon: Alessandro Thomas MD;  Location: [facility] MAIN OR;  Service: Neurosurgery;  Laterality: Left;   • THYROIDECTOMY, PARTIAL Right 06/2015   • TONSILLECTOMY         PT Ortho     Row Name 03/23/21 1700       Subjective Comments    Subjective Comments  Pt reported that she had to take a pain pill prior to coming in today. Pt reported that the pain meds help when she does take them, but doesn't take them very often. Pt reported that she hasn't been doing a lot of lifting or rotating when at work and that has helped w/ the pain.   -SC (r) RP (t) SC (c)       Subjective Pain    Subjective Pain Comment  Feels stiff in the back and runs all the way down the leg  -SC (r) RP (t) SC (c)      User Key  (r) = Recorded By, (t) = Taken By, (c) = Cosigned By    Initials Name Provider Type    SC Irais Mcgraw, PT Physical Therapist    RP Keesha Torres, PT Student PT Student                      PT Assessment/Plan     Row Name 03/23/21 1700          PT Assessment    Functional Limitations  Limitation in home management;Performance in leisure activities;Performance in work activities;Limitations in community activities;Performance in self-care ADL;Impaired locomotion;Limitations in functional capacity and performance  -SC (r) RP (t) SC (c)     Impairments  Edema;Impaired flexibility;Sensation;Poor body mechanics;Range of motion;Posture;Pain;Muscle strength;Joint mobility  -SC (r) RP (t) SC (c)     Assessment Comments  Initiated mechanical traction d/t pt ecperiencing pain relief w/ manual long-axis distraction of the LLE. Updated HEP to include lumbar spine mobility exercises d/t pt feeling stiff in the morning. Pt is compliant w/ HEP and is motivated to  return to PLOF.  -SC (r) RP (t) SC (c)        PT Plan    PT Plan Comments  update HEP as indicated, cont w/ manual traction and MH for pain relief, abdominal and strengthening exercises as tolerated, foam roll proximal HS musculature  -SC (r) RP (t) SC (c)       User Key  (r) = Recorded By, (t) = Taken By, (c) = Cosigned By    Initials Name Provider Type    Irais Day, PT Physical Therapist    Keesha Silveira, PT Student PT Student          Modalities     Row Name 03/23/21 1700             Moist Heat    MH Applied  Yes  -SC (r) RP (t) SC (c)      Location  Lumbar spine supine  -SC (r) RP (t) SC (c)      Rx Minutes  10 mins  -SC (r) RP (t) SC (c)         Traction 46171    Traction Type  Lumbar  -SC (r) RP (t) SC (c)      Rx Minutes  10  -SC (r) RP (t) SC (c)      Duration  Intermittent  -SC (r) RP (t) SC (c)      Position  Hook-lying  -SC (r) RP (t) SC (c)      Weight  45 35  -SC      Hold  30  -SC (r) RP (t) SC (c)      Relax  10  -SC (r) RP (t) SC (c)        User Key  (r) = Recorded By, (t) = Taken By, (c) = Cosigned By    Initials Name Provider Type    Irais Day, PT Physical Therapist    Keesha Silveira, PT Student PT Student        OP Exercises     Row Name 03/23/21 1700             Subjective Comments    Subjective Comments  Pt reported that she had to take a pain pill prior to coming in today. Pt reported that the pain meds help when she does take them, but doesn't take them very often. Pt reported that she hasn't been doing a lot of lifting or rotating when at work and that has helped w/ the pain.   -SC (r) RP (t) SC (c)         Subjective Pain    Able to rate subjective pain?  yes  -SC (r) RP (t) SC (c)      Pre-Treatment Pain Level  5  -SC (r) RP (t) SC (c)      Subjective Pain Comment  Feels stiff in the back and runs all the way down the leg  -SC (r) RP (t) SC (c)         Exercise 1    Exercise Name 1  Lower trunkk rotations  -SC (r) RP (t) SC (c)      Cueing 1   Verbal;Tactile  -SC (r) RP (t) SC (c)      Sets 1  2  -SC (r) RP (t) SC (c)      Reps 1  10  -SC (r) RP (t) SC (c)         Exercise 2    Exercise Name 2  Supine bilateral knee flexion  -SC (r) RP (t) SC (c)      Cueing 2  Verbal;Tactile  -SC (r) RP (t) SC (c)      Sets 2  2  -SC (r) RP (t) SC (c)      Reps 2  10  -SC (r) RP (t) SC (c)        User Key  (r) = Recorded By, (t) = Taken By, (c) = Cosigned By    Initials Name Provider Type    Irais Day, PT Physical Therapist    Keesha Silveira, PT Student PT Student                           Therapy Education  Education Details: reviewed office/home ergonomics, lifting mechanics, abdominal activation prior to attempting to squat and lift, mechanical traction, address pain to progress to return to exercise, updated HEP  Given: Symptoms/condition management, Pain management, Posture/body mechanics, Mobility training, HEP, Other (comment)  Program: New, Reinforced, Modified  How Provided: Verbal, Demonstration, Written  Provided to: Patient  Level of Understanding: Teach back education performed, Verbalized, Demonstrated          Care provided by Keesha Torres PT Student under direct supervision of Irais Mcgraw DPT, CLT UE    Time Calculation:   Start Time: 1700  Stop Time: 1745  Time Calculation (min): 45 min                Irais Booker, PT  3/23/2021

## 2021-03-25 RX ORDER — CYCLOBENZAPRINE HCL 10 MG
10 TABLET ORAL 3 TIMES DAILY PRN
Qty: 30 TABLET | Refills: 0 | Status: SHIPPED | OUTPATIENT
Start: 2021-03-25 | End: 2022-01-11

## 2021-03-30 ENCOUNTER — HOSPITAL ENCOUNTER (OUTPATIENT)
Dept: PHYSICAL THERAPY | Facility: HOSPITAL | Age: 41
Setting detail: THERAPIES SERIES
Discharge: HOME | End: 2021-03-30

## 2021-03-30 DIAGNOSIS — M79.89 SWELLING OF LOWER EXTREMITY: ICD-10-CM

## 2021-03-30 DIAGNOSIS — R29.898 WEAKNESS OF LEFT LOWER EXTREMITY: ICD-10-CM

## 2021-03-30 DIAGNOSIS — Z98.890 STATUS POST LUMBAR DISCECTOMY: ICD-10-CM

## 2021-03-30 DIAGNOSIS — G89.29 CHRONIC BILATERAL LOW BACK PAIN WITH BILATERAL SCIATICA: Primary | ICD-10-CM

## 2021-03-30 DIAGNOSIS — M54.42 CHRONIC BILATERAL LOW BACK PAIN WITH BILATERAL SCIATICA: Primary | ICD-10-CM

## 2021-03-30 DIAGNOSIS — M54.41 CHRONIC BILATERAL LOW BACK PAIN WITH BILATERAL SCIATICA: Primary | ICD-10-CM

## 2021-03-30 PROCEDURE — 97110 THERAPEUTIC EXERCISES: CPT

## 2021-03-30 PROCEDURE — 97012 MECHANICAL TRACTION THERAPY: CPT

## 2021-03-30 NOTE — THERAPY TREATMENT NOTE
Outpatient Physical Therapy Ortho Treatment Note  [facility]     Patient Name: Taty Romo  : 1980  MRN: [id number]  Today's Date: 3/30/2021      Visit Date: 2021    Visit Dx:    ICD-10-CM ICD-9-CM   1. Chronic bilateral low back pain with bilateral sciatica  M54.42 724.2    M54.41 724.3    G89.29 338.29   2. Weakness of left lower extremity  R29.898 729.89   3. Status post lumbar discectomy  Z98.890 V45.89   4. Swelling of lower extremity  M79.89 729.81       Patient Active Problem List   Diagnosis   • Depression with anxiety   • Hypothyroidism due to Hashimoto's thyroiditis   • Insomnia   • Systemic lupus erythematosus (CMS/HCC)   • Vitamin D deficiency   • Irritable bowel syndrome with constipation   • Seasonal allergic rhinitis due to pollen   • DDD (degenerative disc disease), lumbar   • Allergic rhinitis   • Chronic midline low back pain without sciatica   • Spondylolisthesis at L5-S1 level   • Herniation of lumbar intervertebral disc with radiculopathy   • Neck pain   • Degeneration of intervertebral disc at C4-C5 level   • Nausea and vomiting   • Cervical radiculopathy   • Status post surgery        Past Medical History:   Diagnosis Date   • Anxiety    • Bulging lumbar disc    • Colon polyp    • Depression    • Disease of thyroid gland     HYPOTHYROIDISM D/T PARTIAL THYROIDECTOMY   • Fibromyalgia, primary    • Frequent UTI    • Headache, tension-type    • Hemorrhoids    • Hypothyroidism    • Lupus (CMS/HCC)     DX 3-   • Migraine    • Peripheral neuropathy 2017    KNEES DOWN   • Seasonal allergies    • Spondylolisthesis    • Thrombocytopenia (CMS/HCC)     due to ITP        Past Surgical History:   Procedure Laterality Date   • ANAL SPHINCTEROTOMY         • APPENDECTOMY     • COLONOSCOPY N/A 2016    Procedure: COLONOSCOPY;  Surgeon: Natalya Farias MD;  Location: [facility] OR;  Service:    • HEMORRHOIDECTOMY         • HEMORRHOIDECTOMY N/A  11/21/2016    Procedure: HEMORRHOIDECTOMY;  Surgeon: Natalya Farias MD;  Location: [facility] OR;  Service:    • INTRAUTERINE DEVICE INSERTION  08/2016   • LUMBAR DISCECTOMY Left 1/8/2021    Procedure: Outpatient left lumbar five/sacral one Metrx microdiscectomy;  Surgeon: Alessandro Thomas MD;  Location: [facility] OR;  Service: Neurosurgery;  Laterality: Left;   • THYROIDECTOMY, PARTIAL Right 06/2015   • TONSILLECTOMY                         PT Assessment/Plan     Row Name 03/30/21 1700          PT Assessment    Functional Limitations  Limitation in home management;Performance in leisure activities;Performance in work activities;Limitations in community activities;Performance in self-care ADL;Impaired locomotion;Limitations in functional capacity and performance  -SC (r) RP (t) SC (c)     Impairments  Edema;Impaired flexibility;Sensation;Poor body mechanics;Range of motion;Posture;Pain;Muscle strength;Joint mobility  -SC (r) RP (t) SC (c)     Assessment Comments  Pt experiencing decreased pain during visit. Pt did not have to take any pain medication prior to visit or during work. Pt was able to walk for 30 min w/ the only increase in back pain except when pt had to cough resulting in an involuntary senthil aura maneuver. Cont w/ mechanical traction and exercises focused on basic hip and abdominal strengthening, lumbar ROM, and neural tension. Discussed w/ pt progressing exercises to work toward work and functional activities at next visit pending pt's pain level.  -SC (r) RP (t) SC (c)        PT Plan    PT Plan Comments  update HEP as indicated, cont w/ manual traction, MH for pain relief, abdominal and hip strengthening exercises as tolerated, foam roll prox HS musculature if pt is feeling tightness in the region, assess goals  -SC       User Key  (r) = Recorded By, (t) = Taken By, (c) = Cosigned By    Initials Name Provider Type    SC Irais Mcgraw, PT Physical Therapist    RP Keesha Torres, PT  Student PT Student          Modalities     Row Name 03/30/21 1600             Subjective Pain    Subjective Pain Comment  Pt experienced increase in pain when coughing while out on walk  -SC (r) RP (t) SC (c)         Moist Heat    MH Applied  Yes  -SC      Location  Lumbar spine supine  -SC (r) RP (t) SC (c)      Rx Minutes  15 mins with lumbar traction  -SC         Traction 23194    Traction Type  Lumbar  -SC (r) RP (t) SC (c)      Rx Minutes  15  -SC      Duration  Intermittent  -SC      Position  Hook-lying  -SC (r) RP (t) SC (c)      Weight  45 35  -SC (r) RP (t) SC (c)      Hold  30  -SC (r) RP (t) SC (c)      Relax  10  -SC (r) RP (t) SC (c)        User Key  (r) = Recorded By, (t) = Taken By, (c) = Cosigned By    Initials Name Provider Type    SC Irais Mcgraw, PT Physical Therapist    Keesha Silveira, PT Student PT Student        OP Exercises     Row Name 03/30/21 1600             Subjective Comments    Subjective Comments  Pt reported that she is doing better today. Pt reported that she didn't have to take a pain pill today. Pt reported that she went on a 30 min walk w/ no increase in pain.   -SC (r) RP (t) SC (c)         Subjective Pain    Able to rate subjective pain?  yes  -SC (r) RP (t) SC (c)      Pre-Treatment Pain Level  2  -SC (r) RP (t) SC (c)      Subjective Pain Comment  Pt experienced increase in pain when coughing while out on walk  -SC (r) RP (t) SC (c)         Exercise 1    Exercise Name 1  LTR  -SC (r) RP (t) SC (c)      Sets 1  2  -SC (r) RP (t) SC (c)      Reps 1  10  -SC (r) RP (t) SC (c)         Exercise 2    Exercise Name 2  B knee flexion on yoga ball  -SC (r) RP (t) SC (c)      Sets 2  2  -SC (r) RP (t) SC (c)      Reps 2  10  -SC (r) RP (t) SC (c)         Exercise 3    Exercise Name 3  PPT  -SC (r) RP (t) SC (c)      Sets 3  2  -SC (r) RP (t) SC (c)      Reps 3  10  -SC (r) RP (t) SC (c)         Exercise 4    Exercise Name 4  SL clamshells  -SC (r) RP (t) SC (c)      Sets 4   2  -SC (r) RP (t) SC (c)      Reps 4  10  -SC (r) RP (t) SC (c)         Exercise 5    Exercise Name 5  Reverse clamshells  -SC (r) RP (t) SC (c)      Sets 5  2  -SC (r) RP (t) SC (c)      Reps 5  10  -SC (r) RP (t) SC (c)         Exercise 6    Exercise Name 6  Pilates push   -SC (r) RP (t) SC (c)      Sets 6  1  -SC (r) RP (t) SC (c)      Reps 6  10  -SC (r) RP (t) SC (c)      Time 6  5  -SC (r) RP (t) SC (c)         Exercise 7    Exercise Name 7  90/90 nerve glides  -SC (r) RP (t) SC (c)      Sets 7  1  -SC (r) RP (t) SC (c)      Reps 7  10  -SC (r) RP (t) SC (c)        User Key  (r) = Recorded By, (t) = Taken By, (c) = Cosigned By    Initials Name Provider Type    SC Irais Mcgraw, PT Physical Therapist    Keesha Silveira, PT Student PT Student                       PT OP Goals     Row Name 03/30/21 1700          PT Short Term Goals    STG Date to Achieve  04/14/21  -SC (r) RP (t) SC (c)     STG 1  Pt will demonstrated ind w/ HEP  -SC (r) RP (t) SC (c)     STG 1 Progress  Progressing  -SC (r) RP (t) SC (c)     STG 1 Progress Comments  Pt is compliant w/ current HEP   -SC (r) RP (t) SC (c)     STG 2  Pt will achieve bilateral trunk rotation to 30% in preparation for rotational demands of job  -SC (r) RP (t) SC (c)     STG 2 Progress  Ongoing  -SC (r) RP (t) SC (c)     STG 3  Pt will achieve L MMT 4-/5  -SC (r) RP (t) SC (c)     STG 3 Progress  Ongoing  -SC (r) RP (t) SC (c)     STG 4  Pt will perform standing gastroc/soleus strength test 15/25  -SC (r) RP (t) SC (c)     STG 4 Progress  Ongoing  -SC (r) RP (t) SC (c)     STG 5  Pt's will demonstrated understanding of diaphragmatic breathing technique for pain control  -SC (r) RP (t) SC (c)     STG 5 Progress  Ongoing  -SC (r) RP (t) SC (c)        Long Term Goals    LTG Date to Achieve  06/16/21  -SC (r) RP (t) SC (c)     LTG 1  Pt will achieve MCKAY score of 10%  -SC (r) RP (t) SC (c)     LTG 1 Progress  Ongoing  -SC (r) RP (t) SC (c)     LTG 2  Pt will  achieve bilateral trunk rotation to 75% in preparation of rotational demands of job  -SC (r) RP (t) SC (c)     LTG 2 Progress  Ongoing  -SC (r) RP (t) SC (c)     LTG 3  PT will achieve L MMT 4+/5  -SC (r) RP (t) SC (c)     LTG 3 Progress  Ongoing  -SC (r) RP (t) SC (c)     LTG 4  Pt will be ind w/ HEP and demonstrate consistency w/ use and how to monitor and manage pain related symptoms  -SC (r) RP (t) SC (c)     LTG 4 Progress  Progressing  -SC (r) RP (t) SC (c)     LTG 4 Progress Comments  Pt is compliant w/ current HEP  -SC (r) RP (t) SC (c)        Time Calculation    PT Goal Re-Cert Due Date  06/16/21  -SC (r) RP (t) SC (c)       User Key  (r) = Recorded By, (t) = Taken By, (c) = Cosigned By    Initials Name Provider Type    Irais Day, PT Physical Therapist    Keesha Silveira, PT Student PT Student          Therapy Education  Education Details: updated HEP, cont w/ manual traction, cont w/ current plan pending pain levels upon next visit then advance  Given: Symptoms/condition management, Pain management, Posture/body mechanics, Mobility training, HEP, Other (comment)  Program: Reinforced, Progressed  How Provided: Verbal, Demonstration, Written  Provided to: Patient  Level of Understanding: Teach back education performed, Verbalized, Demonstrated          Care provided by Keesha Torres PT Student under direct supervision of SAKSHI SolorzanoT, CLT UE    Time Calculation:   Start Time: 1645  Stop Time: 1738  Time Calculation (min): 53 min                Irais Booker, PT  3/30/2021

## 2021-04-02 ENCOUNTER — IMMUNIZATION (OUTPATIENT)
Dept: VACCINE CLINIC | Facility: HOSPITAL | Age: 41
End: 2021-04-02

## 2021-04-02 PROCEDURE — 0001A: CPT | Performed by: INTERNAL MEDICINE

## 2021-04-02 PROCEDURE — 91300 HC SARSCOV02 VAC 30MCG/0.3ML IM: CPT | Performed by: INTERNAL MEDICINE

## 2021-04-05 ENCOUNTER — HOSPITAL ENCOUNTER (OUTPATIENT)
Dept: MRI IMAGING | Facility: HOSPITAL | Age: 41
Discharge: HOME | End: 2021-04-05
Admitting: NEUROLOGICAL SURGERY

## 2021-04-05 DIAGNOSIS — M51.16 HERNIATION OF LUMBAR INTERVERTEBRAL DISC WITH RADICULOPATHY: ICD-10-CM

## 2021-04-05 DIAGNOSIS — Z98.890 STATUS POST SURGERY: ICD-10-CM

## 2021-04-05 PROCEDURE — 72158 MRI LUMBAR SPINE W/O & W/DYE: CPT

## 2021-04-05 PROCEDURE — A9577 INJ MULTIHANCE: HCPCS | Performed by: NEUROLOGICAL SURGERY

## 2021-04-05 PROCEDURE — 0 GADOBENATE DIMEGLUMINE 529 MG/ML SOLUTION: Performed by: NEUROLOGICAL SURGERY

## 2021-04-05 RX ADMIN — GADOBENATE DIMEGLUMINE 13 ML: 529 INJECTION, SOLUTION INTRAVENOUS at 07:36

## 2021-04-06 ENCOUNTER — APPOINTMENT (OUTPATIENT)
Dept: PHYSICAL THERAPY | Facility: HOSPITAL | Age: 41
End: 2021-04-06

## 2021-04-08 NOTE — PROGRESS NOTES
Subjective   Patient ID: Taty Romo is a 40 y.o. female is here today for follow-up. She is now 3 months out from an left L5-S1 Metrx microdiscectomy 1/8/21.     Patient was last seen 3/11/21 for back pain MRI L-spine and XR was ordered. MRI completed-  4/5/21 XR completed 4/9/21-[facility].     Patient reports she continues to have buttock and L leg pain. She is still going to PT once a week little improvement.  She takes Hydrocodone 5/325 prn and Cyclobenzaprine 10 mg prn for pain.     It has been over 3 months since her outpatient left L5-S1 microdiscectomy.  She did well but then her pain recurred and we found out that she has now recurrent herniated disc at L5-S1.  Is not as bad as the original pain.  She has been working with physical therapy and I think it is fine to go slow on this and to resume the gabapentin that she had stopped before at 300 mg twice daily.  Perhaps this will settle down with time.  We can try a transforaminal left L5-S1 epidural block.  A fusion operation I explained to her is the backup option but she does not feel she wants to do that right now and I think that is fine.  We will do a televisit in 2 months.      Back Pain  The pain is at a severity of 4/10. The pain is moderate. The symptoms are aggravated by position. Associated symptoms include leg pain. Pertinent negatives include no bladder incontinence, bowel incontinence or fever.   Leg Pain   The pain is present in the left leg. The pain is at a severity of 4/10. The pain is moderate.       The following portions of the patient's history were reviewed and updated as appropriate: allergies, current medications, past family history, past medical history, past social history, past surgical history and problem list.    Review of Systems   Constitutional: Negative for fever.   Gastrointestinal: Negative for bowel incontinence.   Genitourinary: Positive for urgency. Negative for bladder incontinence.   Musculoskeletal: Positive for  "back pain.   All other systems reviewed and are negative.          Objective     Vitals:    04/09/21 1541   BP: 102/78   Pulse: 83   Temp: 99.2 °F (37.3 °C)   Weight: 67.6 kg (149 lb)   Height: 170.2 cm (67\")     Body mass index is 23.34 kg/m².      Physical Exam  Constitutional:       Appearance: She is well-developed.   HENT:      Head: Normocephalic and atraumatic.   Eyes:      Extraocular Movements: EOM normal.      Conjunctiva/sclera: Conjunctivae normal.      Pupils: Pupils are equal, round, and reactive to light.   Neck:      Vascular: No carotid bruit.   Neurological:      Mental Status: She is oriented to person, place, and time.      Coordination: Finger-Nose-Finger Test and Heel to Shin Test normal.      Gait: Gait is intact.      Deep Tendon Reflexes:      Reflex Scores:       Tricep reflexes are 2+ on the right side and 2+ on the left side.       Bicep reflexes are 2+ on the right side and 2+ on the left side.       Brachioradialis reflexes are 2+ on the right side and 2+ on the left side.       Patellar reflexes are 2+ on the right side and 2+ on the left side.       Achilles reflexes are 2+ on the right side and 2+ on the left side.  Psychiatric:         Speech: Speech normal.       Neurologic Exam     Mental Status   Oriented to person, place, and time.   Registration of memory: Good recent and remote memory.   Attention: normal. Concentration: normal.   Speech: speech is normal   Level of consciousness: alert  Knowledge: consistent with education.     Cranial Nerves     CN II   Visual fields full to confrontation.   Visual acuity: normal    CN III, IV, VI   Pupils are equal, round, and reactive to light.  Extraocular motions are normal.     CN V   Facial sensation intact.   Right corneal reflex: normal  Left corneal reflex: normal    CN VII   Facial expression full, symmetric.   Right facial weakness: none  Left facial weakness: none    CN VIII   Hearing: intact    CN IX, X   Palate: symmetric    CN " XI   Right sternocleidomastoid strength: normal  Left sternocleidomastoid strength: normal    CN XII   Tongue: not atrophic  Tongue deviation: none    Motor Exam   Muscle bulk: normal  Right arm tone: normal  Left arm tone: normal  Right leg tone: normal  Left leg tone: normal    Strength   Strength 5/5 except as noted.     Sensory Exam   Light touch normal.     Gait, Coordination, and Reflexes     Gait  Gait: normal    Coordination   Finger to nose coordination: normal  Heel to shin coordination: normal    Reflexes   Right brachioradialis: 2+  Left brachioradialis: 2+  Right biceps: 2+  Left biceps: 2+  Right triceps: 2+  Left triceps: 2+  Right patellar: 2+  Left patellar: 2+  Right achilles: 2+  Left achilles: 2+  Right : 2+  Left : 2+          Assessment/Plan   Independent Review of Radiographic Studies:      I personally reviewed the images from the following studies.    I reviewed the postoperative lumbar MRI done on 4/5/2021 which shows recurrence of the left L5-S1 disc herniation.  There is some collapse of the disc space at L5-S1 with small central disc protrusion at L4-L5.  Agree with the report.        Medical Decision Making:      She will continue therapy and will see if we can wait this out.  She will resume her gabapentin at 300 mg twice daily which she has enough of and then will do a televisit in 2 months.  She can try a transforaminal left-sided L5-S1 epidural block as a backup option and of course a fusion at L5-S1 is the final backup option.    We will try to stay away from that for now.      Diagnoses and all orders for this visit:    1. Herniation of lumbar intervertebral disc with radiculopathy (Primary)    2. DDD (degenerative disc disease), lumbar      Return in about 2 months (around 6/9/2021) for Televisit.

## 2021-04-09 ENCOUNTER — HOSPITAL ENCOUNTER (OUTPATIENT)
Dept: GENERAL RADIOLOGY | Facility: HOSPITAL | Age: 41
Discharge: HOME | End: 2021-04-09
Admitting: NEUROLOGICAL SURGERY

## 2021-04-09 ENCOUNTER — OFFICE VISIT (OUTPATIENT)
Dept: NEUROSURGERY | Facility: CLINIC | Age: 41
End: 2021-04-09

## 2021-04-09 ENCOUNTER — TELEPHONE (OUTPATIENT)
Dept: NEUROSURGERY | Facility: CLINIC | Age: 41
End: 2021-04-09

## 2021-04-09 VITALS
HEIGHT: 67 IN | WEIGHT: 149 LBS | DIASTOLIC BLOOD PRESSURE: 78 MMHG | SYSTOLIC BLOOD PRESSURE: 102 MMHG | BODY MASS INDEX: 23.39 KG/M2 | HEART RATE: 83 BPM | TEMPERATURE: 99.2 F

## 2021-04-09 DIAGNOSIS — M51.16 HERNIATION OF LUMBAR INTERVERTEBRAL DISC WITH RADICULOPATHY: ICD-10-CM

## 2021-04-09 DIAGNOSIS — Z98.890 STATUS POST SURGERY: ICD-10-CM

## 2021-04-09 DIAGNOSIS — M51.16 HERNIATION OF LUMBAR INTERVERTEBRAL DISC WITH RADICULOPATHY: Primary | ICD-10-CM

## 2021-04-09 DIAGNOSIS — M51.36 DDD (DEGENERATIVE DISC DISEASE), LUMBAR: ICD-10-CM

## 2021-04-09 PROCEDURE — 72114 X-RAY EXAM L-S SPINE BENDING: CPT

## 2021-04-09 PROCEDURE — 99214 OFFICE O/P EST MOD 30 MIN: CPT | Performed by: NEUROLOGICAL SURGERY

## 2021-04-09 NOTE — TELEPHONE ENCOUNTER
I called pt about XR that Dr. Thomas ordered along with her MRI. She will have them done prior to appointment today.

## 2021-04-13 ENCOUNTER — APPOINTMENT (OUTPATIENT)
Dept: PHYSICAL THERAPY | Facility: HOSPITAL | Age: 41
End: 2021-04-13

## 2021-04-18 ENCOUNTER — TELEMEDICINE (OUTPATIENT)
Dept: FAMILY MEDICINE CLINIC | Facility: TELEHEALTH | Age: 41
End: 2021-04-18

## 2021-04-18 DIAGNOSIS — R05.8 COUGH WITH CONGESTION OF PARANASAL SINUS: ICD-10-CM

## 2021-04-18 DIAGNOSIS — Z20.822 LAB TEST NEGATIVE FOR COVID-19 VIRUS: Primary | ICD-10-CM

## 2021-04-18 DIAGNOSIS — R09.81 COUGH WITH CONGESTION OF PARANASAL SINUS: ICD-10-CM

## 2021-04-18 PROCEDURE — BHEMPVIDEOVISIT: Performed by: NURSE PRACTITIONER

## 2021-04-18 NOTE — PATIENT INSTRUCTIONS
You are cleared to return to work. Follow instructions given to you by Employee Health. If you were instructed to email RTW  letter, it should be sent to [email or web address].  Please do so as soon as you are able. You may need to upload the letter into [employer], if instructed to do so by  or your . If you cannot do this, or need assistance with this step, contact . The document can be found under Letters.    Thank you,  [facility]

## 2021-04-18 NOTE — PROGRESS NOTES
Chief Complaint  Letter for School/Work (Negative Covid)    Subjective          Taty Romo presents to [facility]  Needs RTW note. She had cough due to Allergic Rhinitis and PND. She was seen at  and tested negative for Covid. She has had improvement in symptoms. At no time did she have a fever, chills, SoB, wheezing, loss of taste or smell.      Objective   Vital Signs:   There were no vitals taken for this visit.    Physical Exam  Constitutional:       General: She is not in acute distress.     Appearance: She is not ill-appearing.   HENT:      Nose: No congestion or rhinorrhea.   Pulmonary:      Effort: Pulmonary effort is normal.   Neurological:      Mental Status: She is alert.   Psychiatric:         Speech: Speech normal.         Behavior: Behavior normal.        Result Review :     Covid Tests    Common Labsle 4/15/21   COVID19 Not Detected      Comments are available for some flowsheets but are not being displayed.           Data reviewed:  note          Assessment and Plan    Diagnoses and all orders for this visit:    1. Lab test negative for COVID-19 virus (Primary)    2. Cough with congestion of paranasal sinus    You are cleared to return to work. Follow instructions given to you by Employee Health. If you were instructed to email RTW  letter, it should be sent to [email or web address].  Please do so as soon as you are able. You may need to upload the letter into [employer], if instructed to do so by  or your . If you cannot do this, or need assistance with this step, contact . The document can be found under Letters.          Follow Up   No follow-ups on file.  Patient was given instructions and counseling regarding her condition or for health maintenance advice. Please see specific information pulled into the AVS if appropriate.

## 2021-04-20 ENCOUNTER — APPOINTMENT (OUTPATIENT)
Dept: PHYSICAL THERAPY | Facility: HOSPITAL | Age: 41
End: 2021-04-20

## 2021-04-20 RX ORDER — HYDROCODONE BITARTRATE AND ACETAMINOPHEN 5; 325 MG/1; MG/1
1 TABLET ORAL EVERY 8 HOURS PRN
Qty: 30 TABLET | Refills: 0 | Status: SHIPPED | OUTPATIENT
Start: 2021-04-20 | End: 2021-05-27 | Stop reason: SDUPTHER

## 2021-04-20 RX ORDER — HYDROCODONE BITARTRATE AND ACETAMINOPHEN 5; 325 MG/1; MG/1
TABLET ORAL
Qty: 30 TABLET | Refills: 0 | Status: CANCELLED | OUTPATIENT
Start: 2021-04-20

## 2021-04-23 ENCOUNTER — IMMUNIZATION (OUTPATIENT)
Dept: VACCINE CLINIC | Facility: HOSPITAL | Age: 41
End: 2021-04-23

## 2021-04-23 PROCEDURE — 0002A: CPT | Performed by: INTERNAL MEDICINE

## 2021-04-23 PROCEDURE — 91300 HC SARSCOV02 VAC 30MCG/0.3ML IM: CPT | Performed by: INTERNAL MEDICINE

## 2021-04-26 DIAGNOSIS — F99 INSOMNIA DUE TO OTHER MENTAL DISORDER: Chronic | ICD-10-CM

## 2021-04-26 DIAGNOSIS — F51.05 INSOMNIA DUE TO OTHER MENTAL DISORDER: Chronic | ICD-10-CM

## 2021-04-26 RX ORDER — TRAZODONE HYDROCHLORIDE 100 MG/1
200 TABLET ORAL NIGHTLY
Qty: 60 TABLET | Refills: 5 | Status: SHIPPED | OUTPATIENT
Start: 2021-04-26 | End: 2021-09-21 | Stop reason: SDUPTHER

## 2021-04-27 ENCOUNTER — APPOINTMENT (OUTPATIENT)
Dept: PHYSICAL THERAPY | Facility: HOSPITAL | Age: 41
End: 2021-04-27

## 2021-05-26 RX ORDER — HYDROCODONE BITARTRATE AND ACETAMINOPHEN 5; 325 MG/1; MG/1
1 TABLET ORAL EVERY 8 HOURS PRN
Qty: 30 TABLET | Refills: 0 | Status: CANCELLED | OUTPATIENT
Start: 2021-05-26

## 2021-05-27 RX ORDER — HYDROCODONE BITARTRATE AND ACETAMINOPHEN 5; 325 MG/1; MG/1
1 TABLET ORAL EVERY 8 HOURS PRN
Qty: 30 TABLET | Refills: 0 | Status: SHIPPED | OUTPATIENT
Start: 2021-05-27 | End: 2021-06-23 | Stop reason: SDUPTHER

## 2021-06-10 ENCOUNTER — TELEPHONE (OUTPATIENT)
Dept: NEUROSURGERY | Facility: CLINIC | Age: 41
End: 2021-06-10
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
## 2021-06-18 DIAGNOSIS — F41.8 DEPRESSION WITH ANXIETY: Chronic | ICD-10-CM

## 2021-06-18 RX ORDER — DULOXETIN HYDROCHLORIDE 60 MG/1
120 CAPSULE, DELAYED RELEASE ORAL
Qty: 60 CAPSULE | Refills: 5 | Status: CANCELLED | OUTPATIENT
Start: 2021-06-18

## 2021-06-18 RX ORDER — DULOXETIN HYDROCHLORIDE 60 MG/1
120 CAPSULE, DELAYED RELEASE ORAL
Qty: 60 CAPSULE | Refills: 2 | Status: SHIPPED | OUTPATIENT
Start: 2021-06-18 | End: 2021-09-21 | Stop reason: SDUPTHER

## 2021-06-18 NOTE — TELEPHONE ENCOUNTER
----- Message from Taty Romo sent at 6/18/2021  1:11 PM EDT -----  Regarding: Prescription Question  Contact: [phone]  Dr Mcdonald I need a refill on cymbalta 60mg caps, 120mg daily.  There are no refills remaining.    I will be out this weekend. Sorry for the short notice.  Thank you so much !!   TOMAS Romo   2020 21:45

## 2021-06-23 RX ORDER — HYDROCODONE BITARTRATE AND ACETAMINOPHEN 5; 325 MG/1; MG/1
1 TABLET ORAL EVERY 12 HOURS PRN
Qty: 30 TABLET | Refills: 0 | Status: SHIPPED | OUTPATIENT
Start: 2021-06-23 | End: 2021-07-31 | Stop reason: SDUPTHER

## 2021-07-02 ENCOUNTER — TELEPHONE (OUTPATIENT)
Dept: NEUROSURGERY | Facility: CLINIC | Age: 41
End: 2021-07-02

## 2021-07-02 DIAGNOSIS — M51.26 HERNIATED LUMBAR INTERVERTEBRAL DISC: Primary | ICD-10-CM

## 2021-07-02 DIAGNOSIS — M50.321 DEGENERATION OF INTERVERTEBRAL DISC AT C4-C5 LEVEL: Primary | ICD-10-CM

## 2021-07-02 RX ORDER — GABAPENTIN 600 MG/1
300 TABLET ORAL 2 TIMES DAILY
Qty: 60 TABLET | Refills: 3 | Status: SHIPPED | OUTPATIENT
Start: 2021-07-02 | End: 2021-07-12

## 2021-07-02 RX ORDER — GABAPENTIN 300 MG/1
300 CAPSULE ORAL 2 TIMES DAILY
Status: DISCONTINUED | OUTPATIENT
Start: 2021-07-02 | End: 2021-09-15

## 2021-07-09 DIAGNOSIS — M43.17 SPONDYLOLISTHESIS AT L5-S1 LEVEL: Primary | ICD-10-CM

## 2021-07-12 RX ORDER — GABAPENTIN 600 MG/1
600 TABLET ORAL 2 TIMES DAILY
Qty: 60 TABLET | Refills: 3 | Status: SHIPPED | OUTPATIENT
Start: 2021-07-12 | End: 2022-01-05 | Stop reason: SDUPTHER

## 2021-07-12 NOTE — PROGRESS NOTES
I talked to her by telephone this evening.  She is supposed to have her transforaminal epidural block on the left at L5-S1 next week.  I think we will know if that is going to help her.  She is taking the 300 mg twice daily of gabapentin it seems to be helping a bit and is not making her too sleepy so I asked her to increase it to 600 mg twice daily and a prescription was sent to her pharmacy today.  We will see her in about 2 to 3 weeks.  We will decide at that point if we need to proceed with a fusion or continue treating her nonoperatively.

## 2021-07-19 ENCOUNTER — ANESTHESIA (OUTPATIENT)
Dept: PAIN MEDICINE | Facility: HOSPITAL | Age: 41
End: 2021-07-19

## 2021-07-19 ENCOUNTER — HOSPITAL ENCOUNTER (OUTPATIENT)
Dept: GENERAL RADIOLOGY | Facility: HOSPITAL | Age: 41
Discharge: HOME | End: 2021-07-19

## 2021-07-19 ENCOUNTER — ANESTHESIA EVENT (OUTPATIENT)
Dept: PAIN MEDICINE | Facility: HOSPITAL | Age: 41
End: 2021-07-19

## 2021-07-19 ENCOUNTER — HOSPITAL ENCOUNTER (OUTPATIENT)
Dept: PAIN MEDICINE | Facility: HOSPITAL | Age: 41
Discharge: HOME | End: 2021-07-19

## 2021-07-19 VITALS
BODY MASS INDEX: 23.54 KG/M2 | SYSTOLIC BLOOD PRESSURE: 115 MMHG | HEIGHT: 67 IN | WEIGHT: 150 LBS | RESPIRATION RATE: 14 BRPM | HEART RATE: 77 BPM | DIASTOLIC BLOOD PRESSURE: 66 MMHG | OXYGEN SATURATION: 97 %

## 2021-07-19 DIAGNOSIS — M43.17 SPONDYLOLISTHESIS AT L5-S1 LEVEL: ICD-10-CM

## 2021-07-19 DIAGNOSIS — R52 PAIN: ICD-10-CM

## 2021-07-19 PROCEDURE — 25010000002 FENTANYL CITRATE (PF) 50 MCG/ML SOLUTION: Performed by: ANESTHESIOLOGY

## 2021-07-19 PROCEDURE — 77003 FLUOROGUIDE FOR SPINE INJECT: CPT

## 2021-07-19 PROCEDURE — 25010000002 MIDAZOLAM PER 1 MG: Performed by: ANESTHESIOLOGY

## 2021-07-19 PROCEDURE — 25010000002 DEXAMETHASONE SODIUM PHOSPHATE 10 MG/ML SOLUTION: Performed by: ANESTHESIOLOGY

## 2021-07-19 PROCEDURE — 25010000002 ROPIVACAINE PER 1 MG: Performed by: ANESTHESIOLOGY

## 2021-07-19 PROCEDURE — 0 IOPAMIDOL 41 % SOLUTION: Performed by: ANESTHESIOLOGY

## 2021-07-19 RX ORDER — DEXAMETHASONE SODIUM PHOSPHATE 10 MG/ML
10 INJECTION, SOLUTION INTRAMUSCULAR; INTRAVENOUS ONCE
Status: COMPLETED | OUTPATIENT
Start: 2021-07-19 | End: 2021-07-19

## 2021-07-19 RX ORDER — SODIUM CHLORIDE 0.9 % (FLUSH) 0.9 %
3-10 SYRINGE (ML) INJECTION AS NEEDED
Status: DISCONTINUED | OUTPATIENT
Start: 2021-07-19 | End: 2021-07-20 | Stop reason: HOSPADM

## 2021-07-19 RX ORDER — SODIUM CHLORIDE 0.9 % (FLUSH) 0.9 %
3 SYRINGE (ML) INJECTION EVERY 12 HOURS SCHEDULED
Status: DISCONTINUED | OUTPATIENT
Start: 2021-07-19 | End: 2021-07-20 | Stop reason: HOSPADM

## 2021-07-19 RX ORDER — LIDOCAINE HYDROCHLORIDE 10 MG/ML
1 INJECTION, SOLUTION INFILTRATION; PERINEURAL ONCE
Status: DISCONTINUED | OUTPATIENT
Start: 2021-07-19 | End: 2021-07-20 | Stop reason: HOSPADM

## 2021-07-19 RX ORDER — FENTANYL CITRATE 50 UG/ML
50 INJECTION, SOLUTION INTRAMUSCULAR; INTRAVENOUS AS NEEDED
Status: DISCONTINUED | OUTPATIENT
Start: 2021-07-19 | End: 2021-07-20 | Stop reason: HOSPADM

## 2021-07-19 RX ORDER — LIDOCAINE HYDROCHLORIDE 10 MG/ML
0.5 INJECTION, SOLUTION INFILTRATION; PERINEURAL ONCE AS NEEDED
Status: DISCONTINUED | OUTPATIENT
Start: 2021-07-19 | End: 2021-07-20 | Stop reason: HOSPADM

## 2021-07-19 RX ORDER — ROPIVACAINE HYDROCHLORIDE 5 MG/ML
30 INJECTION, SOLUTION EPIDURAL; INFILTRATION; PERINEURAL ONCE
Status: COMPLETED | OUTPATIENT
Start: 2021-07-19 | End: 2021-07-19

## 2021-07-19 RX ORDER — MIDAZOLAM HYDROCHLORIDE 1 MG/ML
1 INJECTION INTRAMUSCULAR; INTRAVENOUS AS NEEDED
Status: DISCONTINUED | OUTPATIENT
Start: 2021-07-19 | End: 2021-07-20 | Stop reason: HOSPADM

## 2021-07-19 RX ADMIN — DEXAMETHASONE SODIUM PHOSPHATE 10 MG: 10 INJECTION, SOLUTION INTRAMUSCULAR; INTRAVENOUS at 12:04

## 2021-07-19 RX ADMIN — ROPIVACAINE HYDROCHLORIDE 30 ML: 5 INJECTION, SOLUTION EPIDURAL; INFILTRATION; PERINEURAL at 12:05

## 2021-07-19 RX ADMIN — MIDAZOLAM 2 MG: 1 INJECTION INTRAMUSCULAR; INTRAVENOUS at 11:55

## 2021-07-19 RX ADMIN — FENTANYL CITRATE 100 MCG: 50 INJECTION, SOLUTION INTRAMUSCULAR; INTRAVENOUS at 11:55

## 2021-07-19 RX ADMIN — IOPAMIDOL 10 ML: 408 INJECTION, SOLUTION INTRATHECAL at 12:04

## 2021-07-19 NOTE — ANESTHESIA PROCEDURE NOTES
Left-sided L5-S1 transforaminal epidural steroid and local anesthetic injection    Pre-sedation assessment completed: 7/19/2021 11:47 AM    Patient reassessed immediately prior to procedure    Patient location during procedure: pain clinic  Start Time: 7/19/2021 11:53 AM  Stop Time: 7/19/2021 12:07 PM  Indication:procedure for pain  Performed By  Anesthesiologist: Bernardo Urias MD  Preanesthetic Checklist  Completed: patient identified, IV checked, site marked, risks and benefits discussed, surgical consent, monitors and equipment checked, pre-op evaluation and timeout performed  Additional Notes  Post-Op Diagnosis Codes:     * Spondylolisthesis, lumbar region (M43.16)    The patient was observed in recovery with no evidence of neurological deficits or other problems. All questions were answered. The patient was discharged with appropriate instructions.  Prep:  Pt Position:prone  Sterile Tech:cap, gloves, mask and sterile barrier  Prep:chlorhexidine gluconate and isopropyl alcohol  Monitoring:blood pressure monitoring, continuous pulse oximetry and EKG  Procedure:Sedation: yes     Approach: Classic left-sided approach.  Guidance: fluoroscopy  Location:lumbar  Level:L5-S1  Needle Type:Other (spinal)  Epidural needle gauge: 25 G.  Aspiration:negative  Medications:  Analgesia: Dexamethasone (PF) 10mg with 1cc 0.5% Ropivacaine (PF)  Isovue:1mL  Comments:Appropriate spread of contrast with no obvious inappropriate spread.   Post Assessment:  Post-procedure: Dressing per nursing.  Pt Tolerance:patient tolerated the procedure well with no apparent complications  Complications:no

## 2021-07-19 NOTE — H&P
CHIEF COMPLAINT:  Low back pain        HISTORY OF PRESENT ILLNESS:  This patient is complaining of low back pain which radiates down her left greater than right leg.  Her pain is a 7 out of 10 on the pain scale.  The pain is described as aching, burning, hot, deep, intermittent, numb, pressure, tingling, sharp, spasming, stabbing, throbbing in nature. The pain is exacerbated by activity, lifting, sitting, standing, twisting, and walking, and relieved by lying down.  The patient has tried conservative therapy for greater than 6 weeks including: Rest, heat, over-the-counter medication, and prescription medication.  The pain is significantly decreasing the patient's Activities of Daily Living.      Diagnostic imaging: MRI of the lumbar spine with and without contrast from 4/5/2021 shows at L5-S1 history of a left laminectomy with the possibility of recurrent disc protrusion at L5-S1.  Degenerative retrolisthesis of L5 on S1 is also noted.  The full dictation by the radiologist is available in the chart.    Of note, she had a left L5-S1 [facility]rx microdiscectomy on 1/8/21    This patient was referred to our clinic by Dr. Alessandro Thomas for left-sided transforaminal epidural steroid and local anesthetic injection at L5-S1.       PAST MEDICAL HISTORY:  Current Outpatient Medications on File Prior to Encounter   Medication Sig Dispense Refill   • acetaminophen (Tylenol 8 Hour Arthritis Pain) 650 MG 8 hr tablet Take 1 tablet by mouth Every 8 (Eight) Hours As Needed for Mild Pain  or Moderate Pain . 100 tablet 2   • azelastine (ASTELIN) 0.1 % nasal spray Place 2 sprays into the nostril(s) as directed by provider 2 (Two) Times a Day. (Patient taking differently: 2 sprays into the nostril(s) as directed by provider As Needed.) 30 mL 5   • Calcium Polycarbophil (FIBER-CAPS PO) Take 2 capsules by mouth Daily.     • Cholecalciferol (VITAMIN D) 2000 UNITS capsule Take 3,000 Units by mouth Every Evening.     • cyclobenzaprine  (FLEXERIL) 10 MG tablet Take 1 tablet by mouth 3 (Three) Times a Day As Needed for Muscle Spasms. 30 tablet 0   • DULoxetine (CYMBALTA) 60 MG capsule Take 2 capsules by mouth Every Morning Before Breakfast. 60 capsule 2   • fexofenadine (ALLEGRA) 180 MG tablet Take 180 mg by mouth Daily.     • fluticasone (VERAMYST) 27.5 MCG/SPRAY nasal spray Place 1 spray into the nostril(s) as directed by provider 2 (Two) Times a Day. (Patient taking differently: 1 spray into the nostril(s) as directed by provider As Needed.) 9.1 mL 5   • gabapentin (NEURONTIN) 600 MG tablet Take 1 tablet by mouth 2 (Two) Times a Day. 60 tablet 3   • HYDROcodone-acetaminophen (NORCO) 5-325 MG per tablet Take 1 tablet by mouth Every 12 (Twelve) Hours As Needed for Severe Pain . 30 tablet 0   • hydroxychloroquine (PLAQUENIL) 200 MG tablet Take 1 tablet by mouth 2 (two) times a day. 60 tablet 5   • levothyroxine (SYNTHROID, LEVOTHROID) 100 MCG tablet Take 1 tablet by mouth Daily. 90 tablet 3   • ondansetron (Zofran) 4 MG tablet Take 1 tablet by mouth Every 8 (Eight) Hours As Needed for Nausea or Vomiting. 30 tablet 0   • traZODone (DESYREL) 100 MG tablet Take 2 tablets by mouth Every Night. 60 tablet 5     Current Facility-Administered Medications on File Prior to Encounter   Medication Dose Route Frequency Provider Last Rate Last Admin   • gabapentin (NEURONTIN) capsule 300 mg  300 mg Oral BID Alessandro Thomas MD           Past Medical History:   Diagnosis Date   • Anxiety    • Bulging lumbar disc    • Colon polyp    • Depression    • Disease of thyroid gland     HYPOTHYROIDISM D/T PARTIAL THYROIDECTOMY   • Fibromyalgia, primary    • Frequent UTI    • Headache, tension-type    • Hemorrhoids    • Hypothyroidism    • Lupus (CMS/HCC)     DX 3-2007   • Migraine    • Peripheral neuropathy 2017    KNEES DOWN   • Seasonal allergies    • Spondylolisthesis    • Thrombocytopenia (CMS/HCC) 2012    due to ITP       SOCIAL HISTORY:  Counseled to avoid  tobacco     REVIEW OF SYSTEMS:  No pertinent hematologic, infectious, or constitutional symptoms.  Other review of systems non-contributory     PHYSICAL EXAM:  There were no vitals taken for this visit.  Constitutional: Well-developed well-nourished no acute distress  General: Alert and oriented  Neuromuscular: Straight leg raise test is positive left greater than right.  She has decreased range of motion of her lumbar spine pain with bending forward.  She has pain with standing up from a bent forward position.       DIAGNOSIS:  Post-Op Diagnosis Codes:  Post-Op Diagnosis Codes:     * Spondylolisthesis, lumbar region [M43.16]    PLAN:  -  Left-sided transforaminal epidural steroid and local anesthetic injections at the planned level of L5-S1 spaced approximately 2-4 weeks apart.  If pain control is acceptable after this injection, it was discussed with the patient that they may return for the subsequent injections if and when their pain returns.    - Risks were discussed with the patient, including but not limited to: failure of relief, worsening pain, tissue, nerve, blood vessel or spinal cord damage including infarction, post-dural-puncture headache, bleeding, epidural hematoma, infection, and epidural abscess. Benefits and alternatives were also discussed. No promises were made. Risks/benefits/alternatives of procedural medications were also discussed, including but not limited to hyperglycemia, fluid retention, decreased immune system, osteoporosis, and anaphylactoid reactions. The patient voiced understanding and agreed to proceed.   -  Physical therapy exercises at home should be considered, as tolerated.  -  It is recommended that the patient use the least amount of opioid medication possible.     -  Heat and ice to the affected area as tolerated for pain control.  It was discussed that heating pads can cause burns.  -  Daily low impact exercise such as walking or water exercise was recommended to maintain  overall health and aid in weight control.   -  Patient was counseled to abstain from tobacco products.  -  Follow up as needed for subsequent injections.  -My understanding is that this patient is possibly a surgical candidate in the future.  Of course, I will leave that between the patient and her spine surgeon.    Bernardo Urias M.D.  Partner, [facility], [facility]  Board Certified in Pain Medicine by the American Board of Anesthesiology  Board Certified in Anesthesiology by the American Board of Anesthesiology     Much of this encounter note is an electronic transcription/translation of spoken language to printed text. The electronic translation of spoken language may permit erroneous, or at times, nonsensical words or phrases to be inadvertently transcribed. Although I have reviewed the note for such errors, some may still exist.

## 2021-07-31 RX ORDER — HYDROCODONE BITARTRATE AND ACETAMINOPHEN 5; 325 MG/1; MG/1
1 TABLET ORAL EVERY 12 HOURS PRN
Qty: 30 TABLET | Refills: 0 | Status: CANCELLED | OUTPATIENT
Start: 2021-07-31

## 2021-08-02 RX ORDER — HYDROCODONE BITARTRATE AND ACETAMINOPHEN 5; 325 MG/1; MG/1
1 TABLET ORAL EVERY 12 HOURS PRN
Qty: 30 TABLET | Refills: 0 | Status: SHIPPED | OUTPATIENT
Start: 2021-08-02 | End: 2021-08-22 | Stop reason: SDUPTHER

## 2021-08-03 ENCOUNTER — DOCUMENTATION (OUTPATIENT)
Dept: SURGERY | Facility: CLINIC | Age: 41
End: 2021-08-03

## 2021-08-03 NOTE — PROGRESS NOTES
5 YR CY, last done 11/21/2016.     08/04/2021, mailed colonoscopy recall letter to patient.     Thank you.

## 2021-08-04 ENCOUNTER — OFFICE VISIT (OUTPATIENT)
Dept: NEUROSURGERY | Facility: CLINIC | Age: 41
End: 2021-08-04

## 2021-08-04 VITALS
WEIGHT: 156 LBS | DIASTOLIC BLOOD PRESSURE: 68 MMHG | SYSTOLIC BLOOD PRESSURE: 116 MMHG | TEMPERATURE: 98.4 F | BODY MASS INDEX: 24.48 KG/M2 | HEIGHT: 67 IN

## 2021-08-04 DIAGNOSIS — M51.36 DDD (DEGENERATIVE DISC DISEASE), LUMBAR: ICD-10-CM

## 2021-08-04 DIAGNOSIS — M51.26 HERNIATED LUMBAR INTERVERTEBRAL DISC: Primary | ICD-10-CM

## 2021-08-04 PROCEDURE — 99213 OFFICE O/P EST LOW 20 MIN: CPT | Performed by: NEUROLOGICAL SURGERY

## 2021-08-04 NOTE — PROGRESS NOTES
Subjective   Patient ID: Taty Romo is a 41 y.o. female is here today for follow-up. Patient was last seen 3/11/21 Status post surgery as a tele visit.      4/9/21  XR L spine  [facility]  4/5/21  MRI L spine [facility]    Today patient reports after her epidural.  She states she has bilateral leg pain, more prominent on left than right, numbness and tingling.    The patient has a known recurrent left L5-S1 herniated disc.  We are trying to avoid a fusion surgery.  She had a transforaminal block about 2 or 3 weeks ago and it helped.  We changed her gabapentin to 300/600.  She can take it without too much sleepiness.  Since she is better I think we can hold off on a fusion surgery at L5-S1.  She is working.  We will have her come back in 4 months.  If there is any severe worsening she will let us know and we can try another transforaminal block at the pain clinic.      Back Pain  The pain is present in the lumbar spine. The quality of the pain is described as aching, burning and shooting. The pain radiates to the right thigh, right knee, left thigh and left knee. The pain is at a severity of 5/10. The pain is worse during the night. The symptoms are aggravated by sitting, bending and standing. Associated symptoms include numbness and tingling. Pertinent negatives include no fever. She has tried home exercises and bed rest for the symptoms. The treatment provided mild relief.       The following portions of the patient's history were reviewed and updated as appropriate: allergies, current medications, past family history, past medical history, past social history, past surgical history and problem list.    Review of Systems   Constitutional: Negative for chills and fever.   HENT: Negative for congestion.    Musculoskeletal: Positive for back pain.   Neurological: Positive for tingling and numbness.   All other systems reviewed and are negative.          Objective     Vitals:    08/04/21 1404   BP: 116/68   Temp: 98.4 °F (36.9  "°C)   Weight: 70.8 kg (156 lb)   Height: 170.2 cm (67\")     Body mass index is 24.43 kg/m².      Physical Exam  Constitutional:       Appearance: She is well-developed.   HENT:      Head: Normocephalic and atraumatic.   Eyes:      Extraocular Movements: EOM normal.      Conjunctiva/sclera: Conjunctivae normal.      Pupils: Pupils are equal, round, and reactive to light.   Neck:      Vascular: No carotid bruit.   Neurological:      Mental Status: She is oriented to person, place, and time.      Coordination: Finger-Nose-Finger Test and Heel to Shin Test normal.      Gait: Gait is intact.      Deep Tendon Reflexes:      Reflex Scores:       Tricep reflexes are 2+ on the right side and 2+ on the left side.       Bicep reflexes are 2+ on the right side and 2+ on the left side.       Brachioradialis reflexes are 2+ on the right side and 2+ on the left side.       Patellar reflexes are 2+ on the right side and 2+ on the left side.       Achilles reflexes are 2+ on the right side and 2+ on the left side.  Psychiatric:         Speech: Speech normal.       Neurologic Exam     Mental Status   Oriented to person, place, and time.   Registration of memory: Good recent and remote memory.   Attention: normal. Concentration: normal.   Speech: speech is normal   Level of consciousness: alert  Knowledge: consistent with education.     Cranial Nerves     CN II   Visual fields full to confrontation.   Visual acuity: normal    CN III, IV, VI   Pupils are equal, round, and reactive to light.  Extraocular motions are normal.     CN V   Facial sensation intact.   Right corneal reflex: normal  Left corneal reflex: normal    CN VII   Facial expression full, symmetric.   Right facial weakness: none  Left facial weakness: none    CN VIII   Hearing: intact    CN IX, X   Palate: symmetric    CN XI   Right sternocleidomastoid strength: normal  Left sternocleidomastoid strength: normal    CN XII   Tongue: not atrophic  Tongue deviation: " none    Motor Exam   Muscle bulk: normal  Right arm tone: normal  Left arm tone: normal  Right leg tone: normal  Left leg tone: normal    Strength   Strength 5/5 except as noted.     Sensory Exam   Light touch normal.     Gait, Coordination, and Reflexes     Gait  Gait: normal    Coordination   Finger to nose coordination: normal  Heel to shin coordination: normal    Reflexes   Right brachioradialis: 2+  Left brachioradialis: 2+  Right biceps: 2+  Left biceps: 2+  Right triceps: 2+  Left triceps: 2+  Right patellar: 2+  Left patellar: 2+  Right achilles: 2+  Left achilles: 2+  Right : 2+  Left : 2+          Assessment/Plan   Independent Review of Radiographic Studies:      I personally reviewed the images from the following studies.    The lumbar MRI done on 4/5/2021 does show evidence of recurrent left L5-S1 herniated disc with disc space collapse.  Agree with the report.        Medical Decision Making:      We will continue to follow her since she is better.  I will see her in 4 months.  She will continue the gabapentin at 300/600.  If things flareup again then she wants to try another transforaminal block she will let us know and we can set it up.      Diagnoses and all orders for this visit:    1. Herniated lumbar intervertebral disc (Primary)    2. DDD (degenerative disc disease), lumbar      Return in about 4 months (around 12/4/2021) for Face-to-face.

## 2021-08-22 DIAGNOSIS — M51.26 HERNIATED LUMBAR INTERVERTEBRAL DISC: Primary | ICD-10-CM

## 2021-08-22 RX ORDER — HYDROCODONE BITARTRATE AND ACETAMINOPHEN 5; 325 MG/1; MG/1
1 TABLET ORAL EVERY 12 HOURS PRN
Qty: 30 TABLET | Refills: 0 | Status: CANCELLED | OUTPATIENT
Start: 2021-08-22

## 2021-08-23 RX ORDER — HYDROCODONE BITARTRATE AND ACETAMINOPHEN 5; 325 MG/1; MG/1
1 TABLET ORAL EVERY 12 HOURS PRN
Qty: 20 TABLET | Refills: 0 | Status: SHIPPED | OUTPATIENT
Start: 2021-08-23 | End: 2021-09-02

## 2021-08-23 NOTE — TELEPHONE ENCOUNTER
Rx Refill Note  Requested Prescriptions     Pending Prescriptions Disp Refills   • HYDROcodone-acetaminophen (NORCO) 5-325 MG per tablet 30 tablet 0     Sig: Take 1 tablet by mouth Every 12 (Twelve) Hours As Needed for Severe Pain .      Last office visit with prescribing clinician: 8/4/2021      Next office visit with prescribing clinician: 12/6/2021            Rosa Isela Marx MA  08/23/21, 08:05 EDT

## 2021-09-15 ENCOUNTER — OFFICE VISIT (OUTPATIENT)
Dept: INTERNAL MEDICINE | Age: 41
End: 2021-09-15

## 2021-09-15 VITALS
OXYGEN SATURATION: 98 % | SYSTOLIC BLOOD PRESSURE: 100 MMHG | WEIGHT: 154 LBS | DIASTOLIC BLOOD PRESSURE: 60 MMHG | BODY MASS INDEX: 24.17 KG/M2 | HEART RATE: 74 BPM | HEIGHT: 67 IN | TEMPERATURE: 98.2 F

## 2021-09-15 DIAGNOSIS — M32.9 SYSTEMIC LUPUS ERYTHEMATOSUS, UNSPECIFIED SLE TYPE, UNSPECIFIED ORGAN INVOLVEMENT STATUS (HCC): ICD-10-CM

## 2021-09-15 DIAGNOSIS — E03.8 HYPOTHYROIDISM DUE TO HASHIMOTO'S THYROIDITIS: Chronic | ICD-10-CM

## 2021-09-15 DIAGNOSIS — E06.3 HYPOTHYROIDISM DUE TO HASHIMOTO'S THYROIDITIS: Chronic | ICD-10-CM

## 2021-09-15 DIAGNOSIS — M13.0 POLYARTHRITIS: ICD-10-CM

## 2021-09-15 DIAGNOSIS — R53.82 CHRONIC FATIGUE: Primary | ICD-10-CM

## 2021-09-15 DIAGNOSIS — F41.8 DEPRESSION WITH ANXIETY: Chronic | ICD-10-CM

## 2021-09-15 PROCEDURE — 99214 OFFICE O/P EST MOD 30 MIN: CPT | Performed by: INTERNAL MEDICINE

## 2021-09-15 RX ORDER — HYDROCODONE BITARTRATE AND ACETAMINOPHEN 5; 325 MG/1; MG/1
1 TABLET ORAL EVERY 6 HOURS PRN
COMMUNITY
End: 2021-09-21 | Stop reason: SDUPTHER

## 2021-09-15 NOTE — ASSESSMENT & PLAN NOTE
Mom passed in May. Not seeing a therapist. Depression seems little worse but overall stable. Continue duloxetine 120 mg qd.

## 2021-09-15 NOTE — ASSESSMENT & PLAN NOTE
Increased fatigue, arthralgia of hands/fingers, neuropathy, depression.  Check ESR and CRP. Recommended follow-up with rheumatologist.

## 2021-09-15 NOTE — PROGRESS NOTES
I N T E R N A L  M E D I C I N E  J U N O H  K I M,  M D      ENCOUNTER DATE:  09/15/2021    Taty Romo / 41 y.o. / female      CHIEF COMPLAINT / REASON FOR OFFICE VISIT     Depression with anxiety and Hypothyroidism      ASSESSMENT & PLAN     Problem List Items Addressed This Visit        Medium    Depression with anxiety (Chronic)    Overview     Has taken medication since 19.   - Mom PASSED 5/2021     Continue duloxetine 120 mg qd         Current Assessment & Plan     Mom passed in May. Not seeing a therapist. Depression seems little worse but overall stable. Continue duloxetine 120 mg qd.          Relevant Medications    traZODone (DESYREL) 100 MG tablet    DULoxetine (CYMBALTA) 60 MG capsule    Hypothyroidism due to Hashimoto's thyroiditis (Chronic)    Overview     S/p partial thyroidectomy (right side) 2015 for adenoma.     Continue levothyroxine 100 mcg qam.          Relevant Medications    levothyroxine (SYNTHROID, LEVOTHROID) 100 MCG tablet    Other Relevant Orders    TSH+Free T4    Systemic lupus erythematosus (CMS/HCC) (Chronic)    Overview     Dx 2007 started on Plaquenil  *Tak[place]ishi    Continue hydroxychloroquine 200 mg BID          Current Assessment & Plan     Increased fatigue, arthralgia of hands/fingers, neuropathy, depression.  Check ESR and CRP. Recommended follow-up with rheumatologist.          Relevant Orders    Comprehensive Metabolic Panel    CBC & Differential    Urinalysis With Microscopic If Indicated (No Culture) - Urine, Clean Catch    Vitamin D 25 Hydroxy    C-reactive Protein    Sedimentation Rate      Other Visit Diagnoses     Chronic fatigue    -  Primary    Relevant Orders    Vitamin B12    Polyarthritis            Orders Placed This Encounter   Procedures   • Comprehensive Metabolic Panel   • TSH+Free T4   • Urinalysis With Microscopic If Indicated (No Culture) - Urine, Clean Catch   • Vitamin D 25 Hydroxy   • Vitamin B12   • C-reactive Protein   • Sedimentation Rate   •  "CBC & Differential     No orders of the defined types were placed in this encounter.      SUMMARY/DISCUSSION  •       Next Appointment with me: Visit date not found    Return in about 3 months (around 12/15/2021) for Reassess today's problem(s).      VITAL SIGNS     Visit Vitals  /60 (BP Location: Left arm)   Pulse 74   Temp 98.2 °F (36.8 °C)   Ht 170.2 cm (67\")   Wt 69.9 kg (154 lb)   LMP 09/13/2021 (Exact Date)   SpO2 98%   BMI 24.12 kg/m²       BP Readings from Last 3 Encounters:   09/15/21 100/60   08/04/21 116/68   07/19/21 115/66     Wt Readings from Last 3 Encounters:   09/15/21 69.9 kg (154 lb)   08/04/21 70.8 kg (156 lb)   07/19/21 68 kg (150 lb)     Body mass index is 24.12 kg/m².      MEDICATIONS AT THE TIME OF OFFICE VISIT     Current Outpatient Medications on File Prior to Visit   Medication Sig   • acetaminophen (Tylenol 8 Hour Arthritis Pain) 650 MG 8 hr tablet Take 1 tablet by mouth Every 8 (Eight) Hours As Needed for Mild Pain  or Moderate Pain .   • azelastine (ASTELIN) 0.1 % nasal spray Place 2 sprays into the nostril(s) as directed by provider 2 (Two) Times a Day. (Patient taking differently: 2 sprays into the nostril(s) as directed by provider As Needed.)   • Calcium Polycarbophil (FIBER-CAPS PO) Take 2 capsules by mouth Daily.   • Cholecalciferol (VITAMIN D) 2000 UNITS capsule Take 3,000 Units by mouth Every Evening.   • cyclobenzaprine (FLEXERIL) 10 MG tablet Take 1 tablet by mouth 3 (Three) Times a Day As Needed for Muscle Spasms.   • DULoxetine (CYMBALTA) 60 MG capsule Take 2 capsules by mouth Every Morning Before Breakfast.   • fexofenadine (ALLEGRA) 180 MG tablet Take 180 mg by mouth Daily.   • fluticasone (VERAMYST) 27.5 MCG/SPRAY nasal spray Place 1 spray into the nostril(s) as directed by provider 2 (Two) Times a Day. (Patient taking differently: 1 spray into the nostril(s) as directed by provider As Needed.)   • gabapentin (NEURONTIN) 600 MG tablet Take 1 tablet by mouth 2 (Two) " Times a Day. (Patient taking differently: Take 600 mg by mouth 2 (Two) Times a Day. 300 mg am 600 mg pm total 900 mg daily)   • HYDROcodone-acetaminophen (NORCO) 5-325 MG per tablet Take 1 tablet by mouth Every 6 (Six) Hours As Needed.   • hydroxychloroquine (PLAQUENIL) 200 MG tablet Take 1 tablet by mouth 2 (two) times a day.   • levothyroxine (SYNTHROID, LEVOTHROID) 100 MCG tablet Take 1 tablet by mouth Daily.   • ondansetron (Zofran) 4 MG tablet Take 1 tablet by mouth Every 8 (Eight) Hours As Needed for Nausea or Vomiting.   • traZODone (DESYREL) 100 MG tablet Take 2 tablets by mouth Every Night.     Current Facility-Administered Medications on File Prior to Visit   Medication   • [DISCONTINUED] gabapentin (NEURONTIN) capsule 300 mg          HISTORY OF PRESENT ILLNESS     Her mother passed away in May 2021.  Depression is ongoing on duloxetine 60 mg 2 capsules daily.  No suicidal thoughts or ideations.  Complains of worsening chronic fatigue as well as polyarthritis of her hands and wrists.  She is on Plaquenil for SLE by her rheumatologist.  She denies fever, chills, new onset of skin rash.  She has ongoing neuropathy symptoms due to her cervical spine and lumbar spinal problems.  She has Hashimoto's thyroiditis hypothyroidism most recent thyroid levels were within normal.      Patient Care Team:  Jeremy Mcdonald MD as PCP - General (Internal Medicine)  Shaheed Blanco MD as Consulting Physician (Rheumatology)  Angy Hunter MD as Consulting Physician (Obstetrics and Gynecology)    REVIEW OF SYSTEMS     Review of Systems       PHYSICAL EXAMINATION     Physical Exam  No acute distress   Cardiovascular: Normal rate, regular rhythm.  Pulm/Chest: Effort normal, breath sounds normal.  Hands: arthritis of DIP's of both hands, mild edema of hand and wrists, mild swelling of bilateral wrists   Psych: depression with normal thought and judgment, no suicidal ideation       REVIEWED DATA     Labs:     Lab Results    Component Value Date     01/05/2021    K 3.8 01/05/2021    CALCIUM 8.8 01/05/2021    AST 26 01/28/2020    ALT 23 01/28/2020    BUN 14 01/05/2021    CREATININE 0.67 01/05/2021    CREATININE 0.8 01/28/2020    CREATININE 0.8 09/19/2019    EGFRIFNONA 98 01/05/2021    EGFRIFAFRI 93 01/30/2019       No results found for: HGBA1C    No results found for: LDL, HDL, TRIG    Lab Results   Component Value Date    TSH 2.530 07/30/2020    TSH 1.780 09/18/2019    TSH 0.473 01/30/2019    FREET4 1.49 07/30/2020    FREET4 1.43 09/18/2019    FREET4 1.35 01/30/2019       Lab Results   Component Value Date    WBC 3.08 (L) 01/05/2021    HGB 12.8 01/05/2021     (L) 01/05/2021     Lab Results   Component Value Date    PROTEIN Negative 01/30/2019    GLUCOSEU Negative 01/30/2019    BLOODU Negative 01/30/2019    NITRITEU Negative 01/30/2019    LEUKOCYTESUR Trace (A) 01/30/2019      No results found for: [id number]     Imaging:           Medical Tests:           Summary of old records / correspondence / consultant report:           Request outside records:             *Examiner was wearing KN95 mask and eye protection during the entire duration of the visit. Patient was masked the entire time. Minimum social distance of 6 ft maintained entire visit except if physical contact was necessary as documented.     **Dragon Disclaimer: Much of this encounter note is an electronic transcription/translation of spoken language to printed text. The electronic translation of spoken language may permit erroneous, or at times, nonsensical words or phrases to be inadvertently transcribed. Although I have reviewed the note for such errors, some may still exist.       Template created by Adan Mcdonald MD

## 2021-09-16 LAB
25(OH)D3+25(OH)D2 SERPL-MCNC: 60.4 NG/ML (ref 30–100)
ALBUMIN SERPL-MCNC: 4.9 G/DL (ref 3.5–5.2)
ALBUMIN/GLOB SERPL: 2.5 G/DL
ALP SERPL-CCNC: 42 U/L (ref 39–117)
ALT SERPL-CCNC: 14 U/L (ref 1–33)
APPEARANCE UR: CLEAR
AST SERPL-CCNC: 17 U/L (ref 1–32)
BASOPHILS # BLD AUTO: 0.02 10*3/MM3 (ref 0–0.2)
BASOPHILS NFR BLD AUTO: 0.6 % (ref 0–1.5)
BILIRUB SERPL-MCNC: 0.3 MG/DL (ref 0–1.2)
BILIRUB UR QL STRIP: NEGATIVE
BUN SERPL-MCNC: 11 MG/DL (ref 6–20)
BUN/CREAT SERPL: 12.4 (ref 7–25)
CALCIUM SERPL-MCNC: 9.7 MG/DL (ref 8.6–10.5)
CHLORIDE SERPL-SCNC: 101 MMOL/L (ref 98–107)
CO2 SERPL-SCNC: 24.1 MMOL/L (ref 22–29)
COLOR UR: YELLOW
CREAT SERPL-MCNC: 0.89 MG/DL (ref 0.57–1)
CRP SERPL-MCNC: <0.3 MG/DL (ref 0–0.5)
EOSINOPHIL # BLD AUTO: 0.04 10*3/MM3 (ref 0–0.4)
EOSINOPHIL NFR BLD AUTO: 1.3 % (ref 0.3–6.2)
ERYTHROCYTE [DISTWIDTH] IN BLOOD BY AUTOMATED COUNT: 12.9 % (ref 12.3–15.4)
ERYTHROCYTE [SEDIMENTATION RATE] IN BLOOD BY WESTERGREN METHOD: 5 MM/HR (ref 0–20)
GLOBULIN SER CALC-MCNC: 2 GM/DL
GLUCOSE SERPL-MCNC: 90 MG/DL (ref 65–99)
GLUCOSE UR QL: NEGATIVE
HCT VFR BLD AUTO: 39.5 % (ref 34–46.6)
HGB BLD-MCNC: 13.1 G/DL (ref 12–15.9)
HGB UR QL STRIP: NEGATIVE
IMM GRANULOCYTES # BLD AUTO: 0 10*3/MM3 (ref 0–0.05)
IMM GRANULOCYTES NFR BLD AUTO: 0 % (ref 0–0.5)
KETONES UR QL STRIP: NEGATIVE
LEUKOCYTE ESTERASE UR QL STRIP: NEGATIVE
LYMPHOCYTES # BLD AUTO: 0.85 10*3/MM3 (ref 0.7–3.1)
LYMPHOCYTES NFR BLD AUTO: 26.6 % (ref 19.6–45.3)
MCH RBC QN AUTO: 31.5 PG (ref 26.6–33)
MCHC RBC AUTO-ENTMCNC: 33.2 G/DL (ref 31.5–35.7)
MCV RBC AUTO: 95 FL (ref 79–97)
MONOCYTES # BLD AUTO: 0.26 10*3/MM3 (ref 0.1–0.9)
MONOCYTES NFR BLD AUTO: 8.2 % (ref 5–12)
NEUTROPHILS # BLD AUTO: 2.02 10*3/MM3 (ref 1.7–7)
NEUTROPHILS NFR BLD AUTO: 63.3 % (ref 42.7–76)
NITRITE UR QL STRIP: NEGATIVE
NRBC BLD AUTO-RTO: 0 /100 WBC (ref 0–0.2)
PH UR STRIP: 5.5 [PH] (ref 5–8)
PLATELET # BLD AUTO: 120 10*3/MM3 (ref 140–450)
POTASSIUM SERPL-SCNC: 3.8 MMOL/L (ref 3.5–5.2)
PROT SERPL-MCNC: 6.9 G/DL (ref 6–8.5)
PROT UR QL STRIP: NEGATIVE
RBC # BLD AUTO: 4.16 10*6/MM3 (ref 3.77–5.28)
SODIUM SERPL-SCNC: 135 MMOL/L (ref 136–145)
SP GR UR: 1.02 (ref 1–1.03)
T4 FREE SERPL-MCNC: 1.33 NG/DL (ref 0.93–1.7)
TSH SERPL DL<=0.005 MIU/L-ACNC: 1.73 UIU/ML (ref 0.27–4.2)
UROBILINOGEN UR STRIP-MCNC: NORMAL MG/DL
VIT B12 SERPL-MCNC: 906 PG/ML (ref 211–946)
WBC # BLD AUTO: 3.19 10*3/MM3 (ref 3.4–10.8)

## 2021-09-16 NOTE — PROGRESS NOTES
Nildahart:    Taty, here are the result(s) of your test(s):       Inflammatory markers are normal. Urinalysis is normal.     White blood count and platelets remain low but stable    Sodium is mildly low. Follow.     Thyroid is stable     B12 and vitamin D levels are normal       Please do not hesitate to contact me if you have questions.

## 2021-09-21 DIAGNOSIS — E06.3 HYPOTHYROIDISM DUE TO HASHIMOTO'S THYROIDITIS: Chronic | ICD-10-CM

## 2021-09-21 DIAGNOSIS — F51.05 INSOMNIA DUE TO OTHER MENTAL DISORDER: Chronic | ICD-10-CM

## 2021-09-21 DIAGNOSIS — F41.8 DEPRESSION WITH ANXIETY: Chronic | ICD-10-CM

## 2021-09-21 DIAGNOSIS — E03.8 HYPOTHYROIDISM DUE TO HASHIMOTO'S THYROIDITIS: Chronic | ICD-10-CM

## 2021-09-21 DIAGNOSIS — F99 INSOMNIA DUE TO OTHER MENTAL DISORDER: Chronic | ICD-10-CM

## 2021-09-21 RX ORDER — LEVOTHYROXINE SODIUM 0.1 MG/1
100 TABLET ORAL DAILY
Qty: 90 TABLET | Refills: 3 | Status: CANCELLED | OUTPATIENT
Start: 2021-09-21

## 2021-09-21 RX ORDER — TRAZODONE HYDROCHLORIDE 100 MG/1
200 TABLET ORAL NIGHTLY
Qty: 60 TABLET | Refills: 5 | Status: CANCELLED | OUTPATIENT
Start: 2021-09-21

## 2021-09-21 RX ORDER — HYDROCODONE BITARTRATE AND ACETAMINOPHEN 5; 325 MG/1; MG/1
1 TABLET ORAL EVERY 12 HOURS PRN
Qty: 60 TABLET | Refills: 0 | Status: SHIPPED | OUTPATIENT
Start: 2021-09-21 | End: 2022-01-11

## 2021-09-21 RX ORDER — DULOXETIN HYDROCHLORIDE 60 MG/1
120 CAPSULE, DELAYED RELEASE ORAL
Qty: 60 CAPSULE | Refills: 5 | Status: CANCELLED | OUTPATIENT
Start: 2021-09-21

## 2021-09-22 DIAGNOSIS — F99 INSOMNIA DUE TO OTHER MENTAL DISORDER: Chronic | ICD-10-CM

## 2021-09-22 DIAGNOSIS — E03.8 HYPOTHYROIDISM DUE TO HASHIMOTO'S THYROIDITIS: Chronic | ICD-10-CM

## 2021-09-22 DIAGNOSIS — F51.05 INSOMNIA DUE TO OTHER MENTAL DISORDER: Chronic | ICD-10-CM

## 2021-09-22 DIAGNOSIS — E06.3 HYPOTHYROIDISM DUE TO HASHIMOTO'S THYROIDITIS: Chronic | ICD-10-CM

## 2021-09-22 DIAGNOSIS — F41.8 DEPRESSION WITH ANXIETY: Chronic | ICD-10-CM

## 2021-09-22 RX ORDER — DULOXETIN HYDROCHLORIDE 60 MG/1
120 CAPSULE, DELAYED RELEASE ORAL
Qty: 180 CAPSULE | Refills: 1 | Status: SHIPPED | OUTPATIENT
Start: 2021-09-22 | End: 2022-03-24

## 2021-09-22 RX ORDER — LEVOTHYROXINE SODIUM 0.1 MG/1
100 TABLET ORAL DAILY
Qty: 90 TABLET | Refills: 3 | Status: SHIPPED | OUTPATIENT
Start: 2021-09-22 | End: 2022-10-09 | Stop reason: SDUPTHER

## 2021-09-22 RX ORDER — TRAZODONE HYDROCHLORIDE 100 MG/1
200 TABLET ORAL NIGHTLY
Qty: 60 TABLET | Refills: 5 | Status: SHIPPED | OUTPATIENT
Start: 2021-09-22 | End: 2022-03-24

## 2021-09-22 NOTE — TELEPHONE ENCOUNTER
PATIENT CALLING STATING SHE HAS ENOUGH FOR TOMORROW AND THEN SHE WILL BE OUT.    PLEASE ADVISE  [phone]

## 2021-11-03 RX ORDER — HYDROCODONE BITARTRATE AND ACETAMINOPHEN 5; 325 MG/1; MG/1
1 TABLET ORAL EVERY 12 HOURS PRN
Qty: 60 TABLET | Refills: 0 | OUTPATIENT
Start: 2021-11-03

## 2021-11-03 NOTE — TELEPHONE ENCOUNTER
Rx Refill Note  Requested Prescriptions     Pending Prescriptions Disp Refills   • HYDROcodone-acetaminophen (NORCO) 5-325 MG per tablet 60 tablet 0     Sig: Take 1 tablet by mouth Every 12 (Twelve) Hours As Needed for Severe Pain .      Last office visit with prescribing clinician: 8/4/2021      Next office visit with prescribing clinician: 12/6/2021            Rosa Isela Marx MA  11/03/21, 08:24 EDT

## 2021-11-03 NOTE — TELEPHONE ENCOUNTER
Tell her at this point, she needs to either get the pain medication from her PCP or go to pain management to obtain the pain medications long term.

## 2021-11-04 RX ORDER — HYDROCODONE BITARTRATE AND ACETAMINOPHEN 5; 325 MG/1; MG/1
1 TABLET ORAL EVERY 12 HOURS PRN
Qty: 60 TABLET | Refills: 0 | OUTPATIENT
Start: 2021-11-04

## 2021-11-04 NOTE — TELEPHONE ENCOUNTER
Rx Refill Note  Requested Prescriptions     Pending Prescriptions Disp Refills   • HYDROcodone-acetaminophen (NORCO) 5-325 MG per tablet 60 tablet 0     Sig: Take 1 tablet by mouth Every 12 (Twelve) Hours As Needed for Severe Pain .      Last office visit with prescribing clinician: 8/4/2021      Next office visit with prescribing clinician: 12/6/2021            Rosa Isela Marx MA  11/04/21, 10:25 EDT

## 2021-12-02 NOTE — PROGRESS NOTES
Subjective   Patient ID: Taty Romo is a 41 y.o. female is here today for follow-up.    She was last seen 8/4/21 for Herniated lumbar intervertebral disc and Degenerative disc disease, lumbar.    Today patient reports low back pain that is consistent.    This patient has a known recurrent left L5-S1 herniated disc.  But the last time I spoke to her the transforaminal block helped quite a bit and she really has not had any sciatica.  She has mainly low back pain.  Its been pretty incapacitating and it affects her work.  She is also undergoing a lot of stress because her mother passed away recently.  I told her radiofrequency ablation could be a decent option for her.  She would like to be able to do more exercise such as Pilates but feels limited.  She is willing to see Dr. Urias again who did her transforaminal block several months ago.  We also talked about the narcotic pain medications that I have been prescribing.  If she wants to continue that we would need to send her to the pain management group at [facility] but she said that she would do without it.  To my knowledge she is still taking the gabapentin at 300/600.  I can refill that.  We will see her in 5 months and hopefully by that time the ablation will have been done.      Back Pain  The pain is present in the lumbar spine. The quality of the pain is described as burning, aching, shooting and stabbing. The pain is at a severity of 5/10. The pain is worse during the night. The symptoms are aggravated by bending, twisting, sitting and standing. Pertinent negatives include no fever, numbness or tingling. Treatments tried: lay flat. The treatment provided mild relief.       The following portions of the patient's history were reviewed and updated as appropriate: allergies, current medications, past family history, past medical history, past social history, past surgical history and problem list.    Review of Systems   Constitutional: Negative for chills  "and fever.   HENT: Negative for congestion.    Musculoskeletal: Positive for back pain.   Neurological: Negative for tingling and numbness.   All other systems reviewed and are negative.          Objective     Vitals:    12/06/21 1548   BP: 98/60   Pulse: 84   Temp: 97.6 °F (36.4 °C)   SpO2: 98%   Weight: 69.1 kg (152 lb 6.4 oz)   Height: 170.2 cm (67\")     Body mass index is 23.87 kg/m².      Physical Exam  Constitutional:       Appearance: She is well-developed.   HENT:      Head: Normocephalic and atraumatic.   Eyes:      Extraocular Movements: EOM normal.      Conjunctiva/sclera: Conjunctivae normal.      Pupils: Pupils are equal, round, and reactive to light.   Neck:      Vascular: No carotid bruit.   Neurological:      Mental Status: She is oriented to person, place, and time.      Coordination: Finger-Nose-Finger Test and Heel to Shin Test normal.      Gait: Gait is intact.      Deep Tendon Reflexes:      Reflex Scores:       Tricep reflexes are 2+ on the right side and 2+ on the left side.       Bicep reflexes are 2+ on the right side and 2+ on the left side.       Brachioradialis reflexes are 2+ on the right side and 2+ on the left side.       Patellar reflexes are 2+ on the right side and 2+ on the left side.       Achilles reflexes are 2+ on the right side and 2+ on the left side.  Psychiatric:         Speech: Speech normal.       Neurologic Exam     Mental Status   Oriented to person, place, and time.   Registration of memory: Good recent and remote memory.   Attention: normal. Concentration: normal.   Speech: speech is normal   Level of consciousness: alert  Knowledge: consistent with education.     Cranial Nerves     CN II   Visual fields full to confrontation.   Visual acuity: normal    CN III, IV, VI   Pupils are equal, round, and reactive to light.  Extraocular motions are normal.     CN V   Facial sensation intact.   Right corneal reflex: normal  Left corneal reflex: normal    CN VII   Facial " expression full, symmetric.   Right facial weakness: none  Left facial weakness: none    CN VIII   Hearing: intact    CN IX, X   Palate: symmetric    CN XI   Right sternocleidomastoid strength: normal  Left sternocleidomastoid strength: normal    CN XII   Tongue: not atrophic  Tongue deviation: none    Motor Exam   Muscle bulk: normal  Right arm tone: normal  Left arm tone: normal  Right leg tone: normal  Left leg tone: normal    Strength   Strength 5/5 except as noted.     Sensory Exam   Light touch normal.     Gait, Coordination, and Reflexes     Gait  Gait: normal    Coordination   Finger to nose coordination: normal  Heel to shin coordination: normal    Reflexes   Right brachioradialis: 2+  Left brachioradialis: 2+  Right biceps: 2+  Left biceps: 2+  Right triceps: 2+  Left triceps: 2+  Right patellar: 2+  Left patellar: 2+  Right achilles: 2+  Left achilles: 2+  Right : 2+  Left : 2+          Assessment/Plan   Independent Review of Radiographic Studies:      I personally reviewed the images from the following studies.    The lumbar MRI done on 4/5/2021 does show evidence of recurrent left L5-S1 herniated disc with disc space collapse.  Agree with the report.    Medical Decision Making:       We will send her back to  try some lumbar medial branch blocks and the possible radiofrequency ablation.  She said that she would hold off on using any narcotic pain medications for now.  I will see her in 5 months.  She will let us know if she develops any recurrent sciatica on the left.      Diagnoses and all orders for this visit:    1. Herniated lumbar intervertebral disc (Primary)  -     Ambulatory Referral to Pain Management    2. DDD (degenerative disc disease), lumbar  -     Ambulatory Referral to Pain Management    3. Chronic bilateral low back pain without sciatica  -     Ambulatory Referral to Pain Management      Return in about 5 months (around 5/6/2022) for face to face.

## 2021-12-06 ENCOUNTER — OFFICE VISIT (OUTPATIENT)
Dept: NEUROSURGERY | Facility: CLINIC | Age: 41
End: 2021-12-06

## 2021-12-06 VITALS
HEIGHT: 67 IN | DIASTOLIC BLOOD PRESSURE: 60 MMHG | HEART RATE: 84 BPM | WEIGHT: 152.4 LBS | OXYGEN SATURATION: 98 % | BODY MASS INDEX: 23.92 KG/M2 | TEMPERATURE: 97.6 F | SYSTOLIC BLOOD PRESSURE: 98 MMHG

## 2021-12-06 DIAGNOSIS — M51.26 HERNIATED LUMBAR INTERVERTEBRAL DISC: Primary | ICD-10-CM

## 2021-12-06 DIAGNOSIS — M51.36 DDD (DEGENERATIVE DISC DISEASE), LUMBAR: ICD-10-CM

## 2021-12-06 DIAGNOSIS — M54.50 CHRONIC BILATERAL LOW BACK PAIN WITHOUT SCIATICA: ICD-10-CM

## 2021-12-06 DIAGNOSIS — G89.29 CHRONIC BILATERAL LOW BACK PAIN WITHOUT SCIATICA: ICD-10-CM

## 2021-12-06 PROCEDURE — 99214 OFFICE O/P EST MOD 30 MIN: CPT | Performed by: NEUROLOGICAL SURGERY

## 2021-12-15 ENCOUNTER — OFFICE VISIT (OUTPATIENT)
Dept: INTERNAL MEDICINE | Age: 41
End: 2021-12-15

## 2021-12-15 VITALS
OXYGEN SATURATION: 98 % | BODY MASS INDEX: 23.86 KG/M2 | WEIGHT: 152 LBS | HEART RATE: 95 BPM | SYSTOLIC BLOOD PRESSURE: 90 MMHG | TEMPERATURE: 98.2 F | DIASTOLIC BLOOD PRESSURE: 56 MMHG | HEIGHT: 67 IN

## 2021-12-15 DIAGNOSIS — F41.8 DEPRESSION WITH ANXIETY: Chronic | ICD-10-CM

## 2021-12-15 DIAGNOSIS — Z20.822 CLOSE EXPOSURE TO COVID-19 VIRUS: ICD-10-CM

## 2021-12-15 DIAGNOSIS — R20.2 PARESTHESIA AND PAIN OF BOTH UPPER EXTREMITIES: Primary | ICD-10-CM

## 2021-12-15 DIAGNOSIS — M79.601 PARESTHESIA AND PAIN OF BOTH UPPER EXTREMITIES: Primary | ICD-10-CM

## 2021-12-15 DIAGNOSIS — M79.602 PARESTHESIA AND PAIN OF BOTH UPPER EXTREMITIES: Primary | ICD-10-CM

## 2021-12-15 DIAGNOSIS — M32.9 SYSTEMIC LUPUS ERYTHEMATOSUS, UNSPECIFIED SLE TYPE, UNSPECIFIED ORGAN INVOLVEMENT STATUS (HCC): Chronic | ICD-10-CM

## 2021-12-15 PROCEDURE — 99214 OFFICE O/P EST MOD 30 MIN: CPT | Performed by: INTERNAL MEDICINE

## 2021-12-15 NOTE — PROGRESS NOTES
"    I N T E R N A L  M E D I C I N E  J U N O H  K I M,  M D      ENCOUNTER DATE:  12/15/2021    Taty Romo / 41 y.o. / female      CHIEF COMPLAINT / REASON FOR OFFICE VISIT     Depression with anxiety, Hypothyroidism due to Hashimoto's thyroiditis, Lupus, and Fatigue      ASSESSMENT & PLAN     1. Paresthesia and pain of both upper extremities    2. Depression with anxiety    3. Systemic lupus erythematosus, unspecified SLE type, unspecified organ involvement status (HCC)    4. Close exposure to COVID-19 virus      Orders Placed This Encounter   Procedures   • MRI Cervical Spine Without Contrast   • EMG & Nerve Conduction Test     No orders of the defined types were placed in this encounter.      SUMMARY/DISCUSSION  • Check MRI cervical spine and NCV/EMG of BUE's   • Consider increasing dose of gabapentin (managed by NS)  • Check for COVID-19 due to exposure       Next Appointment with me: Visit date not found    Return in about 2 months (around 2/15/2022) for Reassess today's problem(s).        VITAL SIGNS     Visit Vitals  BP 90/56 (BP Location: Left arm)   Pulse 95   Temp 98.2 °F (36.8 °C)   Ht 170.2 cm (67\")   Wt 68.9 kg (152 lb)   LMP 12/04/2021   SpO2 98%   BMI 23.81 kg/m²       BP Readings from Last 3 Encounters:   12/15/21 90/56   12/06/21 98/60   09/15/21 100/60     Wt Readings from Last 3 Encounters:   12/15/21 68.9 kg (152 lb)   12/06/21 69.1 kg (152 lb 6.4 oz)   09/15/21 69.9 kg (154 lb)     Body mass index is 23.81 kg/m².      MEDICATIONS AT THE TIME OF OFFICE VISIT     Current Outpatient Medications on File Prior to Visit   Medication Sig   • acetaminophen (Tylenol 8 Hour Arthritis Pain) 650 MG 8 hr tablet Take 1 tablet by mouth Every 8 (Eight) Hours As Needed for Mild Pain  or Moderate Pain .   • azelastine (ASTELIN) 0.1 % nasal spray Place 2 sprays into the nostril(s) as directed by provider 2 (Two) Times a Day. (Patient taking differently: 2 sprays into the nostril(s) as directed by provider As " Needed.)   • Cholecalciferol (VITAMIN D) 2000 UNITS capsule Take 3,000 Units by mouth Every Evening.   • DULoxetine (CYMBALTA) 60 MG capsule Take 2 capsules by mouth Every Morning Before Breakfast.   • fluticasone (VERAMYST) 27.5 MCG/SPRAY nasal spray Place 1 spray into the nostril(s) as directed by provider 2 (Two) Times a Day. (Patient taking differently: 1 spray into the nostril(s) as directed by provider As Needed.)   • gabapentin (NEURONTIN) 600 MG tablet Take 1 tablet by mouth 2 (Two) Times a Day.   • HYDROcodone-acetaminophen (NORCO) 5-325 MG per tablet Take 1 tablet by mouth Every 12 (Twelve) Hours As Needed for Severe Pain .   • hydroxychloroquine (PLAQUENIL) 200 MG tablet Take 1 tablet by mouth 2 (two) times a day.   • levothyroxine (SYNTHROID, LEVOTHROID) 100 MCG tablet Take 1 tablet by mouth Daily.   • ondansetron (Zofran) 4 MG tablet Take 1 tablet by mouth Every 8 (Eight) Hours As Needed for Nausea or Vomiting.   • traZODone (DESYREL) 100 MG tablet Take 2 tablets by mouth Every Night.   • Calcium Polycarbophil (FIBER-CAPS PO) Take 2 capsules by mouth Daily.   • cyclobenzaprine (FLEXERIL) 10 MG tablet Take 1 tablet by mouth 3 (Three) Times a Day As Needed for Muscle Spasms.   • fexofenadine (ALLEGRA) 180 MG tablet Take 180 mg by mouth Daily.     No current facility-administered medications on file prior to visit.          HISTORY OF PRESENT ILLNESS     Her spouse has all the symptoms of COVID-19 and has pending test results. She has been in close proximity to him multiple days over last 5 days. She requests testing for COVID-19.     She complains of ongoing paresthesia, numbness, and pain of BUE's. Taking gabapentin 600 mg BID for lumbar radicular pain prescribed by Dr. Thomas.  Denies weakness of upper extremities.  Denies pain or paresthesia of her fingers.  She does have history of chronic neck pain and has chronic degenerative disc disease of the lumbar spine.  She also has history of SLE on  hydroxychloroquine managed by her rheumatologist.  Depression remains overall stable on duloxetine.        REVIEW OF SYSTEMS             PHYSICAL EXAMINATION     Physical Exam  Neurological:      Mental Status: She is alert.      Cranial Nerves: No cranial nerve deficit.      Motor: No weakness.      Deep Tendon Reflexes: Reflexes normal.   Psychiatric:         Mood and Affect: Mood normal.         Thought Content: Thought content normal.         Judgment: Judgment normal.              REVIEWED DATA     Labs:     Lab Results   Component Value Date     (L) 09/15/2021    K 3.8 09/15/2021    CALCIUM 9.7 09/15/2021    AST 17 09/15/2021    ALT 14 09/15/2021    BUN 11 09/15/2021    CREATININE 0.89 09/15/2021    CREATININE 0.67 01/05/2021    CREATININE 0.8 01/28/2020    EGFRIFNONA 70 09/15/2021    EGFRIFAFRI 85 09/15/2021       No results found for: HGBA1C    No results found for: LDL, HDL, TRIG    Lab Results   Component Value Date    TSH 1.730 09/15/2021    TSH 2.530 07/30/2020    TSH 1.780 09/18/2019    FREET4 1.33 09/15/2021    FREET4 1.49 07/30/2020    FREET4 1.43 09/18/2019       Lab Results   Component Value Date    WBC 3.19 (L) 09/15/2021    HGB 13.1 09/15/2021     (L) 09/15/2021       No results found for: MICROALBUR      Lab Results   Component Value Date    [id number] 906 09/15/2021        Imaging:           Medical Tests:           Summary of old records / correspondence / consultant report:           Request outside records:             *Examiner was wearing [id number] mask and eye protection during the entire duration of the visit. Patient was masked the entire time. Minimum social distance of 6 ft maintained entire visit except if physical contact was necessary as documented.     **Dragon Disclaimer: Much of this encounter note is an electronic transcription/translation of spoken language to printed text. The electronic translation of spoken language may permit erroneous, or at times, nonsensical  words or phrases to be inadvertently transcribed. Although I have reviewed the note for such errors, some may still exist.       Template created by Adan Mcdonald MD

## 2021-12-16 LAB
LABCORP SARS-COV-2, NAA 2 DAY TAT: NORMAL
SARS-COV-2 RNA RESP QL NAA+PROBE: NOT DETECTED

## 2022-01-05 RX ORDER — GABAPENTIN 600 MG/1
600 TABLET ORAL 2 TIMES DAILY
Qty: 60 TABLET | Refills: 3 | Status: SHIPPED | OUTPATIENT
Start: 2022-01-05 | End: 2022-07-21 | Stop reason: SDUPTHER

## 2022-01-05 NOTE — TELEPHONE ENCOUNTER
Rx Refill Note  Requested Prescriptions     Pending Prescriptions Disp Refills   • gabapentin (NEURONTIN) 600 MG tablet 60 tablet 3     Sig: Take 1 tablet by mouth 2 (Two) Times a Day.      Last office visit with prescribing clinician: 12/6/2021      Next office visit with prescribing clinician: 5/9/2022            Yolanda Welch MA  01/05/22, 13:26 EST

## 2022-01-11 ENCOUNTER — OFFICE VISIT (OUTPATIENT)
Dept: SURGERY | Facility: CLINIC | Age: 42
End: 2022-01-11

## 2022-01-11 VITALS
HEART RATE: 88 BPM | OXYGEN SATURATION: 98 % | SYSTOLIC BLOOD PRESSURE: 110 MMHG | DIASTOLIC BLOOD PRESSURE: 70 MMHG | BODY MASS INDEX: 24.09 KG/M2 | HEIGHT: 67 IN | WEIGHT: 153.5 LBS | TEMPERATURE: 98.4 F

## 2022-01-11 DIAGNOSIS — K60.2 ANAL FISSURE: ICD-10-CM

## 2022-01-11 DIAGNOSIS — Z12.11 SCREENING FOR MALIGNANT NEOPLASM OF COLON: Primary | ICD-10-CM

## 2022-01-11 PROCEDURE — 99204 OFFICE O/P NEW MOD 45 MIN: CPT | Performed by: PHYSICIAN ASSISTANT

## 2022-01-11 RX ORDER — ANTIARTHRITIC COMBINATION NO.2 900 MG
1 TABLET ORAL DAILY
COMMUNITY

## 2022-01-11 RX ORDER — SODIUM CHLORIDE, SODIUM LACTATE, POTASSIUM CHLORIDE, CALCIUM CHLORIDE 600; 310; 30; 20 MG/100ML; MG/100ML; MG/100ML; MG/100ML
30 INJECTION, SOLUTION INTRAVENOUS CONTINUOUS
Status: CANCELLED | OUTPATIENT
Start: 2022-01-11

## 2022-01-11 RX ORDER — DOCUSATE SODIUM 100 MG/1
200 CAPSULE, LIQUID FILLED ORAL 2 TIMES DAILY
COMMUNITY
End: 2023-02-23 | Stop reason: ALTCHOICE

## 2022-01-11 RX ORDER — TURMERIC ROOT EXTRACT 500 MG
2 TABLET ORAL DAILY
COMMUNITY

## 2022-01-11 NOTE — PROGRESS NOTES
Taty Romo is a 41 y.o. female who is seen as a consult at the request of No ref. provider found for Consult, Rectal Pain, Hemorrhoids, Anal Fissure, Abdominal Pain, Constipation, and Diarrhea.      HPI:  Pt is s/p anal sphincterotomy in  performed by Dr. Morgan and s/p hemorrhoidectomy x3 on 2016 performed by Dr. Farias    Pt presents today for evaluation of rectal pain x3-6 months.   Pt describes the pain as a throbbing, burning, ripping sensation.   Pain gradually worsening.   No RB.  Has tried Anusol-HC Cream, but d/c due to increased burning sensation.  Applies Aquaphor with limited relief.   Pt also c/o episodes of lower abdominal pain with each episode lasting 2-3 days.     BM are irregular and can alternate anywhere from a 2-6 on the [place] scale.   White mucous mixed in stool for several years.   Occasional straining.  Not taking fiber.  Takes Colace x2 daily    Last Colonoscopy 2016 - Normal    Mother: Hx of Colon Polyps  Father: Hx of Colon Polyps  Maternal uncle: . Hx of Colon cancer  Paternal grandfather: . Hx of Colon cancer    Past Medical History:   Diagnosis Date   • Allergic rhinitis 2019   • Anxiety    • Bulging lumbar disc 2018   • Cervical radiculopathy 2020   • Chronic bilateral low back pain without sciatica    • Chronic ITP (idiopathic thrombocytopenia) (HCC) 2012    FOLLOWED BY DR. PEÑA PIÑA   • Colon polyps     FOLLOWED BY DR. MANNY FARIAS   • DDD (degenerative disc disease), lumbar    • Degeneration of intervertebral disc at C4-C5 level 2020   • Depression    • Fibromyalgia, primary    • Frequent UTI 2016   • Hemorrhoids    • Herniated lumbar intervertebral disc 2020   • Hypothyroidism    • Insomnia 3/24/2015    Continue trazodone 200 mg qHS    • Irritable bowel syndrome with constipation 2019   • Migraine    • Paresthesia of upper extremity     BILATERAL   • Peripheral neuropathy 2017    KNEES DOWN   • Seasonal  allergies    • Spondylolisthesis at L5-S1 level    • Systemic lupus erythematosus (HCC) 03/2007    FOLLOWED BY DR. ERNESTO DILLARD   • Trochanteric bursitis of both hips 10/2021   • Vitamin D deficiency 8/29/2015       Past Surgical History:   Procedure Laterality Date   • ANAL SPHINCTEROTOMY  2003       • APPENDECTOMY  2010   • COLONOSCOPY N/A 11/21/2016    Procedure: COLONOSCOPY;  Surgeon: Natalya Farias MD;  Location: [facility] MAIN OR;  Service:    • HEMORRHOIDECTOMY  2003       • HEMORRHOIDECTOMY N/A 11/21/2016    Procedure: HEMORRHOIDECTOMY;  Surgeon: Natalya Farias MD;  Location: [facility] OR;  Service:    • INTRAUTERINE DEVICE INSERTION  08/2016   • LUMBAR DISCECTOMY Left 1/8/2021    Procedure: Outpatient left lumbar five/sacral one Metrx microdiscectomy;  Surgeon: Alessandro Thomas MD;  Location: [facility] MAIN OR;  Service: Neurosurgery;  Laterality: Left;   • THYROIDECTOMY, PARTIAL Right 06/2015   • TONSILLECTOMY Bilateral     CHILDHOOD       Social History:   reports that she has never smoked. She has never used smokeless tobacco. She reports previous alcohol use. She reports that she does not use drugs.      Marriage status:     Family History   Problem Relation Age of Onset   • Colon polyps Mother    • Coronary artery disease Mother 60        non-smokers   • Valvular heart disease Mother    • Diabetes type II Mother    • Other Mother         pulmonary hypertension   • Clotting disorder Mother         superficial dvt   • Depression Mother    • Kidney failure Mother         due to diabetes   • Kidney disease Mother    • Heart disease Mother    • Diabetes Mother    • Colon polyps Father    • Atrial fibrillation Father    • Hypertension Father    • Deep vein thrombosis Father    • Other Father         spinal stenosis, Degenerative disc disease    • Arthritis Father    • Autoimmune disease Brother    • Depression Brother    • No Known Problems Daughter    • Dementia Maternal Uncle     • Dementia Maternal Grandmother    • Stroke Maternal Grandmother    • Colon cancer Paternal Grandfather    • Cancer Paternal Grandfather    • Colon cancer Maternal Uncle    • No Known Problems Daughter    • Malig Hyperthermia Neg Hx          Current Outpatient Medications:   •  Biotin 5000 MCG tablet, Take 1 tablet by mouth Daily., Disp: , Rfl:   •  Cetirizine-Pseudoephedrine (ZYRTEC-D PO), Take 1 tablet by mouth Daily As Needed., Disp: , Rfl:   •  Cholecalciferol (VITAMIN D) 2000 UNITS capsule, Take 3,000 Units by mouth Every Evening., Disp: , Rfl:   •  docusate sodium (COLACE) 100 MG capsule, Take 200 mg by mouth 2 (Two) Times a Day., Disp: , Rfl:   •  DULoxetine (CYMBALTA) 60 MG capsule, Take 2 capsules by mouth Every Morning Before Breakfast., Disp: 180 capsule, Rfl: 1  •  gabapentin (NEURONTIN) 600 MG tablet, Take 1 tablet by mouth 2 (Two) Times a Day., Disp: 60 tablet, Rfl: 3  •  hydroxychloroquine (PLAQUENIL) 200 MG tablet, Take 1 tablet by mouth 2 (two) times a day., Disp: 180 tablet, Rfl: 1  •  levothyroxine (SYNTHROID, LEVOTHROID) 100 MCG tablet, Take 1 tablet by mouth Daily., Disp: 90 tablet, Rfl: 3  •  traZODone (DESYREL) 100 MG tablet, Take 2 tablets by mouth Every Night., Disp: 60 tablet, Rfl: 5  •  Turmeric 500 MG tablet, Take 2 tablets by mouth Daily., Disp: , Rfl:   •  acetaminophen (Tylenol 8 Hour Arthritis Pain) 650 MG 8 hr tablet, Take 1 tablet by mouth Every 8 (Eight) Hours As Needed for Mild Pain  or Moderate Pain ., Disp: 100 tablet, Rfl: 2  •  Calcium Polycarbophil (FIBER-CAPS PO), Take 2 capsules by mouth Daily., Disp: , Rfl:     Allergy  Oxycodone    Review of Systems   Constitutional: Negative for decreased appetite and weight gain.   HENT: Negative for congestion, hearing loss and hoarse voice.    Eyes: Negative for blurred vision, discharge and visual disturbance.   Cardiovascular: Negative for chest pain, cyanosis and leg swelling.   Respiratory: Negative for cough, shortness of  breath and snoring.    Endocrine: Positive for cold intolerance. Negative for heat intolerance.   Hematologic/Lymphatic: Does not bruise/bleed easily.   Skin: Negative for itching, poor wound healing and skin cancer.   Musculoskeletal: Positive for arthritis and back pain. Negative for joint pain and joint swelling.   Gastrointestinal: Positive for abdominal pain, constipation and hemorrhoids. Negative for change in bowel habit and bowel incontinence.   Genitourinary: Negative for bladder incontinence, dysuria and hematuria.   Neurological: Positive for headaches and numbness. Negative for brief paralysis, excessive daytime sleepiness, dizziness, focal weakness, light-headedness and weakness.   Psychiatric/Behavioral: Positive for depression. Negative for altered mental status and hallucinations. The patient is nervous/anxious. The patient does not have insomnia.    Allergic/Immunologic: Negative for HIV exposure and persistent infections.   All other systems reviewed and are negative.      Vitals:    01/11/22 1528   BP: 110/70   Pulse: 88   Temp: 98.4 °F (36.9 °C)   SpO2: 98%     Body mass index is 24.04 kg/m².    Physical Exam  Exam conducted with a chaperone present.   Constitutional:       General: She is not in acute distress.     Appearance: She is well-developed.   HENT:      Head: Normocephalic and atraumatic.      Nose: Nose normal.   Eyes:      Conjunctiva/sclera: Conjunctivae normal.      Pupils: Pupils are equal, round, and reactive to light.   Neck:      Trachea: No tracheal deviation.   Pulmonary:      Effort: Pulmonary effort is normal. No respiratory distress.      Breath sounds: Normal breath sounds.   Abdominal:      General: Bowel sounds are normal. There is no distension.      Palpations: Abdomen is soft.   Genitourinary:     Comments: Perianal exam: Perianal skin tags. Posterior anal fissure.   Musculoskeletal:         General: No deformity. Normal range of motion.      Cervical back: Normal  range of motion.   Skin:     General: Skin is warm and dry.   Neurological:      Mental Status: She is alert and oriented to person, place, and time.      Cranial Nerves: No cranial nerve deficit.      Coordination: Coordination normal.      Gait: Gait normal.   Psychiatric:         Behavior: Behavior normal.         Judgment: Judgment normal.         Review of Medical Record:  I reviewed PMHx, Surgical Hx, FHx, and Medication List    Last Colonoscopy 11/21/2016  - Colon WNL  - Repeat: 5 Years  - Dr. Farias    Assessment:  1. Screening for malignant neoplasm of colon    2. Anal fissure    - New    Plan:  - Start Fiber. Recommended 30-40 Grams Daily  - Rx for Diltiazem/Lidocaine Compound Cream Provided. Apply externally TID x10 weeks.  - Will schedule a repeat colonoscopy to evaluate for any neoplasms or inflammation within the colon.   - Follow up in 6-8 weeks with Dr. Farias if symptoms do not improve with conservative management to discuss further treatment options.

## 2022-01-13 ENCOUNTER — TELEPHONE (OUTPATIENT)
Dept: SURGERY | Facility: CLINIC | Age: 42
End: 2022-01-13

## 2022-01-13 NOTE — TELEPHONE ENCOUNTER
Mercedes & Bridgette,  Please read message below from pt via [employer].    Thank you.    ----- Message from Taty Romo sent at 1/13/2022  9:59 AM EST -----  Regarding: appt f/u  I was in to see you on Tuesday and set up a colonoscopy for this coming tues 1/18.  I am not sure if I mentioned the horrible stomach issues I have been having but was wondering if Dr. Farias would be able to do EGD at the same time as colonoscopy?    Thanks!

## 2022-01-18 ENCOUNTER — ANESTHESIA EVENT (OUTPATIENT)
Dept: GASTROENTEROLOGY | Facility: HOSPITAL | Age: 42
End: 2022-01-18

## 2022-01-18 ENCOUNTER — ANESTHESIA (OUTPATIENT)
Dept: GASTROENTEROLOGY | Facility: HOSPITAL | Age: 42
End: 2022-01-18

## 2022-01-18 ENCOUNTER — HOSPITAL ENCOUNTER (OUTPATIENT)
Facility: HOSPITAL | Age: 42
Setting detail: HOSPITAL OUTPATIENT SURGERY
Discharge: HOME | End: 2022-01-18
Attending: COLON & RECTAL SURGERY | Admitting: COLON & RECTAL SURGERY

## 2022-01-18 VITALS
HEART RATE: 66 BPM | HEIGHT: 67 IN | WEIGHT: 143.5 LBS | SYSTOLIC BLOOD PRESSURE: 102 MMHG | DIASTOLIC BLOOD PRESSURE: 60 MMHG | OXYGEN SATURATION: 96 % | RESPIRATION RATE: 16 BRPM | BODY MASS INDEX: 22.52 KG/M2

## 2022-01-18 DIAGNOSIS — Z12.11 SCREENING FOR MALIGNANT NEOPLASM OF COLON: ICD-10-CM

## 2022-01-18 LAB
B-HCG UR QL: NEGATIVE
EXPIRATION DATE: NORMAL
INTERNAL NEGATIVE CONTROL: NEGATIVE
INTERNAL POSITIVE CONTROL: POSITIVE
Lab: NORMAL

## 2022-01-18 PROCEDURE — 25010000002 PROPOFOL 10 MG/ML EMULSION: Performed by: NURSE ANESTHETIST, CERTIFIED REGISTERED

## 2022-01-18 PROCEDURE — 81025 URINE PREGNANCY TEST: CPT | Performed by: COLON & RECTAL SURGERY

## 2022-01-18 PROCEDURE — 45378 DIAGNOSTIC COLONOSCOPY: CPT | Performed by: COLON & RECTAL SURGERY

## 2022-01-18 RX ORDER — LIDOCAINE HYDROCHLORIDE 20 MG/ML
INJECTION, SOLUTION INFILTRATION; PERINEURAL AS NEEDED
Status: DISCONTINUED | OUTPATIENT
Start: 2022-01-18 | End: 2022-01-18 | Stop reason: SURG

## 2022-01-18 RX ORDER — SODIUM CHLORIDE, SODIUM LACTATE, POTASSIUM CHLORIDE, CALCIUM CHLORIDE 600; 310; 30; 20 MG/100ML; MG/100ML; MG/100ML; MG/100ML
30 INJECTION, SOLUTION INTRAVENOUS CONTINUOUS PRN
Status: DISCONTINUED | OUTPATIENT
Start: 2022-01-18 | End: 2022-01-18 | Stop reason: HOSPADM

## 2022-01-18 RX ORDER — SODIUM CHLORIDE, SODIUM LACTATE, POTASSIUM CHLORIDE, CALCIUM CHLORIDE 600; 310; 30; 20 MG/100ML; MG/100ML; MG/100ML; MG/100ML
30 INJECTION, SOLUTION INTRAVENOUS CONTINUOUS
Status: DISCONTINUED | OUTPATIENT
Start: 2022-01-18 | End: 2022-01-18 | Stop reason: HOSPADM

## 2022-01-18 RX ORDER — PROPOFOL 10 MG/ML
VIAL (ML) INTRAVENOUS CONTINUOUS PRN
Status: DISCONTINUED | OUTPATIENT
Start: 2022-01-18 | End: 2022-01-18 | Stop reason: SURG

## 2022-01-18 RX ADMIN — PROPOFOL 180 MCG/KG/MIN: 10 INJECTION, EMULSION INTRAVENOUS at 08:49

## 2022-01-18 RX ADMIN — LIDOCAINE HYDROCHLORIDE 60 MG: 20 INJECTION, SOLUTION INFILTRATION; PERINEURAL at 08:49

## 2022-01-18 RX ADMIN — SODIUM CHLORIDE, POTASSIUM CHLORIDE, SODIUM LACTATE AND CALCIUM CHLORIDE 30 ML/HR: 600; 310; 30; 20 INJECTION, SOLUTION INTRAVENOUS at 08:18

## 2022-01-18 NOTE — ANESTHESIA PREPROCEDURE EVALUATION
Anesthesia Evaluation     Patient summary reviewed and Nursing notes reviewed   history of anesthetic complications: PONV  NPO Solid Status: > 8 hours             Airway   Mallampati: II  TM distance: >3 FB  Neck ROM: full  No difficulty expected  Dental - normal exam     Pulmonary    (-) COPD, sleep apnea, rhonchi, decreased breath sounds, wheezes, not a smoker  Cardiovascular   Exercise tolerance: good (4-7 METS)    Rhythm: regular  Rate: normal    (-) hypertension, CAD, angina, AGUILERA, murmur      Neuro/Psych  (+) headaches, numbness, psychiatric history Depression and Anxiety,     (-) CVA  GI/Hepatic/Renal/Endo    (+)   thyroid problem hypothyroidism  (-) liver disease, no renal disease, diabetes    Musculoskeletal     (+) myalgias, neck pain,   Abdominal     Abdomen: soft.   Substance History      OB/GYN          Other   arthritis,                        Anesthesia Plan    ASA 2     MAC     intravenous induction     Anesthetic plan, all risks, benefits, and alternatives have been provided, discussed and informed consent has been obtained with: patient.

## 2022-01-18 NOTE — H&P
Taty Romo is a 41 y.o. female  who is referred by Manny Bonilla MD for a colonoscopy. She   has an indications: family history of colon polyps.     She denies any change in bowel function, melena, or hematochezia.    Past Medical History:   Diagnosis Date   • Allergic rhinitis 5/22/2019   • Alopecia 08/2015   • CHI positive 08/2014   • Anal fissure 12/2016   • Anxiety    • Bilateral sciatica 01/15/2020   • Biliary colic 05/2019   • Bulging lumbar disc 02/2018   • Cervical radiculopathy 11/18/2020   • Cervicitis 04/04/2002    SEEN AT [facility]   • Chronic bilateral low back pain without sciatica    • Chronic fatigue    • Chronic ITP (idiopathic thrombocytopenia) ([facility]) 01/2012    FOLLOWED BY DR. PEÑA PIÑA   • Closed fracture of left distal radius 01/22/2020    SEEN AT [facility]   • Cold intolerance    • Colon polyps     FOLLOWED BY DR. 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   • Hypothyroidism    • Idiopathic scoliosis of lumbosacral region    • Insomnia 3/24/2015    Continue trazodone 200 mg qHS    • Irregular menses 03/2015   • Irritable bowel syndrome with constipation 1/30/2019   • Lumbar radiculopathy    • Migraine    • Neck sprain 07/11/2005    SEEN AT [facility]   • Paresthesia of upper extremity     BILATERAL   • PCOS (polycystic ovarian syndrome)    • Peripheral neuropathy 2017    KNEES DOWN   • Polyarthralgia    • Polydipsia 10/2016   • PONV (postoperative nausea  and vomiting)    • Ruptured tympanic membrane, right 05/2015   • Seasonal allergies    • Spondylolisthesis at L5-S1 level    • Systemic lupus erythematosus (HCC) 03/2007    FOLLOWED BY DR. ERNESTO DILLARD   • Tattoos    • Tremor of both hands 06/2016   • Trochanteric bursitis of both hips 10/2021   • Urinary frequency 02/2015   • Urinary urgency 02/2015   • Vitamin D deficiency 8/29/2015       Past Surgical History:   Procedure Laterality Date   • ANAL SPHINCTEROTOMY N/A 04/01/2003    DR.RAYMOND HEAD AT [facility]   • APPENDECTOMY N/A 04/13/2010    LAPAROSCOPIC, WITH PELVIC LAPAROSCOPY, DR. GIOVANNI RUBIN AT [facility]   • COLONOSCOPY N/A 11/21/2016    4 MM POLYP AT ANUS, NOT RESECTED D/T UPCPMING HEMORRHOIDECTOMY, OTHERWISE WNL, RESCOPE IN 5 YRS, DR. MANNY FARIAS AT [facility]   • EPIDURAL BLOCK N/A 05/22/2020    DR. ZAIRA GAO AT [facility]   • FINGER MASS EXCISION Right 05/16/2011    RIGHT INDEX FINGER, PATH: BENIGN WITH HYPERKERATOSIS, DR. LUZ ELENA HAY AT [facility]   • HEMORRHOIDECTOMY N/A 04/01/2003    DR.RAYMOND HEAD AT [facility]   • HEMORRHOIDECTOMY N/A 11/21/2016    Procedure: HEMORRHOIDECTOMY;  Surgeon: Manny Farias MD;  Location: [facility];  Service:    • INTRAUTERINE DEVICE INSERTION N/A 10/20/2014    [facility], DR. CICI LUCIANO   • LUMBAR DISCECTOMY Left 1/8/2021    Procedure: Outpatient left lumbar five/sacral one Metrx microdiscectomy;  Surgeon: Alessandro Thomas MD;  Location: [facility] OR;  Service: Neurosurgery;  Laterality: Left;   • LUMBAR EPIDURAL INJECTION N/A 07/19/2021    DR. PARISA BEE AT [facility]   • LUMBAR EPIDURAL INJECTION N/A 11/06/2020    DR. WALDO MUNOZ AT [facility]   • LUMBAR EPIDURAL INJECTION N/A 08/28/2020    DR. MALORIE SHEA AT [facility]   • THYROIDECTOMY, PARTIAL Right 06/17/2015    RIGHT LOBECTOMY, DR. ARABELLA HUNT AT [place]   • TONSILLECTOMY Bilateral     CHILDHOOD   • TYMPANOSTOMY TUBE PLACEMENT      DURING CHIOOD   • VAGINAL DELIVERY N/A 11/01/2002    DR. KEENAN LAUREN AT [facility]   • VAGINAL DELIVERY N/A 11/16/2006     DR. JANNA DEL VALLE AT [facility]       Medications Prior to Admission   Medication Sig Dispense Refill Last Dose   • Biotin 5000 MCG tablet Take 1 tablet by mouth Daily.   1/17/2022 at Unknown time   • levothyroxine (SYNTHROID, LEVOTHROID) 100 MCG tablet Take 1 tablet by mouth Daily. 90 tablet 3 1/17/2022 at Unknown time   • acetaminophen (Tylenol 8 Hour Arthritis Pain) 650 MG 8 hr tablet Take 1 tablet by mouth Every 8 (Eight) Hours As Needed for Mild Pain  or Moderate Pain . 100 tablet 2 1/16/2022   • Calcium Polycarbophil (FIBER-CAPS PO) Take 2 capsules by mouth Daily.   1/16/2022   • Cetirizine-Pseudoephedrine (ZYRTEC-D PO) Take 1 tablet by mouth Daily As Needed.   1/16/2022   • Cholecalciferol (VITAMIN D) 2000 UNITS capsule Take 3,000 Units by mouth Every Evening.   1/16/2022   • docusate sodium (COLACE) 100 MG capsule Take 200 mg by mouth 2 (Two) Times a Day.   1/16/2022   • DULoxetine (CYMBALTA) 60 MG capsule Take 2 capsules by mouth Every Morning Before Breakfast. 180 capsule 1 1/17/2022   • gabapentin (NEURONTIN) 600 MG tablet Take 1 tablet by mouth 2 (Two) Times a Day. 60 tablet 3 1/16/2022   • hydroxychloroquine (PLAQUENIL) 200 MG tablet Take 1 tablet by mouth 2 (two) times a day. 180 tablet 1 1/16/2022   • traZODone (DESYREL) 100 MG tablet Take 2 tablets by mouth Every Night. 60 tablet 5 1/16/2022   • Turmeric 500 MG tablet Take 2 tablets by mouth Daily.   1/16/2022       Allergies   Allergen Reactions   • Oxycodone Nausea And Vomiting     States can take as long as has zofran        Family History   Problem Relation Age of Onset   • Colon polyps Mother    • Coronary artery disease Mother 60        non-smokers   • Valvular heart disease Mother    • Diabetes type II Mother    • Other Mother         pulmonary hypertension   • Clotting disorder Mother         superficial dvt   • Depression Mother    • Kidney failure Mother         due to diabetes   • Kidney disease Mother    • Heart disease Mother    • Diabetes Mother     • Thyroid disease Mother    • Colon polyps Father    • Atrial fibrillation Father    • Hypertension Father    • Deep vein thrombosis Father    • Other Father         spinal stenosis, Degenerative disc disease    • Arthritis Father    • Autoimmune disease Brother    • Depression Brother    • Lupus Brother    • Asthma Daughter    • Dementia Maternal Uncle    • Dementia Maternal Grandmother    • Stroke Maternal Grandmother    • Colon cancer Paternal Grandfather    • Cancer Paternal Grandfather    • Colon cancer Maternal Uncle    • ADD / ADHD Daughter    • Arthritis Paternal Grandmother    • Malig Hyperthermia Neg Hx        Social History     Socioeconomic History   • Marital status:      Spouse name: Alexander*   • Number of children: 2   Tobacco Use   • Smoking status: Never Smoker   • Smokeless tobacco: Never Used   Vaping Use   • Vaping Use: Never used   Substance and Sexual Activity   • Alcohol use: Not Currently     Comment: Few times a year   • Drug use: Never   • Sexual activity: Yes     Partners: Male     Birth control/protection: I.U.D.       Review of Systems   Gastrointestinal: Negative for abdominal pain, nausea and vomiting.   All other systems reviewed and are negative.      Vitals:    01/18/22 0803   BP: 113/69   Pulse: 70   Resp: 12   SpO2: 95%         Physical Exam  Constitutional:       Appearance: She is well-developed.   HENT:      Head: Normocephalic and atraumatic.   Eyes:      Pupils: Pupils are equal, round, and reactive to light.   Cardiovascular:      Rate and Rhythm: Regular rhythm.   Pulmonary:      Effort: Pulmonary effort is normal.   Abdominal:      General: There is no distension.      Palpations: Abdomen is soft.   Musculoskeletal:         General: Normal range of motion.   Skin:     General: Skin is warm and dry.   Neurological:      Mental Status: She is alert and oriented to person, place, and time.   Psychiatric:         Thought Content: Thought content normal.          Judgment: Judgment normal.           Assessment/Plan      indications: family history of colon polyps         I recommend colonoscopy.  I described risks, benefits of the procedure with the patient including but not limited to bleeding, infection, possibility of perforation and possible polypectomy. All of the patient's questions were answered and they would like to proceed with the above recommendations.

## 2022-01-18 NOTE — DISCHARGE INSTRUCTIONS
For the next 24 hours patient needs to be with a responsible adult.    For 24 hours DO NOT drive, operate machinery, appliances, drink alcohol, make important decisions or sign legal documents.    Start with a light or bland diet if you are feeling sick to your stomach otherwise advance to regular diet as tolerated.    Follow recommendations on procedure report if provided by your doctor.    Call Dr Farias for problems [phone]    Problems may include but not limited to: large amounts of bleeding, trouble breathing, repeated vomiting, severe unrelieved pain, fever or chills.

## 2022-01-18 NOTE — ANESTHESIA POSTPROCEDURE EVALUATION
Patient: Taty Romo    Procedure Summary     Date: 01/18/22 Room / Location: [facility] ENDOSCOPY 9 /  [facility] END[facility]COPY    Anesthesia Start: 0846 Anesthesia Stop: 0904    Procedure: COLONOSCOPY to (N/A ) Diagnosis:       Screening for malignant neoplasm of colon      (Screening for malignant neoplasm of colon [Z12.11])    Surgeons: Natalya Farias MD Provider: Carlos Srinivasan MD    Anesthesia Type: MAC ASA Status: 2          Anesthesia Type: MAC    Vitals  Vitals Value Taken Time   BP 99/64 01/18/22 0907   Temp     Pulse 64 01/18/22 0916   Resp 16 01/18/22 0916   SpO2 96 % 01/18/22 0916           Post Anesthesia Care and Evaluation    Patient location during evaluation: bedside  Patient participation: complete - patient participated  Level of consciousness: awake  Pain management: adequate  Airway patency: patent  Anesthetic complications: No anesthetic complications  PONV Status: controlled  Cardiovascular status: acceptable  Respiratory status: acceptable  Hydration status: acceptable    Comments: --------------------            01/18/22 0916     --------------------   BP:                  Pulse:      64       Resp:       16       SpO2:      96%      --------------------

## 2022-02-07 ENCOUNTER — HOSPITAL ENCOUNTER (OUTPATIENT)
Dept: MRI IMAGING | Facility: HOSPITAL | Age: 42
Discharge: HOME | End: 2022-02-07
Admitting: INTERNAL MEDICINE

## 2022-02-07 PROCEDURE — 72141 MRI NECK SPINE W/O DYE: CPT

## 2022-02-22 ENCOUNTER — OFFICE VISIT (OUTPATIENT)
Dept: INTERNAL MEDICINE | Age: 42
End: 2022-02-22

## 2022-02-22 VITALS
WEIGHT: 150 LBS | DIASTOLIC BLOOD PRESSURE: 58 MMHG | HEIGHT: 67 IN | HEART RATE: 77 BPM | OXYGEN SATURATION: 97 % | BODY MASS INDEX: 23.54 KG/M2 | SYSTOLIC BLOOD PRESSURE: 100 MMHG | TEMPERATURE: 98 F

## 2022-02-22 DIAGNOSIS — M79.601 PARESTHESIA AND PAIN OF BOTH UPPER EXTREMITIES: ICD-10-CM

## 2022-02-22 DIAGNOSIS — M79.602 PARESTHESIA AND PAIN OF BOTH UPPER EXTREMITIES: ICD-10-CM

## 2022-02-22 DIAGNOSIS — E06.3 HYPOTHYROIDISM DUE TO HASHIMOTO'S THYROIDITIS: Chronic | ICD-10-CM

## 2022-02-22 DIAGNOSIS — F99 INSOMNIA DUE TO OTHER MENTAL DISORDER: Chronic | ICD-10-CM

## 2022-02-22 DIAGNOSIS — F33.1 MODERATE EPISODE OF RECURRENT MAJOR DEPRESSIVE DISORDER: Primary | Chronic | ICD-10-CM

## 2022-02-22 DIAGNOSIS — E03.8 HYPOTHYROIDISM DUE TO HASHIMOTO'S THYROIDITIS: Chronic | ICD-10-CM

## 2022-02-22 DIAGNOSIS — F51.05 INSOMNIA DUE TO OTHER MENTAL DISORDER: Chronic | ICD-10-CM

## 2022-02-22 DIAGNOSIS — M32.9 SYSTEMIC LUPUS ERYTHEMATOSUS, UNSPECIFIED SLE TYPE, UNSPECIFIED ORGAN INVOLVEMENT STATUS: Chronic | ICD-10-CM

## 2022-02-22 DIAGNOSIS — R20.2 PARESTHESIA AND PAIN OF BOTH UPPER EXTREMITIES: ICD-10-CM

## 2022-02-22 PROBLEM — M54.50 CHRONIC BILATERAL LOW BACK PAIN WITHOUT SCIATICA: Chronic | Status: ACTIVE | Noted: 2019-09-18

## 2022-02-22 PROCEDURE — 99214 OFFICE O/P EST MOD 30 MIN: CPT | Performed by: INTERNAL MEDICINE

## 2022-02-22 NOTE — PATIENT INSTRUCTIONS
** IMPORTANT MESSAGE FROM DR. MARTIN **    In our office, your satisfaction is VERY important to us.     You may receive a survey from [facility] by mail or E-mail for you to provide feedback about your visit. This information is invaluable for me to know what we can do to improve our services.     I ask that you please take a few minutes to complete the survey and let us know how we are doing in serving your needs. (You may receive the survey more than once for multiple visits)    Thank You !    Dr. Martin    _________________________________________________________________________________________________________________________      ** ADDITIONAL INSTRUCTION / REMINDERS FROM DR. MARTIN **

## 2022-02-22 NOTE — ASSESSMENT & PLAN NOTE
Previously ordered NCV/EMG has not been scheduled.  Advised her to proceed with scheduling for this procedure.

## 2022-02-22 NOTE — PROGRESS NOTES
I N T E R N A L  M E D I C I N E  J U N O H  K I M,  M D      ENCOUNTER DATE:  02/22/2022    Taty Romo / 41 y.o. / female      CHIEF COMPLAINT / REASON FOR OFFICE VISIT     Paresthesia and pain of both upper extremities; Depression with anxiety; and Systemic lupus erythematosus, unspecified SLE type, unspecif      ASSESSMENT & PLAN     Problem List Items Addressed This Visit        High    Moderate episode of recurrent major depressive disorder (HCC) - Primary (Chronic)    Overview     Has taken medication since 19.   - Mom PASSED 5/2021     Continue duloxetine 120 mg qd         Current Assessment & Plan     Ongoing depression.   Referral for behavioral health.   Consider Trintellix or adding an atypical.   Will see how quickly she can get in with a psychiatrist.          Relevant Medications    DULoxetine (CYMBALTA) 60 MG capsule    traZODone (DESYREL) 100 MG tablet    Other Relevant Orders    Ambulatory Referral to Behavioral Health       Medium    Hypothyroidism due to Hashimoto's thyroiditis (Chronic)    Overview     S/p partial thyroidectomy (right side) 2015 for adenoma.     Continue levothyroxine 100 mcg qam.          Relevant Medications    levothyroxine (SYNTHROID, LEVOTHROID) 100 MCG tablet    Insomnia (Chronic)    Overview     Continue trazodone 200 mg qHS          Relevant Medications    traZODone (DESYREL) 100 MG tablet    Systemic lupus erythematosus (HCC) (Chronic)    Overview     Dx 2007 started on Plaquenil  *[place]    Continue hydroxychloroquine 200 mg BID          Paresthesia and pain of both upper extremities    Current Assessment & Plan     Previously ordered NCV/EMG has not been scheduled.  Advised her to proceed with scheduling for this procedure.             Orders Placed This Encounter   Procedures   • Ambulatory Referral to Behavioral Health     No orders of the defined types were placed in this encounter.      SUMMARY/DISCUSSION  •       Next Appointment with me: Visit date  "not found    Return in about 4 months (around 6/22/2022) for Reassess chronic medical problems.      VITAL SIGNS     Visit Vitals  /58 (BP Location: Left arm)   Pulse 77   Temp 98 °F (36.7 °C)   Ht 170.2 cm (67\")   Wt 68 kg (150 lb)   LMP 02/22/2022   SpO2 97%   BMI 23.49 kg/m²       BP Readings from Last 3 Encounters:   02/22/22 100/58   01/18/22 102/60   01/11/22 110/70     Wt Readings from Last 3 Encounters:   02/22/22 68 kg (150 lb)   01/18/22 65.1 kg (143 lb 8 oz)   01/11/22 69.6 kg (153 lb 8 oz)     Body mass index is 23.49 kg/m².      MEDICATIONS AT THE TIME OF OFFICE VISIT     Current Outpatient Medications on File Prior to Visit   Medication Sig   • acetaminophen (Tylenol 8 Hour Arthritis Pain) 650 MG 8 hr tablet Take 1 tablet by mouth Every 8 (Eight) Hours As Needed for Mild Pain  or Moderate Pain .   • Biotin 5000 MCG tablet Take 1 tablet by mouth Daily.   • Calcium Polycarbophil (FIBER-CAPS PO) Take 2 capsules by mouth Daily.   • Cetirizine-Pseudoephedrine (ZYRTEC-D PO) Take 1 tablet by mouth Daily As Needed.   • Cholecalciferol (VITAMIN D) 2000 UNITS capsule Take 5,000 Units by mouth Every Evening.   • docusate sodium (COLACE) 100 MG capsule Take 200 mg by mouth 2 (Two) Times a Day.   • DULoxetine (CYMBALTA) 60 MG capsule Take 2 capsules by mouth Every Morning Before Breakfast.   • gabapentin (NEURONTIN) 600 MG tablet Take 1 tablet by mouth 2 (Two) Times a Day. (Patient taking differently: Take 600 mg by mouth 2 (Two) Times a Day. Take 300 mg bid)   • hydroxychloroquine (PLAQUENIL) 200 MG tablet Take 1 tablet by mouth 2 (Two) Times a Day.   • levothyroxine (SYNTHROID, LEVOTHROID) 100 MCG tablet Take 1 tablet by mouth Daily.   • traZODone (DESYREL) 100 MG tablet Take 2 tablets by mouth Every Night.   • Turmeric 500 MG tablet Take 2 tablets by mouth Daily.   • [DISCONTINUED] hydroxychloroquine (PLAQUENIL) 200 MG tablet Take 1 tablet by mouth 2 (two) times a day.     No current facility-administered " medications on file prior to visit.          HISTORY OF PRESENT ILLNESS     She complains of ongoing depression mood without any suicidal thoughts or ideations.  Her mother passed away in May 2021.  She denies any other external major stressors in her life.  She is on duloxetine 60 mg 2 capsules daily.  She complains of ongoing paresthesia discomfort of her upper extremities.  The previously ordered NCV/EMG has not been scheduled.  She recently exacerbated her lower back pain.  She is on stable dose of levothyroxine for hypothyroidism.  She is on trazodone 200 mg nightly for sleep.      Patient Care Team:  Jeremy Mcdonald MD as PCP - General (Internal Medicine)  Shaheed Blanco MD as Consulting Physician (Rheumatology)  Angy Hunter MD as Consulting Physician (Obstetrics and Gynecology)    REVIEW OF SYSTEMS     Review of Systems   Neuro neg for change since last visit     PHYSICAL EXAMINATION     Physical Exam  General: No acute distress  Psych: Normal thought and judgment. Depressed mood. No suicidal ideation.       REVIEWED DATA     Labs:     Lab Results   Component Value Date     (L) 09/15/2021    K 3.8 09/15/2021    CALCIUM 9.7 09/15/2021    AST 17 09/15/2021    ALT 14 09/15/2021    BUN 11 09/15/2021    CREATININE 0.89 09/15/2021    CREATININE 0.67 01/05/2021    CREATININE 0.8 01/28/2020    EGFRIFNONA 70 09/15/2021    EGFRIFAFRI 85 09/15/2021       No results found for: HGBA1C    No results found for: LDL, HDL, TRIG    Lab Results   Component Value Date    TSH 1.730 09/15/2021    TSH 2.530 07/30/2020    TSH 1.780 09/18/2019    FREET4 1.33 09/15/2021    FREET4 1.49 07/30/2020    FREET4 1.43 09/18/2019       Lab Results   Component Value Date    WBC 3.19 (L) 09/15/2021    HGB 13.1 09/15/2021     (L) 09/15/2021       Lab Results   Component Value Date    PROTEIN Negative 09/15/2021    GLUCOSEU Negative 09/15/2021    BLOODU Negative 09/15/2021    NITRITEU Negative 09/15/2021    LEUKOCYTESUR Negative  09/15/2021      No results found for: MICROALBUR        Imaging:           Medical Tests:           Summary of old records / correspondence / consultant report:           Request outside records:             *Examiner was wearing KN95 mask and eye protection during the entire duration of the visit. Patient was masked the entire time. Minimum social distance of 6 ft maintained entire visit except if physical contact was necessary as documented.       Template created by Adan Mcdonald MD

## 2022-02-22 NOTE — ASSESSMENT & PLAN NOTE
Ongoing depression.   Referral for behavioral health.   Consider Trintellix or adding an atypical.   Will see how quickly she can get in with a psychiatrist.

## 2022-03-01 NOTE — TELEPHONE ENCOUNTER
Last Ondanestron 4mg # 30 q8hrs +0RF on 12/9/20.  Last OV 12/28/20, future appt 1/28/21   normal, alert, in no acute distress but chronically ill appearing , well developed, well nourished,  normal communication ability

## 2022-03-24 ENCOUNTER — TELEMEDICINE (OUTPATIENT)
Dept: PSYCHIATRY | Facility: CLINIC | Age: 42
End: 2022-03-24

## 2022-03-24 DIAGNOSIS — F33.1 MODERATE EPISODE OF RECURRENT MAJOR DEPRESSIVE DISORDER: Primary | ICD-10-CM

## 2022-03-24 DIAGNOSIS — F51.05 INSOMNIA DUE TO MENTAL CONDITION: ICD-10-CM

## 2022-03-24 DIAGNOSIS — F41.1 GENERALIZED ANXIETY DISORDER: ICD-10-CM

## 2022-03-24 DIAGNOSIS — F40.10 SOCIAL ANXIETY DISORDER: ICD-10-CM

## 2022-03-24 PROCEDURE — 90792 PSYCH DIAG EVAL W/MED SRVCS: CPT

## 2022-03-24 RX ORDER — PROPRANOLOL HYDROCHLORIDE 10 MG/1
10 TABLET ORAL 2 TIMES DAILY PRN
Qty: 60 TABLET | Refills: 0 | Status: SHIPPED | OUTPATIENT
Start: 2022-03-24 | End: 2023-01-12 | Stop reason: SDUPTHER

## 2022-03-24 RX ORDER — DULOXETIN HYDROCHLORIDE 20 MG/1
CAPSULE, DELAYED RELEASE ORAL
Qty: 9 CAPSULE | Refills: 0 | Status: SHIPPED | OUTPATIENT
Start: 2022-03-24 | End: 2022-03-27 | Stop reason: SDUPTHER

## 2022-03-24 RX ORDER — MIRTAZAPINE 15 MG/1
15 TABLET, FILM COATED ORAL NIGHTLY
Qty: 30 TABLET | Refills: 0 | Status: SHIPPED | OUTPATIENT
Start: 2022-03-24 | End: 2022-04-25 | Stop reason: SDUPTHER
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
## 2022-03-27 DIAGNOSIS — F40.10 SOCIAL ANXIETY DISORDER: ICD-10-CM

## 2022-03-27 DIAGNOSIS — F41.1 GENERALIZED ANXIETY DISORDER: ICD-10-CM

## 2022-03-27 DIAGNOSIS — F33.1 MODERATE EPISODE OF RECURRENT MAJOR DEPRESSIVE DISORDER: ICD-10-CM

## 2022-03-27 RX ORDER — DULOXETIN HYDROCHLORIDE 20 MG/1
CAPSULE, DELAYED RELEASE ORAL
Qty: 9 CAPSULE | Refills: 0 | Status: CANCELLED | OUTPATIENT
Start: 2022-03-27 | End: 2022-04-02

## 2022-03-28 RX ORDER — DULOXETIN HYDROCHLORIDE 20 MG/1
CAPSULE, DELAYED RELEASE ORAL
Qty: 9 CAPSULE | Refills: 0 | Status: SHIPPED | OUTPATIENT
Start: 2022-03-28 | End: 2022-04-01 | Stop reason: SDUPTHER

## 2022-04-01 DIAGNOSIS — F33.1 MODERATE EPISODE OF RECURRENT MAJOR DEPRESSIVE DISORDER: ICD-10-CM

## 2022-04-01 DIAGNOSIS — F41.1 GENERALIZED ANXIETY DISORDER: ICD-10-CM

## 2022-04-01 DIAGNOSIS — F40.10 SOCIAL ANXIETY DISORDER: ICD-10-CM

## 2022-04-01 RX ORDER — DULOXETIN HYDROCHLORIDE 20 MG/1
CAPSULE, DELAYED RELEASE ORAL
Qty: 9 CAPSULE | Refills: 0 | Status: CANCELLED | OUTPATIENT
Start: 2022-04-01 | End: 2022-04-07

## 2022-04-01 RX ORDER — DULOXETIN HYDROCHLORIDE 60 MG/1
120 CAPSULE, DELAYED RELEASE ORAL DAILY
Qty: 60 CAPSULE | Refills: 0 | Status: SHIPPED | OUTPATIENT
Start: 2022-04-01 | End: 2022-04-24 | Stop reason: SDUPTHER

## 2022-04-22 ENCOUNTER — ANESTHESIA (OUTPATIENT)
Dept: PAIN MEDICINE | Facility: HOSPITAL | Age: 42
End: 2022-04-22

## 2022-04-22 ENCOUNTER — ANESTHESIA EVENT (OUTPATIENT)
Dept: PAIN MEDICINE | Facility: HOSPITAL | Age: 42
End: 2022-04-22

## 2022-04-22 ENCOUNTER — HOSPITAL ENCOUNTER (OUTPATIENT)
Dept: PAIN MEDICINE | Facility: HOSPITAL | Age: 42
Discharge: HOME | End: 2022-04-22

## 2022-04-22 ENCOUNTER — HOSPITAL ENCOUNTER (OUTPATIENT)
Dept: GENERAL RADIOLOGY | Facility: HOSPITAL | Age: 42
Discharge: HOME | End: 2022-04-22

## 2022-04-22 VITALS
HEART RATE: 88 BPM | TEMPERATURE: 98.6 F | DIASTOLIC BLOOD PRESSURE: 75 MMHG | SYSTOLIC BLOOD PRESSURE: 115 MMHG | OXYGEN SATURATION: 100 % | WEIGHT: 145 LBS | HEIGHT: 66 IN | BODY MASS INDEX: 23.3 KG/M2 | RESPIRATION RATE: 19 BRPM

## 2022-04-22 DIAGNOSIS — M51.26 HERNIATED LUMBAR INTERVERTEBRAL DISC: ICD-10-CM

## 2022-04-22 DIAGNOSIS — R52 PAIN: ICD-10-CM

## 2022-04-22 DIAGNOSIS — M54.50 CHRONIC BILATERAL LOW BACK PAIN WITHOUT SCIATICA: Chronic | ICD-10-CM

## 2022-04-22 DIAGNOSIS — M51.36 DDD (DEGENERATIVE DISC DISEASE), LUMBAR: Chronic | ICD-10-CM

## 2022-04-22 DIAGNOSIS — G89.29 CHRONIC BILATERAL LOW BACK PAIN WITHOUT SCIATICA: Chronic | ICD-10-CM

## 2022-04-22 PROCEDURE — 0 IOPAMIDOL 41 % SOLUTION: Performed by: ANESTHESIOLOGY

## 2022-04-22 PROCEDURE — 25010000002 ROPIVACAINE PER 1 MG: Performed by: ANESTHESIOLOGY

## 2022-04-22 PROCEDURE — 25010000002 METHYLPREDNISOLONE PER 40 MG: Performed by: ANESTHESIOLOGY

## 2022-04-22 RX ORDER — SODIUM CHLORIDE 0.9 % (FLUSH) 0.9 %
3-10 SYRINGE (ML) INJECTION AS NEEDED
Status: DISCONTINUED | OUTPATIENT
Start: 2022-04-22 | End: 2022-04-23 | Stop reason: HOSPADM

## 2022-04-22 RX ORDER — METHYLPREDNISOLONE ACETATE 40 MG/ML
80 INJECTION, SUSPENSION INTRA-ARTICULAR; INTRALESIONAL; INTRAMUSCULAR; SOFT TISSUE ONCE
Status: COMPLETED | OUTPATIENT
Start: 2022-04-22 | End: 2022-04-22

## 2022-04-22 RX ORDER — SODIUM CHLORIDE 0.9 % (FLUSH) 0.9 %
3 SYRINGE (ML) INJECTION EVERY 12 HOURS SCHEDULED
Status: DISCONTINUED | OUTPATIENT
Start: 2022-04-22 | End: 2022-04-23 | Stop reason: HOSPADM

## 2022-04-22 RX ORDER — MIDAZOLAM HYDROCHLORIDE 1 MG/ML
1 INJECTION INTRAMUSCULAR; INTRAVENOUS AS NEEDED
Status: DISCONTINUED | OUTPATIENT
Start: 2022-04-22 | End: 2022-04-23 | Stop reason: HOSPADM

## 2022-04-22 RX ORDER — ROPIVACAINE HYDROCHLORIDE 5 MG/ML
10 INJECTION, SOLUTION EPIDURAL; INFILTRATION; PERINEURAL ONCE
Status: COMPLETED | OUTPATIENT
Start: 2022-04-22 | End: 2022-04-22

## 2022-04-22 RX ORDER — LIDOCAINE HYDROCHLORIDE 10 MG/ML
1 INJECTION, SOLUTION INFILTRATION; PERINEURAL ONCE
Status: DISCONTINUED | OUTPATIENT
Start: 2022-04-22 | End: 2022-04-23 | Stop reason: HOSPADM

## 2022-04-22 RX ORDER — FENTANYL CITRATE 50 UG/ML
50 INJECTION, SOLUTION INTRAMUSCULAR; INTRAVENOUS AS NEEDED
Status: DISCONTINUED | OUTPATIENT
Start: 2022-04-22 | End: 2022-04-23 | Stop reason: HOSPADM

## 2022-04-22 RX ADMIN — IOPAMIDOL 10 ML: 408 INJECTION, SOLUTION INTRATHECAL at 13:58

## 2022-04-22 RX ADMIN — METHYLPREDNISOLONE ACETATE 80 MG: 40 INJECTION, SUSPENSION INTRA-ARTICULAR; INTRALESIONAL; INTRAMUSCULAR; INTRASYNOVIAL; SOFT TISSUE at 13:58

## 2022-04-22 RX ADMIN — ROPIVACAINE HYDROCHLORIDE 10 ML: 5 INJECTION, SOLUTION EPIDURAL; INFILTRATION; PERINEURAL at 13:58

## 2022-04-22 NOTE — H&P
CHIEF COMPLAINT:  Low back and buttock pain        HISTORY OF PRESENT ILLNESS:  This patient is complaining of low back and buttock pain.  It ranges between a 7 and 9 out of 10 on the pain scale.  The pain is described as burning, deep, intermittent, numb, pressure, tingling, sharp, spasm, and throbbing in nature. The pain is exacerbated by exercise, lifting, lying down, sitting, standing, straining, twisting, and walk, and relieved by lying down.  The patient has tried conservative therapy for greater than 6 weeks including: Rest and heat.  The pain is significantly decreasing the patient's Activities of Daily Living.      Diagnostic imaging: MRI of the lumbar spine with and without contrast from 4/5/2021 shows a prior left-sided laminectomy at L5-S1.  There is degenerative retrolisthesis of L5 on S1.  The full dictation by the radiologist is available in the chart.    This patient was referred to our clinic by Dr. Alessandro Thomas for interventional pain management options.  At her last visit, on 7/19/2021, she had a left-sided transfemoral epidural steroid and local anesthetic injection.  She states it was very beneficial for the pain radiating down her left leg.    She presents today with primarily bilateral buttock pain.         PAST MEDICAL HISTORY:  Current Outpatient Medications on File Prior to Encounter   Medication Sig Dispense Refill   • acetaminophen (Tylenol 8 Hour Arthritis Pain) 650 MG 8 hr tablet Take 1 tablet by mouth Every 8 (Eight) Hours As Needed for Mild Pain  or Moderate Pain . 100 tablet 2   • Biotin 5000 MCG tablet Take 1 tablet by mouth Daily.     • Calcium Polycarbophil (FIBER-CAPS PO) Take 2 capsules by mouth Daily.     • Cetirizine-Pseudoephedrine (ZYRTEC-D PO) Take 1 tablet by mouth Daily As Needed.     • Cholecalciferol (VITAMIN D) 2000 UNITS capsule Take 5,000 Units by mouth Every Evening.     • docusate sodium (COLACE) 100 MG capsule Take 200 mg by mouth 2 (Two) Times a Day.     •  DULoxetine (Cymbalta) 60 MG capsule Take 2 capsules by mouth Daily. 60 capsule 0   • gabapentin (NEURONTIN) 600 MG tablet Take 1 tablet by mouth 2 (Two) Times a Day. (Patient taking differently: Take 600 mg by mouth 2 (Two) Times a Day. Take 300 mg bid) 60 tablet 3   • hydroxychloroquine (PLAQUENIL) 200 MG tablet Take 1 tablet by mouth 2 (Two) Times a Day. 180 tablet 1   • levothyroxine (SYNTHROID, LEVOTHROID) 100 MCG tablet Take 1 tablet by mouth Daily. 90 tablet 3   • meloxicam (MOBIC) 15 MG tablet Take 1 tablet by mouth daily. 90 tablet 1   • mirtazapine (Remeron) 15 MG tablet Take 1 tablet by mouth Every Night. 30 tablet 0   • propranolol (INDERAL) 10 MG tablet Take 1 tablet by mouth 2 (Two) Times a Day As Needed (Anxiety). 60 tablet 0   • Turmeric 500 MG tablet Take 2 tablets by mouth Daily.     • Vortioxetine HBr 5 MG tablet Take 5 mg by mouth Daily for 7 days, THEN 10 mg Daily for 23 days. 53 tablet 0     No current facility-administered medications on file prior to encounter.       Past Medical History:   Diagnosis Date   • Allergic rhinitis 5/22/2019   • Alopecia 08/2015   • CHI positive 08/2014   • Anal fissure 12/2016   • Anxiety    • Bilateral sciatica 01/15/2020   • Biliary colic 05/2019   • Bulging lumbar disc 02/2018   • Cervical radiculopathy 11/18/2020   • Cervicitis 04/04/2002    SEEN AT [facility] ER   • Chronic bilateral low back pain without sciatica    • Chronic fatigue    • Chronic ITP (idiopathic thrombocytopenia) ([facility]) 01/2012    FOLLOWED BY DR. PEÑA PIÑA   • Closed fracture of left distal radius 01/22/2020    SEEN AT [facility] ER   • Cold intolerance    • Colon polyps     FOLLOWED BY DR. 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   • Hypothyroidism    • Idiopathic scoliosis of lumbosacral region    • Insomnia 3/24/2015    Continue trazodone 200 mg qHS    • Irregular menses 03/2015   • Irritable bowel syndrome with constipation 1/30/2019   • Lumbar radiculopathy    • Migraine    • Neck sprain 07/11/2005    SEEN AT [facility] ER   • Paresthesia of upper extremity     BILATERAL   • PCOS (polycystic ovarian syndrome)    • Peripheral neuropathy 2017    KNEES DOWN   • Polyarthralgia    • Polydipsia 10/2016   • PONV (postoperative nausea and vomiting)    • Ruptured tympanic membrane, right 05/2015   • Seasonal allergies    • Spondylolisthesis at L5-S1 level    • Systemic lupus erythematosus (HCC) 03/2007    FOLLOWED BY DR. ERNESTO DILLARD   • Tattoos    • Tremor of both hands 06/2016   • Trochanteric bursitis of both hips 10/2021   • Urinary frequency 02/2015   • Urinary urgency 02/2015   • Vitamin D deficiency 8/29/2015       SOCIAL HISTORY:  Counseled to avoid tobacco     REVIEW OF SYSTEMS:  No pertinent hematologic, infectious, or constitutional symptoms.  Other review of systems non-contributory     PHYSICAL EXAM:  BP 94/67 (BP Location: Left arm, Patient Position: Sitting)   Pulse 89   Temp 37 °C (98.6 °F) (Oral)   Resp 16   Ht 167.6 cm (66\")   Wt 65.8 kg (145 lb)   LMP 04/16/2022   SpO2 99%   BMI 23.40 kg/m²   Constitutional: Well-developed well-nourished no acute distress  General: Alert and oriented  Sacroiliac Joints: Cachorro's Test positive right greater than left, Gaenslin's Test positive recurrent left  Neuromuscular: Tenderness to palpation of the SI joints       DIAGNOSIS:  Post-Op Diagnosis Codes:  Post-Op Diagnosis Codes:     * Bilateral sacroiliitis (HCC) " [M46.1]     * Spondylolisthesis, lumbar region [M43.16]     PLAN:  -This patient had been sent to our clinic for consideration of bilateral lumbar facet medial branch blocks possibly leading to future radiofrequency ablation.  I am certainly willing to consider this for the patient in the future.  However, she denied any pain in this area here today.  It seems as if she has had a significant flareup of pain in her sacroiliac joints.  For that reason, I will focus on her sacroiliac joints at this time and be more than happy to focus on her lumbar facets should she have a flareup of that area again in the future.  -  Bilateral Sacroiliac joint injections spaced approximately 4 weeks apart.  If pain control is acceptable after this injection, it was discussed with the patient that they may return for the subsequent injections if and when their pain returns. Most likely, the patient will receive 3 in this series.  - Risks were discussed with the patient, including but not limited to: failure of relief, worsening pain, tissue or nerve damage, bleeding, and infection. Benefits and alternatives were also discussed. No promises were made. Risks/benefits/alternatives of procedural medications were also discussed, including but not limited to hyperglycemia, fluid retention, decreased immune system, osteoporosis, joint degradation and anaphylactoid reactions. The patient voiced understanding and agreed to proceed.   -  Physical therapy exercises at home should be considered, as tolerated, as prescribed by physical therapy or from the pain clinic handout (given to the patient) to allow for a home exercise program.  Continuation of these exercises every day, or multiple times per week, even when the patient has good pain relief, was stressed to the patient as a preventative measure to decrease the frequency and severity of future pain episodes.  -  It is recommended that the patient use the least amount of opioid medication  possible.     -  Heat and ice to the affected area as tolerated for pain control.  It was discussed that heating pads can cause burns.  -  Daily low impact exercise such as walking or water exercise was recommended to maintain overall health and aid in weight control.   -  Patient was counseled to abstain from tobacco products.  -  Follow up as needed for subsequent injections.      Bernardo Urias M.D.  Nuria Kapadia, [facility], [facility]  Board Certified in Pain Medicine by the American Board of Anesthesiology  Board Certified in Anesthesiology by the American Board of Anesthesiology     Much of this encounter note is an electronic transcription/translation of spoken language to printed text. The electronic translation of spoken language may permit erroneous, or at times, nonsensical words or phrases to be inadvertently transcribed. Although I have reviewed the note for such errors, some may still exist.

## 2022-04-22 NOTE — ANESTHESIA PROCEDURE NOTES
Bilateral sacroiliac joint injections    Pre-sedation assessment completed: 4/22/2022 1:45 PM    Patient reassessed immediately prior to procedure    Patient location during procedure: Pain Clinic  Start time: 4/22/2022 1:46 PM  Stop time: 4/22/2022 1:57 PM    Reason for block: procedure for pain  Performed by  Anesthesiologist: Bernardo Urias MD  Preanesthetic Checklist  Completed: patient identified, IV checked, site marked, risks and benefits discussed, surgical consent, monitors and equipment checked, pre-op evaluation and timeout performed  Prep:  Patient position: prone  Sterile barriers:cap, gloves, mask and sterile barrier  Prep: ChloraPrep  Patient monitoring: blood pressure monitoring, continuous pulse oximetry and EKG  Procedure:  Sedation:no  Guidance:fluoroscopy  Contrast Medium:yes  Location:Bilateral  Needle Type:Quincke  Needle Gauge:25 G  Aspiration:negative  Medications:  Depomedrol:80 mg  Amount (mL): 0.5mls.  Comment:Ropivacaine 0.5% 1.5cc mixed with steroid for injection on each side (1 cc ropivacaine and 40mg Depo medrol per side).  Had initial posterior spread of contrast on the right side.  Upon further advancement, contrast spread was appropriate by fluoroscopy imaging.       Post Assessment  Injection Assessment: negative  Patient Tolerance:comfortable throughout block  Complications:no  Additional Notes  Post-Op Diagnosis Codes:     * Bilateral sacroiliitis (HCC) (M46.1)     * Spondylolisthesis, lumbar region (M43.16)    The patient was observed in recovery with no evidence of neurological deficits or other problems. All questions were answered. The patient was discharged with appropriate instructions.                   No

## 2022-04-24 DIAGNOSIS — F41.1 GENERALIZED ANXIETY DISORDER: ICD-10-CM

## 2022-04-24 DIAGNOSIS — F40.10 SOCIAL ANXIETY DISORDER: ICD-10-CM

## 2022-04-24 DIAGNOSIS — F33.1 MODERATE EPISODE OF RECURRENT MAJOR DEPRESSIVE DISORDER: ICD-10-CM

## 2022-04-25 ENCOUNTER — TELEMEDICINE (OUTPATIENT)
Dept: PSYCHIATRY | Facility: CLINIC | Age: 42
End: 2022-04-25

## 2022-04-25 ENCOUNTER — PRIOR AUTHORIZATION (OUTPATIENT)
Dept: PSYCHIATRY | Facility: CLINIC | Age: 42
End: 2022-04-25

## 2022-04-25 DIAGNOSIS — F33.1 MODERATE EPISODE OF RECURRENT MAJOR DEPRESSIVE DISORDER: ICD-10-CM

## 2022-04-25 DIAGNOSIS — F51.05 INSOMNIA DUE TO MENTAL CONDITION: ICD-10-CM

## 2022-04-25 DIAGNOSIS — F41.1 GENERALIZED ANXIETY DISORDER: ICD-10-CM

## 2022-04-25 DIAGNOSIS — F40.10 SOCIAL ANXIETY DISORDER: ICD-10-CM

## 2022-04-25 PROCEDURE — 99213 OFFICE O/P EST LOW 20 MIN: CPT

## 2022-04-25 RX ORDER — DULOXETIN HYDROCHLORIDE 60 MG/1
120 CAPSULE, DELAYED RELEASE ORAL DAILY
Qty: 60 CAPSULE | Refills: 0 | Status: SHIPPED | OUTPATIENT
Start: 2022-04-25 | End: 2022-04-25 | Stop reason: SDUPTHER

## 2022-04-25 RX ORDER — MIRTAZAPINE 15 MG/1
15 TABLET, FILM COATED ORAL NIGHTLY
Qty: 30 TABLET | Refills: 0 | Status: SHIPPED | OUTPATIENT
Start: 2022-04-25 | End: 2022-05-23 | Stop reason: SDUPTHER

## 2022-04-25 RX ORDER — DULOXETIN HYDROCHLORIDE 60 MG/1
120 CAPSULE, DELAYED RELEASE ORAL DAILY
Qty: 60 CAPSULE | Refills: 0 | Status: SHIPPED | OUTPATIENT
Start: 2022-04-25 | End: 2022-05-12

## 2022-04-25 NOTE — PROGRESS NOTES
This provider is located at [place], [place]. The Patient is seen remotely using Video. Patient is being seen via telehealth and confirm that they are in a secure environment for this session. Patient is located in [place], [place] at her work. The patient's condition being diagnosed/treated is appropriate for telemedicine. Provider identified as Rupa Olivas as well as credentials RENE MSN PMHNP-BC.   The client/patient gave consent to be seen remotely, and when consent is given they understand that the consent allows for patient identifiable information to be sent to a third party as needed.  They may refuse to be seen remotely at any time. The electronic data is encrypted and password protected, and the patient has been advised of the potential risks to privacy not withstanding such measures.    Chief Complaint  Depression and Anxiety    Subjective        Taty Romo presents to [facility] for   History of Present Illness  Patient seen today for follow-up visit for depression and anxiety.  Patient reports there is not been much change in her mood since last visit.  States that Trintellix still has not been improved on her insurance, so she has continued to take Cymbalta.  Currently rates her depression as 6 on a 1-10 scale with 10 being the worst.  States the Remeron helped initially, but now she is having trouble sleeping and plans to take the half tablet as was discussed at last visit.  She states she has not taken the propanolol yet as she has not been in any situations to need it.  Currently rates her anxiety a 4 on a 1-10 scale with 10 being the worst.  No recent panic attacks.  Denies any manic type symptoms.  Denies any paranoia.  Denies any auditory or visual hallucinations.  Denies any side effects to medications.  States she is excited that she has a trip to [place] planned this weekend with her girlfriends for her birthday.  Objective   Vital Signs:    There were no vitals taken for this visit.      PHQ-9 Score:   PHQ-9 Total Score: (P) 22     Mental Status Exam:   Hygiene:   good  Cooperation:  Cooperative  Eye Contact:  Good  Psychomotor Behavior:  Appropriate  Affect:  Flat  Mood: depressed and anxious  Speech:  Normal  Thought Process:  Goal directed  Thought Content:  Normal  Suicidal:  None  Homicidal:  None  Hallucinations:  None  Delusion:  None  Memory:  Intact  Orientation:  Person, Place, Time and Situation  Reliability:  good  Insight:  Good  Judgement:  Good  Impulse Control:  Good  Physical/Medical Issues:  No      PHQ-9 Depression Screening  Little interest or pleasure in doing things? (P) 3   Feeling down, depressed, or hopeless? (P) 3   Trouble falling or staying asleep, or sleeping too much? (P) 3   Feeling tired or having little energy? (P) 3   Poor appetite or overeating? (P) 3   Feeling bad about yourself - or that you are a failure or have let yourself or your family down? (P) 3   Trouble concentrating on things, such as reading the newspaper or watching television? (P) 3   Moving or speaking so slowly that other people could have noticed? Or the opposite - being so fidgety or restless that you have been moving around a lot more than usual? (P) 1   Thoughts that you would be better off dead, or of hurting yourself in some way? (P) 0   PHQ-9 Total Score (P) 22   If you checked off any problems, how difficult have these problems made it for you to do your work, take care of things at home, or get along with other people? (P) Somewhat difficult     PHQ-9 Total Score: (P) 22    COURT 7 anxiety screening tool that patient filled out virtually reviewed by this APRN at today's encounter.    PROMIS scale screening tool that patient filled out virtually reviewed by this APRN at today's encounter.    Previous Provider notes and available records reviewed by this APRN today.   Current Medications:   Current Outpatient Medications   Medication Sig  Dispense Refill   • acetaminophen (Tylenol 8 Hour Arthritis Pain) 650 MG 8 hr tablet Take 1 tablet by mouth Every 8 (Eight) Hours As Needed for Mild Pain  or Moderate Pain . 100 tablet 2   • Biotin 5000 MCG tablet Take 1 tablet by mouth Daily.     • Calcium Polycarbophil (FIBER-CAPS PO) Take 2 capsules by mouth Daily.     • Cetirizine-Pseudoephedrine (ZYRTEC-D PO) Take 1 tablet by mouth Daily As Needed.     • Cholecalciferol (VITAMIN D) 2000 UNITS capsule Take 5,000 Units by mouth Every Evening.     • docusate sodium (COLACE) 100 MG capsule Take 200 mg by mouth 2 (Two) Times a Day.     • DULoxetine (Cymbalta) 60 MG capsule Take 2 capsules by mouth Daily. 60 capsule 0   • gabapentin (NEURONTIN) 600 MG tablet Take 1 tablet by mouth 2 (Two) Times a Day. (Patient taking differently: Take 600 mg by mouth 2 (Two) Times a Day. Take 300 mg bid) 60 tablet 3   • hydroxychloroquine (PLAQUENIL) 200 MG tablet Take 1 tablet by mouth 2 (Two) Times a Day. 180 tablet 1   • levothyroxine (SYNTHROID, LEVOTHROID) 100 MCG tablet Take 1 tablet by mouth Daily. 90 tablet 3   • meloxicam (MOBIC) 15 MG tablet Take 1 tablet by mouth daily. 90 tablet 1   • propranolol (INDERAL) 10 MG tablet Take 1 tablet by mouth 2 (Two) Times a Day As Needed (Anxiety). 60 tablet 0   • Turmeric 500 MG tablet Take 2 tablets by mouth Daily.     • mirtazapine (Remeron) 15 MG tablet Take 1 tablet by mouth Every Night. 30 tablet 0   • Vortioxetine HBr 5 MG tablet Take 5 mg by mouth Daily for 7 days, THEN 10 mg Daily for 23 days. 53 tablet 0     No current facility-administered medications for this visit.       Physical Exam   Result Review :    The following data was reviewed by: RENE Guadalupe on 04/25/2022:  Common labs    Common Labsle 9/15/21 9/15/21 4/1/22 4/1/22 4/1/22    1134 1134 1524 1524 1524   Glucose 90       BUN 11       Creatinine 0.89       eGFR Non African Am 70       eGFR African Am 85       Sodium 135 (A)       Potassium 3.8       Chloride  101       Calcium 9.7       Total Protein 6.9       Albumin 4.90       Total Bilirubin 0.3       Alkaline Phosphatase 42       AST (SGOT) 17  11     ALT (SGPT) 14   8    WBC  3.19 (A)   2.85 (A)   Hemoglobin  13.1   12.2   Hematocrit  39.5   36.3   Platelets  120 (A)   105 (A)   (A) Abnormal value       Comments are available for some flowsheets but are not being displayed.              Assessment and Plan   Problem List Items Addressed This Visit        Mental Health    Moderate episode of recurrent major depressive disorder (HCC) (Chronic)    Overview     Has taken medication since 19.   - Mom PASSED 5/2021     Continue duloxetine 120 mg qd           Relevant Medications    mirtazapine (Remeron) 15 MG tablet    DULoxetine (Cymbalta) 60 MG capsule    Generalized anxiety disorder    Relevant Medications    mirtazapine (Remeron) 15 MG tablet    DULoxetine (Cymbalta) 60 MG capsule    Social anxiety disorder    Relevant Medications    mirtazapine (Remeron) 15 MG tablet    DULoxetine (Cymbalta) 60 MG capsule      Other Visit Diagnoses     Insomnia due to mental condition        Relevant Medications    mirtazapine (Remeron) 15 MG tablet    DULoxetine (Cymbalta) 60 MG capsule        Patient has not been able to start on Trintellix as of yet.  Reached out to our support staff having them prioritize getting that approved.  For now, patient will continue on Cymbalta 120 mg daily for depression and anxiety.  Patient still has the taper plan from last visit when Trintellix finally gets approved.  Encouraged patient to try the propanolol on a test run, so she will be comfortable taking it in a situation where she needs it.  Informed her that it would probably be best to have it on hand during her trip to [place].  Patient agrees and verbalized understanding.  Continue Remeron 15 mg nightly for sleep, but did speak with patient about taking 7.5 mg if the 15 mg is not effective as it can be more sedating.  Patient states she  is going to try the 7.5 mg.  Also discussed with patient that it would probably be better to wait till she gets back from [place] to initiate the change from Cymbalta to Trintellix because there will be a time when neither medication is efficacious during the change over.  Patient agreed and verbalized understanding.  We will notify patient when Trintellix is approved.  We will see patient in 4 weeks to reassess.    TREATMENT PLAN/GOALS: Continue supportive psychotherapy efforts and medications as indicated. Treatment and medication options discussed during today's visit. Patient ackowledged and verbally consented to continue with current treatment plan and was educated on the importance of compliance with treatment and follow-up appointments.    DEPRESSION:  Patient screened positive for depression based on a PHQ-9 score of  on . Follow-up recommendations include: Prescribed antidepressant medication treatment.       MEDICATION ISSUES:  We discussed risks, benefits, and side effects of the above medications and the patient was agreeable with the plan. Patient was educated on the importance of compliance with treatment and follow-up appointments.  Patient is agreeable to call the office with any worsening of symptoms or onset of side effects. Patient is agreeable to call 911 or go to the nearest ER should he/she begin having SI/HI.      Counseled patient regarding multimodal approach with healthy nutrition, healthy sleep, regular physical activity, social activities, counseling, and medications.      Coping skills reviewed and encouraged positive framing of thoughts     Assisted patient in processing above session content; acknowledged and normalized patient’s thoughts, feelings, and concerns.  Applied  positive coping skills and behavior management in session.  Allowed patient to freely discuss issues without interruption or judgment. Provided safe, confidential environment to facilitate the development of  positive therapeutic relationship and encourage open, honest communication. Assisted patient in identifying risk factors which would indicate the need for higher level of care including thoughts to harm self or others and/or self-harming behavior and encouraged patient to contact this office, call 911, or present to the nearest emergency room should any of these events occur. Discussed crisis intervention services and means to access.     MEDS ORDERED DURING VISIT:  New Medications Ordered This Visit   Medications   • mirtazapine (Remeron) 15 MG tablet     Sig: Take 1 tablet by mouth Every Night.     Dispense:  30 tablet     Refill:  0   • DULoxetine (Cymbalta) 60 MG capsule     Sig: Take 2 capsules by mouth Daily.     Dispense:  60 capsule     Refill:  0         Follow Up   Return in about 4 weeks (around 5/23/2022) for Video visit.    Patient was given instructions and counseling regarding her condition or for health maintenance advice. Please see specific information pulled into the AVS if appropriate.         This document has been electronically signed by RENE Guadalupe  April 25, 2022 15:49 EDT    Part of this note may be an electronic transcription/translation of spoken language to printed text using the Dragon Dictation System.

## 2022-05-09 ENCOUNTER — OFFICE VISIT (OUTPATIENT)
Dept: NEUROSURGERY | Facility: CLINIC | Age: 42
End: 2022-05-09

## 2022-05-09 VITALS
HEIGHT: 66 IN | HEART RATE: 77 BPM | TEMPERATURE: 97.1 F | BODY MASS INDEX: 23.3 KG/M2 | WEIGHT: 145 LBS | DIASTOLIC BLOOD PRESSURE: 74 MMHG | SYSTOLIC BLOOD PRESSURE: 112 MMHG | OXYGEN SATURATION: 100 %

## 2022-05-09 DIAGNOSIS — Z98.890 STATUS POST SURGERY: ICD-10-CM

## 2022-05-09 DIAGNOSIS — M51.36 DDD (DEGENERATIVE DISC DISEASE), LUMBAR: ICD-10-CM

## 2022-05-09 DIAGNOSIS — M51.26 HERNIATED LUMBAR INTERVERTEBRAL DISC: Primary | ICD-10-CM

## 2022-05-09 DIAGNOSIS — M53.3 SACROILIAC JOINT DYSFUNCTION OF RIGHT SIDE: ICD-10-CM

## 2022-05-09 PROCEDURE — 99213 OFFICE O/P EST LOW 20 MIN: CPT | Performed by: NEUROLOGICAL SURGERY

## 2022-05-12 ENCOUNTER — ANESTHESIA (OUTPATIENT)
Dept: PAIN MEDICINE | Facility: HOSPITAL | Age: 42
End: 2022-05-12

## 2022-05-12 ENCOUNTER — HOSPITAL ENCOUNTER (OUTPATIENT)
Dept: PAIN MEDICINE | Facility: HOSPITAL | Age: 42
Discharge: HOME | End: 2022-05-12

## 2022-05-12 ENCOUNTER — HOSPITAL ENCOUNTER (OUTPATIENT)
Dept: GENERAL RADIOLOGY | Facility: HOSPITAL | Age: 42
Discharge: HOME | End: 2022-05-12

## 2022-05-12 ENCOUNTER — ANESTHESIA EVENT (OUTPATIENT)
Dept: PAIN MEDICINE | Facility: HOSPITAL | Age: 42
End: 2022-05-12

## 2022-05-12 VITALS
WEIGHT: 145 LBS | DIASTOLIC BLOOD PRESSURE: 63 MMHG | HEIGHT: 66 IN | BODY MASS INDEX: 23.3 KG/M2 | SYSTOLIC BLOOD PRESSURE: 102 MMHG | HEART RATE: 82 BPM | RESPIRATION RATE: 16 BRPM | OXYGEN SATURATION: 98 % | TEMPERATURE: 97.1 F

## 2022-05-12 DIAGNOSIS — R52 PAIN: ICD-10-CM

## 2022-05-12 PROCEDURE — 77003 FLUOROGUIDE FOR SPINE INJECT: CPT

## 2022-05-12 PROCEDURE — 25010000002 METHYLPREDNISOLONE PER 40 MG: Performed by: ANESTHESIOLOGY

## 2022-05-12 PROCEDURE — 0 IOPAMIDOL 41 % SOLUTION: Performed by: ANESTHESIOLOGY

## 2022-05-12 PROCEDURE — 25010000002 ROPIVACAINE PER 1 MG: Performed by: ANESTHESIOLOGY

## 2022-05-12 RX ORDER — FENTANYL CITRATE 50 UG/ML
50 INJECTION, SOLUTION INTRAMUSCULAR; INTRAVENOUS AS NEEDED
Status: DISCONTINUED | OUTPATIENT
Start: 2022-05-12 | End: 2022-05-13 | Stop reason: HOSPADM

## 2022-05-12 RX ORDER — SODIUM CHLORIDE 0.9 % (FLUSH) 0.9 %
3-10 SYRINGE (ML) INJECTION AS NEEDED
Status: DISCONTINUED | OUTPATIENT
Start: 2022-05-12 | End: 2022-05-13 | Stop reason: HOSPADM

## 2022-05-12 RX ORDER — DULOXETIN HYDROCHLORIDE 20 MG/1
20 CAPSULE, DELAYED RELEASE ORAL DAILY
COMMUNITY
End: 2022-05-23 | Stop reason: ALTCHOICE

## 2022-05-12 RX ORDER — MIDAZOLAM HYDROCHLORIDE 1 MG/ML
1 INJECTION INTRAMUSCULAR; INTRAVENOUS AS NEEDED
Status: DISCONTINUED | OUTPATIENT
Start: 2022-05-12 | End: 2022-05-13 | Stop reason: HOSPADM

## 2022-05-12 RX ORDER — METHYLPREDNISOLONE ACETATE 40 MG/ML
40 INJECTION, SUSPENSION INTRA-ARTICULAR; INTRALESIONAL; INTRAMUSCULAR; SOFT TISSUE ONCE
Status: COMPLETED | OUTPATIENT
Start: 2022-05-12 | End: 2022-05-12

## 2022-05-12 RX ORDER — SODIUM CHLORIDE 0.9 % (FLUSH) 0.9 %
3 SYRINGE (ML) INJECTION EVERY 12 HOURS SCHEDULED
Status: DISCONTINUED | OUTPATIENT
Start: 2022-05-12 | End: 2022-05-13 | Stop reason: HOSPADM

## 2022-05-12 RX ORDER — LIDOCAINE HYDROCHLORIDE 10 MG/ML
1 INJECTION, SOLUTION INFILTRATION; PERINEURAL ONCE
Status: DISCONTINUED | OUTPATIENT
Start: 2022-05-12 | End: 2022-05-13 | Stop reason: HOSPADM

## 2022-05-12 RX ORDER — ROPIVACAINE HYDROCHLORIDE 5 MG/ML
10 INJECTION, SOLUTION EPIDURAL; INFILTRATION; PERINEURAL ONCE
Status: COMPLETED | OUTPATIENT
Start: 2022-05-12 | End: 2022-05-12

## 2022-05-12 RX ORDER — VORTIOXETINE 10 MG/1
TABLET, FILM COATED ORAL
COMMUNITY
End: 2022-06-29

## 2022-05-12 RX ADMIN — METHYLPREDNISOLONE ACETATE 40 MG: 40 INJECTION, SUSPENSION INTRA-ARTICULAR; INTRALESIONAL; INTRAMUSCULAR; SOFT TISSUE at 15:04

## 2022-05-12 RX ADMIN — IOPAMIDOL 10 ML: 408 INJECTION, SOLUTION INTRATHECAL at 15:03

## 2022-05-12 RX ADMIN — ROPIVACAINE HYDROCHLORIDE 10 ML: 5 INJECTION EPIDURAL; INFILTRATION; PERINEURAL at 15:04

## 2022-05-12 NOTE — ANESTHESIA PROCEDURE NOTES
Right-sided sacroiliac joint injection    Pre-sedation assessment completed: 5/12/2022 2:51 PM    Patient reassessed immediately prior to procedure    Patient location during procedure: Pain Clinic  Start time: 5/12/2022 2:53 PM  Stop time: 5/12/2022 3:05 PM    Reason for block: procedure for pain  Performed by  Anesthesiologist: Bernardo Urias MD  Preanesthetic Checklist  Completed: patient identified, IV checked, site marked, risks and benefits discussed, surgical consent, monitors and equipment checked, pre-op evaluation and timeout performed  Prep:  Patient position: prone  Sterile barriers:cap, gloves, mask and sterile barrier  Prep: ChloraPrep  Patient monitoring: blood pressure monitoring, continuous pulse oximetry and EKG  Procedure:  Sedation:no  Guidance:fluoroscopy  Contrast Medium:yes  Location:Right SIJ  Needle Type:Quincke  Needle Gauge:25 G  Aspiration:negative  Medications:  Depomedrol:40 mg  Amount (mL): 0.5 ml.  Comment:Ropivacaine 0.5% 1cc mixed with steroid for injection. Contrast spread appropriate by fluoroscopy imaging.     Post Assessment  Injection Assessment: negative  Patient Tolerance:comfortable throughout block  Complications:no  Additional Notes  Post-Op Diagnosis Codes:     * Sacroiliitis (HCC) (M46.1)    The patient was observed in recovery with no evidence of neurological deficits or other problems. All questions were answered. The patient was discharged with appropriate instructions.

## 2022-05-12 NOTE — H&P
CHIEF COMPLAINT:  Low back and buttock pain        HISTORY OF PRESENT ILLNESS:  This patient is complaining of low back and buttock pain.  She previously had right and left-sided pain but now states the pain is only on the right side after having a prior bilateral sacroiliac joint injections.   The pain is rated 7 out of 10 on the pain scale.  The pain is significantly decreasing the patient's Activities of Daily Living.      Diagnostic imaging: Diagnostic imaging: MRI of the lumbar spine with and without contrast from 4/5/2021 shows a prior left-sided laminectomy at L5-S1.  There is degenerative retrolisthesis of L5 on S1.  The full dictation by the radiologist is available in the chart.     This patient was referred to our clinic by Dr. Alessandro Thomas for interventional pain management options.  On 7/19/2021, she had a left-sided transfemoral epidural steroid and local anesthetic injection.  She states it was very beneficial for the pain radiating down her left leg.    She now presents for sacroiliac joint injections    Prior bilateral sacroiliac joint injections performed on 4/22/2022 afforded nearly 100% relief of pain in her left buttock that has been longstanding and approximately 50% relief of pain in her right buttock that has faded.  She is requesting another right-sided sacroiliac joint injection here today.       PAST MEDICAL HISTORY:  Current Outpatient Medications on File Prior to Encounter   Medication Sig Dispense Refill   • Biotin 5000 MCG tablet Take 1 tablet by mouth Daily.     • Cetirizine-Pseudoephedrine (ZYRTEC-D PO) Take 1 tablet by mouth Daily As Needed.     • docusate sodium (COLACE) 100 MG capsule Take 200 mg by mouth 2 (Two) Times a Day.     • DULoxetine (CYMBALTA) 20 MG capsule Take 20 mg by mouth Daily.     • gabapentin (NEURONTIN) 600 MG tablet Take 1 tablet by mouth 2 (Two) Times a Day. (Patient taking differently: Take 600 mg by mouth 2 (Two) Times a Day. Take 300 mg bid) 60 tablet 3    • hydroxychloroquine (PLAQUENIL) 200 MG tablet Take 1 tablet by mouth 2 (Two) Times a Day. 180 tablet 1   • levothyroxine (SYNTHROID, LEVOTHROID) 100 MCG tablet Take 1 tablet by mouth Daily. 90 tablet 3   • mirtazapine (Remeron) 15 MG tablet Take 1 tablet by mouth Every Night. (Patient taking differently: Take 7.5 mg by mouth Every Night.) 30 tablet 0   • Turmeric 500 MG tablet Take 2 tablets by mouth Daily.     • Vortioxetine HBr (Trintellix) 10 MG tablet Take  by mouth.     • Vortioxetine HBr 5 MG tablet Take 1 tablet by mouth Daily for 7 days, THEN 2 tablets Daily for 23 days. 53 tablet 0   • [DISCONTINUED] DULoxetine (Cymbalta) 60 MG capsule Take 2 capsules by mouth Daily. (Patient taking differently: Take 20 mg by mouth Daily.) 60 capsule 0   • acetaminophen (Tylenol 8 Hour Arthritis Pain) 650 MG 8 hr tablet Take 1 tablet by mouth Every 8 (Eight) Hours As Needed for Mild Pain  or Moderate Pain . 100 tablet 2   • Calcium Polycarbophil (FIBER-CAPS PO) Take 2 capsules by mouth Daily.     • Cholecalciferol (VITAMIN D) 2000 UNITS capsule Take 5,000 Units by mouth Every Evening.     • meloxicam (MOBIC) 15 MG tablet Take 1 tablet by mouth daily. 90 tablet 1   • propranolol (INDERAL) 10 MG tablet Take 1 tablet by mouth 2 (Two) Times a Day As Needed (Anxiety). 60 tablet 0     No current facility-administered medications on file prior to encounter.       Past Medical History:   Diagnosis Date   • Allergic rhinitis 5/22/2019   • Alopecia 08/2015   • CHI positive 08/2014   • Anal fissure 12/2016   • Anxiety    • Bilateral sciatica 01/15/2020   • Biliary colic 05/2019   • Bulging lumbar disc 02/2018   • Cervical radiculopathy 11/18/2020   • Cervicitis 04/04/2002    SEEN AT [facility] ER   • Chronic bilateral low back pain without sciatica    • Chronic fatigue    • Chronic ITP (idiopathic thrombocytopenia) (HCC) 01/2012    FOLLOWED BY DR. PEÑA PIÑA   • Closed fracture of left distal radius 01/22/2020    SEEN AT [facility] ER   • Cold  "intolerance    • Colon polyps     FOLLOWED BY DR. 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   • Hypothyroidism    • Idiopathic scoliosis of lumbosacral region    • Insomnia 3/24/2015    Continue trazodone 200 mg qHS    • Irregular menses 03/2015   • Irritable bowel syndrome with constipation 1/30/2019   • Lumbar radiculopathy    • Migraine    • Neck sprain 07/11/2005    SEEN AT [facility] ER   • Paresthesia of upper extremity     BILATERAL   • PCOS (polycystic ovarian syndrome)    • Peripheral neuropathy 2017    KNEES DOWN   • Polyarthralgia    • Polydipsia 10/2016   • PONV (postoperative nausea and vomiting)    • Ruptured tympanic membrane, right 05/2015   • Seasonal allergies    • Spondylolisthesis at L5-S1 level    • Systemic lupus erythematosus (HCC) 03/2007    FOLLOWED BY DR. ERNESTO DILLARD   • Tattoos    • Tremor of both hands 06/2016   • Trochanteric bursitis of both hips 10/2021   • Urinary frequency 02/2015   • Urinary urgency 02/2015   • Vitamin D deficiency 8/29/2015       SOCIAL HISTORY:  Counseled to avoid tobacco     REVIEW OF SYSTEMS:  No pertinent hematologic, infectious, or constitutional symptoms.  Other review of systems non-contributory     PHYSICAL EXAM:  /71 (BP Location: Left arm, Patient Position: Sitting)   Pulse 85   Temp 36.2 °C (97.1 °F) (Infrared)   Resp 14   Ht 167 cm (65.75\")   Wt 65.8 kg (145 lb)   LMP 04/16/2022   SpO2 98%   BMI 23.58 " kg/m²   Constitutional: Well-developed well-nourished no acute distress  General: Alert and oriented  Sacroiliac Joints: Cachorro's Test positive on the right, Gaenslin's Test positive on the right  Neuromuscular: Tenderness to palpation of the Right SI joint       DIAGNOSIS:  Post-Op Diagnosis Codes:  Post-Op Diagnosis Codes:     * Sacroiliitis (HCC) [M46.1]     PLAN:  -  Right Sacroiliac joint injection #2 in the series.  If pain control is acceptable after this injection, it was discussed with the patient that they may return for the subsequent injections if and when their pain returns.   - Risks were discussed with the patient, including but not limited to: failure of relief, worsening pain, tissue or nerve damage, bleeding, and infection. Benefits and alternatives were also discussed. No promises were made. Risks/benefits/alternatives of procedural medications were also discussed, including but not limited to hyperglycemia, fluid retention, decreased immune system, osteoporosis, joint degradation and anaphylactoid reactions. The patient voiced understanding and agreed to proceed.   -  Physical therapy exercises at home should be considered, as tolerated, as prescribed by physical therapy or from the pain clinic handout (given to the patient) to allow for a home exercise program.  Continuation of these exercises every day, or multiple times per week, even when the patient has good pain relief, was stressed to the patient as a preventative measure to decrease the frequency and severity of future pain episodes.  -  It is recommended that the patient use the least amount of opioid medication possible.     -  Heat and ice to the affected area as tolerated for pain control.  It was discussed that heating pads can cause burns.  -  Daily low impact exercise such as walking or water exercise was recommended to maintain overall health and aid in weight control.   -  Patient was counseled to abstain from tobacco  products.  -  Follow up as needed for subsequent injections.  -She has been given the name of Dr. Paras Brooks and will possibly consider transitioning to care for me in the future, especially if she needs radiofrequency ablations.  This is because I will be leaving this practice soon and going to a different hospital.    Bernardo Urias M.D.  Nuria Kapadia [facility]  Board Certified in Pain Medicine by the American Board of Anesthesiology  Board Certified in Anesthesiology by the American Board of Anesthesiology     Much of this encounter note is an electronic transcription/translation of spoken language to printed text. The electronic translation of spoken language may permit erroneous, or at times, nonsensical words or phrases to be inadvertently transcribed. Although I have reviewed the note for such errors, some may still exist.

## 2022-05-23 ENCOUNTER — TELEMEDICINE (OUTPATIENT)
Dept: PSYCHIATRY | Facility: CLINIC | Age: 42
End: 2022-05-23

## 2022-05-23 DIAGNOSIS — F51.05 INSOMNIA DUE TO MENTAL CONDITION: ICD-10-CM

## 2022-05-23 DIAGNOSIS — F40.10 SOCIAL ANXIETY DISORDER: ICD-10-CM

## 2022-05-23 DIAGNOSIS — F33.1 MODERATE EPISODE OF RECURRENT MAJOR DEPRESSIVE DISORDER: Primary | ICD-10-CM

## 2022-05-23 DIAGNOSIS — F41.1 GENERALIZED ANXIETY DISORDER: ICD-10-CM

## 2022-05-23 PROCEDURE — 99213 OFFICE O/P EST LOW 20 MIN: CPT

## 2022-05-23 RX ORDER — MIRTAZAPINE 15 MG/1
7.5-15 TABLET, FILM COATED ORAL NIGHTLY
Qty: 30 TABLET | Refills: 0 | Status: SHIPPED | OUTPATIENT
Start: 2022-05-23 | End: 2022-07-17 | Stop reason: ALTCHOICE

## 2022-05-23 NOTE — PROGRESS NOTES
This provider is located at [place], [place]. The Patient is seen remotely using Video. Patient is being seen via telehealth and confirm that they are in a secure environment for this session. Patient is located in [place], [place] in her car. The patient's condition being diagnosed/treated is appropriate for telemedicine. Provider identified as Rupa Olivas as well as credentials APRN MSN PMHNP-BC.   The client/patient gave consent to be seen remotely, and when consent is given they understand that the consent allows for patient identifiable information to be sent to a third party as needed.  They may refuse to be seen remotely at any time. The electronic data is encrypted and password protected, and the patient has been advised of the potential risks to privacy not withstanding such measures.    Chief Complaint  Depression and Anxiety    Subjective        Taty Romo presents to [facility] for   History of Present Illness  Patient seen today for follow-up visit for depression and anxiety.  Patient states she was able to get the Trintellix field and tapered off of Cymbalta.  States she has been on the Trintellix about 2 to 3 weeks now, and been off Cymbalta completely for 1 week.  States she has had some brain zaps type feelings since stopping the Cymbalta.  States overall the transition from Cymbalta to Trintellix was uneventful.  Patient states she feels her anxiety is better now than it was before.  States she feels that she is not as anxious.  States she has not had to take the propanolol any.  Rates her anxiety at 3-4 on a 1-10 scale with 10 being the worst.  Denies any panic attacks.  Currently rates her depression as 6 on a 1-10 scale with 10 being the worst.  States she still has several days of feeling down and depressed.  States that her sleep is still not where she wants it to be.  She had to go to taking the Remeron 7.5 mg and states that she still wakes up  during the night.  States that she thinks that is more contributed to by her back pain.  States her appetite is increased.  Denies any suicidal or homicidal ideation.  Denies any paranoia.  Denies any manic type symptoms.  Denies any auditory or visual hallucinations.  States that she has a trip planned with her daughters and their friends and her  in June to [place].  States she was able to enjoy her trip to [facility] with her friends last month.  Denies any side effects to the medication.  Objective   Vital Signs:   There were no vitals taken for this visit.      PHQ-9 Score:   PHQ-9 Total Score: (P) 14     Mental Status Exam:   Hygiene:   good  Cooperation:  Cooperative  Eye Contact:  Good  Psychomotor Behavior:  Appropriate  Affect:  Full range  Mood: depressed  Speech:  Normal  Thought Process:  Goal directed  Thought Content:  Normal  Suicidal:  None  Homicidal:  None  Hallucinations:  None  Delusion:  None  Memory:  Intact  Orientation:  Person, Place, Time and Situation  Reliability:  good  Insight:  Good  Judgement:  Good  Impulse Control:  Good  Physical/Medical Issues:  No      PHQ-9 Depression Screening  Little interest or pleasure in doing things? (P) 2   Feeling down, depressed, or hopeless? (P) 2   Trouble falling or staying asleep, or sleeping too much? (P) 2   Feeling tired or having little energy? (P) 3   Poor appetite or overeating?     Feeling bad about yourself - or that you are a failure or have let yourself or your family down? (P) 2   Trouble concentrating on things, such as reading the newspaper or watching television? (P) 2   Moving or speaking so slowly that other people could have noticed? Or the opposite - being so fidgety or restless that you have been moving around a lot more than usual? (P) 1   Thoughts that you would be better off dead, or of hurting yourself in some way? (P) 0   PHQ-9 Total Score (P) 14   If you checked off any problems, how difficult have these  problems made it for you to do your work, take care of things at home, or get along with other people? (P) Somewhat difficult     PHQ-9 Total Score: (P) 14    COURT 7 anxiety screening tool that patient filled out virtually reviewed by this APRN at today's encounter.    PROMIS scale screening tool that patient filled out virtually reviewed by this APRN at today's encounter.    Previous Provider notes and available records reviewed by this APRN today.   Current Medications:   Current Outpatient Medications   Medication Sig Dispense Refill   • acetaminophen (Tylenol 8 Hour Arthritis Pain) 650 MG 8 hr tablet Take 1 tablet by mouth Every 8 (Eight) Hours As Needed for Mild Pain  or Moderate Pain . 100 tablet 2   • Biotin 5000 MCG tablet Take 1 tablet by mouth Daily.     • Calcium Polycarbophil (FIBER-CAPS PO) Take 2 capsules by mouth Daily.     • Cetirizine-Pseudoephedrine (ZYRTEC-D PO) Take 1 tablet by mouth Daily As Needed.     • Cholecalciferol (VITAMIN D) 2000 UNITS capsule Take 5,000 Units by mouth Every Evening.     • docusate sodium (COLACE) 100 MG capsule Take 200 mg by mouth 2 (Two) Times a Day.     • gabapentin (NEURONTIN) 600 MG tablet Take 1 tablet by mouth 2 (Two) Times a Day. (Patient taking differently: Take 600 mg by mouth 2 (Two) Times a Day. Take 300 mg bid) 60 tablet 3   • hydroxychloroquine (PLAQUENIL) 200 MG tablet Take 1 tablet by mouth 2 (Two) Times a Day. 180 tablet 1   • levothyroxine (SYNTHROID, LEVOTHROID) 100 MCG tablet Take 1 tablet by mouth Daily. 90 tablet 3   • meloxicam (MOBIC) 15 MG tablet Take 1 tablet by mouth daily. 90 tablet 1   • mirtazapine (Remeron) 15 MG tablet Take 0.5-1 tablets by mouth Every Night. 30 tablet 0   • propranolol (INDERAL) 10 MG tablet Take 1 tablet by mouth 2 (Two) Times a Day As Needed (Anxiety). 60 tablet 0   • Turmeric 500 MG tablet Take 2 tablets by mouth Daily.     • Vortioxetine HBr (Trintellix) 10 MG tablet Take  by mouth.     • Vortioxetine HBr 20 MG tablet  Take 20 mg by mouth Daily. 30 tablet 1     No current facility-administered medications for this visit.       Physical Exam   Result Review :    The following data was reviewed by: RENE Guadalupe on 05/23/2022:  Common labs    Common Labsle 9/15/21 9/15/21 4/1/22 4/1/22 4/1/22    1134 1134 1524 1524 1524   Glucose 90       BUN 11       Creatinine 0.89       eGFR Non African Am 70       eGFR African Am 85       Sodium 135 (A)       Potassium 3.8       Chloride 101       Calcium 9.7       Total Protein 6.9       Albumin 4.90       Total Bilirubin 0.3       Alkaline Phosphatase 42       AST (SGOT) 17  11     ALT (SGPT) 14   8    WBC  3.19 (A)   2.85 (A)   Hemoglobin  13.1   12.2   Hematocrit  39.5   36.3   Platelets  120 (A)   105 (A)   (A) Abnormal value       Comments are available for some flowsheets but are not being displayed.              Assessment and Plan   Problem List Items Addressed This Visit        Mental Health    Moderate episode of recurrent major depressive disorder (HCC) - Primary (Chronic)    Overview     Has taken medication since 19.   - Mom PASSED 5/2021     Continue duloxetine 120 mg qd           Relevant Medications    Vortioxetine HBr 20 MG tablet    mirtazapine (Remeron) 15 MG tablet    Generalized anxiety disorder    Relevant Medications    Vortioxetine HBr 20 MG tablet    mirtazapine (Remeron) 15 MG tablet    Social anxiety disorder    Relevant Medications    Vortioxetine HBr 20 MG tablet    mirtazapine (Remeron) 15 MG tablet      Other Visit Diagnoses     Insomnia due to mental condition        Relevant Medications    Vortioxetine HBr 20 MG tablet    mirtazapine (Remeron) 15 MG tablet        Discussed treatment options with patient.  Informed her that the side effects from stopping Cymbalta that she has been experiencing can last for up to 3 weeks.  Patient verbalized understanding and states that it is something that she can deal with.  Discussed with her increasing Trintellix to  20 mg daily for depression and anxiety as she is still having depressive symptoms.  Patient would like to do so.  Increase Trintellix to 20 mg daily for depression and anxiety.  Continue Remeron 7.5 mg nightly for sleep.  Patient has a supply of propanolol if she needs it.  We will see patient again in 5 weeks to reassess.    TREATMENT PLAN/GOALS: Continue supportive psychotherapy efforts and medications as indicated. Treatment and medication options discussed during today's visit. Patient ackowledged and verbally consented to continue with current treatment plan and was educated on the importance of compliance with treatment and follow-up appointments.    DEPRESSION:  Patient screened positive for depression based on a PHQ-9 score of 4 on 5/23/2022. Follow-up recommendations include: Prescribed antidepressant medication treatment.       MEDICATION ISSUES:  We discussed risks, benefits, and side effects of the above medications and the patient was agreeable with the plan. Patient was educated on the importance of compliance with treatment and follow-up appointments.  Patient is agreeable to call the office with any worsening of symptoms or onset of side effects. Patient is agreeable to call 911 or go to the nearest ER should he/she begin having SI/HI.      Counseled patient regarding multimodal approach with healthy nutrition, healthy sleep, regular physical activity, social activities, counseling, and medications.      Coping skills reviewed and encouraged positive framing of thoughts     Assisted patient in processing above session content; acknowledged and normalized patient’s thoughts, feelings, and concerns.  Applied  positive coping skills and behavior management in session.  Allowed patient to freely discuss issues without interruption or judgment. Provided safe, confidential environment to facilitate the development of positive therapeutic relationship and encourage open, honest communication. Assisted  patient in identifying risk factors which would indicate the need for higher level of care including thoughts to harm self or others and/or self-harming behavior and encouraged patient to contact this office, call 911, or present to the nearest emergency room should any of these events occur. Discussed crisis intervention services and means to access. If patient has any concerns or needs assistance they were instructed to call the [facility] at [phone].    MEDS ORDERED DURING VISIT:  New Medications Ordered This Visit   Medications   • Vortioxetine HBr 20 MG tablet     Sig: Take 20 mg by mouth Daily.     Dispense:  30 tablet     Refill:  1   • mirtazapine (Remeron) 15 MG tablet     Sig: Take 0.5-1 tablets by mouth Every Night.     Dispense:  30 tablet     Refill:  0         Follow Up   Return in about 4 weeks (around 6/20/2022) for Video visit.    Patient was given instructions and counseling regarding her condition or for health maintenance advice. Please see specific information pulled into the AVS if appropriate.         This document has been electronically signed by RENE Guadalupe  May 23, 2022 16:12 EDT    Part of this note may be an electronic transcription/translation of spoken language to printed text using the Dragon Dictation System.

## 2022-06-29 ENCOUNTER — TELEMEDICINE (OUTPATIENT)
Dept: PSYCHIATRY | Facility: CLINIC | Age: 42
End: 2022-06-29

## 2022-06-29 DIAGNOSIS — F41.1 GENERALIZED ANXIETY DISORDER: ICD-10-CM

## 2022-06-29 DIAGNOSIS — F33.1 MODERATE EPISODE OF RECURRENT MAJOR DEPRESSIVE DISORDER: ICD-10-CM

## 2022-06-29 DIAGNOSIS — F51.05 INSOMNIA DUE TO MENTAL CONDITION: ICD-10-CM

## 2022-06-29 DIAGNOSIS — F40.10 SOCIAL ANXIETY DISORDER: ICD-10-CM

## 2022-06-29 PROCEDURE — 99213 OFFICE O/P EST LOW 20 MIN: CPT

## 2022-06-29 RX ORDER — MIRTAZAPINE 15 MG/1
7.5-15 TABLET, FILM COATED ORAL NIGHTLY
Qty: 30 TABLET | Refills: 0 | Status: SHIPPED | OUTPATIENT
Start: 2022-06-29 | End: 2022-07-05 | Stop reason: SINTOL

## 2022-06-29 NOTE — PROGRESS NOTES
This provider is located at [place], [place]. The Patient is seen remotely using Video. Patient is being seen via telehealth and confirm that they are in a secure environment for this session. Patient is located in [place], [place] in her car. The patient's condition being diagnosed/treated is appropriate for telemedicine. Provider identified as Rupa Olivas as well as credentials APRN MSN PMHNP-BC.   The client/patient gave consent to be seen remotely, and when consent is given they understand that the consent allows for patient identifiable information to be sent to a third party as needed.  They may refuse to be seen remotely at any time. The electronic data is encrypted and password protected, and the patient has been advised of the potential risks to privacy not withstanding such measures.    Chief Complaint  Depression, Anxiety, and Sleeping Problem    Subjective        Taty Romo presents to [facility] for   History of Present Illness  Patient seen today for a follow-up visit for depression and anxiety.  Patient reports that she feels that her depression and anxiety have gotten better since starting the Trintellix.  Currently rates her depression a 4 on a 1-10 scale with 10 being the worst.  She denies any hopelessness.  Denies any suicidal or homicidal ideation.  States she is still having trouble with waking up during the night, but equates that to her back pain.  States she has had some weight gain with the Remeron as she is eating more.  Currently rates her anxiety a 3-4 on a 1-10 scale with 10 being the worst.  States she has been hanging out with friends.  States the propanolol helps in situations where she has more anxiety and is pleased with that.  Denies any manic type symptoms.  Denies any paranoia.  Denies any auditory or visual hallucinations.   Objective   Vital Signs:   There were no vitals taken for this visit.      PHQ-9 Score:   PHQ-9 Total Score:  (P) 14     Mental Status Exam:   Hygiene:   good  Cooperation:  Cooperative  Eye Contact:  Good  Psychomotor Behavior:  Appropriate  Affect:  Full range  Mood: normal  Speech:  Normal  Thought Process:  Goal directed  Thought Content:  Normal  Suicidal:  None  Homicidal:  None  Hallucinations:  None  Delusion:  None  Memory:  Intact  Orientation:  Person, Place, Time and Situation  Reliability:  good  Insight:  Good  Judgement:  Good  Impulse Control:  Good  Physical/Medical Issues:  No      PHQ-9 Depression Screening  Little interest or pleasure in doing things? (P) 1   Feeling down, depressed, or hopeless? (P) 1   Trouble falling or staying asleep, or sleeping too much? (P) 3   Feeling tired or having little energy? (P) 3   Poor appetite or overeating? (P) 3   Feeling bad about yourself - or that you are a failure or have let yourself or your family down? (P) 2   Trouble concentrating on things, such as reading the newspaper or watching television? (P) 1   Moving or speaking so slowly that other people could have noticed? Or the opposite - being so fidgety or restless that you have been moving around a lot more than usual? (P) 0   Thoughts that you would be better off dead, or of hurting yourself in some way? (P) 0   PHQ-9 Total Score (P) 14   If you checked off any problems, how difficult have these problems made it for you to do your work, take care of things at home, or get along with other people? (P) Somewhat difficult     PHQ-9 Total Score: (P) 14    COURT 7 anxiety screening tool that patient filled out virtually reviewed by this APRN at today's encounter.    PROMIS scale screening tool that patient filled out virtually reviewed by this APRN at today's encounter.    Previous Provider notes and available records reviewed by this APRN today.   Current Medications:   Current Outpatient Medications   Medication Sig Dispense Refill   • acetaminophen (Tylenol 8 Hour Arthritis Pain) 650 MG 8 hr tablet Take 1 tablet  by mouth Every 8 (Eight) Hours As Needed for Mild Pain  or Moderate Pain . 100 tablet 2   • Biotin 5000 MCG tablet Take 1 tablet by mouth Daily.     • Calcium Polycarbophil (FIBER-CAPS PO) Take 2 capsules by mouth Daily.     • Cetirizine-Pseudoephedrine (ZYRTEC-D PO) Take 1 tablet by mouth Daily As Needed.     • Cholecalciferol (VITAMIN D) 2000 UNITS capsule Take 5,000 Units by mouth Every Evening.     • docusate sodium (COLACE) 100 MG capsule Take 200 mg by mouth 2 (Two) Times a Day.     • gabapentin (NEURONTIN) 600 MG tablet Take 1 tablet by mouth 2 (Two) Times a Day. (Patient taking differently: Take 600 mg by mouth 2 (Two) Times a Day. Take 300 mg bid) 60 tablet 3   • hydroxychloroquine (PLAQUENIL) 200 MG tablet Take 1 tablet by mouth 2 (Two) Times a Day. 180 tablet 1   • levothyroxine (SYNTHROID, LEVOTHROID) 100 MCG tablet Take 1 tablet by mouth Daily. 90 tablet 3   • meloxicam (MOBIC) 15 MG tablet Take 1 tablet by mouth daily. 90 tablet 1   • mirtazapine (Remeron) 15 MG tablet Take 0.5-1 tablets by mouth Every Night. 30 tablet 0   • mirtazapine (Remeron) 15 MG tablet Take 0.5-1 tablets by mouth Every Night. 30 tablet 0   • propranolol (INDERAL) 10 MG tablet Take 1 tablet by mouth 2 (Two) Times a Day As Needed (Anxiety). 60 tablet 0   • Turmeric 500 MG tablet Take 2 tablets by mouth Daily.     • Vortioxetine HBr 20 MG tablet Take 1 tablet by mouth Daily. 30 tablet 0     No current facility-administered medications for this visit.       Physical Exam   Result Review :    The following data was reviewed by: RENE Guadalupe on 06/29/2022:  Common labs    Common Labsle 9/15/21 9/15/21 4/1/22 4/1/22 4/1/22    1134 1134 1524 1524 1524   Glucose 90       BUN 11       Creatinine 0.89       eGFR Non African Am 70       eGFR African Am 85       Sodium 135 (A)       Potassium 3.8       Chloride 101       Calcium 9.7       Total Protein 6.9       Albumin 4.90       Total Bilirubin 0.3       Alkaline Phosphatase 42        AST (SGOT) 17  11     ALT (SGPT) 14   8    WBC  3.19 (A)   2.85 (A)   Hemoglobin  13.1   12.2   Hematocrit  39.5   36.3   Platelets  120 (A)   105 (A)   (A) Abnormal value       Comments are available for some flowsheets but are not being displayed.                Assessment and Plan   Problem List Items Addressed This Visit        Mental Health    Moderate episode of recurrent major depressive disorder (HCC) (Chronic)    Overview     Has taken medication since 19.   - Mom PASSED 5/2021     Continue duloxetine 120 mg qd           Relevant Medications    mirtazapine (Remeron) 15 MG tablet    Vortioxetine HBr 20 MG tablet    Generalized anxiety disorder    Relevant Medications    mirtazapine (Remeron) 15 MG tablet    Vortioxetine HBr 20 MG tablet    Social anxiety disorder    Relevant Medications    mirtazapine (Remeron) 15 MG tablet    Vortioxetine HBr 20 MG tablet      Other Visit Diagnoses     Insomnia due to mental condition        Relevant Medications    mirtazapine (Remeron) 15 MG tablet    Vortioxetine HBr 20 MG tablet        Discussed treatment options with patient.  Patient is pleased with her current medications.  We will continue Trintellix 20 mg daily for depression and anxiety.  Continue Remeron 15 mg half to 1 tablet nightly for sleep.  Discussed with patient that Remeron can cause weight gain.  Patient states she would like to try to control it with diet before changing medications.  Discussed with her that we will reevaluate in 1 month to see if she was able to control with diet, and if not we can talk about switching medications.  Patient agreeable.  Continue propanolol 10 mg twice daily as needed for anxiety.  We will see patient again in 4 weeks to reassess.    TREATMENT PLAN/GOALS: Continue supportive psychotherapy efforts and medications as indicated. Treatment and medication options discussed during today's visit. Patient ackowledged and verbally consented to continue with current treatment  plan and was educated on the importance of compliance with treatment and follow-up appointments.    DEPRESSION:  Patient screened positive for depression based on a PHQ-9 score of 4 on 5/23/2022. Follow-up recommendations include: Prescribed antidepressant medication treatment.       MEDICATION ISSUES:  We discussed risks, benefits, and side effects of the above medications and the patient was agreeable with the plan. Patient was educated on the importance of compliance with treatment and follow-up appointments.  Patient is agreeable to call the office with any worsening of symptoms or onset of side effects. Patient is agreeable to call 911 or go to the nearest ER should he/she begin having SI/HI.      Counseled patient regarding multimodal approach with healthy nutrition, healthy sleep, regular physical activity, social activities, counseling, and medications.      Coping skills reviewed and encouraged positive framing of thoughts     Assisted patient in processing above session content; acknowledged and normalized patient’s thoughts, feelings, and concerns.  Applied  positive coping skills and behavior management in session.  Allowed patient to freely discuss issues without interruption or judgment. Provided safe, confidential environment to facilitate the development of positive therapeutic relationship and encourage open, honest communication. Assisted patient in identifying risk factors which would indicate the need for higher level of care including thoughts to harm self or others and/or self-harming behavior and encouraged patient to contact this office, call 911, or present to the nearest emergency room should any of these events occur. Discussed crisis intervention services and means to access. If patient has any concerns or needs assistance they were instructed to call the [facility] at [phone].    MEDS ORDERED DURING VISIT:  New Medications Ordered This Visit   Medications    • mirtazapine (Remeron) 15 MG tablet     Sig: Take 0.5-1 tablets by mouth Every Night.     Dispense:  30 tablet     Refill:  0   • Vortioxetine HBr 20 MG tablet     Sig: Take 1 tablet by mouth Daily.     Dispense:  30 tablet     Refill:  0         Follow Up   Return in about 4 weeks (around 7/27/2022) for Video visit.    Patient was given instructions and counseling regarding her condition or for health maintenance advice. Please see specific information pulled into the AVS if appropriate.         This document has been electronically signed by RENE Guadalupe  June 29, 2022 15:49 EDT    Part of this note may be an electronic transcription/translation of spoken language to printed text using the Dragon Dictation System.

## 2022-07-05 ENCOUNTER — TELEPHONE (OUTPATIENT)
Dept: PSYCHIATRY | Facility: CLINIC | Age: 42
End: 2022-07-05

## 2022-07-05 RX ORDER — HYDROXYZINE HYDROCHLORIDE 25 MG/1
25-50 TABLET, FILM COATED ORAL NIGHTLY PRN
Qty: 60 TABLET | Refills: 0 | Status: SHIPPED | OUTPATIENT
Start: 2022-07-05 | End: 2022-08-03 | Stop reason: SDUPTHER

## 2022-07-05 NOTE — TELEPHONE ENCOUNTER
Can you see if patient has tried hydroxyzine in the past?  If not, I will send in hydroxyzine 25 mg take 1 to 2 tablets at bedtime.

## 2022-07-05 NOTE — TELEPHONE ENCOUNTER
Patient states that she has not tried hydroxyzine in the past.  Patient states she would like to try it.    Made patient aware of the new prescription and the instructions.

## 2022-07-05 NOTE — TELEPHONE ENCOUNTER
Patient stated that she had talked to provider about stopping the mirtazapine due to weight gain.  Patient states she and provider had discussed waiting until her next appt.  Patient states she doesn't want to wait until then.      Please advise.

## 2022-07-21 RX ORDER — GABAPENTIN 600 MG/1
600 TABLET ORAL 2 TIMES DAILY
Qty: 60 TABLET | Refills: 3 | Status: SHIPPED | OUTPATIENT
Start: 2022-07-21 | End: 2023-04-01 | Stop reason: SDUPTHER

## 2022-07-21 NOTE — TELEPHONE ENCOUNTER
Rx Refill Note  Requested Prescriptions     Pending Prescriptions Disp Refills   • gabapentin (NEURONTIN) 600 MG tablet 60 tablet 3     Sig: Take 1 tablet by mouth 2 (Two) Times a Day.      Last office visit with prescribing clinician: 5/9/2022      Next office visit with prescribing clinician: 9/19/2022            Perla Rea MA  07/21/22, 16:26 EDT

## 2022-07-26 ENCOUNTER — TELEPHONE (OUTPATIENT)
Dept: INTERNAL MEDICINE | Age: 42
End: 2022-07-26

## 2022-07-26 NOTE — TELEPHONE ENCOUNTER
Caller: Taty Romo    Relationship to patient: Self    Best call back number: [phone]     Date of positive COVID19 test: 7/22, BUT WAS INITIALLY TESTED ON 7/17, BUT PATIENT STATES [facility] LOST HER SAMPLE.     Date if possible COVID19 exposure: UNKNOWN    COVID19 symptoms: PATIENT STATES THAT IT WAS CONFIRMED SHE HAD COVID ON 7/22, BUT SHE STARTED WITH SYMPTOMS ON 7/15.  PATIENT STATES SHE HAS LUPUS, AND THAT SHE FEELS AS THOUGH SHE IS GETTING WORSE.  SHE STATES SHE HAS LOST 14 POUNDS IN THE LAST 10 DAYS, HAS VOMITING, DIARRHEA, FATIGUE, SHORTNESS OF BREATH, CANNOT GET HER PULSE OX TO WORK, NECK PAIN, HEADACHES, TASTE/SMELL NOT FULLY RETURNED.   PATIENT STATES SHE DID HAVE A COUGH AND CONGESTION WHICH HAS GOTTEN BETTER, BUT ALL THE OTHER SYMPTOMS HAVE BEEN PERSISTENT.       Additional information or concerns: PATIENT WOULD LIKE TO KNOW WHAT DR. MARTIN RECOMMENDS SHE NEEDS TO DO.  SHE WOULD LIKE TO KNOW IF SHE IS A CANDIDATE FOR PAXLOVID OR THE INFUSION.   PLEASE CONTACT PATIENT TO ADVISE WHAT HER NEXT STEPS SHOULD BE.      What is the patients preferred pharmacy: [id number] [address] AT [facility] & [address] - [phone] [facility] [phone] FX

## 2022-07-26 NOTE — TELEPHONE ENCOUNTER
If her symptoms started 7/15 she is outside the window for Paxlovid / monoclonal antibody infusion. Schedule an office visit for tomorrow if she is agreeable. I would suggest in office because she will likely need labs.

## 2022-07-27 ENCOUNTER — OFFICE VISIT (OUTPATIENT)
Dept: INTERNAL MEDICINE | Age: 42
End: 2022-07-27

## 2022-07-27 ENCOUNTER — HOSPITAL ENCOUNTER (OUTPATIENT)
Dept: GENERAL RADIOLOGY | Facility: HOSPITAL | Age: 42
Discharge: HOME | End: 2022-07-27

## 2022-07-27 VITALS
DIASTOLIC BLOOD PRESSURE: 70 MMHG | SYSTOLIC BLOOD PRESSURE: 116 MMHG | HEART RATE: 73 BPM | TEMPERATURE: 98.3 F | OXYGEN SATURATION: 99 %

## 2022-07-27 DIAGNOSIS — R11.10 VOMITING AND DIARRHEA: ICD-10-CM

## 2022-07-27 DIAGNOSIS — Z00.00 ANNUAL PHYSICAL EXAM: ICD-10-CM

## 2022-07-27 DIAGNOSIS — Z86.16 HISTORY OF COVID-19: Primary | ICD-10-CM

## 2022-07-27 DIAGNOSIS — M32.9 SYSTEMIC LUPUS ERYTHEMATOSUS, UNSPECIFIED SLE TYPE, UNSPECIFIED ORGAN INVOLVEMENT STATUS: Chronic | ICD-10-CM

## 2022-07-27 DIAGNOSIS — R19.7 VOMITING AND DIARRHEA: ICD-10-CM

## 2022-07-27 PROCEDURE — 99214 OFFICE O/P EST MOD 30 MIN: CPT

## 2022-07-27 PROCEDURE — 71046 X-RAY EXAM CHEST 2 VIEWS: CPT

## 2022-07-27 RX ORDER — ALBUTEROL SULFATE 90 UG/1
2 AEROSOL, METERED RESPIRATORY (INHALATION) EVERY 4 HOURS PRN
Qty: 6.7 G | Refills: 0 | Status: SHIPPED | OUTPATIENT
Start: 2022-07-27 | End: 2022-08-26

## 2022-07-27 RX ORDER — ONDANSETRON 8 MG/1
8 TABLET, ORALLY DISINTEGRATING ORAL EVERY 8 HOURS PRN
Qty: 12 TABLET | Refills: 0 | Status: SHIPPED | OUTPATIENT
Start: 2022-07-27 | End: 2023-01-20 | Stop reason: SDUPTHER

## 2022-07-27 NOTE — PROGRESS NOTES
I N T E R N A L  M E D I C I N E  Araceli Hardy, APRN    ENCOUNTER DATE:  07/27/2022    Taty Romo / 42 y.o. / female      CHIEF COMPLAINT / REASON FOR OFFICE VISIT     Exposure To Known Illness (Tested + 7/21/22/SX:7/15/22)      ASSESSMENT & PLAN     Diagnoses and all orders for this visit:    1. History of COVID-19 (Primary)  -     ondansetron ODT (ZOFRAN-ODT) 8 MG disintegrating tablet; Place 1 tablet on the tongue Every 8 (Eight) Hours As Needed for Nausea or Vomiting.  Dispense: 12 tablet; Refill: 0  -     albuterol sulfate  (90 Base) MCG/ACT inhaler; Inhale 2 puffs Every 4 (Four) Hours As Needed for Shortness of Air for up to 30 days.  Dispense: 6.7 g; Refill: 0  -     D-dimer, Quantitative  -     XR Chest 2 View    2. Systemic lupus erythematosus, unspecified SLE type, unspecified organ involvement status (HCC)  Overview:  Dx 2007 started on Plaquenil  *Takagishi    Continue hydroxychloroquine 200 mg BID     Orders:  -     D-dimer, Quantitative  -     XR Chest 2 View    3. Vomiting and diarrhea  -     ondansetron ODT (ZOFRAN-ODT) 8 MG disintegrating tablet; Place 1 tablet on the tongue Every 8 (Eight) Hours As Needed for Nausea or Vomiting.  Dispense: 12 tablet; Refill: 0    4. Annual physical exam  -     CBC & Differential  -     Comprehensive Metabolic Panel  -     Hemoglobin A1c  -     Lipid Panel With / Chol / HDL Ratio  -     TSH+Free T4  -     Urinalysis With Microscopic If Indicated (No Culture) - Urine, Clean Catch  -     Vitamin D 25 Hydroxy       SUMMARY/DISCUSSION  • Prescribed Zofran for nausea/ vomiting.  May use Imodium for episodes of diarrhea.  May use albuterol inhaler for episodes of dyspnea with exertion.  • Labs and chest XR ordered.  • Pt instructed to closely monitor her symptoms and return for any new or worsening symptoms.  Aware to visit ED with any chest pain, dyspnea, oxygenation <92%.        Next Appointment with me: Visit date not found    Return for Next scheduled  follow up.      VITAL SIGNS     Visit Vitals  /70   Pulse 73   Temp 98.3 °F (36.8 °C) (Oral)   LMP 07/01/2022   SpO2 99%             Wt Readings from Last 3 Encounters:   07/17/22 71.7 kg (158 lb)   05/12/22 65.8 kg (145 lb)   05/09/22 65.8 kg (145 lb)     There is no height or weight on file to calculate BMI.        MEDICATIONS AT THE TIME OF OFFICE VISIT     Current Outpatient Medications on File Prior to Visit   Medication Sig Dispense Refill   • acetaminophen (Tylenol 8 Hour Arthritis Pain) 650 MG 8 hr tablet Take 1 tablet by mouth Every 8 (Eight) Hours As Needed for Mild Pain  or Moderate Pain . 100 tablet 2   • Biotin 5000 MCG tablet Take 1 tablet by mouth Daily.     • Calcium Polycarbophil (FIBER-CAPS PO) Take 2 capsules by mouth Daily.     • Cetirizine-Pseudoephedrine (ZYRTEC-D PO) Take 1 tablet by mouth Daily As Needed.     • Cholecalciferol (VITAMIN D) 2000 UNITS capsule Take 5,000 Units by mouth Every Evening.     • docusate sodium (COLACE) 100 MG capsule Take 200 mg by mouth 2 (Two) Times a Day.     • gabapentin (NEURONTIN) 600 MG tablet Take 1 tablet by mouth 2 (Two) Times a Day. 60 tablet 3   • hydroxychloroquine (PLAQUENIL) 200 MG tablet Take 1 tablet by mouth 2 (Two) Times a Day. 180 tablet 1   • hydrOXYzine (ATARAX) 25 MG tablet Take 1-2 tablets by mouth At Night As Needed for sleep. 60 tablet 0   • levothyroxine (SYNTHROID, LEVOTHROID) 100 MCG tablet Take 1 tablet by mouth Daily. 90 tablet 3   • meloxicam (MOBIC) 15 MG tablet Take 1 tablet by mouth daily. 90 tablet 1   • oseltamivir (Tamiflu) 75 MG capsule Take 1 capsule by mouth 2 (Two) Times a Day. 10 capsule 0   • propranolol (INDERAL) 10 MG tablet Take 1 tablet by mouth 2 (Two) Times a Day As Needed (Anxiety). 60 tablet 0   • Turmeric 500 MG tablet Take 2 tablets by mouth Daily.     • Vortioxetine HBr 20 MG tablet Take 1 tablet by mouth Daily. 30 tablet 0   • [DISCONTINUED] ondansetron ODT (ZOFRAN-ODT) 8 MG disintegrating tablet Place 1  tablet on the tongue Every 8 (Eight) Hours As Needed for Nausea or Vomiting. 12 tablet 0     No current facility-administered medications on file prior to visit.        HISTORY OF PRESENT ILLNESS     Presents today on day 12 of COVID-19 illness.  Symptoms of COVID started July 15 with fever, chills, cough, sore throat.  Took home test yesterday which was negative.  She continues to experience nausea, vomiting, diarrhea, headaches, myalgias, difficultly sleeping, diminished taste/ smell.  She did not receive Paxlovid or antibody infusion.      Visited urgent care on July 17, who prescribed Zofran, which she was unable to  from pharmacy.  Denies any blood in vomit or stool.  She reports 3 episodes of diarrhea daily.  No vomiting since yesterday.  She is tolerating liquid intake and small amounts of food daily.      She does continue to experience intermittent episodes of dyspnea only with exertion.  No chest pain.      COVID-19 vaccinated with 0 boosters.      Patient Care Team:  Jeremy Mcdonald MD as PCP - General (Internal Medicine)  Shaheed Blanco MD as Consulting Physician (Rheumatology)  Angy Hunter MD as Consulting Physician (Obstetrics and Gynecology)    REVIEW OF SYSTEMS     Review of Systems   Constitutional: Positive for fatigue. Negative for chills and fever.   HENT: Negative for congestion and sore throat.    Respiratory: Positive for shortness of breath. Negative for cough and chest tightness.    Cardiovascular: Negative for chest pain, palpitations and leg swelling.   Gastrointestinal: Positive for diarrhea, nausea and vomiting. Negative for abdominal pain.   Genitourinary: Negative for decreased urine volume.   Musculoskeletal: Positive for myalgias.   Neurological: Positive for headaches. Negative for dizziness, weakness and light-headedness.   Psychiatric/Behavioral: Positive for sleep disturbance.          PHYSICAL EXAMINATION     Physical Exam  Vitals reviewed.   Constitutional:        General: She is not in acute distress.     Appearance: Normal appearance. She is not ill-appearing, toxic-appearing or diaphoretic.   HENT:      Head: Normocephalic and atraumatic.      Right Ear: Tympanic membrane, ear canal and external ear normal. There is no impacted cerumen.      Left Ear: Tympanic membrane, ear canal and external ear normal. There is no impacted cerumen.      Nose: Nose normal. No congestion or rhinorrhea.      Mouth/Throat:      Mouth: Mucous membranes are moist.      Pharynx: Oropharynx is clear. No oropharyngeal exudate or posterior oropharyngeal erythema.   Cardiovascular:      Rate and Rhythm: Normal rate and regular rhythm.      Heart sounds: Normal heart sounds.   Pulmonary:      Effort: Pulmonary effort is normal. No respiratory distress.      Breath sounds: Normal breath sounds. No wheezing, rhonchi or rales.   Abdominal:      General: Abdomen is flat. Bowel sounds are normal.      Tenderness: There is no abdominal tenderness.   Musculoskeletal:      Right lower leg: No edema.      Left lower leg: No edema.   Lymphadenopathy:      Cervical: No cervical adenopathy.   Neurological:      Mental Status: She is alert and oriented to person, place, and time. Mental status is at baseline.   Psychiatric:         Mood and Affect: Mood normal.         Behavior: Behavior normal.         Thought Content: Thought content normal.         Judgment: Judgment normal.           REVIEWED DATA     Labs:           Imaging:            Medical Tests:           Summary of old records / correspondence / consultant report:           Request outside records:

## 2022-07-28 ENCOUNTER — DOCUMENTATION (OUTPATIENT)
Dept: INTERNAL MEDICINE | Age: 42
End: 2022-07-28

## 2022-07-28 ENCOUNTER — TELEPHONE (OUTPATIENT)
Dept: INTERNAL MEDICINE | Age: 42
End: 2022-07-28

## 2022-07-28 LAB
25(OH)D3+25(OH)D2 SERPL-MCNC: 83.5 NG/ML (ref 30–100)
ALBUMIN SERPL-MCNC: 4.9 G/DL (ref 3.8–4.8)
ALBUMIN/GLOB SERPL: 1.8 {RATIO} (ref 1.2–2.2)
ALP SERPL-CCNC: 45 IU/L (ref 44–121)
ALT SERPL-CCNC: 17 IU/L (ref 0–32)
APPEARANCE UR: ABNORMAL
AST SERPL-CCNC: 18 IU/L (ref 0–40)
BACTERIA #/AREA URNS HPF: ABNORMAL /[HPF]
BASOPHILS # BLD AUTO: 0 X10E3/UL (ref 0–0.2)
BASOPHILS NFR BLD AUTO: 1 %
BILIRUB SERPL-MCNC: 0.5 MG/DL (ref 0–1.2)
BILIRUB UR QL STRIP: NEGATIVE
BUN SERPL-MCNC: 16 MG/DL (ref 6–24)
BUN/CREAT SERPL: 17 (ref 9–23)
CALCIUM SERPL-MCNC: 9.6 MG/DL (ref 8.7–10.2)
CASTS URNS MICRO: ABNORMAL
CASTS URNS QL MICRO: PRESENT /LPF
CHLORIDE SERPL-SCNC: 101 MMOL/L (ref 96–106)
CHOLEST SERPL-MCNC: 159 MG/DL (ref 100–199)
CHOLEST/HDLC SERPL: 3.5 RATIO (ref 0–4.4)
CO2 SERPL-SCNC: 20 MMOL/L (ref 20–29)
COLOR UR: YELLOW
CREAT SERPL-MCNC: 0.92 MG/DL (ref 0.57–1)
CRYSTALS URNS MICRO: ABNORMAL
D DIMER PPP FEU-MCNC: 0.46 MG/L FEU (ref 0–0.49)
EGFRCR SERPLBLD CKD-EPI 2021: 80 ML/MIN/1.73
EOSINOPHIL # BLD AUTO: 0.1 X10E3/UL (ref 0–0.4)
EOSINOPHIL NFR BLD AUTO: 1 %
EPI CELLS #/AREA URNS HPF: ABNORMAL /HPF (ref 0–10)
ERYTHROCYTE [DISTWIDTH] IN BLOOD BY AUTOMATED COUNT: 12.7 % (ref 11.7–15.4)
GLOBULIN SER CALC-MCNC: 2.7 G/DL (ref 1.5–4.5)
GLUCOSE SERPL-MCNC: 91 MG/DL (ref 65–99)
GLUCOSE UR QL STRIP: NEGATIVE
HBA1C MFR BLD: 5.6 % (ref 4.8–5.6)
HCT VFR BLD AUTO: 39.6 % (ref 34–46.6)
HDLC SERPL-MCNC: 45 MG/DL
HGB BLD-MCNC: 13.8 G/DL (ref 11.1–15.9)
HGB UR QL STRIP: ABNORMAL
IMM GRANULOCYTES # BLD AUTO: 0 X10E3/UL (ref 0–0.1)
IMM GRANULOCYTES NFR BLD AUTO: 0 %
KETONES UR QL STRIP: ABNORMAL
LDLC SERPL CALC-MCNC: 95 MG/DL (ref 0–99)
LEUKOCYTE ESTERASE UR QL STRIP: ABNORMAL
LYMPHOCYTES # BLD AUTO: 1.1 X10E3/UL (ref 0.7–3.1)
LYMPHOCYTES NFR BLD AUTO: 26 %
MCH RBC QN AUTO: 31.9 PG (ref 26.6–33)
MCHC RBC AUTO-ENTMCNC: 34.8 G/DL (ref 31.5–35.7)
MCV RBC AUTO: 92 FL (ref 79–97)
MICRO URNS: ABNORMAL
MONOCYTES # BLD AUTO: 0.3 X10E3/UL (ref 0.1–0.9)
MONOCYTES NFR BLD AUTO: 8 %
MUCOUS THREADS URNS QL MICRO: PRESENT
NEUTROPHILS # BLD AUTO: 2.7 X10E3/UL (ref 1.4–7)
NEUTROPHILS NFR BLD AUTO: 64 %
NITRITE UR QL STRIP: NEGATIVE
PH UR STRIP: 5.5 [PH] (ref 5–7.5)
PLATELET # BLD AUTO: 158 X10E3/UL (ref 150–450)
POTASSIUM SERPL-SCNC: 3.9 MMOL/L (ref 3.5–5.2)
PROT SERPL-MCNC: 7.6 G/DL (ref 6–8.5)
PROT UR QL STRIP: ABNORMAL
RBC # BLD AUTO: 4.33 X10E6/UL (ref 3.77–5.28)
RBC #/AREA URNS HPF: ABNORMAL /HPF (ref 0–2)
SODIUM SERPL-SCNC: 138 MMOL/L (ref 134–144)
SP GR UR STRIP: 1.03 (ref 1–1.03)
T4 FREE SERPL-MCNC: 1.8 NG/DL (ref 0.82–1.77)
TRIGL SERPL-MCNC: 104 MG/DL (ref 0–149)
TSH SERPL DL<=0.005 MIU/L-ACNC: 6.66 UIU/ML (ref 0.45–4.5)
UNIDENT CRYS URNS QL MICRO: PRESENT
UROBILINOGEN UR STRIP-MCNC: 0.2 MG/DL (ref 0.2–1)
VLDLC SERPL CALC-MCNC: 19 MG/DL (ref 5–40)
WBC # BLD AUTO: 4.2 X10E3/UL (ref 3.4–10.8)
WBC #/AREA URNS HPF: ABNORMAL /HPF (ref 0–5)

## 2022-07-28 NOTE — TELEPHONE ENCOUNTER
Called pt to discuss Chest XR results.  Discussed pending D-dimer lab with pt, and possibility for PE given increased risk with lupus history, COVID-19 and ongoing episodes of dyspnea.  Discussed possibility for pt to visit ED for stat CT PE protocol.  Pt would like to continue to monitor her symptoms, and is agreeable to wait for results of pending D-dimer lab.  She acknowledges to visit the ED with any chest pain, worsening dyspnea, oxygenation <92%.

## 2022-08-03 ENCOUNTER — TELEMEDICINE (OUTPATIENT)
Dept: PSYCHIATRY | Facility: CLINIC | Age: 42
End: 2022-08-03

## 2022-08-03 DIAGNOSIS — F41.1 GENERALIZED ANXIETY DISORDER: Chronic | ICD-10-CM

## 2022-08-03 DIAGNOSIS — F40.10 SOCIAL ANXIETY DISORDER: ICD-10-CM

## 2022-08-03 DIAGNOSIS — F33.1 MODERATE EPISODE OF RECURRENT MAJOR DEPRESSIVE DISORDER: Chronic | ICD-10-CM

## 2022-08-03 PROCEDURE — 99214 OFFICE O/P EST MOD 30 MIN: CPT

## 2022-08-03 RX ORDER — HYDROXYZINE HYDROCHLORIDE 25 MG/1
75 TABLET, FILM COATED ORAL NIGHTLY PRN
Qty: 270 TABLET | Refills: 0 | Status: SHIPPED | OUTPATIENT
Start: 2022-08-03 | End: 2022-09-28 | Stop reason: SDUPTHER

## 2022-08-03 RX ORDER — HYDROXYZINE HYDROCHLORIDE 25 MG/1
25-50 TABLET, FILM COATED ORAL NIGHTLY PRN
Qty: 60 TABLET | Refills: 0 | Status: CANCELLED | OUTPATIENT
Start: 2022-08-03

## 2022-08-03 NOTE — PROGRESS NOTES
This provider is located at [place], [place]. The Patient is seen remotely using Video. Patient is being seen via telehealth and confirm that they are in a secure environment for this session. Patient is located in [place], [place] at her work. The patient's condition being diagnosed/treated is appropriate for telemedicine. Provider identified as Rupa Olivas as well as credentials APRN MSN PMHNP-BC.   The client/patient gave consent to be seen remotely, and when consent is given they understand that the consent allows for patient identifiable information to be sent to a third party as needed.  They may refuse to be seen remotely at any time. The electronic data is encrypted and password protected, and the patient has been advised of the potential risks to privacy not withstanding such measures.    Chief Complaint  Depression and Anxiety    Subjective        Taty Romo presents to [facility] for   History of Present Illness  Patient is seen today for follow-up visit for depression and anxiety.  Patient reports she is doing well.  Currently rates her depression a 4 on a 1-10 scale with 10 being the worst.  Denies any hopelessness.  Denies any suicidal or homicidal ideation.  States her sleep and appetite are good.  States she recently had COVID and lost about 14 pounds and is just now getting over that.  Rates her anxiety at 2-3 on a 1-10 scale with 10 being the worst.  States she has not had to take hydroxyzine during the day nor the propanolol.  States she did stop taking the Remeron due to the weight gain and has been taking 2-3 of the hydroxyzine at night to help sleep and that is working okay for her.  Denies any paranoia.  Denies any auditory or visual hallucinations.  Denies any manic type symptoms.  Denies any side effects to the medications.  States she has a trip to [place] planned in a couple weeks and is excited about that.  Objective   Vital Signs:   There  were no vitals taken for this visit.      Mental Status Exam:   Hygiene:   good  Cooperation:  Cooperative  Eye Contact:  Good  Psychomotor Behavior:  Appropriate  Affect:  Full range  Mood: normal  Speech:  Normal  Thought Process:  Goal directed  Thought Content:  Normal  Suicidal:  None  Homicidal:  None  Hallucinations:  None  Delusion:  None  Memory:  Intact  Orientation:  Person, Place, Time and Situation  Reliability:  good  Insight:  Good  Judgement:  Good  Impulse Control:  Good  Physical/Medical Issues:  No      PHQ-2 Depression Screening  Little interest or pleasure in doing things? 0-->not at all   Feeling down, depressed, or hopeless? 0-->not at all   PHQ-2 Total Score 0       COURT 7 anxiety screening tool that patient filled out virtually reviewed by this APRN at today's encounter.    PROMIS scale screening tool that patient filled out virtually reviewed by this APRN at today's encounter.    Previous Provider notes and available records reviewed by this APRN today.   Current Medications:   Current Outpatient Medications   Medication Sig Dispense Refill   • acetaminophen (Tylenol 8 Hour Arthritis Pain) 650 MG 8 hr tablet Take 1 tablet by mouth Every 8 (Eight) Hours As Needed for Mild Pain  or Moderate Pain . 100 tablet 2   • albuterol sulfate  (90 Base) MCG/ACT inhaler Inhale 2 puffs Every 4 (Four) Hours As Needed for Shortness of Air for up to 30 days. 6.7 g 0   • Biotin 5000 MCG tablet Take 1 tablet by mouth Daily.     • Calcium Polycarbophil (FIBER-CAPS PO) Take 2 capsules by mouth Daily.     • Cetirizine-Pseudoephedrine (ZYRTEC-D PO) Take 1 tablet by mouth Daily As Needed.     • Cholecalciferol (VITAMIN D) 2000 UNITS capsule Take 5,000 Units by mouth Every Evening.     • docusate sodium (COLACE) 100 MG capsule Take 200 mg by mouth 2 (Two) Times a Day.     • gabapentin (NEURONTIN) 600 MG tablet Take 1 tablet by mouth 2 (Two) Times a Day. 60 tablet 3   • hydroxychloroquine (PLAQUENIL) 200 MG  tablet Take 1 tablet by mouth 2 (Two) Times a Day. 180 tablet 1   • hydrOXYzine (ATARAX) 25 MG tablet Take 3 tablets by mouth At Night As Needed (sleep). 270 tablet 0   • levothyroxine (SYNTHROID, LEVOTHROID) 100 MCG tablet Take 1 tablet by mouth Daily. 90 tablet 3   • meloxicam (MOBIC) 15 MG tablet Take 1 tablet by mouth daily. 90 tablet 1   • ondansetron ODT (ZOFRAN-ODT) 8 MG disintegrating tablet Place 1 tablet on the tongue Every 8 (Eight) Hours As Needed for Nausea or Vomiting. 12 tablet 0   • propranolol (INDERAL) 10 MG tablet Take 1 tablet by mouth 2 (Two) Times a Day As Needed (Anxiety). 60 tablet 0   • Turmeric 500 MG tablet Take 2 tablets by mouth Daily.     • Vortioxetine HBr 20 MG tablet Take 1 tablet by mouth Daily. 90 tablet 0     No current facility-administered medications for this visit.       Physical Exam   Result Review :    The following data was reviewed by: RENE Guadalupe on 08/03/2022:  Common labs    Common Labsle 9/15/21 9/15/21 4/1/22 4/1/22 4/1/22 7/27/22 7/27/22 7/27/22 7/27/22    1134 1134 1524 1524 1524 0910 0910 0910 0910   Glucose 90      91     BUN 11      16     Creatinine 0.89      0.92     eGFR Non African Am 70           eGFR African Am 85           Sodium 135 (A)      138     Potassium 3.8      3.9     Chloride 101      101     Calcium 9.7      9.6     Total Protein 6.9      7.6     Albumin 4.90      4.9 (A)     Total Bilirubin 0.3      0.5     Alkaline Phosphatase 42      45     AST (SGOT) 17  11    18     ALT (SGPT) 14   8   17     WBC  3.19 (A)   2.85 (A) 4.2      Hemoglobin  13.1   12.2 13.8      Hematocrit  39.5   36.3 39.6      Platelets  120 (A)   105 (A) 158      Total Cholesterol         159   Triglycerides         104   HDL Cholesterol         45   LDL Cholesterol          95   Hemoglobin A1C        5.6    (A) Abnormal value       Comments are available for some flowsheets but are not being displayed.              Assessment and Plan   Problem List Items  Addressed This Visit        Mental Health    Moderate episode of recurrent major depressive disorder (HCC) (Chronic)    Overview     Has taken medication since 19.   - Mom PASSED 5/2021     Continue duloxetine 120 mg qd         Relevant Medications    Vortioxetine HBr 20 MG tablet    hydrOXYzine (ATARAX) 25 MG tablet    Generalized anxiety disorder    Relevant Medications    Vortioxetine HBr 20 MG tablet    hydrOXYzine (ATARAX) 25 MG tablet    Social anxiety disorder    Relevant Medications    Vortioxetine HBr 20 MG tablet    hydrOXYzine (ATARAX) 25 MG tablet        Discussed treatment options with patient.  Patient is pleased with her current medications.  Discussed with patient that we can change her hydroxyzine dosage to 75 mg nightly for sleep since it seems that works for her.  Patient agreeable.  Continue Trintellix 20 mg daily for depression and anxiety.  We will see patient again in 2 months to reassess.  Informed patient to call sooner if she had any issues.    TREATMENT PLAN/GOALS: Continue supportive psychotherapy efforts and medications as indicated. Treatment and medication options discussed during today's visit. Patient ackowledged and verbally consented to continue with current treatment plan and was educated on the importance of compliance with treatment and follow-up appointments.    DEPRESSION:  Patient screened positive for depression based on a PHQ-9 score of 4 on 5/23/2022. Follow-up recommendations include: Prescribed antidepressant medication treatment.       MEDICATION ISSUES:  We discussed risks, benefits, and side effects of the above medications and the patient was agreeable with the plan. Patient was educated on the importance of compliance with treatment and follow-up appointments.  Patient is agreeable to call the office with any worsening of symptoms or onset of side effects. Patient is agreeable to call 911 or go to the nearest ER should he/she begin having SI/HI.      Counseled  patient regarding multimodal approach with healthy nutrition, healthy sleep, regular physical activity, social activities, counseling, and medications.      Coping skills reviewed and encouraged positive framing of thoughts     Assisted patient in processing above session content; acknowledged and normalized patient’s thoughts, feelings, and concerns.  Applied  positive coping skills and behavior management in session.  Allowed patient to freely discuss issues without interruption or judgment. Provided safe, confidential environment to facilitate the development of positive therapeutic relationship and encourage open, honest communication. Assisted patient in identifying risk factors which would indicate the need for higher level of care including thoughts to harm self or others and/or self-harming behavior and encouraged patient to contact this office, call 911, or present to the nearest emergency room should any of these events occur. Discussed crisis intervention services and means to access. If patient has any concerns or needs assistance they were instructed to call the [facility] at [phone].    MEDS ORDERED DURING VISIT:  New Medications Ordered This Visit   Medications   • Vortioxetine HBr 20 MG tablet     Sig: Take 1 tablet by mouth Daily.     Dispense:  90 tablet     Refill:  0   • hydrOXYzine (ATARAX) 25 MG tablet     Sig: Take 3 tablets by mouth At Night As Needed (sleep).     Dispense:  270 tablet     Refill:  0         Follow Up   Return in about 4 weeks (around 8/31/2022) for Video visit.    Patient was given instructions and counseling regarding her condition or for health maintenance advice. Please see specific information pulled into the AVS if appropriate.         This document has been electronically signed by RENE Guadalupe  August 3, 2022 15:46 EDT    Part of this note may be an electronic transcription/translation of spoken language to printed text using  the Dragon Dictation System.

## 2022-08-10 ENCOUNTER — OFFICE VISIT (OUTPATIENT)
Dept: INTERNAL MEDICINE | Age: 42
End: 2022-08-10

## 2022-08-10 VITALS
BODY MASS INDEX: 24.33 KG/M2 | WEIGHT: 155 LBS | DIASTOLIC BLOOD PRESSURE: 70 MMHG | HEIGHT: 67 IN | HEART RATE: 92 BPM | SYSTOLIC BLOOD PRESSURE: 110 MMHG | TEMPERATURE: 97.3 F | OXYGEN SATURATION: 98 %

## 2022-08-10 DIAGNOSIS — F33.1 MODERATE EPISODE OF RECURRENT MAJOR DEPRESSIVE DISORDER: Chronic | ICD-10-CM

## 2022-08-10 DIAGNOSIS — E03.8 HYPOTHYROIDISM DUE TO HASHIMOTO'S THYROIDITIS: Primary | ICD-10-CM

## 2022-08-10 DIAGNOSIS — M32.9 SYSTEMIC LUPUS ERYTHEMATOSUS, UNSPECIFIED SLE TYPE, UNSPECIFIED ORGAN INVOLVEMENT STATUS: Chronic | ICD-10-CM

## 2022-08-10 DIAGNOSIS — E06.3 HYPOTHYROIDISM DUE TO HASHIMOTO'S THYROIDITIS: Primary | ICD-10-CM

## 2022-08-10 PROCEDURE — 99214 OFFICE O/P EST MOD 30 MIN: CPT | Performed by: INTERNAL MEDICINE

## 2022-08-10 NOTE — ASSESSMENT & PLAN NOTE
Seeing a psych APRN now. On vortioxetine 20 mg qd. On hydroxyzine 25 mg qHS along with Tylenol PM. Also on gabapentin 600 mg BID for neuropathy pain.

## 2022-08-10 NOTE — ASSESSMENT & PLAN NOTE
Recheck thyroid function test today.    Lab Results   Component Value Date    TSH 6.660 (H) 07/27/2022    TSH 1.730 09/15/2021    TSH 2.530 07/30/2020    FREET4 1.80 (H) 07/27/2022    FREET4 1.33 09/15/2021    FREET4 1.49 07/30/2020

## 2022-08-10 NOTE — PROGRESS NOTES
I N T E R N A L  M E D I C I N E  J U N O H  K I M,  M D      ENCOUNTER DATE:  08/10/2022    Taty Romo / 42 y.o. / female      CHIEF COMPLAINT / REASON FOR OFFICE VISIT     Moderate episode of recurrent major depressive disorder      ASSESSMENT & PLAN     Problem List Items Addressed This Visit        High    Hypothyroidism due to Hashimoto's thyroiditis - Primary (Chronic)    Overview     S/p partial thyroidectomy (right side) 2015 for adenoma.     Continue levothyroxine 100 mcg qam.          Current Assessment & Plan     Recheck thyroid function test today.    Lab Results   Component Value Date    TSH 6.660 (H) 07/27/2022    TSH 1.730 09/15/2021    TSH 2.530 07/30/2020    FREET4 1.80 (H) 07/27/2022    FREET4 1.33 09/15/2021    FREET4 1.49 07/30/2020             Relevant Medications    levothyroxine (SYNTHROID, LEVOTHROID) 100 MCG tablet    propranolol (INDERAL) 10 MG tablet    meloxicam (MOBIC) 15 MG tablet    Other Relevant Orders    TSH+Free T4       Medium    Moderate episode of recurrent major depressive disorder (HCC) (Chronic)    Overview     Has taken medication since 19.   - Mom PASSED 5/2021          Current Assessment & Plan     Seeing a psych APRN now. On vortioxetine 20 mg qd. On hydroxyzine 25 mg qHS along with Tylenol PM. Also on gabapentin 600 mg BID for neuropathy pain.          Relevant Medications    Vortioxetine HBr 20 MG tablet    hydrOXYzine (ATARAX) 25 MG tablet    Systemic lupus erythematosus (HCC) (Chronic)    Overview     Dx 2007 started on Plaquenil  *Takagishi    Continue hydroxychloroquine 200 mg TWO at night         Relevant Medications    meloxicam (MOBIC) 15 MG tablet        Orders Placed This Encounter   Procedures   • TSH+Free T4     No orders of the defined types were placed in this encounter.      SUMMARY/DISCUSSION  •       Next Appointment with me: Visit date not found    Return in about 6 months (around 2/10/2023) for ANNUAL PHYSICAL.      VITAL SIGNS     Vitals:     "08/10/22 1447   BP: 110/70   BP Location: Left arm   Pulse: 92   Temp: 97.3 °F (36.3 °C)   SpO2: 98%   Weight: 70.3 kg (155 lb)   Height: 170.2 cm (67\")       BP Readings from Last 3 Encounters:   08/10/22 110/70   07/27/22 116/70   07/17/22 110/69     Wt Readings from Last 3 Encounters:   08/10/22 70.3 kg (155 lb)   07/17/22 71.7 kg (158 lb)   05/12/22 65.8 kg (145 lb)     Body mass index is 24.28 kg/m².    Blood pressure readings recorded on patient flowsheet:  No flowsheet data found.       MEDICATIONS AT THE TIME OF OFFICE VISIT     Current Outpatient Medications on File Prior to Visit   Medication Sig   • acetaminophen (Tylenol 8 Hour Arthritis Pain) 650 MG 8 hr tablet Take 1 tablet by mouth Every 8 (Eight) Hours As Needed for Mild Pain  or Moderate Pain .   • Biotin 5000 MCG tablet Take 1 tablet by mouth Daily.   • Calcium Polycarbophil (FIBER-CAPS PO) Take 2 capsules by mouth Daily.   • Cetirizine-Pseudoephedrine (ZYRTEC-D PO) Take 1 tablet by mouth Daily As Needed.   • Cholecalciferol (VITAMIN D) 2000 UNITS capsule Take 5,000 Units by mouth Every Evening.   • docusate sodium (COLACE) 100 MG capsule Take 200 mg by mouth 2 (Two) Times a Day.   • gabapentin (NEURONTIN) 600 MG tablet Take 1 tablet by mouth 2 (Two) Times a Day.   • hydroxychloroquine (PLAQUENIL) 200 MG tablet Take 1 tablet by mouth 2 (Two) Times a Day.   • hydrOXYzine (ATARAX) 25 MG tablet Take 3 tablets by mouth At Night As Needed (sleep).   • levothyroxine (SYNTHROID, LEVOTHROID) 100 MCG tablet Take 1 tablet by mouth Daily.   • meloxicam (MOBIC) 15 MG tablet Take 1 tablet by mouth daily. (Patient taking differently: Take 15 mg by mouth Daily. prn)   • ondansetron ODT (ZOFRAN-ODT) 8 MG disintegrating tablet Place 1 tablet on the tongue Every 8 (Eight) Hours As Needed for Nausea or Vomiting.   • propranolol (INDERAL) 10 MG tablet Take 1 tablet by mouth 2 (Two) Times a Day As Needed (Anxiety).   • Turmeric 500 MG tablet Take 2 tablets by mouth " Daily.   • Vortioxetine HBr 20 MG tablet Take 1 tablet by mouth Daily.   • albuterol sulfate  (90 Base) MCG/ACT inhaler Inhale 2 puffs Every 4 (Four) Hours As Needed for Shortness of Air for up to 30 days.     No current facility-administered medications on file prior to visit.          HISTORY OF PRESENT ILLNESS     She is now under the care of a psychiatric nurse practitioner.  She is on Trintellix 20 mg daily.  She is also on hydroxyzine for anxiety and insomnia.  She denies significant side effects with these medications.  She has history of Hashimoto's thyroiditis and is on levothyroxine for hypothyroidism.  Most recent TSH level was elevated with elevated free T4.  She recently had COVID infection with vomiting and diarrhea which may have affected her thyroid levels.  She is on hydroxychloroquine for SLE which is monitored by her rheumatologist.  She denies any significant changes regarding this condition.      Patient Care Team:  Jeremy Mcdonald MD as PCP - General (Internal Medicine)  Shaheed Blanco MD as Consulting Physician (Rheumatology)  Angy Hunter MD as Consulting Physician (Obstetrics and Gynecology)    REVIEW OF SYSTEMS     Review of Systems       PHYSICAL EXAMINATION     Physical Exam  General: No acute distress  Psych: Normal thought and judgment   Cardiovascular Rate: normal. Rhythm: regular. Heart sounds: normal.       REVIEWED DATA     Labs:     Lab Results   Component Value Date     07/27/2022    K 3.9 07/27/2022    CALCIUM 9.6 07/27/2022    AST 18 07/27/2022    ALT 17 07/27/2022    BUN 16 07/27/2022    CREATININE 0.92 07/27/2022    CREATININE 0.89 09/15/2021    CREATININE 0.67 01/05/2021    EGFRIFNONA 70 09/15/2021    EGFRIFAFRI 85 09/15/2021       Lab Results   Component Value Date    HGBA1C 5.6 07/27/2022       Lab Results   Component Value Date    LDL 95 07/27/2022    HDL 45 07/27/2022    TRIG 104 07/27/2022       Lab Results   Component Value Date    TSH 6.660 (H)  07/27/2022    TSH 1.730 09/15/2021    TSH 2.530 07/30/2020    FREET4 1.80 (H) 07/27/2022    FREET4 1.33 09/15/2021    FREET4 1.49 07/30/2020       Lab Results   Component Value Date    WBC 4.2 07/27/2022    HGB 13.8 07/27/2022     07/27/2022       No results found for: MALBCRERATIO       Imaging:           Medical Tests:           Summary of old records / correspondence / consultant report:           Request outside records:             *Examiner was wearing KN95 mask and eye protection during the entire duration of the visit. Patient was masked the entire time. Minimum social distance of 6 ft maintained entire visit except if physical contact was necessary as documented.       Template created by Adan Mcdonald MD

## 2022-08-11 LAB
T4 FREE SERPL-MCNC: 1.45 NG/DL (ref 0.82–1.77)
TSH SERPL DL<=0.005 MIU/L-ACNC: 0.88 UIU/ML (ref 0.45–4.5)

## 2022-09-11 ENCOUNTER — HOSPITAL ENCOUNTER (EMERGENCY)
Facility: HOSPITAL | Age: 42
Discharge: HOME | End: 2022-09-12
Attending: EMERGENCY MEDICINE | Admitting: EMERGENCY MEDICINE

## 2022-09-11 ENCOUNTER — APPOINTMENT (OUTPATIENT)
Dept: CT IMAGING | Facility: HOSPITAL | Age: 42
End: 2022-09-11

## 2022-09-11 DIAGNOSIS — R51.9 ACUTE NONINTRACTABLE HEADACHE, UNSPECIFIED HEADACHE TYPE: Primary | ICD-10-CM

## 2022-09-11 DIAGNOSIS — M32.9 HISTORY OF SYSTEMIC LUPUS ERYTHEMATOSUS: ICD-10-CM

## 2022-09-11 DIAGNOSIS — R42 DIZZINESS: ICD-10-CM

## 2022-09-11 LAB
ALBUMIN SERPL-MCNC: 4.2 G/DL (ref 3.5–5.2)
ALBUMIN/GLOB SERPL: 1.8 G/DL
ALP SERPL-CCNC: 38 U/L (ref 39–117)
ALT SERPL W P-5'-P-CCNC: 26 U/L (ref 1–33)
ANION GAP SERPL CALCULATED.3IONS-SCNC: 8.6 MMOL/L (ref 5–15)
APTT PPP: 27.4 SECONDS (ref 22.7–35.4)
AST SERPL-CCNC: 19 U/L (ref 1–32)
BASOPHILS # BLD AUTO: 0.03 10*3/MM3 (ref 0–0.2)
BASOPHILS NFR BLD AUTO: 0.4 % (ref 0–1.5)
BILIRUB SERPL-MCNC: <0.2 MG/DL (ref 0–1.2)
BUN SERPL-MCNC: 14 MG/DL (ref 6–20)
BUN/CREAT SERPL: 15.9 (ref 7–25)
CALCIUM SPEC-SCNC: 9.2 MG/DL (ref 8.6–10.5)
CHLORIDE SERPL-SCNC: 101 MMOL/L (ref 98–107)
CO2 SERPL-SCNC: 27.4 MMOL/L (ref 22–29)
CREAT SERPL-MCNC: 0.88 MG/DL (ref 0.57–1)
DEPRECATED RDW RBC AUTO: 45.2 FL (ref 37–54)
EGFRCR SERPLBLD CKD-EPI 2021: 84.8 ML/MIN/1.73
EOSINOPHIL # BLD AUTO: 0.06 10*3/MM3 (ref 0–0.4)
EOSINOPHIL NFR BLD AUTO: 0.7 % (ref 0.3–6.2)
ERYTHROCYTE [DISTWIDTH] IN BLOOD BY AUTOMATED COUNT: 13 % (ref 12.3–15.4)
GLOBULIN UR ELPH-MCNC: 2.3 GM/DL
GLUCOSE SERPL-MCNC: 102 MG/DL (ref 65–99)
HCG SERPL QL: NEGATIVE
HCT VFR BLD AUTO: 36.8 % (ref 34–46.6)
HGB BLD-MCNC: 12.6 G/DL (ref 12–15.9)
IMM GRANULOCYTES # BLD AUTO: 0.04 10*3/MM3 (ref 0–0.05)
IMM GRANULOCYTES NFR BLD AUTO: 0.5 % (ref 0–0.5)
INR PPP: 1.06 (ref 0.9–1.1)
LIPASE SERPL-CCNC: 28 U/L (ref 13–60)
LYMPHOCYTES # BLD AUTO: 1.11 10*3/MM3 (ref 0.7–3.1)
LYMPHOCYTES NFR BLD AUTO: 13.3 % (ref 19.6–45.3)
MCH RBC QN AUTO: 31.7 PG (ref 26.6–33)
MCHC RBC AUTO-ENTMCNC: 34.2 G/DL (ref 31.5–35.7)
MCV RBC AUTO: 92.5 FL (ref 79–97)
MONOCYTES # BLD AUTO: 0.52 10*3/MM3 (ref 0.1–0.9)
MONOCYTES NFR BLD AUTO: 6.2 % (ref 5–12)
NEUTROPHILS NFR BLD AUTO: 6.57 10*3/MM3 (ref 1.7–7)
NEUTROPHILS NFR BLD AUTO: 78.9 % (ref 42.7–76)
NRBC BLD AUTO-RTO: 0 /100 WBC (ref 0–0.2)
PLATELET # BLD AUTO: 124 10*3/MM3 (ref 140–450)
PMV BLD AUTO: 11.9 FL (ref 6–12)
POTASSIUM SERPL-SCNC: 3.9 MMOL/L (ref 3.5–5.2)
PROT SERPL-MCNC: 6.5 G/DL (ref 6–8.5)
PROTHROMBIN TIME: 13.7 SECONDS (ref 11.7–14.2)
RBC # BLD AUTO: 3.98 10*6/MM3 (ref 3.77–5.28)
SARS-COV-2 RNA RESP QL NAA+PROBE: NOT DETECTED
SODIUM SERPL-SCNC: 137 MMOL/L (ref 136–145)
WBC NRBC COR # BLD: 8.33 10*3/MM3 (ref 3.4–10.8)

## 2022-09-11 PROCEDURE — 99284 EMERGENCY DEPT VISIT MOD MDM: CPT

## 2022-09-11 PROCEDURE — 96374 THER/PROPH/DIAG INJ IV PUSH: CPT

## 2022-09-11 PROCEDURE — 80053 COMPREHEN METABOLIC PANEL: CPT | Performed by: EMERGENCY MEDICINE

## 2022-09-11 PROCEDURE — 0 IOPAMIDOL PER 1 ML: Performed by: EMERGENCY MEDICINE

## 2022-09-11 PROCEDURE — 25010000002 PROCHLORPERAZINE 10 MG/2ML SOLUTION: Performed by: EMERGENCY MEDICINE

## 2022-09-11 PROCEDURE — 96375 TX/PRO/DX INJ NEW DRUG ADDON: CPT

## 2022-09-11 PROCEDURE — 25010000002 DIPHENHYDRAMINE PER 50 MG: Performed by: EMERGENCY MEDICINE

## 2022-09-11 PROCEDURE — 25010000002 LORAZEPAM PER 2 MG: Performed by: EMERGENCY MEDICINE

## 2022-09-11 PROCEDURE — U0003 INFECTIOUS AGENT DETECTION BY NUCLEIC ACID (DNA OR RNA); SEVERE ACUTE RESPIRATORY SYNDROME CORONAVIRUS 2 (SARS-COV-2) (CORONAVIRUS DISEASE [COVID-19]), AMPLIFIED PROBE TECHNIQUE, MAKING USE OF HIGH THROUGHPUT TECHNOLOGIES AS DESCRIBED BY CMS-2020-01-R: HCPCS | Performed by: EMERGENCY MEDICINE

## 2022-09-11 PROCEDURE — 85025 COMPLETE CBC W/AUTO DIFF WBC: CPT | Performed by: EMERGENCY MEDICINE

## 2022-09-11 PROCEDURE — 70496 CT ANGIOGRAPHY HEAD: CPT

## 2022-09-11 PROCEDURE — 84703 CHORIONIC GONADOTROPIN ASSAY: CPT | Performed by: EMERGENCY MEDICINE

## 2022-09-11 PROCEDURE — 70498 CT ANGIOGRAPHY NECK: CPT

## 2022-09-11 PROCEDURE — 85610 PROTHROMBIN TIME: CPT | Performed by: EMERGENCY MEDICINE

## 2022-09-11 PROCEDURE — 83690 ASSAY OF LIPASE: CPT | Performed by: EMERGENCY MEDICINE

## 2022-09-11 PROCEDURE — 85730 THROMBOPLASTIN TIME PARTIAL: CPT | Performed by: EMERGENCY MEDICINE

## 2022-09-11 RX ORDER — PROCHLORPERAZINE EDISYLATE 5 MG/ML
10 INJECTION INTRAMUSCULAR; INTRAVENOUS ONCE
Status: COMPLETED | OUTPATIENT
Start: 2022-09-11 | End: 2022-09-11

## 2022-09-11 RX ORDER — LORAZEPAM 2 MG/ML
0.5 INJECTION INTRAMUSCULAR ONCE
Status: COMPLETED | OUTPATIENT
Start: 2022-09-11 | End: 2022-09-11

## 2022-09-11 RX ORDER — DIPHENHYDRAMINE HYDROCHLORIDE 50 MG/ML
25 INJECTION INTRAMUSCULAR; INTRAVENOUS ONCE
Status: COMPLETED | OUTPATIENT
Start: 2022-09-11 | End: 2022-09-11

## 2022-09-11 RX ORDER — SODIUM CHLORIDE 0.9 % (FLUSH) 0.9 %
10 SYRINGE (ML) INJECTION AS NEEDED
Status: DISCONTINUED | OUTPATIENT
Start: 2022-09-11 | End: 2022-09-12 | Stop reason: HOSPADM

## 2022-09-11 RX ADMIN — DIPHENHYDRAMINE HYDROCHLORIDE 25 MG: 50 INJECTION INTRAMUSCULAR; INTRAVENOUS at 22:31

## 2022-09-11 RX ADMIN — PROCHLORPERAZINE EDISYLATE 10 MG: 5 INJECTION INTRAMUSCULAR; INTRAVENOUS at 22:31

## 2022-09-11 RX ADMIN — LORAZEPAM 0.5 MG: 2 INJECTION INTRAMUSCULAR; INTRAVENOUS at 22:31

## 2022-09-11 RX ADMIN — SODIUM CHLORIDE 1000 ML: 9 INJECTION, SOLUTION INTRAVENOUS at 22:31

## 2022-09-11 RX ADMIN — IOPAMIDOL 95 ML: 755 INJECTION, SOLUTION INTRAVENOUS at 23:26

## 2022-09-12 VITALS
HEART RATE: 66 BPM | SYSTOLIC BLOOD PRESSURE: 95 MMHG | RESPIRATION RATE: 18 BRPM | TEMPERATURE: 97.8 F | OXYGEN SATURATION: 95 % | DIASTOLIC BLOOD PRESSURE: 56 MMHG

## 2022-09-12 NOTE — ED NOTES
Pt presents to ED with dizziness, vomiting and HA with photophobia since 1100 today. Pt denies history of a migraine.

## 2022-09-12 NOTE — ED PROVIDER NOTES
EMERGENCY DEPARTMENT ENCOUNTER    Room Number:  23/23  Date of encounter:  9/12/2022  PCP: Jeremy Mcdonald MD  Historian: Patient    Patient was placed in face mask during triage process. Patient was wearing facemask when I entered the room and throughout our encounter. I wore full protective equipment throughout this patient encounter including a face mask, eye protection, and gloves. Hand hygiene was performed before donning protective equipment and again following doffing of PPE after leaving the room.    HPI:  Chief Complaint: Headache, nausea vomiting, dizziness  A complete HPI/ROS/PMH/PSH/SH/FH are unobtainable due to: N/A   Context: Taty Romo is a 42 y.o. female who presents to the ED c/o dizziness upon waking this morning.  She noted when she was getting up out of bed.  When she is standing, she notes that any dizziness will not completely resolved but certainly ease.  Around noon or slightly after, she started to develop gradual onset headache and she describes the top part of her head feeling like it is going to explode.  This afternoon she began to have photophobia as well as nausea and vomiting.  She denies any neck pain, abdominal pain, chest pain, shortness of breath, fevers though she feels hot all over.  She does describe photophobia but no vision change.  No hearing change.  No focal numbness or weakness.  No gait disturbances reported by the patient or her spouse who is at bedside.  Patient felt well yesterday and went to bed without any symptoms.  Patient is on Plaquenil for history of lupus.      MEDICAL HISTORY REVIEW  EMR reviewed:    MRI cervical spine 2/7/2022:  IMPRESSION:  Relatively minimal indentations upon the ventral aspect of the thecal  sac are seen at the C2-3, C3-4, and C4-5 levels as discussed in detail  above. Otherwise, no significant canal or foraminal stenosis is  identified throughout the cervical spine.  This report was finalized on 2/8/2022 9:35 AM by Dr. Masood Allen  M.D.    PAST MEDICAL HISTORY  Active Ambulatory Problems     Diagnosis Date Noted   • Moderate episode of recurrent major depressive disorder ([facility]) 01/30/2019   • Hypothyroidism due to Hashimoto's thyroiditis 01/30/2019   • Insomnia 03/24/2015   • Systemic lupus erythematosus ([facility]) 03/01/2007   • Vitamin D deficiency 08/29/2015   • Irritable bowel syndrome with constipation 01/30/2019   • Seasonal allergic rhinitis due to pollen 04/30/2019   • DDD (degenerative disc disease), lumbar 04/30/2019   • Allergic rhinitis 05/22/2019   • Chronic bilateral low back pain without sciatica 09/18/2019   • Spondylolisthesis at L5-S1 level 01/20/2020   • Herniated lumbar intervertebral disc 04/15/2020   • Neck pain 08/14/2020   • Degeneration of intervertebral disc at C4-C5 level 08/14/2020   • Nausea and vomiting 10/04/2020   • Cervical radiculopathy 11/18/2020   • Status post surgery 01/28/2021   • Paresthesia and pain of both upper extremities 02/22/2022   • Generalized anxiety disorder 03/24/2022   • Social anxiety disorder 03/24/2022   • Sacroiliac joint dysfunction of right side 05/09/2022     Resolved Ambulatory Problems     Diagnosis Date Noted   • Hypothyroidism 08/29/2015     Past Medical History:   Diagnosis Date   • Alopecia 08/2015   • CHI positive 08/2014   • Anal fissure 12/2016   • Anxiety    • Bilateral sciatica 01/15/2020   • Biliary colic 05/2019   • Bulging lumbar disc 02/2018   • Cervicitis 04/04/2002   • Chronic fatigue    • Chronic ITP (idiopathic thrombocytopenia) ([facility]) 01/2012   • Closed fracture of left distal radius 01/22/2020   • Cold intolerance    • Colon polyps    • Contusion of back 03/04/2004   • Cystic disease of breast 01/2014   • Depression    • Dysphagia    • Edema of both lower legs 10/2019   • Excessive sweating 08/2017   • Facet arthropathy, lumbar    • Fibromyalgia, primary    • Frequent UTI 03/2016   • Goiter    • Hashimoto's disease    • Hemorrhoids    • Hepatitis A antibody positive  10/2014   • 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   • Idiopathic scoliosis of lumbosacral region    • Irregular menses 03/2015   • Lumbar radiculopathy    • Migraine    • Neck sprain 07/11/2005   • Paresthesia of upper extremity    • PCOS (polycystic ovarian syndrome)    • Peripheral neuropathy 2017   • Polyarthralgia    • Polydipsia 10/2016   • PONV (postoperative nausea and vomiting)    • Ruptured tympanic membrane, right 05/2015   • Seasonal allergies    • Tattoos    • Tremor of both hands 06/2016   • Trochanteric bursitis of both hips 10/2021   • Urinary frequency 02/2015   • Urinary urgency 02/2015         PAST SURGICAL HISTORY  Past Surgical History:   Procedure Laterality Date   • ANAL SPHINCTEROTOMY N/A 04/01/2003    DR.RAYMOND HEAD AT [facility]   • APPENDECTOMY N/A 04/13/2010    LAPAROSCOPIC, WITH PELVIC LAPAROSCOPY, DR. GIOVANNI RUBIN AT [facility]   • COLONOSCOPY N/A 11/21/2016    4 MM POLYP AT ANUS, NOT RESECTED D/T UPCPMING HEMORRHOIDECTOMY, OTHERWISE WNL, RESCOPE IN 5 YRS, DR. MANNY FARIAS AT [facility]   • COLONOSCOPY N/A 1/18/2022    ENTIRE COLON WNL, RESCOPE IN 5 YRS, DR. MANNY FARIAS AT [facility]   • EPIDURAL BLOCK N/A 05/22/2020    DR. ZAIRA GAO AT [facility]   • FINGER MASS EXCISION Right 05/16/2011    RIGHT INDEX FINGER, PATH: BENIGN WITH HYPERKERATOSIS, DR. LUZ ELENA HAY AT [facility]   • HEMORRHOIDECTOMY N/A 04/01/2003    DR.RAYMOND HEAD AT [facility]   • HEMORRHOIDECTOMY N/A 11/21/2016    Procedure: HEMORRHOIDECTOMY;  Surgeon: Manny Farias MD;  Location: [facility] MAIN OR;  Service:    • INTRAUTERINE DEVICE INSERTION N/A 10/20/2014    DR. CICI LEMUS   • LUMBAR DISCECTOMY Left 1/8/2021    Procedure: Outpatient left lumbar five/sacral one Metrx microdiscectomy;  Surgeon: Alessandro Thomas MD;  Location: [facility] MAIN OR;  Service: Neurosurgery;  Laterality: Left;   • LUMBAR EPIDURAL INJECTION N/A 07/19/2021    DR. PARISA BEE AT [facility]   • LUMBAR EPIDURAL INJECTION N/A 11/06/2020    DR. WALDO MUNOZ AT [facility]   • LUMBAR EPIDURAL  INJECTION N/A 08/28/2020    DR. MALORIE SHEA AT [facility]   • THYROIDECTOMY, PARTIAL Right 06/17/2015    RIGHT LOBECTOMY, DR. ARABELLA HUNT AT [place]   • TONSILLECTOMY Bilateral     CHILDHOOD   • [facility]   • VAGINAL DELIVERY N/A 11/01/2002    DR. KEENAN LAUREN AT [facility]   • VAGINAL DELIVERY N/A 11/16/2006    DR. JANNA DEL VALLE AT [facility]         FAMILY HISTORY  Family History   Problem Relation Age of Onset   • Colon polyps Mother    • Coronary artery disease Mother 60        non-smokers   • Valvular heart disease Mother    • Diabetes type II Mother    • Other Mother         pulmonary hypertension   • Clotting disorder Mother         superficial dvt   • Depression Mother    • Kidney failure Mother         due to diabetes   • Kidney disease Mother    • Heart disease Mother    • Diabetes Mother    • Thyroid disease Mother    • Colon polyps Father    • Atrial fibrillation Father    • Hypertension Father    • Deep vein thrombosis Father    • Other Father         spinal stenosis, Degenerative disc disease    • Arthritis Father    • Autoimmune disease Brother    • Depression Brother    • Lupus Brother    • Asthma Daughter    • Dementia Maternal Uncle    • Dementia Maternal Grandmother    • Stroke Maternal Grandmother    • Colon cancer Paternal Grandfather    • Cancer Paternal Grandfather    • Colon cancer Maternal Uncle    • ADD / ADHD Daughter    • Arthritis Paternal Grandmother    • Malig Hyperthermia Neg Hx          SOCIAL HISTORY  Social History     Socioeconomic History   • Marital status:      Spouse name: Alexander*   • Number of children: 2   Tobacco Use   • Smoking status: Never Smoker   • Smokeless tobacco: Never Used   Vaping Use   • Vaping Use: Never used   Substance and Sexual Activity   • Alcohol use: Not Currently     Comment: Few times a year   • Drug use: Never   • Sexual activity: Yes     Partners: Male     Birth control/protection: I.U.D.          ALLERGIES  Oxycodone        REVIEW OF SYSTEMS  Review of Systems     All systems reviewed and negative except for those discussed in HPI.       PHYSICAL EXAM    I have reviewed the triage vital signs and nursing notes.    ED Triage Vitals [09/11/22 2036]   Temp Heart Rate Resp BP SpO2   97.8 °F (36.6 °C) 98 18 104/66 100 %      Temp src Heart Rate Source Patient Position BP Location FiO2 (%)   Oral Monitor -- -- --       Physical Exam    Physical Exam   Constitutional: No distress.  Uncomfortable appearing but not overtly toxic.  HENT:  Head: Normocephalic and atraumatic.   Oropharynx: Mucous membranes are moist.   Eyes: No scleral icterus. No conjunctival pallor.  Moderate photophobia.  PERRL, EOMI  Neck: Painless range of motion noted. Neck supple.  No meningismus or rigidity.  C5-8 motor and sensory grossly intact.  Cardiovascular: Normal rate, regular rhythm and intact distal pulses.  Pulmonary/Chest: No respiratory distress. There are no wheezes, no rhonchi, and no rales.   Abdominal: Soft. There is no tenderness. There is no rebound and no guarding.   Musculoskeletal: Moves all extremities equally. There is no pedal edema or calf tenderness.   Neurological: Alert.  Baseline strength and sensation noted.  NIH stroke scale-0.  Stable gait and station witnessed in ED.  Skin: Skin is pink, warm, and dry. No pallor.   Psychiatric: Mood and affect normal.   Nursing note and vitals reviewed.    LAB RESULTS  Recent Results (from the past 24 hour(s))   Comprehensive Metabolic Panel    Collection Time: 09/11/22 10:22 PM    Specimen: Blood   Result Value Ref Range    Glucose 102 (H) 65 - 99 mg/dL    BUN 14 6 - 20 mg/dL    Creatinine 0.88 0.57 - 1.00 mg/dL    Sodium 137 136 - 145 mmol/L    Potassium 3.9 3.5 - 5.2 mmol/L    Chloride 101 98 - 107 mmol/L    CO2 27.4 22.0 - 29.0 mmol/L    Calcium 9.2 8.6 - 10.5 mg/dL    Total Protein 6.5 6.0 - 8.5 g/dL    Albumin 4.20 3.50 - 5.20 g/dL    ALT (SGPT) 26 1 - 33 U/L     AST (SGOT) 19 1 - 32 U/L    Alkaline Phosphatase 38 (L) 39 - 117 U/L    Total Bilirubin <0.2 0.0 - 1.2 mg/dL    Globulin 2.3 gm/dL    A/G Ratio 1.8 g/dL    BUN/Creatinine Ratio 15.9 7.0 - 25.0    Anion Gap 8.6 5.0 - 15.0 mmol/L    eGFR 84.8 >60.0 mL/min/1.73   Lipase    Collection Time: 09/11/22 10:22 PM    Specimen: Blood   Result Value Ref Range    Lipase 28 13 - 60 U/L   hCG, Serum, Qualitative    Collection Time: 09/11/22 10:22 PM    Specimen: Blood   Result Value Ref Range    HCG Qualitative Negative Negative   Protime-INR    Collection Time: 09/11/22 10:22 PM    Specimen: Blood   Result Value Ref Range    Protime 13.7 11.7 - 14.2 Seconds    INR 1.06 0.90 - 1.10   aPTT    Collection Time: 09/11/22 10:22 PM    Specimen: Blood   Result Value Ref Range    PTT 27.4 22.7 - 35.4 seconds   CBC Auto Differential    Collection Time: 09/11/22 10:22 PM    Specimen: Blood   Result Value Ref Range    WBC 8.33 3.40 - 10.80 10*3/mm3    RBC 3.98 3.77 - 5.28 10*6/mm3    Hemoglobin 12.6 12.0 - 15.9 g/dL    Hematocrit 36.8 34.0 - 46.6 %    MCV 92.5 79.0 - 97.0 fL    MCH 31.7 26.6 - 33.0 pg    MCHC 34.2 31.5 - 35.7 g/dL    RDW 13.0 12.3 - 15.4 %    RDW-SD 45.2 37.0 - 54.0 fl    MPV 11.9 6.0 - 12.0 fL    Platelets 124 (L) 140 - 450 10*3/mm3    Neutrophil % 78.9 (H) 42.7 - 76.0 %    Lymphocyte % 13.3 (L) 19.6 - 45.3 %    Monocyte % 6.2 5.0 - 12.0 %    Eosinophil % 0.7 0.3 - 6.2 %    Basophil % 0.4 0.0 - 1.5 %    Immature Grans % 0.5 0.0 - 0.5 %    Neutrophils, Absolute 6.57 1.70 - 7.00 10*3/mm3    Lymphocytes, Absolute 1.11 0.70 - 3.10 10*3/mm3    Monocytes, Absolute 0.52 0.10 - 0.90 10*3/mm3    Eosinophils, Absolute 0.06 0.00 - 0.40 10*3/mm3    Basophils, Absolute 0.03 0.00 - 0.20 10*3/mm3    Immature Grans, Absolute 0.04 0.00 - 0.05 10*3/mm3    nRBC 0.0 0.0 - 0.2 /100 WBC   COVID-19,[facility] MAY IN-HOUSE CEPHEID/OTONIEL NP SWAB IN TRANSPORT MEDIA 8-12 HR TAT - Swab, Nasopharynx    Collection Time: 09/11/22 10:46 PM    Specimen:  Nasopharynx; Swab   Result Value Ref Range    COVID19 Not Detected Not Detected - Ref. Range       Ordered the above labs and independently reviewed the results.        RADIOLOGY  CT Angiogram Head, CT Angiogram Neck    Result Date: 9/12/2022  NONCONTRAST CRANIAL CT SCAN, CT ANGIOGRAM NECK, CT ANGIOGRAM HEAD.  HISTORY: Dizziness.  COMPARISON: None..  TECHNIQUE:  Radiation dose reduction techniques were utilized, including automated exposure control and exposure modulation based on body size. Initially, a routine noncontrast cranial CT performed from the skull base through the vertex region. After review, thin-section contrast enhanced CT angiogram images obtained from the aortic arch through the calvarial vertex utilizing angiographic technique. Multi projection 3-D MIP reformatted images were supplemented and reviewed.  FINDINGS CRANIAL CT: No acute hemorrhage or midline shift is demonstrated..  Ventricular size and configuration is within normal limits for age..  Postcontrast imaging does not show any evidence of venous occlusion or abnormal enhancement  Bone window images mucosal thickening within the ethmoid sinuses..     CERVICAL CAROTID CT ANGIOGRAM: FINDINGS:  There is normal arch anatomy. The common carotid arteries are widely patent. The cervical internal carotid arteries are widely patent. Both cervical vertebral arteries are normal in appearance. The left is dominant. Images through the lung apices do not demonstrate any acute abnormalities. Right lobe of the thyroid gland is absent.    NASCET criteria utilized in stenosis measurements. Stenosis mild, 0-49%.   CRANIAL CTA ANGIOGRAM:  FINDINGS:  The intracranial vertebral arteries are widely patent. Basilar artery is somewhat small in caliber. The posterior cerebral arteries are normal bilaterally. There does appear to be a fetal origin of the right posterior cerebral artery. There is a posterior communicating artery on the left.  The middle cerebral  arteries are normal bilaterally. There is an anterior communicating artery..    AI analysis of LVO was utilized.  Radiation dose reduction techniques were utilized, including automated exposure control and exposure modulation based on body size.       1. No acute intracranial findings. 2. No intracranial or cervical vascular stenosis or occlusion.   Radiation dose reduction techniques were utilized, including automated exposure control and exposure modulation based on body size.  This report was finalized on 9/12/2022 2:45 AM by Dr. Jenny Rodriguez M.D.        I ordered the above noted radiological studies. Reviewed by me and discussed with radiologist.  See dictation for official radiology interpretation.      PROCEDURES    Procedures        MEDICATIONS GIVEN IN ER    Medications   sodium chloride 0.9 % flush 10 mL (has no administration in time range)   sodium chloride 0.9 % bolus 1,000 mL (0 mL Intravenous Stopped 9/12/22 0029)   prochlorperazine (COMPAZINE) injection 10 mg (10 mg Intravenous Given 9/11/22 2231)   diphenhydrAMINE (BENADRYL) injection 25 mg (25 mg Intravenous Given 9/11/22 2231)   LORazepam (ATIVAN) injection 0.5 mg (0.5 mg Intravenous Given 9/11/22 2231)   iopamidol (ISOVUE-370) 76 % injection 100 mL (95 mL Intravenous Given 9/11/22 2326)         PROGRESS, DATA ANALYSIS, CONSULTS, AND MEDICAL DECISION MAKING    My differential diagnosis for dizziness included but is not limited to:  Labyrinthitis, vertigo, concussion, intracranial hemorrhage, stroke, migraine headache, cardiac arrhythmias, acute MI, hypotension, hypertension, hypoxia, inner ear and middle ear infections, anemia, electrolyte abnormalities, dehydration, hyperventilation and anxiety attacks..    My differential diagnosis for headache includes but is not limited to:  Migraine, cluster, ischemic stroke, subarachnoid hemorrhage, intracranial hemorrhage, vascular malformation, cerebral aneurysm, vascular dissection, vasculitis,  temporal arteritis, malignant hypertension, pheochromocytoma, cerebral venous thrombosis, preeclampsia; bacterial meningitis, viral meningitis, fungal meningitis, encephalitis, brain abscess, pleural empyema, sinusitis, dental infection, influenza, viral syndrome; carbon monoxide exposure, analgesic abuse, hypoglycemia; trigeminal neuralgia, postherpetic neuralgia, occipital neuralgia; subdural hematoma, concussion, musculoskeletal tension, cervical osteoarthritis; glaucoma, TMJ disease, pseudotumor cerebri, post LP headache, intracranial neoplasm, sleep apnea      All labs have been independently reviewed by me.  All radiology studies have been reviewed by me and discussed with radiologist dictating the report.   EKG's independently viewed and interpreted by me.  Discussion below represents my analysis of pertinent findings related to patient's condition, differential diagnosis, treatment plan and final disposition.      ED Course as of 09/12/22 0249   Sun Sep 11, 2022   2152 CONSULT        Provider: Dr. Choi-stroke neurology    Discussion: Reviewed patient history, ED presentation and evaluation.  Agrees with plan for labs and CT angiography of the head and neck while treating with migraine cocktail.  If patient symptoms completely resolved and she feels much better in combination with negative ED evaluation, patient may be candidate for discharge home however if she has persistent symptoms or any concerning findings, admission to the ED observation unit for MRI brain with and without could be considered.    Agreeable c treatment and planned disposition.         [RS]   2153 Given patient's history of lupus, new onset dizziness and now headache, it is quite reasonable to consider the possibility of vascular abnormality causing her symptoms.  CT angiography head and neck indicated. [RS]   Mon Sep 12, 2022   0124 COVID19: Not Detected [RS]   0125 HCG Qualitative: Negative [RS]   0125 Lipase: 28 [RS]   0125  Glucose(!): 102 [RS]   0125 BUN: 14 [RS]   0125 Creatinine: 0.88 [RS]   0125 WBC: 8.33 [RS]   0125 Hemoglobin: 12.6 [RS]   0125 ALT (SGPT): 26 [RS]   0125 AST (SGOT): 19 [RS]   0125 Total Bilirubin: <0.2 [RS]   0246 Discussion with radiologist, preliminary imaging is negative on CT angiography head and neck.  Awaiting reformats. [RS]   0247 Patient reports that her headache is much improved as well as her dizziness.  She has been resting in the ED.  I updated her and her spouse with laboratory findings and preliminary CT results.  Patient's preference would be for discharge home and outpatient follow-up. [RS]      ED Course User Index  [RS] Naresh Fragoso MD       AS OF 02:49 EDT VITALS:    BP - 95/56  HR - 66  TEMP - 97.8 °F (36.6 °C) (Oral)  O2 SATS - 95%        DIAGNOSIS  Final diagnoses:   Acute nonintractable headache, unspecified headache type   Dizziness   History of systemic lupus erythematosus (HCC)         DISPOSITION  DISCHARGE    Patient discharged in stable condition.    Reviewed implications of results, diagnosis, meds, responsibility to follow up, warning signs and symptoms of possible worsening, potential complications and reasons to return to ER.    Patient/Family voiced understanding of above instructions.    Discussed plan for discharge, as there is no emergent indication for admission. Patient referred to primary care provider for regular health maintenance. Pt/family is agreeable and understands need for follow up and possible repeat testing.  Pt is aware that discharge does not mean that nothing is wrong but it indicates no emergency is present that requires admission and they must continue care with follow-up as given below or physician of their choice.     FOLLOW-UP  Jeremy Mcdonald MD  [address] [address]  [phone]    Schedule an appointment as soon as possible for a visit in 3 days  If symptoms fail to improve    [facility]  [address] [id number] [phone]  Go to   As needed, If symptoms worsen         Medication List      Changed    meloxicam 15 MG tablet  Commonly known as: MOBIC  Take 1 tablet by mouth daily.  What changed: additional instructions               Naresh Fragoso MD  09/12/22 0414

## 2022-09-12 NOTE — ED TRIAGE NOTES
Patient to ED per EMS w/ reports of dizziness since waking up at approx 0900, headache and nausea/vomiting at approx 1500.    Patient and staff w/ appropriate PPE in place throughout encounter.

## 2022-09-19 ENCOUNTER — OFFICE VISIT (OUTPATIENT)
Dept: NEUROSURGERY | Facility: CLINIC | Age: 42
End: 2022-09-19

## 2022-09-19 VITALS
BODY MASS INDEX: 24.48 KG/M2 | SYSTOLIC BLOOD PRESSURE: 82 MMHG | WEIGHT: 156 LBS | OXYGEN SATURATION: 97 % | DIASTOLIC BLOOD PRESSURE: 54 MMHG | TEMPERATURE: 97.5 F | HEART RATE: 62 BPM | HEIGHT: 67 IN

## 2022-09-19 DIAGNOSIS — M51.36 DDD (DEGENERATIVE DISC DISEASE), LUMBAR: Primary | ICD-10-CM

## 2022-09-19 DIAGNOSIS — M53.3 SACROILIAC JOINT DYSFUNCTION OF RIGHT SIDE: ICD-10-CM

## 2022-09-19 DIAGNOSIS — Z98.890 HISTORY OF LUMBAR SURGERY: ICD-10-CM

## 2022-09-19 PROCEDURE — 99214 OFFICE O/P EST MOD 30 MIN: CPT | Performed by: NEUROLOGICAL SURGERY

## 2022-09-19 NOTE — PROGRESS NOTES
Subjective   Patient ID: Taty Romo is a 42 y.o. female is here today for follow-up of herniated lumbar intervertebral disc.    Today Ms. Romo reports she is having constant lower back pain. She reports she is having pain in her right leg down to the back of her knee. She denies any numbness or tingling. She denies any leg weakness. She denies any bowel or bladder incontinence. She reports she is taking tylenol for pain.     She continues to have mainly right-sided sacroiliac pain.  The first SI block by Dr. Urias said helped her.  The second one not so much.  But Dr. Urias has left [facility].  I thought we would have her see Dr. Klein to see if she thought that there was enough of a positive response to do a sacroiliac ablation on the right.  Or perhaps try another right sacroiliac injection.  We have tried other local measures such as a Lidoderm patch and anti-inflammatories but this is continued to bother her.  Its been several years since she underwent a left L5-S1 microdiscectomy and the left leg is generally doing well.  There are some radiographic evidence of a recurrent disc herniation but if she is not having radicular symptoms there is no reason to do anything about it.        Back Pain  This is a chronic problem. The current episode started more than 1 year ago. The problem occurs constantly. The problem has been gradually worsening since onset. The pain is present in the lumbar spine. The quality of the pain is described as aching, burning, cramping, stabbing and shooting. The pain radiates to the right knee and right thigh. The pain is at a severity of 4/10. The pain is mild. The symptoms are aggravated by position, bending, lying down, sitting, standing and twisting. Associated symptoms include leg pain. Pertinent negatives include no bladder incontinence, bowel incontinence, fever, numbness, tingling or weakness. She has tried analgesics for the symptoms. The treatment provided no relief.  "      The following portions of the patient's history were reviewed and updated as appropriate: allergies, current medications, past family history, past medical history, past social history, past surgical history and problem list.    Review of Systems   Constitutional: Positive for activity change. Negative for fever.   Gastrointestinal: Negative for bowel incontinence.   Genitourinary: Negative for bladder incontinence.   Musculoskeletal: Positive for back pain.   Neurological: Negative for tingling, weakness and numbness.   Psychiatric/Behavioral: Positive for sleep disturbance.   All other systems reviewed and are negative.          Objective     Vitals:    09/19/22 1637   BP: (!) 82/54   Pulse: 62   Temp: 97.5 °F (36.4 °C)   SpO2: 97%   Weight: 70.8 kg (156 lb)   Height: 170.2 cm (67\")     Body mass index is 24.43 kg/m².      Physical Exam  Constitutional:       Appearance: She is well-developed.   HENT:      Head: Normocephalic and atraumatic.   Eyes:      Extraocular Movements: EOM normal.      Conjunctiva/sclera: Conjunctivae normal.      Pupils: Pupils are equal, round, and reactive to light.   Neck:      Vascular: No carotid bruit.   Neurological:      Mental Status: She is oriented to person, place, and time.      Coordination: Finger-Nose-Finger Test and Heel to Shin Test normal.      Gait: Gait is intact.      Deep Tendon Reflexes:      Reflex Scores:       Tricep reflexes are 2+ on the right side and 2+ on the left side.       Bicep reflexes are 2+ on the right side and 2+ on the left side.       Brachioradialis reflexes are 2+ on the right side and 2+ on the left side.       Patellar reflexes are 2+ on the right side and 2+ on the left side.       Achilles reflexes are 2+ on the right side and 2+ on the left side.  Psychiatric:         Speech: Speech normal.       Neurologic Exam     Mental Status   Oriented to person, place, and time.   Registration of memory: Good recent and remote memory. "   Attention: normal. Concentration: normal.   Speech: speech is normal   Level of consciousness: alert  Knowledge: consistent with education.     Cranial Nerves     CN II   Visual fields full to confrontation.   Visual acuity: normal    CN III, IV, VI   Pupils are equal, round, and reactive to light.  Extraocular motions are normal.     CN V   Facial sensation intact.   Right corneal reflex: normal  Left corneal reflex: normal    CN VII   Facial expression full, symmetric.   Right facial weakness: none  Left facial weakness: none    CN VIII   Hearing: intact    CN IX, X   Palate: symmetric    CN XI   Right sternocleidomastoid strength: normal  Left sternocleidomastoid strength: normal    CN XII   Tongue: not atrophic  Tongue deviation: none    Motor Exam   Muscle bulk: normal  Right arm tone: normal  Left arm tone: normal  Right leg tone: normal  Left leg tone: normal    Strength   Strength 5/5 except as noted.     Sensory Exam   Light touch normal.     Gait, Coordination, and Reflexes     Gait  Gait: normal    Coordination   Finger to nose coordination: normal  Heel to shin coordination: normal    Reflexes   Right brachioradialis: 2+  Left brachioradialis: 2+  Right biceps: 2+  Left biceps: 2+  Right triceps: 2+  Left triceps: 2+  Right patellar: 2+  Left patellar: 2+  Right achilles: 2+  Left achilles: 2+  Right : 2+  Left : 2+          Assessment & Plan   Independent Review of Radiographic Studies:      I personally reviewed the images from the following studies.    The lumbar MRI done on 4/5/2021 does show evidence of recurrent left L5-S1 herniated disc with disc space collapse.  Agree with the report.    Medical Decision Making:      Given that she is not symptomatic on the left side, no plans for any reoperative surgery.  But the right sacroiliac pain persists.  We will send her to Dr. Klein to see if she would be a candidate for an ablation on that right side.    We will see her in approximately 5  months.      Diagnoses and all orders for this visit:    1. DDD (degenerative disc disease), lumbar (Primary)    2. Sacroiliac joint dysfunction of right side  -     Ambulatory Referral to Pain Management    3. History of lumbar surgery      Return in about 5 months (around 2/19/2023) for Face-to-face.

## 2022-09-28 ENCOUNTER — TELEMEDICINE (OUTPATIENT)
Dept: PSYCHIATRY | Facility: CLINIC | Age: 42
End: 2022-09-28

## 2022-09-28 DIAGNOSIS — F40.10 SOCIAL ANXIETY DISORDER: ICD-10-CM

## 2022-09-28 DIAGNOSIS — F41.1 GENERALIZED ANXIETY DISORDER: Chronic | ICD-10-CM

## 2022-09-28 DIAGNOSIS — F33.1 MODERATE EPISODE OF RECURRENT MAJOR DEPRESSIVE DISORDER: Primary | Chronic | ICD-10-CM

## 2022-09-28 DIAGNOSIS — G47.01 INSOMNIA DUE TO MEDICAL CONDITION: ICD-10-CM

## 2022-09-28 PROCEDURE — 99214 OFFICE O/P EST MOD 30 MIN: CPT

## 2022-09-28 RX ORDER — BUPROPION HYDROCHLORIDE 150 MG/1
150 TABLET ORAL EVERY MORNING
Qty: 30 TABLET | Refills: 0 | Status: SHIPPED | OUTPATIENT
Start: 2022-09-28 | End: 2022-10-27 | Stop reason: SDUPTHER

## 2022-09-28 RX ORDER — HYDROXYZINE HYDROCHLORIDE 25 MG/1
75 TABLET, FILM COATED ORAL NIGHTLY PRN
Qty: 270 TABLET | Refills: 0 | Status: SHIPPED | OUTPATIENT
Start: 2022-09-28 | End: 2022-10-27 | Stop reason: SDUPTHER

## 2022-09-28 NOTE — PROGRESS NOTES
This provider is located at [place], [place]. The Patient is seen remotely using Video. Patient is being seen via telehealth and confirm that they are in a secure environment for this session. Patient is located in [place], [place] in her car. The patient's condition being diagnosed/treated is appropriate for telemedicine. Provider identified as Rupa Olivas as well as credentials RENE MSN PMHNP-BC.   The client/patient gave consent to be seen remotely, and when consent is given they understand that the consent allows for patient identifiable information to be sent to a third party as needed.  They may refuse to be seen remotely at any time. The electronic data is encrypted and password protected, and the patient has been advised of the potential risks to privacy not withstanding such measures.    Chief Complaint  Depression and Anxiety    Subjective        Taty Romo presents to [facility] for   History of Present Illness  Patient is seen today for follow-up visit for depression and anxiety.  Patient states overall her mood has been good.  States she is struggling with a lack of motivation and fatigue.  States she just has trouble having any energy to get things done around the house.  States she knows that getting up and moving around would help her back, but she just cannot seem to do it.  Rates her depression a 4 on a 1-10 scale with 10 being the worst.  Denies any hopelessness.  Denies any sadness.  Denies any suicidal or homicidal ideation.  States her sleep is fair, but that is due to her back pain.  Appetite is good.  Rates her anxiety a 1 on a 1-10 scale with 10 being the worst.  States anxiety is not really been an issue for her.  Denies any worry or overthinking.  States she has not had to take the hydroxyzine or propanolol for anxiety.  She has taken the hydroxyzine at night to help with sleep.  Denies any manic type symptoms.  Denies any paranoia.  Denies any  auditory or visual hallucinations.  Denies any side effects to the medications.  Objective   Vital Signs:   There were no vitals taken for this visit.      Mental Status Exam:   Hygiene:   good  Cooperation:  Cooperative  Eye Contact:  Good  Psychomotor Behavior:  Appropriate  Affect:  Full range  Mood: normal  Speech:  Normal  Thought Process:  Goal directed  Thought Content:  Normal  Suicidal:  None  Homicidal:  None  Hallucinations:  None  Delusion:  None  Memory:  Intact  Orientation:  Person, Place, Time and Situation  Reliability:  good  Insight:  Good  Judgement:  Good  Impulse Control:  Good  Physical/Medical Issues:  No      PHQ-9 Depression Screening  Little interest or pleasure in doing things? 1-->several days   Feeling down, depressed, or hopeless? 1-->several days   Trouble falling or staying asleep, or sleeping too much? 2-->more than half the days   Feeling tired or having little energy? 3-->nearly every day   Poor appetite or overeating? 1-->several days   Feeling bad about yourself - or that you are a failure or have let yourself or your family down? 1-->several days   Trouble concentrating on things, such as reading the newspaper or watching television? 0-->not at all   Moving or speaking so slowly that other people could have noticed? Or the opposite - being so fidgety or restless that you have been moving around a lot more than usual? 0-->not at all   Thoughts that you would be better off dead, or of hurting yourself in some way? 0-->not at all   PHQ-9 Total Score 9   If you checked off any problems, how difficult have these problems made it for you to do your work, take care of things at home, or get along with other people? somewhat difficult     PHQ-9 Total Score: 9  COURT 7 anxiety screening tool that patient filled out virtually reviewed by this APRN at today's encounter.    PROMIS scale screening tool that patient filled out virtually reviewed by this APRN at today's encounter.    Previous  Provider notes and available records reviewed by this APRN today.   Current Medications:   Current Outpatient Medications   Medication Sig Dispense Refill   • acetaminophen (Tylenol 8 Hour Arthritis Pain) 650 MG 8 hr tablet Take 1 tablet by mouth Every 8 (Eight) Hours As Needed for Mild Pain  or Moderate Pain . 100 tablet 2   • Biotin 5000 MCG tablet Take 1 tablet by mouth Daily.     • buPROPion XL (Wellbutrin XL) 150 MG 24 hr tablet Take 1 tablet by mouth Every Morning. 30 tablet 0   • Calcium Polycarbophil (FIBER-CAPS PO) Take 2 capsules by mouth Daily.     • Cetirizine-Pseudoephedrine (ZYRTEC-D PO) Take 1 tablet by mouth Daily As Needed.     • Cholecalciferol (VITAMIN D) 2000 UNITS capsule Take 5,000 Units by mouth Every Evening.     • docusate sodium (COLACE) 100 MG capsule Take 200 mg by mouth 2 (Two) Times a Day.     • gabapentin (NEURONTIN) 600 MG tablet Take 1 tablet by mouth 2 (Two) Times a Day. 60 tablet 3   • hydroxychloroquine (PLAQUENIL) 200 MG tablet Take 1 tablet by mouth 2 (Two) Times a Day. 180 tablet 1   • hydrOXYzine (ATARAX) 25 MG tablet Take 3 tablets by mouth At Night As Needed (sleep). 270 tablet 0   • levothyroxine (SYNTHROID, LEVOTHROID) 100 MCG tablet Take 1 tablet by mouth Daily. 90 tablet 3   • meloxicam (MOBIC) 15 MG tablet Take 1 tablet by mouth daily. (Patient taking differently: Take 15 mg by mouth Daily. prn) 90 tablet 1   • ondansetron ODT (ZOFRAN-ODT) 8 MG disintegrating tablet Place 1 tablet on the tongue Every 8 (Eight) Hours As Needed for Nausea or Vomiting. 12 tablet 0   • propranolol (INDERAL) 10 MG tablet Take 1 tablet by mouth 2 (Two) Times a Day As Needed (Anxiety). 60 tablet 0   • Turmeric 500 MG tablet Take 2 tablets by mouth Daily.     • Vortioxetine HBr 20 MG tablet Take 1 tablet by mouth Daily. 90 tablet 0     No current facility-administered medications for this visit.       Physical Exam   Result Review :    The following data was reviewed by: RENE Guadalupe  on 09/28/2022:  Common labs    Common Labs 4/1/22 4/1/22 4/1/22 7/27/22 7/27/22 7/27/22 7/27/22 9/11/22 9/11/22    1524 1524 1524 0910 0910 0910 0910 2222 2222   Glucose     91   102 (A)    BUN     16   14    Creatinine     0.92   0.88    Sodium     138   137    Potassium     3.9   3.9    Chloride     101   101    Calcium     9.6   9.2    Total Protein     7.6       Albumin     4.9 (A)   4.20    Total Bilirubin     0.5   <0.2    Alkaline Phosphatase     45   38 (A)    AST (SGOT) 11    18   19    ALT (SGPT)  8   17   26    WBC   2.85 (A) 4.2     8.33   Hemoglobin   12.2 13.8     12.6   Hematocrit   36.3 39.6     36.8   Platelets   105 (A) 158     124 (A)   Total Cholesterol       159     Triglycerides       104     HDL Cholesterol       45     LDL Cholesterol        95     Hemoglobin A1C      5.6      (A) Abnormal value       Comments are available for some flowsheets but are not being displayed.              Assessment and Plan   Problem List Items Addressed This Visit        Mental Health    Moderate episode of recurrent major depressive disorder (HCC) - Primary (Chronic)    Overview     Has taken medication since 19.   - Mom PASSED 5/2021          Relevant Medications    hydrOXYzine (ATARAX) 25 MG tablet    Vortioxetine HBr 20 MG tablet    buPROPion XL (Wellbutrin XL) 150 MG 24 hr tablet    Generalized anxiety disorder    Relevant Medications    hydrOXYzine (ATARAX) 25 MG tablet    Vortioxetine HBr 20 MG tablet    buPROPion XL (Wellbutrin XL) 150 MG 24 hr tablet    Social anxiety disorder    Relevant Medications    hydrOXYzine (ATARAX) 25 MG tablet    Vortioxetine HBr 20 MG tablet    buPROPion XL (Wellbutrin XL) 150 MG 24 hr tablet       Sleep    Insomnia (Chronic)    Relevant Medications    hydrOXYzine (ATARAX) 25 MG tablet        Discussed treatment options with patient.  Discussed with patient that since her depression and anxiety seem to be under control, but she is still is having fatigue and lack of  motivation that we could add Wellbutrin.  Discussed Wellbutrin with patient.  Patient denies any history of any seizures.  Patient states she would like to try the medication.  Start Wellbutrin  mg daily for depression.  Continue Trintellix 20 mg daily for depression and anxiety.  Continue hydroxyzine 25 mg 3 times daily as needed for anxiety/sleep.    TREATMENT PLAN/GOALS: Continue supportive psychotherapy efforts and medications as indicated. Treatment and medication options discussed during today's visit. Patient ackowledged and verbally consented to continue with current treatment plan and was educated on the importance of compliance with treatment and follow-up appointments.    DEPRESSION:  Patient screened positive for depression based on a PHQ-9 score of 9 on 9/28/2022. Follow-up recommendations include: Prescribed antidepressant medication treatment.       MEDICATION ISSUES:  We discussed risks, benefits, and side effects of the above medications and the patient was agreeable with the plan. Patient was educated on the importance of compliance with treatment and follow-up appointments.  Patient is agreeable to call the office with any worsening of symptoms or onset of side effects. Patient is agreeable to call 911 or go to the nearest ER should he/she begin having SI/HI.      Counseled patient regarding multimodal approach with healthy nutrition, healthy sleep, regular physical activity, social activities, counseling, and medications.      Coping skills reviewed and encouraged positive framing of thoughts     Assisted patient in processing above session content; acknowledged and normalized patient’s thoughts, feelings, and concerns.  Applied  positive coping skills and behavior management in session.  Allowed patient to freely discuss issues without interruption or judgment. Provided safe, confidential environment to facilitate the development of positive therapeutic relationship and encourage open,  honest communication. Assisted patient in identifying risk factors which would indicate the need for higher level of care including thoughts to harm self or others and/or self-harming behavior and encouraged patient to contact this office, call 911, or present to the nearest emergency room should any of these events occur. Discussed crisis intervention services and means to access. If patient has any concerns or needs assistance they were instructed to call the [facility] at [phone].    MEDS ORDERED DURING VISIT:  New Medications Ordered This Visit   Medications   • hydrOXYzine (ATARAX) 25 MG tablet     Sig: Take 3 tablets by mouth At Night As Needed (sleep).     Dispense:  270 tablet     Refill:  0   • Vortioxetine HBr 20 MG tablet     Sig: Take 1 tablet by mouth Daily.     Dispense:  90 tablet     Refill:  0   • buPROPion XL (Wellbutrin XL) 150 MG 24 hr tablet     Sig: Take 1 tablet by mouth Every Morning.     Dispense:  30 tablet     Refill:  0         Follow Up   Return in about 4 weeks (around 10/26/2022) for Video visit.    Patient was given instructions and counseling regarding her condition or for health maintenance advice. Please see specific information pulled into the AVS if appropriate.         This document has been electronically signed by RENE Guadalupe  September 28, 2022 16:32 EDT    Part of this note may be an electronic transcription/translation of spoken language to printed text using the Dragon Dictation System.

## 2022-10-09 DIAGNOSIS — E06.3 HYPOTHYROIDISM DUE TO HASHIMOTO'S THYROIDITIS: Chronic | ICD-10-CM

## 2022-10-09 DIAGNOSIS — E03.8 HYPOTHYROIDISM DUE TO HASHIMOTO'S THYROIDITIS: Chronic | ICD-10-CM

## 2022-10-09 RX ORDER — LEVOTHYROXINE SODIUM 0.1 MG/1
100 TABLET ORAL DAILY
Qty: 90 TABLET | Refills: 3 | Status: CANCELLED | OUTPATIENT
Start: 2022-10-09

## 2022-10-10 RX ORDER — LEVOTHYROXINE SODIUM 0.1 MG/1
100 TABLET ORAL DAILY
Qty: 90 TABLET | Refills: 3 | Status: SHIPPED | OUTPATIENT
Start: 2022-10-10 | End: 2023-02-15 | Stop reason: DRUGHIGH

## 2022-10-18 DIAGNOSIS — F33.1 MODERATE EPISODE OF RECURRENT MAJOR DEPRESSIVE DISORDER: Chronic | ICD-10-CM

## 2022-10-18 RX ORDER — BUPROPION HYDROCHLORIDE 150 MG/1
150 TABLET ORAL EVERY MORNING
Qty: 30 TABLET | Refills: 0 | OUTPATIENT
Start: 2022-10-18

## 2022-10-27 ENCOUNTER — TELEMEDICINE (OUTPATIENT)
Dept: PSYCHIATRY | Facility: CLINIC | Age: 42
End: 2022-10-27

## 2022-10-27 DIAGNOSIS — F41.1 GENERALIZED ANXIETY DISORDER: Chronic | ICD-10-CM

## 2022-10-27 DIAGNOSIS — F33.1 MODERATE EPISODE OF RECURRENT MAJOR DEPRESSIVE DISORDER: Chronic | ICD-10-CM

## 2022-10-27 DIAGNOSIS — G47.01 INSOMNIA DUE TO MEDICAL CONDITION: ICD-10-CM

## 2022-10-27 DIAGNOSIS — F40.10 SOCIAL ANXIETY DISORDER: ICD-10-CM

## 2022-10-27 PROCEDURE — 99214 OFFICE O/P EST MOD 30 MIN: CPT

## 2022-10-27 RX ORDER — BUPROPION HYDROCHLORIDE 300 MG/1
300 TABLET ORAL EVERY MORNING
Qty: 90 TABLET | Refills: 0 | Status: SHIPPED | OUTPATIENT
Start: 2022-10-27 | End: 2022-11-30 | Stop reason: SDUPTHER

## 2022-10-27 RX ORDER — HYDROXYZINE HYDROCHLORIDE 25 MG/1
75 TABLET, FILM COATED ORAL NIGHTLY PRN
Qty: 270 TABLET | Refills: 0 | Status: SHIPPED | OUTPATIENT
Start: 2022-10-27 | End: 2022-11-30 | Stop reason: SDUPTHER

## 2022-11-30 ENCOUNTER — TELEMEDICINE (OUTPATIENT)
Dept: PSYCHIATRY | Facility: CLINIC | Age: 42
End: 2022-11-30

## 2022-11-30 DIAGNOSIS — G47.01 INSOMNIA DUE TO MEDICAL CONDITION: ICD-10-CM

## 2022-11-30 DIAGNOSIS — F41.1 GENERALIZED ANXIETY DISORDER: Chronic | ICD-10-CM

## 2022-11-30 DIAGNOSIS — F40.10 SOCIAL ANXIETY DISORDER: ICD-10-CM

## 2022-11-30 DIAGNOSIS — F33.1 MODERATE EPISODE OF RECURRENT MAJOR DEPRESSIVE DISORDER: Chronic | ICD-10-CM

## 2022-11-30 PROCEDURE — 99214 OFFICE O/P EST MOD 30 MIN: CPT

## 2022-11-30 RX ORDER — BUPROPION HYDROCHLORIDE 300 MG/1
300 TABLET ORAL EVERY MORNING
Qty: 90 TABLET | Refills: 0 | Status: SHIPPED | OUTPATIENT
Start: 2022-11-30 | End: 2023-01-25 | Stop reason: SDUPTHER

## 2022-11-30 RX ORDER — HYDROXYZINE HYDROCHLORIDE 25 MG/1
75 TABLET, FILM COATED ORAL NIGHTLY PRN
Qty: 270 TABLET | Refills: 0 | Status: SHIPPED | OUTPATIENT
Start: 2022-11-30 | End: 2023-01-25 | Stop reason: SDUPTHER
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
## 2023-01-12 DIAGNOSIS — F40.10 SOCIAL ANXIETY DISORDER: ICD-10-CM

## 2023-01-16 RX ORDER — PROPRANOLOL HYDROCHLORIDE 10 MG/1
10 TABLET ORAL 2 TIMES DAILY PRN
Qty: 60 TABLET | Refills: 0 | Status: SHIPPED | OUTPATIENT
Start: 2023-01-16

## 2023-01-20 ENCOUNTER — OFFICE VISIT (OUTPATIENT)
Dept: INTERNAL MEDICINE | Age: 43
End: 2023-01-20
Payer: COMMERCIAL

## 2023-01-20 VITALS
BODY MASS INDEX: 25.52 KG/M2 | TEMPERATURE: 98.4 F | DIASTOLIC BLOOD PRESSURE: 64 MMHG | OXYGEN SATURATION: 97 % | HEIGHT: 67 IN | SYSTOLIC BLOOD PRESSURE: 98 MMHG | HEART RATE: 84 BPM | WEIGHT: 162.6 LBS

## 2023-01-20 DIAGNOSIS — R11.10 VOMITING AND DIARRHEA: Primary | ICD-10-CM

## 2023-01-20 DIAGNOSIS — Z86.16 HISTORY OF COVID-19: ICD-10-CM

## 2023-01-20 DIAGNOSIS — R19.7 VOMITING AND DIARRHEA: Primary | ICD-10-CM

## 2023-01-20 PROCEDURE — 99213 OFFICE O/P EST LOW 20 MIN: CPT | Performed by: NURSE PRACTITIONER

## 2023-01-20 RX ORDER — SUCRALFATE 1 G/1
1 TABLET ORAL 4 TIMES DAILY
Qty: 90 TABLET | Refills: 0 | Status: SHIPPED | OUTPATIENT
Start: 2023-01-20 | End: 2023-01-20

## 2023-01-20 RX ORDER — DICYCLOMINE HYDROCHLORIDE 10 MG/1
10 CAPSULE ORAL
Qty: 60 CAPSULE | Refills: 2 | Status: SHIPPED | OUTPATIENT
Start: 2023-01-20

## 2023-01-20 RX ORDER — ONDANSETRON 8 MG/1
8 TABLET, ORALLY DISINTEGRATING ORAL EVERY 8 HOURS PRN
Qty: 12 TABLET | Refills: 0 | Status: SHIPPED | OUTPATIENT
Start: 2023-01-20

## 2023-01-20 NOTE — PROGRESS NOTES
I N T E R N A L  M E D I C I N E  Noelle Rousseau, APRN    ENCOUNTER DATE:  01/20/2023    Taty Romo / 42 y.o. / female      CHIEF COMPLAINT / REASON FOR OFFICE VISIT     Nausea and Vomiting (No appetite, on and off for approx 3-4 mths, getting more frequent, no pain)      ASSESSMENT & PLAN     Diagnoses and all orders for this visit:    1. Vomiting and diarrhea (Primary)  -     Ambulatory Referral to Gastroenterology  -     ondansetron ODT (ZOFRAN-ODT) 8 MG disintegrating tablet; Place 1 tablet on the tongue Every 8 (Eight) Hours As Needed for Nausea or Vomiting.  Dispense: 12 tablet; Refill: 0    2. History of COVID-19  -     ondansetron ODT (ZOFRAN-ODT) 8 MG disintegrating tablet; Place 1 tablet on the tongue Every 8 (Eight) Hours As Needed for Nausea or Vomiting.  Dispense: 12 tablet; Refill: 0    Other orders  -     dicyclomine (BENTYL) 10 MG capsule; Take 1 capsule by mouth 4 (Four) Times a Day Before Meals & at Bedtime.  Dispense: 60 capsule; Refill: 2         SUMMARY/DISCUSSION  • Referral to Gastroenterology for evaluation for possible Chron's, Ulcerative colitis, IBS, related to diarrhea, mucous stools,  nausea with vomiting after she eats with decrease in appetite. States she has had an US Gallbladder and was normal.  Discussion of abdominal CT and patient wants to wait till she sees a gastroenterologist.  • Follow up with Dr Mcdonald as scheduled and as needed  • I spent 25 min in direct care of this patient on this date of service. This time includes times spent by me in the following activities: Preparing for the visit, obtaining and/or reviewing a separately obtained history, performing a medically appropriate examination and/or evaluation, reviewing medical records, reviewing tests, ordering medications, tests, or procedures, counseling and educating the patient, documenting information in the medical record and reviewing office note/correspondence from other providers.       Next Appointment with  "me: Visit date not found    Return in about 24 days (around 2/13/2023) for Next scheduled follow up.      VITAL SIGNS     Visit Vitals  BP 98/64 (BP Location: Left arm, Patient Position: Sitting, Cuff Size: Adult)   Pulse 84   Temp 98.4 °F (36.9 °C) (Temporal)   Ht 170.2 cm (67\")   Wt 73.8 kg (162 lb 9.6 oz)   LMP 01/01/2023 (Approximate)   SpO2 97%   BMI 25.47 kg/m²           BP Readings from Last 3 Encounters:   01/20/23 98/64   09/19/22 (!) 82/54   09/12/22 95/56     Wt Readings from Last 3 Encounters:   01/20/23 73.8 kg (162 lb 9.6 oz)   09/19/22 70.8 kg (156 lb)   08/10/22 70.3 kg (155 lb)     Body mass index is 25.47 kg/m².    Blood pressure readings recorded on patient flowsheet:  No flowsheet data found.          MEDICATIONS AT THE TIME OF OFFICE VISIT     Current Outpatient Medications on File Prior to Visit   Medication Sig Dispense Refill   • acetaminophen (Tylenol 8 Hour Arthritis Pain) 650 MG 8 hr tablet Take 1 tablet by mouth Every 8 (Eight) Hours As Needed for Mild Pain  or Moderate Pain . 100 tablet 2   • Biotin 5000 MCG tablet Take 1 tablet by mouth Daily.     • buPROPion XL (Wellbutrin XL) 300 MG 24 hr tablet Take 1 tablet by mouth Every Morning. 90 tablet 0   • Calcium Polycarbophil (FIBER-CAPS PO) Take 2 capsules by mouth Daily.     • Cetirizine-Pseudoephedrine (ZYRTEC-D PO) Take 1 tablet by mouth Daily As Needed.     • Cholecalciferol (VITAMIN D) 2000 UNITS capsule Take 5,000 Units by mouth Every Evening.     • docusate sodium (COLACE) 100 MG capsule Take 200 mg by mouth 2 (Two) Times a Day.     • gabapentin (NEURONTIN) 600 MG tablet Take 1 tablet by mouth 2 (Two) Times a Day. 60 tablet 3   • hydroxychloroquine (PLAQUENIL) 200 MG tablet Take 1 tablet by mouth 2 (Two) Times a Day. 180 tablet 1   • hydrOXYzine (ATARAX) 25 MG tablet Take 3 tablets by mouth At Night As Needed (sleep). 270 tablet 0   • levothyroxine (SYNTHROID, LEVOTHROID) 100 MCG tablet Take 1 tablet by mouth Daily. 90 tablet 3   • " meloxicam (MOBIC) 15 MG tablet Take 1 tablet by mouth daily. (Patient taking differently: Take 15 mg by mouth Daily. prn) 90 tablet 1   • propranolol (INDERAL) 10 MG tablet Take 1 tablet by mouth 2 (Two) Times a Day As Needed (Anxiety). 60 tablet 0   • Vortioxetine HBr (TRINTELLIX) 20 MG tablet Take 1 tablet by mouth Daily. 90 tablet 0   • [DISCONTINUED] ondansetron ODT (ZOFRAN-ODT) 8 MG disintegrating tablet Place 1 tablet on the tongue Every 8 (Eight) Hours As Needed for Nausea or Vomiting. 12 tablet 0   • methylPREDNISolone (MEDROL) 4 MG dose pack Use as directed per package instructions. 21 tablet 0   • Turmeric 500 MG tablet Take 2 tablets by mouth Daily.       No current facility-administered medications on file prior to visit.        HISTORY OF PRESENT ILLNESS     42-year-old female being seen today for complaint of Nausea and Vomiting, decreased appetite, on and off for approx 3-4 months.  States when she eats after 2-3 bites she vomits.  States her appetite is decreased.  States it does not happen every time she eats but more frequently lately.  Patient states that she has had some diarrhea with mucus present.  Patient denies any abdominal pain, no heartburn.  Patient states she is taking fiber daily.  Patient states her Wellbutrin was recently increased a month ago.  Discussion of common side effects of Wellbutrin including headache, nausea, dizziness, constipation.  Patient states she will discuss with her behavioral health therapist.    Patient Care Team:  Jeremy Mcdonald MD as PCP - General (Internal Medicine)  Shaheed Blanco MD as Consulting Physician (Rheumatology)  Angy Hunter MD as Consulting Physician (Obstetrics and Gynecology)    REVIEW OF SYSTEMS     Review of Systems   Constitutional: Positive for appetite change.   HENT: Negative.    Respiratory: Negative.    Cardiovascular: Negative.    Gastrointestinal: Positive for nausea and vomiting. Negative for abdominal pain and blood in stool.    Genitourinary: Negative.    Neurological: Negative for dizziness and headaches.          PHYSICAL EXAMINATION     Physical Exam  Cardiovascular:      Rate and Rhythm: Normal rate and regular rhythm.      Pulses: Normal pulses.      Heart sounds: Normal heart sounds.   Pulmonary:      Effort: Pulmonary effort is normal.      Breath sounds: Normal breath sounds.   Abdominal:      General: Bowel sounds are normal. There is no distension.      Palpations: Abdomen is soft.      Tenderness: There is no abdominal tenderness. There is no guarding or rebound.   Musculoskeletal:         General: Normal range of motion.   Skin:     General: Skin is warm and dry.   Neurological:      Mental Status: She is alert and oriented to person, place, and time.             REVIEWED DATA     Labs:     Lab Results   Component Value Date     09/11/2022    K 3.9 09/11/2022    CALCIUM 9.2 09/11/2022    AST 19 09/11/2022    ALT 26 09/11/2022    BUN 14 09/11/2022    CREATININE 0.88 09/11/2022    CREATININE 0.92 07/27/2022    CREATININE 0.89 09/15/2021    EGFRIFNONA 70 09/15/2021    EGFRIFAFRI 85 09/15/2021       Lab Results   Component Value Date    HGBA1C 5.6 07/27/2022       Lab Results   Component Value Date    LDL 95 07/27/2022    HDL 45 07/27/2022    TRIG 104 07/27/2022       Lab Results   Component Value Date    TSH 0.879 08/10/2022    TSH 6.660 (H) 07/27/2022    TSH 1.730 09/15/2021    FREET4 1.45 08/10/2022    FREET4 1.80 (H) 07/27/2022    FREET4 1.33 09/15/2021       Lab Results   Component Value Date    WBC 8.33 09/11/2022    HGB 12.6 09/11/2022     (L) 09/11/2022       No results found for: MALBCRERATIO       Imaging:           Medical Tests:           Summary of old records / correspondence / consultant report:           Request outside records:           RENE León

## 2023-01-25 ENCOUNTER — TELEMEDICINE (OUTPATIENT)
Dept: PSYCHIATRY | Facility: CLINIC | Age: 43
End: 2023-01-25
Payer: COMMERCIAL

## 2023-01-25 DIAGNOSIS — F33.1 MODERATE EPISODE OF RECURRENT MAJOR DEPRESSIVE DISORDER: Chronic | ICD-10-CM

## 2023-01-25 DIAGNOSIS — G47.01 INSOMNIA DUE TO MEDICAL CONDITION: ICD-10-CM

## 2023-01-25 DIAGNOSIS — F40.10 SOCIAL ANXIETY DISORDER: ICD-10-CM

## 2023-01-25 DIAGNOSIS — F41.1 GENERALIZED ANXIETY DISORDER: Chronic | ICD-10-CM

## 2023-01-25 PROCEDURE — 99214 OFFICE O/P EST MOD 30 MIN: CPT

## 2023-01-25 RX ORDER — BUPROPION HYDROCHLORIDE 300 MG/1
300 TABLET ORAL EVERY MORNING
Qty: 90 TABLET | Refills: 0 | Status: SHIPPED | OUTPATIENT
Start: 2023-01-25

## 2023-01-25 RX ORDER — HYDROXYZINE HYDROCHLORIDE 25 MG/1
75 TABLET, FILM COATED ORAL NIGHTLY PRN
Qty: 270 TABLET | Refills: 0 | Status: SHIPPED | OUTPATIENT
Start: 2023-01-25

## 2023-01-25 NOTE — PROGRESS NOTES
This provider is located at [place], [place]. The Patient is seen remotely using Video. Patient is being seen via telehealth and confirm that they are in a secure environment for this session. Patient is located in [place], [place] at her work. The patient's condition being diagnosed/treated is appropriate for telemedicine. Provider identified as Rupa Olivas as well as credentials APRN MSN PMHNP-BC.   The client/patient gave consent to be seen remotely, and when consent is given they understand that the consent allows for patient identifiable information to be sent to a third party as needed.  They may refuse to be seen remotely at any time. The electronic data is encrypted and password protected, and the patient has been advised of the potential risks to privacy not withstanding such measures.    Chief Complaint  Depression, Anxiety, and Sleeping Problem    Subjective        Taty Romo presents to [facility] for   History of Present Illness  Patient is seen today for follow-up visit for depression, anxiety, and insomnia.  Patient reports that she has been doing well.  She states that over the last several months she has not had any issues.  Currently rates her depression a 1-2 on a 1-10 scale with 10 being the worst.  States she feels like her energy level is a little better now than it was in the past.  Denies any hopelessness.  Denies any suicidal or homicidal ideation.  States her sleep is good with the help of hydroxyzine.  Appetite is good.  Rates anxiety a 1-2 on a 1-10 scale with 10 being the worst.  States she feels that her symptoms are well controlled with her medications and is pleased with them.  Denies any manic type symptoms.  Denies any paranoia.  Denies any auditory or visual hallucinations.  Denies any side effects to her medications.  Patient request to be seen in 3 months time.  Objective   Vital Signs:   There were no vitals taken for this visit.       Mental Status Exam:   Hygiene:   good  Cooperation:  Cooperative  Eye Contact:  Good  Psychomotor Behavior:  Appropriate  Affect:  Full range  Mood: normal  Speech:  Normal  Thought Process:  Goal directed  Thought Content:  Normal  Suicidal:  None  Homicidal:  None  Hallucinations:  None  Delusion:  None  Memory:  Intact  Orientation:  Person, Place, Time and Situation  Reliability:  good  Insight:  Good  Judgement:  Good  Impulse Control:  Good  Physical/Medical Issues:  No      Previous Provider notes and available records reviewed by this APRN today.   Current Medications:   Current Outpatient Medications   Medication Sig Dispense Refill   • acetaminophen (Tylenol 8 Hour Arthritis Pain) 650 MG 8 hr tablet Take 1 tablet by mouth Every 8 (Eight) Hours As Needed for Mild Pain  or Moderate Pain . 100 tablet 2   • Biotin 5000 MCG tablet Take 1 tablet by mouth Daily.     • buPROPion XL (Wellbutrin XL) 300 MG 24 hr tablet Take 1 tablet by mouth Every Morning. 90 tablet 0   • Calcium Polycarbophil (FIBER-CAPS PO) Take 2 capsules by mouth Daily.     • Cetirizine-Pseudoephedrine (ZYRTEC-D PO) Take 1 tablet by mouth Daily As Needed.     • Cholecalciferol (VITAMIN D) 2000 UNITS capsule Take 5,000 Units by mouth Every Evening.     • dicyclomine (BENTYL) 10 MG capsule Take 1 capsule by mouth 4 (Four) Times a Day Before Meals & at Bedtime. 60 capsule 2   • docusate sodium (COLACE) 100 MG capsule Take 200 mg by mouth 2 (Two) Times a Day.     • gabapentin (NEURONTIN) 600 MG tablet Take 1 tablet by mouth 2 (Two) Times a Day. 60 tablet 3   • hydroxychloroquine (PLAQUENIL) 200 MG tablet Take 1 tablet by mouth 2 (Two) Times a Day. 180 tablet 1   • hydrOXYzine (ATARAX) 25 MG tablet Take 3 tablets by mouth At Night As Needed (sleep). 270 tablet 0   • levothyroxine (SYNTHROID, LEVOTHROID) 100 MCG tablet Take 1 tablet by mouth Daily. 90 tablet 3   • meloxicam (MOBIC) 15 MG tablet Take 1 tablet by mouth daily. (Patient taking  differently: Take 15 mg by mouth Daily. prn) 90 tablet 1   • methylPREDNISolone (MEDROL) 4 MG dose pack Use as directed per package instructions. 21 tablet 0   • ondansetron ODT (ZOFRAN-ODT) 8 MG disintegrating tablet Place 1 tablet on the tongue Every 8 (Eight) Hours As Needed for Nausea or Vomiting. 12 tablet 0   • propranolol (INDERAL) 10 MG tablet Take 1 tablet by mouth 2 (Two) Times a Day As Needed (Anxiety). 60 tablet 0   • Turmeric 500 MG tablet Take 2 tablets by mouth Daily.     • Vortioxetine HBr (TRINTELLIX) 20 MG tablet Take 1 tablet by mouth Daily. 90 tablet 0     No current facility-administered medications for this visit.       Physical Exam   Result Review :    The following data was reviewed by: RENE Guadalupe on 01/25/2023:  Common labs    Common Labs 4/1/22 4/1/22 4/1/22 7/27/22 7/27/22 7/27/22 7/27/22 9/11/22 9/11/22    1524 1524 1524 0910 0910 0910 0910 2222 2222   Glucose     91   102 (A)    BUN     16   14    Creatinine     0.92   0.88    Sodium     138   137    Potassium     3.9   3.9    Chloride     101   101    Calcium     9.6   9.2    Total Protein     7.6       Albumin     4.9 (A)   4.20    Total Bilirubin     0.5   <0.2    Alkaline Phosphatase     45   38 (A)    AST (SGOT) 11    18   19    ALT (SGPT)  8   17   26    WBC   2.85 (A) 4.2     8.33   Hemoglobin   12.2 13.8     12.6   Hematocrit   36.3 39.6     36.8   Platelets   105 (A) 158     124 (A)   Total Cholesterol       159     Triglycerides       104     HDL Cholesterol       45     LDL Cholesterol        95     Hemoglobin A1C      5.6      (A) Abnormal value       Comments are available for some flowsheets but are not being displayed.              Assessment and Plan   Problem List Items Addressed This Visit        Mental Health    Moderate episode of recurrent major depressive disorder (HCC) (Chronic)    Overview     Has taken medication since 19.   - Mom PASSED 5/2021          Relevant Medications    Vortioxetine HBr  (TRINTELLIX) 20 MG tablet    hydrOXYzine (ATARAX) 25 MG tablet    buPROPion XL (Wellbutrin XL) 300 MG 24 hr tablet    Generalized anxiety disorder    Relevant Medications    Vortioxetine HBr (TRINTELLIX) 20 MG tablet    hydrOXYzine (ATARAX) 25 MG tablet    buPROPion XL (Wellbutrin XL) 300 MG 24 hr tablet    Social anxiety disorder    Relevant Medications    Vortioxetine HBr (TRINTELLIX) 20 MG tablet    hydrOXYzine (ATARAX) 25 MG tablet    buPROPion XL (Wellbutrin XL) 300 MG 24 hr tablet       Sleep    Insomnia (Chronic)    Relevant Medications    hydrOXYzine (ATARAX) 25 MG tablet     Discussed treatment options with patient.  Patient is currently pleased with her medications.  Continue Trintellix 20 mg daily for depression and anxiety.  Continue Wellbutrin  mg daily for depression.  Continue hydroxyzine 75 mg nightly for sleep.  Patient states she is only had to take the propanolol handful of times and has a supply that medication.  Patient request to be seen in 3 months.  Encouraged patient to contact the office if she has any issues sooner.    TREATMENT PLAN/GOALS: Continue supportive psychotherapy efforts and medications as indicated. Treatment and medication options discussed during today's visit. Patient ackowledged and verbally consented to continue with current treatment plan and was educated on the importance of compliance with treatment and follow-up appointments.    DEPRESSION:  Patient screened positive for depression based on a PHQ-9 score of 8 on 10/27/2022. Follow-up recommendations include: Prescribed antidepressant medication treatment.       MEDICATION ISSUES:  We discussed risks, benefits, and side effects of the above medications and the patient was agreeable with the plan. Patient was educated on the importance of compliance with treatment and follow-up appointments.  Patient is agreeable to call the office with any worsening of symptoms or onset of side effects. Patient is agreeable to  call 911 or go to the nearest ER should he/she begin having SI/HI.      Counseled patient regarding multimodal approach with healthy nutrition, healthy sleep, regular physical activity, social activities, counseling, and medications.      Coping skills reviewed and encouraged positive framing of thoughts     Assisted patient in processing above session content; acknowledged and normalized patient’s thoughts, feelings, and concerns.  Applied  positive coping skills and behavior management in session.  Allowed patient to freely discuss issues without interruption or judgment. Provided safe, confidential environment to facilitate the development of positive therapeutic relationship and encourage open, honest communication. Assisted patient in identifying risk factors which would indicate the need for higher level of care including thoughts to harm self or others and/or self-harming behavior and encouraged patient to contact this office, call 911, or present to the nearest emergency room should any of these events occur. Discussed crisis intervention services and means to access. If patient has any concerns or needs assistance they were instructed to call the [facility] at [phone].    MEDS ORDERED DURING VISIT:  New Medications Ordered This Visit   Medications   • Vortioxetine HBr (TRINTELLIX) 20 MG tablet     Sig: Take 1 tablet by mouth Daily.     Dispense:  90 tablet     Refill:  0   • hydrOXYzine (ATARAX) 25 MG tablet     Sig: Take 3 tablets by mouth At Night As Needed (sleep).     Dispense:  270 tablet     Refill:  0   • buPROPion XL (Wellbutrin XL) 300 MG 24 hr tablet     Sig: Take 1 tablet by mouth Every Morning.     Dispense:  90 tablet     Refill:  0         Follow Up   Return in about 3 months (around 4/25/2023) for Video visit.    Patient was given instructions and counseling regarding her condition or for health maintenance advice. Please see specific information pulled into  the AVS if appropriate.         This document has been electronically signed by RENE Guadalupe  January 25, 2023 16:20 EST    Part of this note may be an electronic transcription/translation of spoken language to printed text using the Dragon Dictation System.

## 2023-02-13 ENCOUNTER — OFFICE VISIT (OUTPATIENT)
Dept: INTERNAL MEDICINE | Age: 43
End: 2023-02-13
Payer: COMMERCIAL

## 2023-02-13 VITALS
BODY MASS INDEX: 25.74 KG/M2 | TEMPERATURE: 97.7 F | OXYGEN SATURATION: 98 % | WEIGHT: 164 LBS | HEIGHT: 67 IN | SYSTOLIC BLOOD PRESSURE: 102 MMHG | HEART RATE: 89 BPM | DIASTOLIC BLOOD PRESSURE: 60 MMHG

## 2023-02-13 DIAGNOSIS — M32.9 SYSTEMIC LUPUS ERYTHEMATOSUS, UNSPECIFIED SLE TYPE, UNSPECIFIED ORGAN INVOLVEMENT STATUS: Chronic | ICD-10-CM

## 2023-02-13 DIAGNOSIS — Z00.00 ENCOUNTER FOR ANNUAL HEALTH EXAMINATION: Primary | ICD-10-CM

## 2023-02-13 DIAGNOSIS — R11.10 POSTPRANDIAL VOMITING: ICD-10-CM

## 2023-02-13 DIAGNOSIS — E06.3 HYPOTHYROIDISM DUE TO HASHIMOTO'S THYROIDITIS: Chronic | ICD-10-CM

## 2023-02-13 DIAGNOSIS — E53.8 B12 DEFICIENCY: ICD-10-CM

## 2023-02-13 DIAGNOSIS — E03.8 HYPOTHYROIDISM DUE TO HASHIMOTO'S THYROIDITIS: Chronic | ICD-10-CM

## 2023-02-13 DIAGNOSIS — F33.1 MODERATE EPISODE OF RECURRENT MAJOR DEPRESSIVE DISORDER: Chronic | ICD-10-CM

## 2023-02-13 DIAGNOSIS — K58.1 IRRITABLE BOWEL SYNDROME WITH CONSTIPATION: Chronic | ICD-10-CM

## 2023-02-13 PROBLEM — Z98.890 HISTORY OF LUMBAR SURGERY: Status: RESOLVED | Noted: 2021-01-28 | Resolved: 2023-02-13

## 2023-02-13 PROBLEM — M54.2 NECK PAIN: Status: RESOLVED | Noted: 2020-08-14 | Resolved: 2023-02-13

## 2023-02-13 PROBLEM — K58.2 IRRITABLE BOWEL SYNDROME WITH BOTH CONSTIPATION AND DIARRHEA: Chronic | Status: ACTIVE | Noted: 2019-01-30

## 2023-02-13 PROCEDURE — 99396 PREV VISIT EST AGE 40-64: CPT | Performed by: INTERNAL MEDICINE

## 2023-02-13 PROCEDURE — 99214 OFFICE O/P EST MOD 30 MIN: CPT | Performed by: INTERNAL MEDICINE

## 2023-02-13 RX ORDER — CYANOCOBALAMIN (VITAMIN B-12) 100 MCG
1 TABLET ORAL DAILY
COMMUNITY
Start: 2023-02-13

## 2023-02-13 RX ORDER — CETIRIZINE HYDROCHLORIDE 10 MG/1
10 TABLET ORAL DAILY
COMMUNITY

## 2023-02-13 NOTE — ASSESSMENT & PLAN NOTE
Advised to keep appointment with GI. Will likely need EGD or UGI/barium study for further evaluation of her symptoms.

## 2023-02-13 NOTE — PROGRESS NOTES
"    I N T E R N A L  M E D I C I N E    J U N O H  K I M,  M D      ENCOUNTER DATE:  02/13/2023    Taty Romo / 42 y.o. / female    CHIEF COMPLAINT     Annual Exam and nausea/vomiting when eating      VITALS     Vitals:    02/13/23 1359   BP: 102/60   Pulse: 89   Temp: 97.7 °F (36.5 °C)   SpO2: 98%   Weight: 74.4 kg (164 lb)   Height: 170.2 cm (67\")       BP Readings from Last 3 Encounters:   02/13/23 102/60   01/20/23 98/64   09/19/22 (!) 82/54     Wt Readings from Last 3 Encounters:   02/13/23 74.4 kg (164 lb)   01/20/23 73.8 kg (162 lb 9.6 oz)   09/19/22 70.8 kg (156 lb)      Body mass index is 25.69 kg/m².    Blood pressure readings recorded on patient flowsheet:  No flowsheet data found.       MEDICATIONS     Current Outpatient Medications on File Prior to Visit   Medication Sig Dispense Refill   • acetaminophen (Tylenol 8 Hour Arthritis Pain) 650 MG 8 hr tablet Take 1 tablet by mouth Every 8 (Eight) Hours As Needed for Mild Pain  or Moderate Pain . 100 tablet 2   • Biotin 5000 MCG tablet Take 1 tablet by mouth Daily.     • buPROPion XL (Wellbutrin XL) 300 MG 24 hr tablet Take 1 tablet by mouth Every Morning. 90 tablet 0   • Calcium Polycarbophil (FIBER-CAPS PO) Take 2 capsules by mouth Daily.     • cetirizine (zyrTEC) 10 MG tablet Take 10 mg by mouth Daily.     • Cholecalciferol (VITAMIN D) 2000 UNITS capsule Take 5,000 Units by mouth Every Evening.     • docusate sodium (COLACE) 100 MG capsule Take 200 mg by mouth 2 (Two) Times a Day.     • gabapentin (NEURONTIN) 600 MG tablet Take 1 tablet by mouth 2 (Two) Times a Day. (Patient taking differently: Take 600 mg by mouth 2 (Two) Times a Day. Once nightly) 60 tablet 3   • hydroxychloroquine (PLAQUENIL) 200 MG tablet Take 1 tablet by mouth 2 (Two) Times a Day. 180 tablet 1   • hydrOXYzine (ATARAX) 25 MG tablet Take 3 tablets by mouth At Night As Needed (sleep). (Patient taking differently: Take 75 mg by mouth At Night As Needed (sleep). nightly) 270 tablet 0 "   • levothyroxine (SYNTHROID, LEVOTHROID) 100 MCG tablet Take 1 tablet by mouth Daily. 90 tablet 3   • meloxicam (MOBIC) 15 MG tablet Take 1 tablet by mouth daily. (Patient taking differently: Take 15 mg by mouth Daily. prn) 90 tablet 1   • ondansetron ODT (ZOFRAN-ODT) 8 MG disintegrating tablet Place 1 tablet on the tongue Every 8 (Eight) Hours As Needed for Nausea or Vomiting. 12 tablet 0   • propranolol (INDERAL) 10 MG tablet Take 1 tablet by mouth 2 (Two) Times a Day As Needed (Anxiety). 60 tablet 0   • Turmeric 500 MG tablet Take 2 tablets by mouth Daily.     • Vortioxetine HBr (TRINTELLIX) 20 MG tablet Take 1 tablet by mouth Daily. 90 tablet 0   • dicyclomine (BENTYL) 10 MG capsule Take 1 capsule by mouth 4 (Four) Times a Day Before Meals & at Bedtime. 60 capsule 2     No current facility-administered medications on file prior to visit.         HISTORY OF PRESENT ILLNESS     Taty presents for annual health maintenance visit.  Patient complains of relative new onset of nausea and vomiting while eating.  She denies associated abdominal pain.  It tends to occur sporadically without any correlation to type of food she is eating.  She denies any consistent symptoms of dysphagia to solids or sensation of early satiety.  Denies any melena or blood in the stool but she does have history of IBS with both constipation and diarrhea.  She was recently prescribed Bentyl by one of the nurse practitioners here.  She has made an appointment to see Dr. Raygzoa of GI service for evaluation of her symptoms.  She sees a psychiatrist now and she is on Trintellix and Wellbutrin XL which seems to help her depression and anxiety symptoms.  She takes hydroxyzine at nighttime for sleep.  She also takes gabapentin 600 mg nightly for lumbar radiculopathy.  She cannot tolerate a higher dose due to slowed cognition and word finding difficulty.  She has SLE which is followed by her rheumatologist and she denies any significant changes  while taking Plaquenil.  She has had several bouts of vaginal bleeding associated with bowel movement.  She denies any bleeding with intercourse.  She has noticed some erratic vaginal bleeding episodes.  She has an IUD which is about 7 years old.  She has an appointment with her gynecologist for further evaluation.    · General health: multiple medical problems  · Lifestyle:  · Attempting to lose weight?: Yes   · Diet: eats decently  · Exercise: limited physical activity due to health/physical limitations  · Tobacco: Never used   · Alcohol: occasional/infrequent  · Work: Full-time  · Reproductive health:  · Sexually active?: Yes   · Sexual problems?: No   · Concern for STD?: No    · Sees Gynecologist?: Yes   · Breanna/Postmenopausal?: No   · Domestic abuse concerns: No   · Depression Screening:      PHQ-2/PHQ-9 Depression Screening 2/13/2023   Retired PHQ-9 Total Score -   Retired Total Score -   Little Interest or Pleasure in Doing Things 0-->not at all   Feeling Down, Depressed or Hopeless 1-->several days   Trouble Falling or Staying Asleep, or Sleeping Too Much -   Feeling Tired or Having Little Energy -   Poor Appetite or Overeating -   Feeling Bad about Yourself - or that You are a Failure or Have Let Yourself or Your Family Down -   Trouble Concentrating on Things, Such as Reading the Newspaper or Watching Television -   Moving or Speaking So Slowly that Other People Could Have Noticed? Or the Opposite - Being So Fidgety -   Thoughts that You Would be Better Off Dead or of Hurting Yourself in Some Way -   PHQ-9: Brief Depression Severity Measure Score 1   If You Checked Off Any Problems, How Difficult Have These Problems Made It For You to Do Your Work, Take Care of Things at Home, or Get Along with Other People? -   Little interest or pleasure in doing things -   Feeling down, depressed or hopeless -   Trouble falling or staying asleep or sleeping too much -   Feeling tired or having little energy -   Poor  appetite or overeating -   Feeling bad about yourself or that you are a failure or have let yourself or your family down -   Trouble concentrating on things, such as reading the newspaper or watching television -   Moving or speaking so slowly that other people could have noticed or the opposite, being so fidgety -   Thoughts that you would be better off dead or of hurting yourself in some way -   If you checked off any problems, how difficult have these problems made it for you to do your work, take care of things at home or get along with other people? -         PHQ-2: N/A (Has diagnosis of depression and is on treatment)   PHQ-9: 1-4 (Minimal Depression)    Patient Care Team:  Jeremy Mcdonald MD as PCP - General (Internal Medicine)  Shaheed Blanco MD as Consulting Physician (Rheumatology)  Angy Hunter MD as Consulting Physician (Obstetrics and Gynecology)      ALLERGIES  Allergies   Allergen Reactions   • Oxycodone Nausea And Vomiting     States can take as long as has zofran         PFSH:     The following portions of the patient's history were reviewed and updated as appropriate: Allergies / Current Medications / Past Medical History / Surgical History / Social History / Family History    PROBLEM LIST   Patient Active Problem List   Diagnosis   • Moderate episode of recurrent major depressive disorder (HCC)   • Hypothyroidism due to Hashimoto's thyroiditis   • Insomnia   • Systemic lupus erythematosus (HCC)   • Vitamin D deficiency   • Irritable bowel syndrome with both constipation and diarrhea   • Seasonal allergic rhinitis due to pollen   • DDD (degenerative disc disease), lumbar   • Allergic rhinitis   • Chronic bilateral low back pain without sciatica   • Spondylolisthesis at L5-S1 level   • Herniated lumbar intervertebral disc   • Degeneration of intervertebral disc at C4-C5 level   • Postprandial vomiting   • Cervical radiculopathy   • Paresthesia and pain of both upper extremities   • Generalized  anxiety disorder   • Social anxiety disorder   • Sacroiliac joint dysfunction of right side   • Vomiting and diarrhea   • B12 deficiency       PAST MEDICAL HISTORY  Past Medical History:   Diagnosis Date   • Allergic rhinitis 5/22/2019   • Alopecia 08/2015   • CHI positive 08/2014   • Anal fissure 12/2016   • Anxiety    • Bilateral sciatica 01/15/2020   • Biliary colic 05/2019   • Bulging lumbar disc 02/2018   • Cervical radiculopathy 11/18/2020   • Cervicitis 04/04/2002    SEEN AT [facility] ER   • Chronic bilateral low back pain without sciatica    • Chronic fatigue    • Chronic ITP (idiopathic thrombocytopenia) (Formerly [facility]) 01/2012    FOLLOWED BY DR. PEÑA PIÑA   • Closed fracture of left distal radius 01/22/2020    SEEN AT [facility] ER   • Cold intolerance    • Colon polyps     FOLLOWED BY DR. 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   • Hypothyroidism    • Idiopathic scoliosis of lumbosacral region    • Insomnia 3/24/2015    Continue trazodone 200 mg qHS    • Irregular menses 03/2015   • Irritable bowel syndrome with constipation 1/30/2019   • Lumbar radiculopathy    • Migraine    • Neck sprain 07/11/2005    SEEN AT [facility] ER   • Paresthesia of upper extremity     BILATERAL   • PCOS (polycystic ovarian syndrome)    • Peripheral neuropathy 2017    KNEES DOWN   • Polyarthralgia    • Polydipsia 10/2016   • PONV (postoperative nausea and vomiting)    • Ruptured tympanic membrane, right 05/2015    • Seasonal allergies    • Spondylolisthesis at L5-S1 level    • Systemic lupus erythematosus (HCC) 03/2007    FOLLOWED BY DR. ERNESTO DILLARD   • Tattoos    • Tremor of both hands 06/2016   • Trochanteric bursitis of both hips 10/2021   • Urinary frequency 02/2015   • Urinary urgency 02/2015   • Vitamin D deficiency 8/29/2015       SURGICAL HISTORY  Past Surgical History:   Procedure Laterality Date   • ANAL SPHINCTEROTOMY N/A 04/01/2003    DR.RAYMOND HEAD AT [facility]   • APPENDECTOMY N/A 04/13/2010    LAPAROSCOPIC, WITH PELVIC LAPAROSCOPY, DR. GIOVANNI RUBIN AT [facility]   • COLONOSCOPY N/A 11/21/2016    4 MM POLYP AT ANUS, NOT RESECTED D/T UPCPMING HEMORRHOIDECTOMY, OTHERWISE WNL, RESCOPE IN 5 YRS, DR. MANNY FARIAS AT [facility]   • COLONOSCOPY N/A 1/18/2022    ENTIRE COLON WNL, RESCOPE IN 5 YRS, DR. MANNY FARIAS AT [facility]   • EPIDURAL BLOCK N/A 05/22/2020    DR. ZAIRA GAO AT [facility]   • FINGER MASS EXCISION Right 05/16/2011    RIGHT INDEX FINGER, PATH: BENIGN WITH HYPERKERATOSIS, DR. LUZ ELENA HAY AT [facility]   • HEMORRHOIDECTOMY N/A 04/01/2003    DR.RAYMOND HEAD AT [facility]   • HEMORRHOIDECTOMY N/A 11/21/2016    Procedure: HEMORRHOIDECTOMY;  Surgeon: Manny Farias MD;  Location: [facility];  Service:    • INTRAUTERINE DEVICE INSERTION N/A 10/20/2014    DR. CICI LEMUS   • LUMBAR DISCECTOMY Left 1/8/2021    Procedure: Outpatient left lumbar five/sacral one Metrx microdiscectomy;  Surgeon: Alessandro Thomas MD;  Location: [facility];  Service: Neurosurgery;  Laterality: Left;   • LUMBAR EPIDURAL INJECTION N/A 07/19/2021    DR. PARISA BEE AT [facility]   • LUMBAR EPIDURAL INJECTION N/A 11/06/2020    DR. WALDO MUNOZ AT [facility]   • LUMBAR EPIDURAL INJECTION N/A 08/28/2020    DR. MALORIE SHEA AT [facility]   • THYROIDECTOMY, PARTIAL Right 06/17/2015    RIGHT LOBECTOMY, DR. ARABELLA HUNT AT [place]   • TONSILLECTOMY Bilateral     CHILDHOOD   • TYMPANOSTOMY TUBE PLACEMENT      DURING [facility]   • VAGINAL DELIVERY N/A 11/01/2002    DR. HUSSEIN  LEONIDAS AT [facility]   • VAGINAL DELIVERY N/A 11/16/2006    DR. JANNA DEL VALLE AT [facility]       SOCIAL HISTORY  Social History     Socioeconomic History   • Marital status:      Spouse name: Alexander*   • Number of children: 2   Tobacco Use   • Smoking status: Never   • Smokeless tobacco: Never   Vaping Use   • Vaping Use: Never used   Substance and Sexual Activity   • Alcohol use: Yes     Comment: Few times a year   • Drug use: Never   • Sexual activity: Yes     Partners: Male     Birth control/protection: I.U.D.       FAMILY HISTORY  Family History   Problem Relation Age of Onset   • Colon polyps Mother    • Coronary artery disease Mother 60        non-smokers   • Valvular heart disease Mother    • Diabetes type II Mother    • Lung disease Mother         pulmonary hypertension   • Clotting disorder Mother         superficial dvt   • Depression Mother    • Kidney failure Mother         due to diabetes   • Colon polyps Father    • Atrial fibrillation Father         pacemaker SSS   • Hypertension Father    • Deep vein thrombosis Father    • Spondylolysis Father         spinal stenosis, Degenerative disc disease    • Arthritis Father    • Autoimmune disease Brother    • Depression Brother    • Dementia Maternal Grandmother    • Stroke Maternal Grandmother    • Arthritis Paternal Grandmother    • Colon cancer Paternal Grandfather    • Asthma Daughter    • ADD / ADHD Daughter    • Dementia Maternal Uncle    • Colon cancer Maternal Uncle    • Malig Hyperthermia Neg Hx        IMMUNIZATION HISTORY  Immunization History   Administered Date(s) Administered   • COVID-19 (PFIZER) PURPLE CAP 04/02/2021, 04/23/2021   • Flu Vaccine Quad PF >36MO 10/20/2015, 10/01/2017, 10/01/2018, 10/16/2019   • FluLaval/Fluzone >6mos 10/20/2015, 10/01/2017, 10/01/2018, 10/16/2019   • Hepatitis A 04/25/2018, 11/12/2018   • Influenza, Unspecified 10/01/2022         REVIEW OF SYSTEMS     Review of Systems   Constitutional:        Complains of weight gain     Eyes:        Dry eyes (stable)   Gastrointestinal: Positive for constipation, diarrhea, nausea (episodic with eating) and vomiting (episodic with eating). Negative for abdominal pain, anal bleeding and blood in stool.        IBS with constipation/diarrhea; no melena or blood in stool; episodes of vaginal bleeding with BM's    Genitourinary: Positive for vaginal bleeding (erratic ; vaginal bleeding with BM (occasional)).   Musculoskeletal: Positive for back pain and neck pain.   Neurological:        Lumbar radicular pain   Peripheral neuropathy    Hematological: Negative for adenopathy. Does not bruise/bleed easily.   Psychiatric/Behavioral:        Improved depression/anxiety with current medications          PHYSICAL EXAMINATION     Physical Exam  Constitutional:       General: She is not in acute distress.     Appearance: She is well-developed. She is not ill-appearing.      Comments: Mildly overweight    HENT:      Head: Normocephalic and atraumatic.      Right Ear: Tympanic membrane, ear canal and external ear normal.      Left Ear: Tympanic membrane, ear canal and external ear normal.   Eyes:      General: No scleral icterus.     Extraocular Movements: Extraocular movements intact.      Conjunctiva/sclera: Conjunctivae normal.      Pupils: Pupils are equal, round, and reactive to light.   Neck:      Thyroid: No thyroid mass or thyromegaly.      Vascular: No carotid bruit.      Trachea: No tracheal deviation.   Cardiovascular:      Rate and Rhythm: Normal rate and regular rhythm.      Pulses: Normal pulses.      Heart sounds: Normal heart sounds.   Pulmonary:      Effort: Pulmonary effort is normal.      Breath sounds: Normal breath sounds.   Abdominal:      General: There is no distension.      Palpations: Abdomen is soft. There is no mass.      Tenderness: There is no abdominal tenderness.      Hernia: No hernia is present.   Genitourinary:     Comments: Deferred to gyne/by patient unless specified otherwise.    Musculoskeletal:      Cervical back: Neck supple.      Right lower leg: No edema.      Left lower leg: No edema.   Lymphadenopathy:      Cervical: No cervical adenopathy.   Skin:     General: Skin is warm.      Coloration: Skin is not jaundiced or pale.      Findings: No rash.   Neurological:      General: No focal deficit present.      Mental Status: She is alert and oriented to person, place, and time.      Cranial Nerves: No cranial nerve deficit.      Motor: No abnormal muscle tone.      Coordination: Coordination normal.      Deep Tendon Reflexes: Reflexes normal.   Psychiatric:         Attention and Perception: Attention normal.         Speech: Speech normal.         Behavior: Behavior normal.         Thought Content: Thought content normal.         Cognition and Memory: Cognition normal.         Judgment: Judgment normal.      Comments: Improved mood/affect on exam          REVIEWED DATA      Labs:    Lab Results   Component Value Date     09/11/2022    K 3.9 09/11/2022    CALCIUM 9.2 09/11/2022    AST 19 09/11/2022    ALT 26 09/11/2022    BUN 14 09/11/2022    CREATININE 0.88 09/11/2022    CREATININE 0.92 07/27/2022    CREATININE 0.89 09/15/2021    EGFRIFNONA 70 09/15/2021    EGFRIFAFRI 85 09/15/2021       Lab Results   Component Value Date    GLUCOSE 102 (H) 09/11/2022    HGBA1C 5.6 07/27/2022    TSH 0.879 08/10/2022    FREET4 1.45 08/10/2022       Lab Results   Component Value Date    LDL 95 07/27/2022    HDL 45 07/27/2022    TRIG 104 07/27/2022    CHOLHDLRATIO 3.5 07/27/2022       Lab Results   Component Value Date    KEOI94YJ 83.5 07/27/2022        Lab Results   Component Value Date    WBC 8.33 09/11/2022    HGB 12.6 09/11/2022    MCV 92.5 09/11/2022     (L) 09/11/2022       Lab Results   Component Value Date    PROTEIN 1+ (A) 07/27/2022    GLUCOSEU Negative 07/27/2022    BLOODU 2+ (A) 07/27/2022    NITRITEU Negative 07/27/2022    LEUKOCYTESUR 2+ (A) 07/27/2022        No results found for:  HEPCVIRUSABY    Imaging:        Medical Tests:        ASSESSMENT & PLAN     ANNUAL WELLNESS EXAM / PHYSICAL     Other medical problems addressed today:  Problem List Items Addressed This Visit        High    Postprandial vomiting    Current Assessment & Plan     Advised to keep appointment with GI. Will likely need EGD or UGI/barium study for further evaluation of her symptoms.             Medium    Moderate episode of recurrent major depressive disorder (HCC) (Chronic)    Overview     Has taken medication since 19.   - Mom PASSED 5/2021     Sees psychiatrist currently          Current Assessment & Plan     Depression / anxiety seem to be better on Trintellix and Wellbutrin XL. Continue follow-up with psychiatrist. Watch for side effects from polypharmacy. Discussed in detail.          Relevant Medications    Vortioxetine HBr (TRINTELLIX) 20 MG tablet    hydrOXYzine (ATARAX) 25 MG tablet    buPROPion XL (Wellbutrin XL) 300 MG 24 hr tablet    Hypothyroidism due to Hashimoto's thyroiditis (Chronic)    Overview     S/p partial thyroidectomy (right side) 2015 for adenoma.     Continue levothyroxine 100 mcg qam.          Relevant Medications    meloxicam (MOBIC) 15 MG tablet    levothyroxine (SYNTHROID, LEVOTHROID) 100 MCG tablet    propranolol (INDERAL) 10 MG tablet    Systemic lupus erythematosus (HCC) (Chronic)    Overview     Dx 2007 started on Plaquenil  *Takagishi    Continue hydroxychloroquine and follow-up with rheumatologist.          Relevant Medications    meloxicam (MOBIC) 15 MG tablet    Irritable bowel syndrome with both constipation and diarrhea (Chronic)    Current Assessment & Plan     May take bentyl PRN. Check celiac serology.             Low    B12 deficiency (Chronic)    Current Assessment & Plan     Start B12 1000 mcg SL daily          Relevant Medications    Cyanocobalamin (B-12-SL) 1000 MCG sublingual tablet    Other Relevant Orders    Celiac Ab tTG DGP TIgA   Other Visit Diagnoses     Encounter  for annual health examination    -  Primary    Relevant Orders    CBC & Differential    Comprehensive Metabolic Panel    Hemoglobin A1c    Lipid Panel With / Chol / HDL Ratio    TSH+Free T4    Urinalysis With Microscopic If Indicated (No Culture) - Urine, Clean Catch          Summary/Discussion:     •     Next Appointment with me: Visit date not found    Return in about 6 months (around 8/13/2023) for Reassess chronic medical problems.      HEALTHCARE MAINTENANCE ISSUES     Cancer Screening:  · Colon: Initial/Next screening at age: CURRENT and -Colonoscopy  · Repeat colon cancer screening: every 5 years  · Breast: Recommended monthly self exams; annual professional exam  · Mammogram: every 1 year  · Cervical: 1 year or 2 years  · Skin: Monthly self skin examination, annual exam by health professional  · Lung: Does not meet criteria for lung cancer screening.   · Other:    Screening Labs & Tests:  · Lab results reviewed & discussed with the patient or test orders placed today.  · EKG:  · CV Screening: Lipid panel  · DEXA (65+ or postmenopausal with risk factors):   · HEP C (If born [id number], or risk factors): Previously had negative screen  · Other:     Immunization/Vaccinations (to be given today unless deferred by patient)  · Influenza: Patient had the flu shot this season  · Hepatitis A: Up to date  · Hepatitis B: Verify immunization records  · Tetanus/Pertussis: Verify immunization records  · Pneumococcal: Will discuss  · Shingles: Not needed at this time  · COVID: Had primary vaccine series     Lifestyle Counseling:  · Lifestyle Modifications: Attempt to lose weight, Improve dietary compliance, Begin progressive aerobic exercise program 3-5 days a week, Maintain a low sugar/carbohydrate diet, Follow a low fat, low cholesterol diet, Reduce exposure to stress if possible, Discussed better management of stress/anxiety and Discussed sexual issues, safe sex practices, contraception  · Safety Issues: Always wear  seatbelt, Avoid texting while driving   · Use sunscreen, regular skin examination  · Recommended annual dental/vision examination.  · Emotional/Stress/Sleep: Reviewed and  given when appropriate      Health Maintenance   Topic Date Due   • TDAP/TD VACCINES (1 - Tdap) Never done   • HEPATITIS C SCREENING  Never done   • COVID-19 Vaccine (3 - Booster for Pfizer series) 06/18/2021   • ANNUAL PHYSICAL  02/13/2024   • PAP SMEAR  02/01/2025   • INFLUENZA VACCINE  Completed   • Pneumococcal Vaccine 0-64  Aged Out           *Examiner was wearing KN95 mask and eye protection during the entire duration of the visit. Patient was masked the entire time. Minimum social distance of 6 ft maintained entire visit except if physical contact was necessary as documented.       Template created by Adan Mcdonald MD

## 2023-02-13 NOTE — ASSESSMENT & PLAN NOTE
Depression / anxiety seem to be better on Trintellix and Wellbutrin XL. Continue follow-up with psychiatrist. Watch for side effects from polypharmacy. Discussed in detail.

## 2023-02-14 LAB
ALBUMIN SERPL-MCNC: 4.6 G/DL (ref 3.8–4.8)
ALBUMIN/GLOB SERPL: 2 {RATIO} (ref 1.2–2.2)
ALP SERPL-CCNC: 46 IU/L (ref 44–121)
ALT SERPL-CCNC: 29 IU/L (ref 0–32)
APPEARANCE UR: CLEAR
AST SERPL-CCNC: 22 IU/L (ref 0–40)
BASOPHILS # BLD AUTO: 0 X10E3/UL (ref 0–0.2)
BASOPHILS NFR BLD AUTO: 1 %
BILIRUB SERPL-MCNC: 0.2 MG/DL (ref 0–1.2)
BILIRUB UR QL STRIP: NEGATIVE
BUN SERPL-MCNC: 15 MG/DL (ref 6–24)
BUN/CREAT SERPL: 16 (ref 9–23)
CALCIUM SERPL-MCNC: 9.7 MG/DL (ref 8.7–10.2)
CHLORIDE SERPL-SCNC: 102 MMOL/L (ref 96–106)
CHOLEST SERPL-MCNC: 194 MG/DL (ref 100–199)
CHOLEST/HDLC SERPL: 3.1 RATIO (ref 0–4.4)
CO2 SERPL-SCNC: 23 MMOL/L (ref 20–29)
COLOR UR: YELLOW
CREAT SERPL-MCNC: 0.95 MG/DL (ref 0.57–1)
EGFRCR SERPLBLD CKD-EPI 2021: 77 ML/MIN/1.73
EOSINOPHIL # BLD AUTO: 0.1 X10E3/UL (ref 0–0.4)
EOSINOPHIL NFR BLD AUTO: 3 %
ERYTHROCYTE [DISTWIDTH] IN BLOOD BY AUTOMATED COUNT: 13.1 % (ref 11.7–15.4)
GLIADIN PEPTIDE IGA SER-ACNC: 5 UNITS (ref 0–19)
GLIADIN PEPTIDE IGG SER-ACNC: 9 UNITS (ref 0–19)
GLOBULIN SER CALC-MCNC: 2.3 G/DL (ref 1.5–4.5)
GLUCOSE SERPL-MCNC: 85 MG/DL (ref 70–99)
GLUCOSE UR QL STRIP: NEGATIVE
HBA1C MFR BLD: 5.3 % (ref 4.8–5.6)
HCT VFR BLD AUTO: 39.8 % (ref 34–46.6)
HDLC SERPL-MCNC: 62 MG/DL
HGB BLD-MCNC: 13.7 G/DL (ref 11.1–15.9)
HGB UR QL STRIP: NEGATIVE
IGA SERPL-MCNC: 164 MG/DL (ref 87–352)
IMM GRANULOCYTES # BLD AUTO: 0 X10E3/UL (ref 0–0.1)
IMM GRANULOCYTES NFR BLD AUTO: 0 %
KETONES UR QL STRIP: NEGATIVE
LDLC SERPL CALC-MCNC: 106 MG/DL (ref 0–99)
LEUKOCYTE ESTERASE UR QL STRIP: NEGATIVE
LYMPHOCYTES # BLD AUTO: 1.1 X10E3/UL (ref 0.7–3.1)
LYMPHOCYTES NFR BLD AUTO: 31 %
MCH RBC QN AUTO: 32.7 PG (ref 26.6–33)
MCHC RBC AUTO-ENTMCNC: 34.4 G/DL (ref 31.5–35.7)
MCV RBC AUTO: 95 FL (ref 79–97)
MICRO URNS: NORMAL
MONOCYTES # BLD AUTO: 0.3 X10E3/UL (ref 0.1–0.9)
MONOCYTES NFR BLD AUTO: 9 %
NEUTROPHILS # BLD AUTO: 2.1 X10E3/UL (ref 1.4–7)
NEUTROPHILS NFR BLD AUTO: 56 %
NITRITE UR QL STRIP: NEGATIVE
PH UR STRIP: 5.5 [PH] (ref 5–7.5)
PLATELET # BLD AUTO: 158 X10E3/UL (ref 150–450)
POTASSIUM SERPL-SCNC: 4 MMOL/L (ref 3.5–5.2)
PROT SERPL-MCNC: 6.9 G/DL (ref 6–8.5)
PROT UR QL STRIP: NEGATIVE
RBC # BLD AUTO: 4.19 X10E6/UL (ref 3.77–5.28)
SODIUM SERPL-SCNC: 141 MMOL/L (ref 134–144)
SP GR UR STRIP: 1.02 (ref 1–1.03)
T4 FREE SERPL-MCNC: 1.19 NG/DL (ref 0.82–1.77)
TRIGL SERPL-MCNC: 150 MG/DL (ref 0–149)
TSH SERPL DL<=0.005 MIU/L-ACNC: 9.88 UIU/ML (ref 0.45–4.5)
TTG IGA SER-ACNC: <2 U/ML (ref 0–3)
TTG IGG SER-ACNC: 3 U/ML (ref 0–5)
UROBILINOGEN UR STRIP-MCNC: 0.2 MG/DL (ref 0.2–1)
VLDLC SERPL CALC-MCNC: 26 MG/DL (ref 5–40)
WBC # BLD AUTO: 3.6 X10E3/UL (ref 3.4–10.8)

## 2023-02-15 ENCOUNTER — DOCUMENTATION (OUTPATIENT)
Dept: INTERNAL MEDICINE | Age: 43
End: 2023-02-15
Payer: COMMERCIAL

## 2023-02-15 DIAGNOSIS — E06.3 HYPOTHYROIDISM DUE TO HASHIMOTO'S THYROIDITIS: Primary | ICD-10-CM

## 2023-02-15 DIAGNOSIS — E03.8 HYPOTHYROIDISM DUE TO HASHIMOTO'S THYROIDITIS: Primary | ICD-10-CM

## 2023-02-15 RX ORDER — LEVOTHYROXINE SODIUM 112 UG/1
112 TABLET ORAL DAILY
Qty: 30 TABLET | Refills: 1 | Status: SHIPPED | OUTPATIENT
Start: 2023-02-15

## 2023-02-15 NOTE — PROGRESS NOTES
CALL PATIENT with results:    1. Increase levothyroxine to 112 mcg every morning. Verify taking 100 mcg. Recheck TFT in 6 weeks.     2. Overall lipids and A1c level for blood sugar average are improved.     3. Complete blood cell counts stable    4. Celiac disease serology negative     5. Urinalysis negative

## 2023-02-15 NOTE — ASSESSMENT & PLAN NOTE
Increase levothyroxine to 112 mcg.  Recheck thyroid function test in 6 weeks.    Lab Results   Component Value Date    TSH 9.880 (H) 02/13/2023    TSH 0.879 08/10/2022    TSH 6.660 (H) 07/27/2022    FREET4 1.19 02/13/2023    FREET4 1.45 08/10/2022    FREET4 1.80 (H) 07/27/2022

## 2023-02-19 RX ORDER — GABAPENTIN 600 MG/1
600 TABLET ORAL 2 TIMES DAILY
Qty: 60 TABLET | Refills: 3 | Status: CANCELLED | OUTPATIENT
Start: 2023-02-19

## 2023-02-20 NOTE — TELEPHONE ENCOUNTER
Rx Refill Note  Requested Prescriptions     Pending Prescriptions Disp Refills   • gabapentin (NEURONTIN) 600 MG tablet 60 tablet 3     Sig: Take 1 tablet by mouth 2 (Two) Times a Day.      Last office visit with prescribing clinician: 9/19/2022   Last telemedicine visit with prescribing clinician: Visit date not found   Next office visit with prescribing clinician: Visit date not found                         Would you like a call back once the refill request has been completed: [] Yes [] No    If the office needs to give you a call back, can they leave a voicemail: [] Yes [] No    Yolanda Welch MA  02/20/23, 08:15 EST

## 2023-02-22 RX ORDER — GABAPENTIN 600 MG/1
600 TABLET ORAL 2 TIMES DAILY
Qty: 60 TABLET | Refills: 3 | Status: CANCELLED | OUTPATIENT
Start: 2023-02-19

## 2023-02-23 ENCOUNTER — OFFICE VISIT (OUTPATIENT)
Dept: GASTROENTEROLOGY | Facility: CLINIC | Age: 43
End: 2023-02-23

## 2023-02-23 VITALS
WEIGHT: 163.2 LBS | OXYGEN SATURATION: 97 % | SYSTOLIC BLOOD PRESSURE: 105 MMHG | BODY MASS INDEX: 25.62 KG/M2 | HEIGHT: 67 IN | DIASTOLIC BLOOD PRESSURE: 69 MMHG | TEMPERATURE: 96.4 F | HEART RATE: 86 BPM

## 2023-02-23 DIAGNOSIS — R11.2 NAUSEA AND VOMITING, UNSPECIFIED VOMITING TYPE: Primary | ICD-10-CM

## 2023-02-23 DIAGNOSIS — K58.2 IRRITABLE BOWEL SYNDROME WITH BOTH CONSTIPATION AND DIARRHEA: Chronic | ICD-10-CM

## 2023-02-23 PROCEDURE — 99203 OFFICE O/P NEW LOW 30 MIN: CPT | Performed by: INTERNAL MEDICINE

## 2023-02-23 RX ORDER — PANTOPRAZOLE SODIUM 40 MG/1
40 TABLET, DELAYED RELEASE ORAL DAILY
Qty: 90 TABLET | Refills: 3 | Status: SHIPPED | OUTPATIENT
Start: 2023-02-23

## 2023-02-23 RX ORDER — NORTRIPTYLINE HYDROCHLORIDE 10 MG/1
10 CAPSULE ORAL NIGHTLY
Qty: 30 CAPSULE | Refills: 11 | Status: SHIPPED | OUTPATIENT
Start: 2023-02-23

## 2023-03-10 ENCOUNTER — TELEPHONE (OUTPATIENT)
Dept: GASTROENTEROLOGY | Facility: CLINIC | Age: 43
End: 2023-03-10
Payer: COMMERCIAL

## 2023-04-03 ENCOUNTER — TELEPHONE (OUTPATIENT)
Dept: NEUROSURGERY | Facility: CLINIC | Age: 43
End: 2023-04-03
Payer: COMMERCIAL

## 2023-04-03 RX ORDER — GABAPENTIN 600 MG/1
600 TABLET ORAL 2 TIMES DAILY
Qty: 60 TABLET | Refills: 3 | Status: SHIPPED | OUTPATIENT
Start: 2023-04-03

## 2023-04-03 NOTE — TELEPHONE ENCOUNTER
LVM: Told patient Dr. Thomas wants to do a televisit follow up in 3 months. I went ahead and scheduled it for 7/6/23 @12. Told her if this time does not work please call us back to reschedule. [facility] can read/advise.

## 2023-04-03 NOTE — TELEPHONE ENCOUNTER
Rx Refill Note  Requested Prescriptions     Pending Prescriptions Disp Refills   • gabapentin (NEURONTIN) 600 MG tablet 60 tablet 3     Sig: Take 1 tablet by mouth 2 (Two) Times a Day.      Last office visit with prescribing clinician: 9/19/2022   Last telemedicine visit with prescribing clinician: Visit date not found   Next office visit with prescribing clinician: Visit date not found                         Would you like a call back once the refill request has been completed: [] Yes [] No    If the office needs to give you a call back, can they leave a voicemail: [] Yes [] No    Yolanda Welch MA  04/03/23, 08:23 EDT

## 2023-04-04 ENCOUNTER — LAB (OUTPATIENT)
Dept: LAB | Facility: HOSPITAL | Age: 43
End: 2023-04-04
Payer: COMMERCIAL

## 2023-04-04 DIAGNOSIS — E06.3 HYPOTHYROIDISM DUE TO HASHIMOTO'S THYROIDITIS: Primary | ICD-10-CM

## 2023-04-04 DIAGNOSIS — E03.8 HYPOTHYROIDISM DUE TO HASHIMOTO'S THYROIDITIS: Primary | ICD-10-CM

## 2023-04-04 LAB — HOLD SPECIMEN: NORMAL

## 2023-04-04 PROCEDURE — 84439 ASSAY OF FREE THYROXINE: CPT | Performed by: INTERNAL MEDICINE

## 2023-04-04 PROCEDURE — 84443 ASSAY THYROID STIM HORMONE: CPT | Performed by: INTERNAL MEDICINE

## 2023-04-16 DIAGNOSIS — F41.1 GENERALIZED ANXIETY DISORDER: Chronic | ICD-10-CM

## 2023-04-16 DIAGNOSIS — G47.01 INSOMNIA DUE TO MEDICAL CONDITION: ICD-10-CM

## 2023-04-16 DIAGNOSIS — E06.3 HYPOTHYROIDISM DUE TO HASHIMOTO'S THYROIDITIS: Primary | ICD-10-CM

## 2023-04-16 DIAGNOSIS — E03.8 HYPOTHYROIDISM DUE TO HASHIMOTO'S THYROIDITIS: Primary | ICD-10-CM

## 2023-04-17 RX ORDER — LEVOTHYROXINE SODIUM 112 UG/1
112 TABLET ORAL DAILY
Qty: 30 TABLET | Refills: 5 | Status: SHIPPED | OUTPATIENT
Start: 2023-04-17

## 2023-04-17 RX ORDER — HYDROXYZINE HYDROCHLORIDE 25 MG/1
75 TABLET, FILM COATED ORAL NIGHTLY PRN
Qty: 270 TABLET | Refills: 0 | Status: SHIPPED | OUTPATIENT
Start: 2023-04-17

## 2023-04-26 ENCOUNTER — TELEMEDICINE (OUTPATIENT)
Dept: PSYCHIATRY | Facility: CLINIC | Age: 43
End: 2023-04-26
Payer: COMMERCIAL

## 2023-04-26 DIAGNOSIS — F33.1 MODERATE EPISODE OF RECURRENT MAJOR DEPRESSIVE DISORDER: Chronic | ICD-10-CM

## 2023-04-26 DIAGNOSIS — F41.1 GENERALIZED ANXIETY DISORDER: Chronic | ICD-10-CM

## 2023-04-26 DIAGNOSIS — F40.10 SOCIAL ANXIETY DISORDER: ICD-10-CM

## 2023-04-26 DIAGNOSIS — G47.01 INSOMNIA DUE TO MEDICAL CONDITION: ICD-10-CM

## 2023-04-26 RX ORDER — BUPROPION HYDROCHLORIDE 300 MG/1
300 TABLET ORAL EVERY MORNING
Qty: 90 TABLET | Refills: 0 | Status: SHIPPED | OUTPATIENT
Start: 2023-04-26

## 2023-04-26 RX ORDER — HYDROXYZINE HYDROCHLORIDE 25 MG/1
75 TABLET, FILM COATED ORAL NIGHTLY PRN
Qty: 270 TABLET | Refills: 0 | Status: SHIPPED | OUTPATIENT
Start: 2023-04-26

## 2023-04-26 NOTE — PROGRESS NOTES
This provider is located at [place], [place]. The Patient is seen remotely using Video. Patient is being seen via telehealth and confirm that they are in a secure environment for this session. Patient is located in [place], [place] in her car. The patient's condition being diagnosed/treated is appropriate for telemedicine. Provider identified as Rupa Olivas as well as credentials APRN MSN PMHNP-BC.   The client/patient gave consent to be seen remotely, and when consent is given they understand that the consent allows for patient identifiable information to be sent to a third party as needed.  They may refuse to be seen remotely at any time. The electronic data is encrypted and password protected, and the patient has been advised of the potential risks to privacy not withstanding such measures.    Chief Complaint  Depression, Anxiety, and Sleeping Problem    Subjective        Taty Romo presents to [facility] for   History of Present Illness  Patient is seen today for follow-up visit for depression, anxiety, and insomnia.  Patient reports she continues to do well.  Rates both her depression and anxiety a 1-2 on a 1-10 scale with 10 being the worst.  Denies any hopelessness.  Denies any suicidal or homicidal ideation.  States she has started doing Pilates to help with her back and is getting some relief from that.  States her sleep is good with the help of hydroxyzine.  Denies states her appetite is good.  States she is currently pleased with her medications.  States she has a couple of trips planned in the next few months.  States she and her  are going to the [facility] [place] for their anniversary.  Also will be taking a family vacation to [place] in June.  She denies any manic type symptoms.  Denies any paranoia.  Denies any auditory or visual hallucinations.  Denies any side effects to the medications.  Objective   Vital Signs:   There were no vitals taken for  this visit.      Mental Status Exam:   Hygiene:   good  Cooperation:  Cooperative  Eye Contact:  Good  Psychomotor Behavior:  Appropriate  Affect:  Full range  Mood: normal  Speech:  Normal  Thought Process:  Goal directed  Thought Content:  Normal  Suicidal:  None  Homicidal:  None  Hallucinations:  None  Delusion:  None  Memory:  Intact  Orientation:  Person, Place, Time and Situation  Reliability:  good  Insight:  Good  Judgement:  Good  Impulse Control:  Good  Physical/Medical Issues:  No      Previous Provider notes and available records reviewed by this APRN today.   Current Medications:   Current Outpatient Medications   Medication Sig Dispense Refill   • acetaminophen (Tylenol 8 Hour Arthritis Pain) 650 MG 8 hr tablet Take 1 tablet by mouth Every 8 (Eight) Hours As Needed for Mild Pain  or Moderate Pain . 100 tablet 2   • Biotin 5000 MCG tablet Take 1 tablet by mouth Daily.     • Calcium Polycarbophil (FIBER-CAPS PO) Take 2 capsules by mouth Daily.     • cetirizine (zyrTEC) 10 MG tablet Take 10 mg by mouth Daily.     • Cholecalciferol (VITAMIN D) 2000 UNITS capsule Take 5,000 Units by mouth Every Evening.     • Cyanocobalamin (B-12-SL) 1000 MCG sublingual tablet Place 1 tablet under the tongue Daily.     • dicyclomine (BENTYL) 10 MG capsule Take 1 capsule by mouth 4 (Four) Times a Day Before Meals & at Bedtime. 60 capsule 2   • gabapentin (NEURONTIN) 600 MG tablet Take 1 tablet by mouth 2 (Two) Times a Day. 60 tablet 3   • hydroxychloroquine (PLAQUENIL) 200 MG tablet Take 1 tablet by mouth 2 (Two) Times a Day. 180 tablet 1   • levothyroxine (Synthroid) 112 MCG tablet Take 1 tablet by mouth Daily. 30 tablet 5   • linaclotide (Linzess) 72 MCG capsule capsule Take 1 capsule by mouth Every Morning Before Breakfast. 30 capsule 11   • meloxicam (MOBIC) 15 MG tablet Take 1 tablet by mouth daily. (Patient taking differently: Take 15 mg by mouth Daily. prn) 90 tablet 1   • miSOPROStol (Cytotec) 200 MCG tablet Take 1  tablet by mouth 4 hours before IUD procedure. Also take OTC pain meds 2 hours before procedure. 1 tablet 0   • nortriptyline (PAMELOR) 10 MG capsule Take 1 capsule by mouth Every Night. 30 capsule 11   • ondansetron ODT (ZOFRAN-ODT) 8 MG disintegrating tablet Place 1 tablet on the tongue Every 8 (Eight) Hours As Needed for Nausea or Vomiting. 12 tablet 0   • pantoprazole (PROTONIX) 40 MG EC tablet Take 1 tablet by mouth Daily. 90 tablet 3   • propranolol (INDERAL) 10 MG tablet Take 1 tablet by mouth 2 (Two) Times a Day As Needed (Anxiety). 60 tablet 0   • Turmeric 500 MG tablet Take 2 tablets by mouth Daily.     • buPROPion XL (Wellbutrin XL) 300 MG 24 hr tablet Take 1 tablet by mouth Every Morning. 90 tablet 0   • hydrOXYzine (ATARAX) 25 MG tablet Take 3 tablets by mouth At Night As Needed (sleep). 270 tablet 0   • Vortioxetine HBr (TRINTELLIX) 20 MG tablet Take 1 tablet by mouth Daily. 90 tablet 0     No current facility-administered medications for this visit.       Physical Exam   Result Review :    The following data was reviewed by: RENE Guadalupe on 04/26/2023:  Common labs        7/27/2022    09:10 9/11/2022    22:22 2/13/2023    15:05   Common Labs   Glucose 91   102   85     BUN 16   14   15     Creatinine 0.92   0.88   0.95     Sodium 138   137   141     Potassium 3.9   3.9   4.0     Chloride 101   101   102     Calcium 9.6   9.2   9.7     Total Protein 7.6    6.9     Albumin 4.9   4.20   4.6     Total Bilirubin 0.5   <0.2   0.2     Alkaline Phosphatase 45   38   46     AST (SGOT) 18   19   22     ALT (SGPT) 17   26   29     WBC 4.2   8.33   3.6     Hemoglobin 13.8   12.6   13.7     Hematocrit 39.6   36.8   39.8     Platelets 158   124   158     Total Cholesterol 159    194     Triglycerides 104    150     HDL Cholesterol 45    62     LDL Cholesterol  95    106     Hemoglobin A1C 5.6    5.3          Assessment and Plan   Problem List Items Addressed This Visit        Mental Health    Moderate  episode of recurrent major depressive disorder (HCC) (Chronic)    Overview     Has taken medication since 19.   - Mom PASSED 5/2021     Sees psychiatrist currently          Relevant Medications    buPROPion XL (Wellbutrin XL) 300 MG 24 hr tablet    hydrOXYzine (ATARAX) 25 MG tablet    Vortioxetine HBr (TRINTELLIX) 20 MG tablet    Generalized anxiety disorder    Relevant Medications    buPROPion XL (Wellbutrin XL) 300 MG 24 hr tablet    hydrOXYzine (ATARAX) 25 MG tablet    Vortioxetine HBr (TRINTELLIX) 20 MG tablet    Social anxiety disorder    Relevant Medications    buPROPion XL (Wellbutrin XL) 300 MG 24 hr tablet    hydrOXYzine (ATARAX) 25 MG tablet    Vortioxetine HBr (TRINTELLIX) 20 MG tablet       Sleep    Insomnia (Chronic)    Relevant Medications    hydrOXYzine (ATARAX) 25 MG tablet     Discussed treatment options with patient.  Patient is currently pleased with her medications.  Continue Trintellix 20 mg daily for depression and anxiety.  Continue Wellbutrin  mg daily for depression.  Continue hydroxyzine 25 mg take 3 nightly as needed for sleep.  Patient states she has a supply of propanolol on hand if she needs it.  We will see patient again in 3 months to reassess.  Encouraged patient to contact the office if she has any issues sooner.    TREATMENT PLAN/GOALS: Continue supportive psychotherapy efforts and medications as indicated. Treatment and medication options discussed during today's visit. Patient ackowledged and verbally consented to continue with current treatment plan and was educated on the importance of compliance with treatment and follow-up appointments.    DEPRESSION:  Patient screened positive for depression based on a PHQ-9 score of 1 on 2/13/2023. Follow-up recommendations include: Prescribed antidepressant medication treatment.       MEDICATION ISSUES:  We discussed risks, benefits, and side effects of the above medications and the patient was agreeable with the plan. Patient was  educated on the importance of compliance with treatment and follow-up appointments.  Patient is agreeable to call the office with any worsening of symptoms or onset of side effects. Patient is agreeable to call 911 or go to the nearest ER should he/she begin having SI/HI.      Counseled patient regarding multimodal approach with healthy nutrition, healthy sleep, regular physical activity, social activities, counseling, and medications.      Coping skills reviewed and encouraged positive framing of thoughts     Assisted patient in processing above session content; acknowledged and normalized patient’s thoughts, feelings, and concerns.  Applied  positive coping skills and behavior management in session.  Allowed patient to freely discuss issues without interruption or judgment. Provided safe, confidential environment to facilitate the development of positive therapeutic relationship and encourage open, honest communication. Assisted patient in identifying risk factors which would indicate the need for higher level of care including thoughts to harm self or others and/or self-harming behavior and encouraged patient to contact this office, call 911, or present to the nearest emergency room should any of these events occur. Discussed crisis intervention services and means to access. If patient has any concerns or needs assistance they were instructed to call the [facility] at [phone].    MEDS ORDERED DURING VISIT:  New Medications Ordered This Visit   Medications   • buPROPion XL (Wellbutrin XL) 300 MG 24 hr tablet     Sig: Take 1 tablet by mouth Every Morning.     Dispense:  90 tablet     Refill:  0   • hydrOXYzine (ATARAX) 25 MG tablet     Sig: Take 3 tablets by mouth At Night As Needed (sleep).     Dispense:  270 tablet     Refill:  0   • Vortioxetine HBr (TRINTELLIX) 20 MG tablet     Sig: Take 1 tablet by mouth Daily.     Dispense:  90 tablet     Refill:  0         Follow Up   Return  in about 3 months (around 7/26/2023) for Video visit.    Patient was given instructions and counseling regarding her condition or for health maintenance advice. Please see specific information pulled into the AVS if appropriate.         This document has been electronically signed by RENE Guadalupe  April 26, 2023 15:54 EDT    Part of this note may be an electronic transcription/translation of spoken language to printed text using the Dragon Dictation System.

## 2023-05-03 NOTE — PROGRESS NOTES
Internal Medicine Progress Note  Patient: Romy Lira  Age/sex: 72 y o  male  Medical Record #: [id number]      ASSESSMENT/PLAN: (Interval History)  Romy Lira is seen and examined and management for following issues:     S/Post left Total SHOULDER ARTHROPLASTY superficial infection s/p washout/VAC placement   Pain controlled w/block   Continue encourage incentive spirometry; monitor fever curve    DVT prophylaxis in place and reviewed  Results from last 7 days   Lab Units 05/03/23  0914   WBC Thousand/uL 5 60   HEMOGLOBIN g/dL 12 3   HEMATOCRIT % 39 0   PLATELETS Thousands/uL 102*       Superficial surgical incision infection  · S/p washout 5/2 and VAC placement  · ID now managing abx with vancomycin/cefepime  · Afebrile/no leukocytosis  · Await operative cultures and further input    HTN   Hold losartan/triamterene/hctz in the post operative setting to avoid hypotension/ABILIO   Cont hydralazine as needed for SBP >160   Continue cardizem with appropriate hold parameters   stable     DM II   Hgb A1c 8 2   Home meds: lantus 40 units SQ qhs/glimepiride daily   Here: lantus 40u qhs/glimepiride 4mg daily/sliding scale   Cont DM diet   BS trending upward     ABILIO   Baseline creatinine 1 0-1 2   Peaked at 2 5 on admission   Currently 1 5   Appreciate nephrology input   Urine studies ordered by renal   Avoid nephrotoxic medications   vanco to be adjusted by pharmacy   Hold losartan/traimterene/hctz     Thrombocytopenia   Baseline platelets 25-90 range   Currently 102   Monitor cbc     ETOH cirrhosis   No longer uses ETOH   F/b GI           PRE-OP HGB LEVEL: 13 4  The above assessment and plan was reviewed and updated as determined by my evaluation of the patient on 5/3/2023      Labs:   Results from last 7 days   Lab Units 05/03/23  0914 05/01/23  1143   WBC Thousand/uL 5 60 5 95   HEMOGLOBIN g/dL 12 3 13 4   HEMATOCRIT % 39 0 41 6   PLATELETS Thousands/uL 102* 130*     Results from last 7 days   Lab This provider is located at [place], [place]. The Patient is seen remotely using Video. Patient is being seen via telehealth and confirm that they are in a secure environment for this session. Patient is located in [place], [place] in her car. The patient's condition being diagnosed/treated is appropriate for telemedicine. Provider identified as Rupa Olivas as well as credentials RENE MSN PMHNP-BC.   The client/patient gave consent to be seen remotely, and when consent is given they understand that the consent allows for patient identifiable information to be sent to a third party as needed.  They may refuse to be seen remotely at any time. The electronic data is encrypted and password protected, and the patient has been advised of the potential risks to privacy not withstanding such measures.    Chief Complaint  Depression, Anxiety, and Sleeping Problem    Subjective        Taty Romo presents to [facility] for   History of Present Illness  Patient is seen today for follow-up visit for depression, anxiety, and insomnia.  She reports that she thinks her mood is doing well.  States she noticed a little bit of improvement with energy, lack of motivation, and fatigue with the Wellbutrin but thinks she can make more improvements in that area.  Rates her depression at 3 on a 1-10 scale with 10 being the worst.  Denies any hopelessness.  Denies any suicidal or homicidal ideation.  States her sleep is fair, but again that is due to her back pain issues.  She takes the hydroxyzine at night.  Appetite is good.  Rates anxiety a 1 on a 1-10 scale with 10 being the worst.  Denies any worry or overthinking.  Denies any panic attacks.  States she is pleased with the medication, but would like to get more improvement with her fatigue level.  Denies any manic type symptoms.  Denies any paranoia.  Denies any auditory or visual hallucinations.  Denies any side effects to the medications.    has been staying busy running with her girls.  She states that both have birthdays in November so it is going to be a busy month.  Objective   Vital Signs:   There were no vitals taken for this visit.      Mental Status Exam:   Hygiene:   good  Cooperation:  Cooperative  Eye Contact:  Good  Psychomotor Behavior:  Appropriate  Affect:  Full range  Mood: normal  Speech:  Normal  Thought Process:  Goal directed  Thought Content:  Normal  Suicidal:  None  Homicidal:  None  Hallucinations:  None  Delusion:  None  Memory:  Intact  Orientation:  Person, Place, Time and Situation  Reliability:  good  Insight:  Good  Judgement:  Good  Impulse Control:  Good  Physical/Medical Issues:  No      PHQ-9 Depression Screening  Little interest or pleasure in doing things? 1-->several days   Feeling down, depressed, or hopeless? 1-->several days   Trouble falling or staying asleep, or sleeping too much? 1-->several days   Feeling tired or having little energy? 3-->nearly every day   Poor appetite or overeating? 1-->several days   Feeling bad about yourself - or that you are a failure or have let yourself or your family down? 1-->several days   Trouble concentrating on things, such as reading the newspaper or watching television? 0-->not at all   Moving or speaking so slowly that other people could have noticed? Or the opposite - being so fidgety or restless that you have been moving around a lot more than usual? 0-->not at all   Thoughts that you would be better off dead, or of hurting yourself in some way? 0-->not at all   PHQ-9 Total Score 8   If you checked off any problems, how difficult have these problems made it for you to do your work, take care of things at home, or get along with other people? somewhat difficult     PHQ-9 Total Score: 8  COURT 7 anxiety screening tool that patient filled out virtually reviewed by this APRN at today's encounter.    PROMIS scale screening tool that patient filled out virtually reviewed by  Units 05/03/23  0915 05/02/23  0552   SODIUM mmol/L 135 136   POTASSIUM mmol/L 4 5 4 3   CHLORIDE mmol/L 106 107   CO2 mmol/L 26 23   BUN mg/dL 39* 44*   CREATININE mg/dL 1 59* 1 98*   CALCIUM mg/dL 9 5 9 5         Results from last 7 days   Lab Units 05/01/23  1143   INR  1 07     Results from last 7 days   Lab Units 05/03/23  1122 05/02/23  2122 05/02/23  1711   POC GLUCOSE mg/dl 312* 422* 191*       Review of Scheduled Meds:  Current Facility-Administered Medications   Medication Dose Route Frequency Provider Last Rate    acetaminophen  650 mg Oral Q4H PRN Natasha Joie, PA-C      cefepime  2,000 mg Intravenous [address] [facility] [place] 2,000 mg (05/03/23 0447)    diltiazem  180 mg Oral Daily Natasha Joie, PA-C      glimepiride  4 mg Oral Daily With Breakfast AMOR [place]      hydrALAZINE  25 mg Oral Q8H PRN Natasha Joie, PA-C      insulin glargine  40 Units Subcutaneous HS Natasha Joie, PA-C      insulin lispro  1-5 Units Subcutaneous TID AC Natasha Joie, PA-C      insulin lispro  1-5 Units Subcutaneous HS Natasha Joie, PA-C      lactated ringers  20 mL/hr Intravenous Continuous Cain Pittman MD 20 mL/hr (05/02/23 1600)    naloxone  0 04 mg Intravenous Q1MIN PRN Natasha Joie, PA-C      ondansetron  4 mg Intravenous Q4H PRN Natasha Joie, PA-C      oxyCODONE  5 mg Oral Q4H PRN Natasha Joie, PA-C      oxyCODONE  2 5 mg Oral Q4H PRN Natasha Joie, PA-C      senna-docusate sodium  1 tablet Oral HS Natasha Joie, PA-C      traZODone  50 mg Oral HS Natasha Jioe, PA-C      vancomycin  1,250 mg Intravenous Q24H Tu Elizondo MD         Subjective/ HPI: Flavio Leiva seen and examined  Patient's overnight issues or events were reviewed with nursing or staff during rounds or morning huddle session  New or overnight issues include the following:     Pt seen in his room         ROS:   A 10 point ROS was performed; negative except as noted above  Imaging:     US kidney and bladder   Final Result by Trever Mcwilliams DO (05/01 2585)      No hydronephrosis  Enlarged prostate  Splenomegaly  Workstation performed: [id number]         XR chest portable   Final Result by Trixie Gandhi MD (05/01 1062)      No acute cardiopulmonary disease  Workstation performed: [id number]             *Labs /Radiology studies Reviewed  *Medications  reviewed and reconciled as needed  *Please refer to order section for additional ordered labs studies  *Case discussed with primary attending during morning huddle case rounds    Physical Examination:  Vitals:   Vitals:    05/02/23 2336 05/03/23 0218 05/03/23 0707 05/03/23 1115   BP: 116/72 125/70 126/72 119/68   BP Location:       Pulse: 95 85 85 94   Resp: 16  18 16   Temp: 97 7 °F (36 5 °C)   97 9 °F (36 6 °C)   TempSrc:       SpO2: 91% 95% 97% 97%       GEN: No apparent distress, interactive  NEURO: Alert and oriented x3  HEENT: Pupils are equal and reactive, EOMI, mucous membranes are moist, face symmetrical  CV: S1 S2 regular, no MRG, no peripheral edema noted  RESP: Lungs are clear bilaterally, no wheezes, rales or rhonchi noted, on room air, respirations easy and non labored  GI: obese, soft non tender, non distended; +BS x4  : Voiding without difficulty  MUSC: Moves all extremities; except LUE in sling  SKIN: pink, warm and dry, normal turgor, incision with upper portion serous drainage; PVD discoloration b/l LE         The above physical exam was reviewed and updated as determined by my evaluation of the patient on 5/3/2023      Invasive Devices     Peripheral Intravenous Line  Duration           Peripheral IV 05/01/23 Right;Ventral (anterior) Forearm 2 days                   VTE Pharmacologic Prophylaxis: Reason for no pharmacologic prophylaxis ambulatory  Code Status: Level 1 - Full Code  Current Length of Stay: 2 day(s)      Total this APRN at today's encounter.    Previous Provider notes and available records reviewed by this APRN today.   Current Medications:   Current Outpatient Medications   Medication Sig Dispense Refill   • acetaminophen (Tylenol 8 Hour Arthritis Pain) 650 MG 8 hr tablet Take 1 tablet by mouth Every 8 (Eight) Hours As Needed for Mild Pain  or Moderate Pain . 100 tablet 2   • Biotin 5000 MCG tablet Take 1 tablet by mouth Daily.     • buPROPion XL (Wellbutrin XL) 300 MG 24 hr tablet Take 1 tablet by mouth Every Morning. 90 tablet 0   • Calcium Polycarbophil (FIBER-CAPS PO) Take 2 capsules by mouth Daily.     • Cetirizine-Pseudoephedrine (ZYRTEC-D PO) Take 1 tablet by mouth Daily As Needed.     • Cholecalciferol (VITAMIN D) 2000 UNITS capsule Take 5,000 Units by mouth Every Evening.     • docusate sodium (COLACE) 100 MG capsule Take 200 mg by mouth 2 (Two) Times a Day.     • gabapentin (NEURONTIN) 600 MG tablet Take 1 tablet by mouth 2 (Two) Times a Day. 60 tablet 3   • hydroxychloroquine (PLAQUENIL) 200 MG tablet Take 1 tablet by mouth 2 (Two) Times a Day. 180 tablet 1   • hydrOXYzine (ATARAX) 25 MG tablet Take 3 tablets by mouth At Night As Needed (sleep). 270 tablet 0   • levothyroxine (SYNTHROID, LEVOTHROID) 100 MCG tablet Take 1 tablet by mouth Daily. 90 tablet 3   • meloxicam (MOBIC) 15 MG tablet Take 1 tablet by mouth daily. (Patient taking differently: Take 15 mg by mouth Daily. prn) 90 tablet 1   • ondansetron ODT (ZOFRAN-ODT) 8 MG disintegrating tablet Place 1 tablet on the tongue Every 8 (Eight) Hours As Needed for Nausea or Vomiting. 12 tablet 0   • propranolol (INDERAL) 10 MG tablet Take 1 tablet by mouth 2 (Two) Times a Day As Needed (Anxiety). 60 tablet 0   • Turmeric 500 MG tablet Take 2 tablets by mouth Daily.     • Vortioxetine HBr (TRINTELLIX) 20 MG tablet Take 1 tablet by mouth Daily. 90 tablet 0     No current facility-administered medications for this visit.       Physical Exam   Result Review  :    The following data was reviewed by: RENE Guadalupe on 10/27/2022:  Common labs    Common Labs 4/1/22 4/1/22 4/1/22 7/27/22 7/27/22 7/27/22 7/27/22 9/11/22 9/11/22    1524 1524 1524 0910 0910 0910 0910 2222 2222   Glucose     91   102 (A)    BUN     16   14    Creatinine     0.92   0.88    Sodium     138   137    Potassium     3.9   3.9    Chloride     101   101    Calcium     9.6   9.2    Total Protein     7.6       Albumin     4.9 (A)   4.20    Total Bilirubin     0.5   <0.2    Alkaline Phosphatase     45   38 (A)    AST (SGOT) 11    18   19    ALT (SGPT)  8   17   26    WBC   2.85 (A) 4.2     8.33   Hemoglobin   12.2 13.8     12.6   Hematocrit   36.3 39.6     36.8   Platelets   105 (A) 158     124 (A)   Total Cholesterol       159     Triglycerides       104     HDL Cholesterol       45     LDL Cholesterol        95     Hemoglobin A1C      5.6      (A) Abnormal value       Comments are available for some flowsheets but are not being displayed.              Assessment and Plan   Problem List Items Addressed This Visit        Mental Health    Moderate episode of recurrent major depressive disorder (HCC) (Chronic)    Overview     Has taken medication since 19.   - Mom PASSED 5/2021          Relevant Medications    buPROPion XL (Wellbutrin XL) 300 MG 24 hr tablet    Vortioxetine HBr (TRINTELLIX) 20 MG tablet    hydrOXYzine (ATARAX) 25 MG tablet    Generalized anxiety disorder    Relevant Medications    buPROPion XL (Wellbutrin XL) 300 MG 24 hr tablet    Vortioxetine HBr (TRINTELLIX) 20 MG tablet    hydrOXYzine (ATARAX) 25 MG tablet    Social anxiety disorder    Relevant Medications    buPROPion XL (Wellbutrin XL) 300 MG 24 hr tablet    Vortioxetine HBr (TRINTELLIX) 20 MG tablet    hydrOXYzine (ATARAX) 25 MG tablet       Sleep    Insomnia (Chronic)    Relevant Medications    hydrOXYzine (ATARAX) 25 MG tablet     Discussed treatment options with patient.  Continue Trintellix 20 mg daily for depression and  floor / unit time spent today 30 minutes  Coordination of patient's care was performed in conjunction with primary service  Time invested included review of patient's labs, vitals, and management of their comorbidities with continued monitoring, examination of patient as well as answering patient questions, documenting her findings and creating progress note in electronic medical record,  ordering appropriate diagnostic testing  Medical decision making for the day was made by supervising physician unless otherwise noted in their attestation statement  ** Please Note:  voice to text software may have been used in the creation of this document   Although proof errors in transcription or interpretation are a potential of such software** anxiety.  Continue hydroxyzine 75 mg nightly for sleep.  Discussed increasing patient's Wellbutrin to further target the symptoms of fatigue and lack of motivation that patient is experiencing.  Patient agreeable.  Increase Wellbutrin XL to 300 mg daily for depression.  We will see patient again in 5 weeks to reassess.  Encouraged patient to call if she had any issues sooner.    TREATMENT PLAN/GOALS: Continue supportive psychotherapy efforts and medications as indicated. Treatment and medication options discussed during today's visit. Patient ackowledged and verbally consented to continue with current treatment plan and was educated on the importance of compliance with treatment and follow-up appointments.    DEPRESSION:  Patient screened positive for depression based on a PHQ-9 score of 9 on 9/28/2022. Follow-up recommendations include: Prescribed antidepressant medication treatment         .       MEDICATION ISSUES:  We discussed risks, benefits, and side effects of the above medications and the patient was agreeable with the plan. Patient was educated on the importance of compliance with treatment and follow-up appointments.  Patient is agreeable to call the office with any worsening of symptoms or onset of side effects. Patient is agreeable to call 911 or go to the nearest ER should he/she begin having SI/HI.      Counseled patient regarding multimodal approach with healthy nutrition, healthy sleep, regular physical activity, social activities, counseling, and medications.      Coping skills reviewed and encouraged positive framing of thoughts     Assisted patient in processing above session content; acknowledged and normalized patient’s thoughts, feelings, and concerns.  Applied  positive coping skills and behavior management in session.  Allowed patient to freely discuss issues without interruption or judgment. Provided safe, confidential environment to facilitate the development of positive therapeutic  relationship and encourage open, honest communication. Assisted patient in identifying risk factors which would indicate the need for higher level of care including thoughts to harm self or others and/or self-harming behavior and encouraged patient to contact this office, call 911, or present to the nearest emergency room should any of these events occur. Discussed crisis intervention services and means to access. If patient has any concerns or needs assistance they were instructed to call the [facility] at [phone].    MEDS ORDERED DURING VISIT:  New Medications Ordered This Visit   Medications   • buPROPion XL (Wellbutrin XL) 300 MG 24 hr tablet     Sig: Take 1 tablet by mouth Every Morning.     Dispense:  90 tablet     Refill:  0   • Vortioxetine HBr (TRINTELLIX) 20 MG tablet     Sig: Take 1 tablet by mouth Daily.     Dispense:  90 tablet     Refill:  0   • hydrOXYzine (ATARAX) 25 MG tablet     Sig: Take 3 tablets by mouth At Night As Needed (sleep).     Dispense:  270 tablet     Refill:  0         Follow Up   Return in about 5 weeks (around 12/1/2022) for Video visit.    Patient was given instructions and counseling regarding her condition or for health maintenance advice. Please see specific information pulled into the AVS if appropriate.         This document has been electronically signed by RENE Guadalupe  October 27, 2022 16:16 EDT    Part of this note may be an electronic transcription/translation of spoken language to printed text using the Dragon Dictation System.

## 2023-05-30 NOTE — PROGRESS NOTES
Chief Complaint   Patient presents with   • Diarrhea     Alternating with constipation      Taty Romo is a 43 y.o. female who presents with a h/o Lupud, long standing IBS-C, s/p colonoscopy in 2022 c/o alt constipation with diarrhea and new onset n/v.  Pt deniest wt loss, fever or chills and BRBPR.  Pt denies sick contacts or recent travel.  Pt here for further recommendations.   HPI  Past Medical History:   Diagnosis Date   • Allergic rhinitis 5/22/2019   • Alopecia 08/2015   • CHI positive 08/2014   • Anal fissure 12/2016   • Anxiety    • Bilateral sciatica 01/15/2020   • Biliary colic 05/2019   • Bulging lumbar disc 02/2018   • Cervical radiculopathy 11/18/2020   • Cervicitis 04/04/2002    SEEN AT [facility]   • Chronic bilateral low back pain without sciatica    • Chronic fatigue    • Chronic ITP (idiopathic thrombocytopenia) 01/2012    FOLLOWED BY DR. PEÑA PIÑA   • Closed fracture of left distal radius 01/22/2020    SEEN AT [facility]   • Cold intolerance    • Colon polyps     FOLLOWED BY DR. 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   • Hypothyroidism    • Idiopathic scoliosis of lumbosacral region    • Insomnia 3/24/2015    Continue trazodone 200 mg qHS    • Irregular menses 03/2015   • Irritable bowel syndrome with constipation 1/30/2019   • Lumbar radiculopathy    • Migraine    • Neck sprain 07/11/2005    SEEN AT [facility]   • Paresthesia of upper extremity      BILATERAL   • PCOS (polycystic ovarian syndrome)    • Peripheral neuropathy 2017    KNEES DOWN   • Polyarthralgia    • Polydipsia 10/2016   • PONV (postoperative nausea and vomiting)    • Ruptured tympanic membrane, right 05/2015   • Seasonal allergies    • Spondylolisthesis at L5-S1 level    • Systemic lupus erythematosus 03/2007    FOLLOWED BY DR. ERNESTO DILLARD   • Tattoos    • Tremor of both hands 06/2016   • Trochanteric bursitis of both hips 10/2021   • Urinary frequency 02/2015   • Urinary urgency 02/2015   • Vitamin D deficiency 8/29/2015       Current Outpatient Medications:   •  acetaminophen (Tylenol 8 Hour Arthritis Pain) 650 MG 8 hr tablet, Take 1 tablet by mouth Every 8 (Eight) Hours As Needed for Mild Pain  or Moderate Pain ., Disp: 100 tablet, Rfl: 2  •  Biotin 5000 MCG tablet, Take 1 tablet by mouth Daily., Disp: , Rfl:   •  Calcium Polycarbophil (FIBER-CAPS PO), Take 2 capsules by mouth Daily., Disp: , Rfl:   •  cetirizine (zyrTEC) 10 MG tablet, Take 10 mg by mouth Daily., Disp: , Rfl:   •  Cholecalciferol (VITAMIN D) 2000 UNITS capsule, Take 5,000 Units by mouth Every Evening., Disp: , Rfl:   •  Cyanocobalamin (B-12-SL) 1000 MCG sublingual tablet, Place 1 tablet under the tongue Daily., Disp: , Rfl:   •  hydroxychloroquine (PLAQUENIL) 200 MG tablet, Take 1 tablet by mouth 2 (Two) Times a Day., Disp: 180 tablet, Rfl: 1  •  ondansetron ODT (ZOFRAN-ODT) 8 MG disintegrating tablet, Place 1 tablet on the tongue Every 8 (Eight) Hours As Needed for Nausea or Vomiting., Disp: 12 tablet, Rfl: 0  •  propranolol (INDERAL) 10 MG tablet, Take 1 tablet by mouth 2 (Two) Times a Day As Needed (Anxiety)., Disp: 60 tablet, Rfl: 0  •  Turmeric 500 MG tablet, Take 2 tablets by mouth Daily., Disp: , Rfl:   •  buPROPion XL (Wellbutrin XL) 300 MG 24 hr tablet, Take 1 tablet by mouth Every Morning., Disp: 90 tablet, Rfl: 0  •  dicyclomine (BENTYL) 10 MG capsule, Take 1 capsule by mouth 4 (Four) Times a Day Before Meals &  at Bedtime., Disp: 60 capsule, Rfl: 2  •  gabapentin (NEURONTIN) 600 MG tablet, Take 1 tablet by mouth 2 (Two) Times a Day., Disp: 60 tablet, Rfl: 3  •  hydrOXYzine (ATARAX) 25 MG tablet, Take 3 tablets by mouth At Night As Needed (sleep)., Disp: 270 tablet, Rfl: 0  •  levothyroxine (Synthroid) 112 MCG tablet, Take 1 tablet by mouth Daily., Disp: 30 tablet, Rfl: 5  •  linaclotide (Linzess) 72 MCG capsule capsule, Take 1 capsule by mouth Every Morning Before Breakfast., Disp: 30 capsule, Rfl: 11  •  meloxicam (MOBIC) 15 MG tablet, Take 1 tablet by mouth daily. (Patient taking differently: Take 15 mg by mouth Daily. prn), Disp: 90 tablet, Rfl: 1  •  miSOPROStol (Cytotec) 200 MCG tablet, Take 1 tablet by mouth 4 hours before IUD procedure. Also take OTC pain meds 2 hours before procedure., Disp: 1 tablet, Rfl: 0  •  nortriptyline (PAMELOR) 10 MG capsule, Take 1 capsule by mouth Every Night., Disp: 30 capsule, Rfl: 11  •  pantoprazole (PROTONIX) 40 MG EC tablet, Take 1 tablet by mouth Daily., Disp: 90 tablet, Rfl: 3  •  Vortioxetine HBr (TRINTELLIX) 20 MG tablet, Take 1 tablet by mouth Daily., Disp: 90 tablet, Rfl: 0  Allergies   Allergen Reactions   • Oxycodone Nausea And Vomiting     States can take as long as has zofran      Social History     Socioeconomic History   • Marital status:      Spouse name: Alexander*   • Number of children: 2   Tobacco Use   • Smoking status: Never   • Smokeless tobacco: Never   Vaping Use   • Vaping Use: Never used   Substance and Sexual Activity   • Alcohol use: Not Currently     Comment: Few times a year   • Drug use: No   • Sexual activity: Yes     Partners: Male     Birth control/protection: I.U.D.     Family History   Problem Relation Age of Onset   • Colon polyps Mother    • Coronary artery disease Mother 60        non-smokers   • Valvular heart disease Mother    • Diabetes type II Mother    • Lung disease Mother         pulmonary hypertension   • Clotting disorder Mother          superficial dvt   • Depression Mother    • Kidney failure Mother         due to diabetes   • Irritable bowel syndrome Mother    • Colon polyps Father    • Atrial fibrillation Father         pacemaker SSS   • Hypertension Father    • Deep vein thrombosis Father    • Spondylolysis Father         spinal stenosis, Degenerative disc disease    • Arthritis Father    • Autoimmune disease Brother    • Depression Brother    • Dementia Maternal Grandmother    • Stroke Maternal Grandmother    • Arthritis Paternal Grandmother    • Colon cancer Paternal Grandfather    • Asthma Daughter    • ADD / ADHD Daughter    • Dementia Maternal Uncle    • Colon cancer Maternal Uncle    • Colon cancer Maternal Uncle    • Malig Hyperthermia Neg Hx      Review of Systems   Constitutional: Negative.    HENT: Negative.    Eyes: Negative.    Respiratory: Negative.    Cardiovascular: Negative.    Gastrointestinal: Positive for abdominal distention, abdominal pain, constipation, diarrhea, nausea and vomiting. Negative for anal bleeding, blood in stool and rectal pain.   Endocrine: Negative.    Musculoskeletal: Positive for arthralgias. Negative for back pain, gait problem and joint swelling.   Skin: Negative.    Allergic/Immunologic: Negative.    Hematological: Negative.      Vitals:    02/23/23 1543   BP: 105/69   Pulse: 86   Temp: 96.4 °F (35.8 °C)   SpO2: 97%     Physical Exam  Vitals and nursing note reviewed.   Constitutional:       Appearance: Normal appearance. She is well-developed and normal weight.   HENT:      Head: Normocephalic and atraumatic.   Eyes:      General: No scleral icterus.     Pupils: Pupils are equal, round, and reactive to light.   Cardiovascular:      Rate and Rhythm: Normal rate and regular rhythm.      Heart sounds: Normal heart sounds. No murmur heard.    No friction rub. No gallop.   Pulmonary:      Effort: Pulmonary effort is normal.      Breath sounds: Normal breath sounds. No wheezing or rales.   Abdominal:       General: Bowel sounds are normal. There is no distension or abdominal bruit.      Palpations: Abdomen is soft. Abdomen is not rigid. There is no shifting dullness, fluid wave, mass or pulsatile mass.      Tenderness: There is no abdominal tenderness. There is no guarding.      Hernia: No hernia is present.   Musculoskeletal:         General: No swelling or tenderness. Normal range of motion.   Skin:     General: Skin is warm and dry.      Coloration: Skin is not jaundiced.      Findings: No rash.   Neurological:      General: No focal deficit present.      Mental Status: She is alert and oriented to person, place, and time.      Cranial Nerves: No cranial nerve deficit.   Psychiatric:         Behavior: Behavior normal.         Thought Content: Thought content normal.       Diagnoses and all orders for this visit:    Nausea and vomiting, unspecified vomiting type  -     Case Request; Standing  -     Implement Anesthesia orders day of procedure.; Standing  -     Obtain informed consent; Standing  -     Case Request    Irritable bowel syndrome with both constipation and diarrhea    Other orders  -     linaclotide (Linzess) 72 MCG capsule capsule; Take 1 capsule by mouth Every Morning Before Breakfast.  -     nortriptyline (PAMELOR) 10 MG capsule; Take 1 capsule by mouth Every Night.  -     pantoprazole (PROTONIX) 40 MG EC tablet; Take 1 tablet by mouth Daily.    Pt is a 42 yo female with h/o lupus, chronic ITP, long standing IBS-C c/o alt constipation and diarrhea as well as recent onset nausea and vomiting.  Pt with no fever or chills, wt loss.  Pt has noted some mucus but no blood in the stool.  Pt had normal colonoscopy in 2022 by colorectal surgery.  At this point recommend trial both linzess and pamelor for alt bowels related to IBS and arrange EGD to evaluyate new N/V.  Will f/u post EGD

## 2023-08-09 NOTE — PROGRESS NOTES
"Subjective   Patient ID: Taty Romo is a 43 y.o. female is here today for follow-up.    History of Present Illness    She was last seen in the office almost 1 year ago by Dr Thomas for constant low back pain.  At that time, she was also reporting pain in her right leg down to the back of her knee.  She was undergoing SI blocks, but with only minimal relief.  She has history of left-sided L5-S1 microdiscectomy for which she did very well.  Since her last office visit, she has been seen by pain management for consideration of an ablation on the right side.    Today, she states that she never move forward with the ablation as the pain on the right side improved on its own.  However, today she is having pain in the left low back that radiates laterally and anteriorly down the left thigh to the knee.  This has been present for the past few months and has progressively worsened.  She reports a constant pain in her low back on both sides, right greater than left where she describes a \"knot\" that is a constant source of discomfort.  She takes Tylenol and uses heat as needed.  She denies any numbness, tingling or weakness in either legs.  She denies any balance or gait issues.    She is also reporting pain in the back of the right side of her neck with an area that is very sensitive and feels that it is \"very itchy\".  She reports that this area is always very sensitive to the touch.  This has been the case for the past few months.  For the past 6 months she reports bilateral hand numbness and tingling that radiates from the lower forearms down into the hands, thumbs, index and ring fingers.  She reports that the sensations feel the same on both hands equally.  She is right-handed.  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  Typing does make the hand discomfort worse.  She denies any weakness in her arms and hands.    She reports being very discouraged by the discomfort in her neck, hands " and left leg.      She presents unaccompanied.    The following portions of the patient's history were reviewed and updated as appropriate: allergies, current medications, past family history, past medical history, past social history, past surgical history, and problem list.    Review of Systems   Musculoskeletal:  Positive for back pain and neck pain. Negative for joint swelling.     /82   Pulse 89   SpO2 97%       Objective     Vitals:    08/15/23 1308   BP: 120/82   Pulse: 89   SpO2: 97%     There is no height or weight on file to calculate BMI.    Tobacco Use: Low Risk     Smoking Tobacco Use: Never    Smokeless Tobacco Use: Never    Passive Exposure: Not on file          Physical Exam  Vitals reviewed.   Constitutional:       Appearance: Normal appearance.   HENT:      Head: Atraumatic.   Pulmonary:      Effort: Pulmonary effort is normal.   Musculoskeletal:         General: Tenderness present. Normal range of motion.      Comments: Strength equal and intact bilateral upper and lower extremities, with the exception of 4+5 weakness with left thumb flexor that is chronic and unchanged  Very tender to minimal palpation bilateral low back, right greater than left in the paraspinal musculature at L5-S1 as well as in the right sciatic notch   Skin:     General: Skin is warm and dry.   Neurological:      Mental Status: She is alert and oriented to person, place, and time.      GCS: GCS eye subscore is 4. GCS verbal subscore is 5. GCS motor subscore is 6.      Sensory: No sensory deficit.      Motor: No weakness or tremor.      Gait: Gait normal.      Deep Tendon Reflexes:      Reflex Scores:       Tricep reflexes are 2+ on the right side and 2+ on the left side.       Bicep reflexes are 2+ on the right side and 2+ on the left side.       Brachioradialis reflexes are 2+ on the right side and 2+ on the left side.       Patellar reflexes are 2+ on the right side and 2+ on the left side.       Achilles reflexes  are 2+ on the right side and 2+ on the left side.     Comments: Gait is stable and upright, nonantalgic  Positive straight leg raise on the left  Negative clonus  Normal sensation bilateral upper and lower extremities  Negative Tinel's and Phalen's   Psychiatric:         Mood and Affect: Mood normal.     Neurologic Exam     Mental Status   Oriented to person, place, and time.     Gait, Coordination, and Reflexes     Reflexes   Right brachioradialis: 2+  Left brachioradialis: 2+  Right biceps: 2+  Left biceps: 2+  Right triceps: 2+  Left triceps: 2+  Right patellar: 2+  Left patellar: 2+  Right achilles: 2+  Left achilles: 2+        Assessment & Plan   Independent Review of Radiographic Studies:      I personally reviewed the images from the following studies.    No new imaging    Medical Decision Making:      She presents to the office today for further evaluation of acute on chronic low back and left leg pain.  Is well as right-sided neck and new onset hand and wrist pain with paresthesias.  Exam as noted above, red flags concerning for left-sided radiculopathy with a positive straight leg raise.  She has had no frequent imaging so I have ordered new cervical and lumbar MRIs to assess above noted issues.  I have also ordered EMG of the upper extremities due to the fact that she is having paresthesias in both of her hands and the pain originates in the lower forearms.  I am concerned that she may have more of a carpal/cubital tunnel issue versus true radiculopathy.  No changes were made to her medications.  Recommend that she resume taking meloxicam that she was previously prescribed and still has at home as this will be more helpful than the Tylenol.  We will see her back in the office once the imaging studies and EMG have been completed.  All questions from the patient were answered.    Plan: Return to office following EMG and cervical and lumbar MRIs    Diagnoses and all orders for this visit:    1. DDD  (degenerative disc disease), lumbar (Primary)  -     MRI Cervical Spine Without Contrast; Future  -     MRI Lumbar Spine Without Contrast; Future    2. Left leg pain  -     MRI Cervical Spine Without Contrast; Future  -     MRI Lumbar Spine Without Contrast; Future    3. Paresthesia of both hands  -     EMG both arms; Future  -     Nerve Conduction Test both arms; Future      Return in about 4 weeks (around 9/12/2023).

## 2023-08-15 ENCOUNTER — OFFICE VISIT (OUTPATIENT)
Dept: NEUROSURGERY | Facility: CLINIC | Age: 43
End: 2023-08-15
Payer: COMMERCIAL

## 2023-08-15 ENCOUNTER — OFFICE VISIT (OUTPATIENT)
Dept: INTERNAL MEDICINE | Age: 43
End: 2023-08-15
Payer: COMMERCIAL

## 2023-08-15 VITALS — SYSTOLIC BLOOD PRESSURE: 120 MMHG | OXYGEN SATURATION: 97 % | DIASTOLIC BLOOD PRESSURE: 82 MMHG | HEART RATE: 89 BPM

## 2023-08-15 VITALS
WEIGHT: 166 LBS | TEMPERATURE: 97.1 F | OXYGEN SATURATION: 95 % | BODY MASS INDEX: 26.06 KG/M2 | HEART RATE: 102 BPM | DIASTOLIC BLOOD PRESSURE: 74 MMHG | SYSTOLIC BLOOD PRESSURE: 120 MMHG | HEIGHT: 67 IN

## 2023-08-15 DIAGNOSIS — R20.2 PARESTHESIA OF BOTH HANDS: ICD-10-CM

## 2023-08-15 DIAGNOSIS — M54.41 CHRONIC BILATERAL LOW BACK PAIN WITH BILATERAL SCIATICA: Primary | Chronic | ICD-10-CM

## 2023-08-15 DIAGNOSIS — R20.2 PARESTHESIA AND PAIN OF BOTH UPPER EXTREMITIES: ICD-10-CM

## 2023-08-15 DIAGNOSIS — E03.8 HYPOTHYROIDISM DUE TO HASHIMOTO'S THYROIDITIS: Chronic | ICD-10-CM

## 2023-08-15 DIAGNOSIS — F33.1 MODERATE EPISODE OF RECURRENT MAJOR DEPRESSIVE DISORDER: Chronic | ICD-10-CM

## 2023-08-15 DIAGNOSIS — M79.7 FIBROMYALGIA: Chronic | ICD-10-CM

## 2023-08-15 DIAGNOSIS — M79.602 PARESTHESIA AND PAIN OF BOTH UPPER EXTREMITIES: ICD-10-CM

## 2023-08-15 DIAGNOSIS — M51.36 DDD (DEGENERATIVE DISC DISEASE), LUMBAR: Primary | ICD-10-CM

## 2023-08-15 DIAGNOSIS — M79.605 LEFT LEG PAIN: ICD-10-CM

## 2023-08-15 DIAGNOSIS — M54.42 CHRONIC BILATERAL LOW BACK PAIN WITH BILATERAL SCIATICA: Primary | Chronic | ICD-10-CM

## 2023-08-15 DIAGNOSIS — M79.601 PARESTHESIA AND PAIN OF BOTH UPPER EXTREMITIES: ICD-10-CM

## 2023-08-15 DIAGNOSIS — E06.3 HYPOTHYROIDISM DUE TO HASHIMOTO'S THYROIDITIS: Chronic | ICD-10-CM

## 2023-08-15 DIAGNOSIS — G89.29 CHRONIC BILATERAL LOW BACK PAIN WITH BILATERAL SCIATICA: Primary | Chronic | ICD-10-CM

## 2023-08-15 PROCEDURE — 99214 OFFICE O/P EST MOD 30 MIN: CPT | Performed by: INTERNAL MEDICINE

## 2023-08-15 NOTE — PROGRESS NOTES
I N T E R N A L  M E D I C I N E    J U N O H  K I M,  M D      ENCOUNTER DATE:  08/15/2023    Taty Romo / 43 y.o. / female    CHIEF COMPLAINT / REASON FOR OFFICE VISIT     Hypothyroidism, Anxiety, Depression, and Irritable Bowel Syndrome      ASSESSMENT & PLAN     Problem List Items Addressed This Visit          High    Chronic bilateral low back pain with bilateral sciatica - Primary (Chronic)    Fibromyalgia (Chronic)    Paresthesia and pain of both upper extremities       Medium    Moderate episode of recurrent major depressive disorder (Chronic)    Overview     Has taken medication since 19.   - Mom PASSED 5/2021     Sees psychiatrist currently          Current Assessment & Plan     Continue follow-up with psychiatrist. On Trintellix and Wellbutrin XL.  Also taking nortriptyline 10 mg for IBS.          Relevant Medications    nortriptyline (PAMELOR) 10 MG capsule    hydrOXYzine (ATARAX) 25 MG tablet    buPROPion XL (Wellbutrin XL) 300 MG 24 hr tablet    Vortioxetine HBr (TRINTELLIX) 20 MG tablet    Hypothyroidism due to Hashimoto's thyroiditis (Chronic)    Overview     S/p partial thyroidectomy (right side) 2015 for adenoma.          Current Assessment & Plan     Lab Results   Component Value Date    TSH 2.050 04/04/2023    TSH 9.880 (H) 02/13/2023    TSH 0.879 08/10/2022    FREET4 1.18 04/04/2023    FREET4 1.19 02/13/2023    FREET4 1.45 08/10/2022      Continue levothyroxine 112 mcg.          Relevant Medications    meloxicam (MOBIC) 15 MG tablet    propranolol (INDERAL) 10 MG tablet    levothyroxine (Synthroid) 112 MCG tablet     No orders of the defined types were placed in this encounter.    No orders of the defined types were placed in this encounter.      SUMMARY/DISCUSSION  Consider increasing dose of nortriptyline for spine/nerve pain and fibromyalgia.   Consider starting pregabalin for nerve pain  / fibromyalgia   She was advised to discuss with neurosurgery regarding these options.  "  Continue close follow-up with neurosurgery      Next Appointment with me: Visit date not found    Return in about 3 months (around 11/15/2023) for Next scheduled follow up.      VITAL SIGNS     Vitals:    08/15/23 1431   BP: 120/74   Pulse: 102   Temp: 97.1 øF (36.2 øC)   SpO2: 95%   Weight: 75.3 kg (166 lb)   Height: 170.2 cm (67\")       BP Readings from Last 3 Encounters:   08/15/23 120/74   08/15/23 120/82   02/23/23 105/69     Wt Readings from Last 3 Encounters:   08/15/23 75.3 kg (166 lb)   02/23/23 74 kg (163 lb 3.2 oz)   02/13/23 74.4 kg (164 lb)     Body mass index is 26 kg/mý.    Blood pressure readings recorded on patient flowsheet:       No data to display                  MEDICATIONS AT THE TIME OF OFFICE VISIT     Current Outpatient Medications on File Prior to Visit   Medication Sig    acetaminophen (Tylenol 8 Hour Arthritis Pain) 650 MG 8 hr tablet Take 1 tablet by mouth Every 8 (Eight) Hours As Needed for Mild Pain  or Moderate Pain .    Biotin 5000 MCG tablet Take 1 tablet by mouth Daily.    buPROPion XL (Wellbutrin XL) 300 MG 24 hr tablet Take 1 tablet by mouth Every Morning.    Calcium Polycarbophil (FIBER-CAPS PO) Take 2 capsules by mouth Daily.    cetirizine (zyrTEC) 10 MG tablet Take 1 tablet by mouth Daily.    Cholecalciferol (VITAMIN D) 2000 UNITS capsule Take 5,000 Units by mouth Every Evening.    Cyanocobalamin (B-12-SL) 1000 MCG sublingual tablet Place 1 tablet under the tongue Daily.    dicyclomine (BENTYL) 10 MG capsule Take 1 capsule by mouth 4 (Four) Times a Day Before Meals & at Bedtime.    hydroxychloroquine (PLAQUENIL) 200 MG tablet Take 1 tablet by mouth 2 (Two) Times a Day.    hydrOXYzine (ATARAX) 25 MG tablet Take 3 tablets by mouth At Night As Needed (sleep). (Patient taking differently: Take 3 tablets by mouth At Night As Needed (sleep). TAKE 4 TABLET NIGHTLY)    levothyroxine (Synthroid) 112 MCG tablet Take 1 tablet by mouth Daily.    meloxicam (MOBIC) 15 MG tablet Take 1 " tablet by mouth daily. (Patient taking differently: Take 1 tablet by mouth Daily. prn)    nortriptyline (PAMELOR) 10 MG capsule Take 1 capsule by mouth Every Night.    ondansetron ODT (ZOFRAN-ODT) 8 MG disintegrating tablet Place 1 tablet on the tongue Every 8 (Eight) Hours As Needed for Nausea or Vomiting.    pantoprazole (PROTONIX) 40 MG EC tablet Take 1 tablet by mouth Daily.    propranolol (INDERAL) 10 MG tablet Take 1 tablet by mouth 2 (Two) Times a Day As Needed (Anxiety).    Turmeric 500 MG tablet Take 2 tablets by mouth Daily.    Vortioxetine HBr (TRINTELLIX) 20 MG tablet Take 1 tablet by mouth Daily.    [DISCONTINUED] miSOPROStol (Cytotec) 200 MCG tablet Take 1 tablet by mouth 4 hours before IUD procedure. Also take OTC pain meds 2 hours before procedure.    gabapentin (NEURONTIN) 600 MG tablet Take 1 tablet by mouth 2 (Two) Times a Day. (Patient not taking: Reported on 8/15/2023)    linaclotide (Linzess) 72 MCG capsule capsule Take 1 capsule by mouth Every Morning Before Breakfast. (Patient not taking: Reported on 8/15/2023)     No current facility-administered medications on file prior to visit.          HISTORY OF PRESENT ILLNESS     Worsening bilateral lower extremity pain due to sciatica from DDD/DJD of lumbar spine with known recurrent lumbar disc herniation followed by neurosurgery. She was not able to tolerate gabapentin 600 mg doses. She is taking nortriptyline 10 mg for IBS. She remains on Trintellix and Wellbutrin XL for depression which remains stable overall. She is schedule to undergo repeat MRI's of the cervical and lumbar spine. She complains of increased paresthesia and pain involving BUE's. Denies weakness of the upper extremities. She indicates she may have fibromyalgia along with SLE.       Patient Care Team:  Jeremy Mcdonald MD as PCP - General (Internal Medicine)  Shaheed Blanco MD as Consulting Physician (Rheumatology)  Angy Hunter MD as Consulting Physician (Obstetrics and  Gynecology)    REVIEW OF SYSTEMS     Review of Systems       PHYSICAL EXAMINATION     Physical Exam  No acute distress   Alert with normal thought and judgment.   Neck/trapezius muscle tightness / tenderness       REVIEWED DATA     Labs:     Lab Results   Component Value Date     02/13/2023    K 4.0 02/13/2023    CALCIUM 9.7 02/13/2023    AST 22 02/13/2023    ALT 29 02/13/2023    BUN 15 02/13/2023    CREATININE 0.95 02/13/2023    CREATININE 0.88 09/11/2022    CREATININE 0.92 07/27/2022    EGFRIFNONA 70 09/15/2021    EGFRIFAFRI 85 09/15/2021       Lab Results   Component Value Date    HGBA1C 5.3 02/13/2023    HGBA1C 5.6 07/27/2022       Lab Results   Component Value Date     (H) 02/13/2023    LDL 95 07/27/2022    HDL 62 02/13/2023    HDL 45 07/27/2022    TRIG 150 (H) 02/13/2023    TRIG 104 07/27/2022       Lab Results   Component Value Date    TSH 2.050 04/04/2023    TSH 9.880 (H) 02/13/2023    TSH 0.879 08/10/2022    FREET4 1.18 04/04/2023    FREET4 1.19 02/13/2023    FREET4 1.45 08/10/2022       Lab Results   Component Value Date    WBC 3.6 02/13/2023    HGB 13.7 02/13/2023     02/13/2023       No results found for: MALBCRERATIO         Imaging:     MRI Cervical Spine Without Contrast (02/07/2022 18:05)     MRI Lumbar Spine Without Contrast (12/16/2020 17:25)     Medical Tests:           Summary of old records / correspondence / consultant report:           Request outside records:

## 2023-08-15 NOTE — ASSESSMENT & PLAN NOTE
Continue follow-up with psychiatrist. On Trintellix and Wellbutrin XL.  Also taking nortriptyline 10 mg for IBS.

## 2023-08-15 NOTE — ASSESSMENT & PLAN NOTE
Lab Results   Component Value Date    TSH 2.050 04/04/2023    TSH 9.880 (H) 02/13/2023    TSH 0.879 08/10/2022    FREET4 1.18 04/04/2023    FREET4 1.19 02/13/2023    FREET4 1.45 08/10/2022      Continue levothyroxine 112 mcg.

## 2023-08-28 RX ORDER — NORTRIPTYLINE HYDROCHLORIDE 10 MG/1
20 CAPSULE ORAL NIGHTLY
Qty: 60 CAPSULE | Refills: 11 | Status: SHIPPED | OUTPATIENT
Start: 2023-08-28

## 2023-09-10 ENCOUNTER — HOSPITAL ENCOUNTER (OUTPATIENT)
Dept: MRI IMAGING | Facility: HOSPITAL | Age: 43
Discharge: HOME | End: 2023-09-10
Payer: COMMERCIAL

## 2023-09-10 DIAGNOSIS — M51.36 DDD (DEGENERATIVE DISC DISEASE), LUMBAR: ICD-10-CM

## 2023-09-10 DIAGNOSIS — M79.605 LEFT LEG PAIN: ICD-10-CM

## 2023-09-10 PROCEDURE — 72141 MRI NECK SPINE W/O DYE: CPT

## 2023-09-10 PROCEDURE — 72148 MRI LUMBAR SPINE W/O DYE: CPT

## 2023-09-13 ENCOUNTER — PROCEDURE VISIT (OUTPATIENT)
Dept: NEUROLOGY | Facility: CLINIC | Age: 43
End: 2023-09-13
Payer: COMMERCIAL

## 2023-09-13 VITALS
DIASTOLIC BLOOD PRESSURE: 94 MMHG | OXYGEN SATURATION: 98 % | SYSTOLIC BLOOD PRESSURE: 122 MMHG | BODY MASS INDEX: 25.99 KG/M2 | HEART RATE: 103 BPM | HEIGHT: 67 IN

## 2023-09-13 DIAGNOSIS — R20.2 PARESTHESIA OF BOTH HANDS: ICD-10-CM

## 2023-09-13 NOTE — PROGRESS NOTES
EMG and Nerve Conduction Studies      History: 43-year-old woman with bilateral hand numbness.      Results:     Nerve conduction studies were performed on both upper extremities.  Bilateral median antidromic sensory nerve action potentials across the wrist segment were mildly prolonged in latency with reduced velocity.  Bilateral radial and ulnar antidromic sensory nerve action potentials across the wrist segment were normal.  Bilateral median compound motor action potentials were technically within normal limits but latency across the wrist segment was prolonged in comparison to ipsilateral ulnar findings.  Bilateral ulnar compound motor action potentials were normal throughout.  Bilateral median and bilateral ulnar F waves were present and normal in latency.    EMG needle examination of selected muscles of both upper extremities and cervical paraspinal muscles revealed no abnormal insertional activity, spontaneous activity, or motor unit remodeling.  Please see accompanying data for details.    Impression:    This nerve conduction study and EMG needle examination of the upper extremities is abnormal because of the presence of mild to moderate bilateral median neuropathy at the wrists, as can be seen in carpal tunnel syndrome.    Cachorro Balderrama M.D.              Dictated utilizing Dragon dictation.

## 2023-10-03 NOTE — PROGRESS NOTES
Subjective   Patient ID: Taty Romo is a 43 y.o. female is here today for follow-up.    History of Present Illness    She was last seen in the office by myself on August 15.  At that time she was having low back pain that radiated laterally anterior to the left thigh and knee.  She also reported some neck discomfort with bilateral hand numbness and tingling that radiated into the fingers.  She has had new cervical and lumbar MRI imaging.    Today, she reports on going mild low back discomfort.  Overall her symptoms are about the same from her last office visit.  She has a follow-up with hand surgery secondary to bilateral carpal tunnel syndrome that was discovered via EMG testing.  She does continue to have some muscle tightness in the right side of her neck and shoulder with a constant burning sensation.  She states that she used to get massage therapy and will try to resume that.  She would also like to try physical therapy in hopes that this may help with some of the chronic muscle tightness.  Otherwise, she states that she is doing very well.    She presents unaccompanied.      The following portions of the patient's history were reviewed and updated as appropriate: allergies, current medications, past family history, past medical history, past social history, past surgical history, and problem list.    Review of Systems   Musculoskeletal:  Positive for back pain and neck pain. Negative for neck stiffness.       /80   Pulse 108   SpO2 96%       Objective     Vitals:    10/04/23 1307   BP: 114/80   Pulse: 108   SpO2: 96%     There is no height or weight on file to calculate BMI.    Tobacco Use: Low Risk     Smoking Tobacco Use: Never    Smokeless Tobacco Use: Never    Passive Exposure: Not on file          Physical Exam  Vitals reviewed.   Constitutional:       Appearance: Normal appearance.   HENT:      Head: Atraumatic.   Pulmonary:      Effort: Pulmonary effort is normal.   Musculoskeletal:          General: Tenderness (Very tender right trapezius musculature, tight, rigid) present. Normal range of motion.      Comments: Moving all extremities well   Skin:     General: Skin is warm and dry.   Neurological:      Mental Status: She is alert and oriented to person, place, and time.      GCS: GCS eye subscore is 4. GCS verbal subscore is 5. GCS motor subscore is 6.      Sensory: No sensory deficit.      Motor: No weakness.      Gait: Gait normal.      Comments: Gait is stable and upright   Psychiatric:         Mood and Affect: Mood normal.     Neurologic Exam     Mental Status   Oriented to person, place, and time.         Assessment & Plan   Independent Review of Radiographic Studies:      I personally reviewed the images from the following studies.    Cervical MRI shows stable findings when compared to prior imaging in February 2022.  There is a small posterior disc protrusion at C2-3 that only mildly narrows the central portion of the canal.  At C3-4 there is a posterior central disc bulge as well as at C4-5.  Remainder of the cervical MRI is stable.    Lumbar MRI from [facility] dated September 10, 2023 reveals disc space and facets are normal in appearance with no canal or foraminal narrowing from T11 down to T4.  At L4-5 there is a posterior central annular tear with a small posterior central disc protrusion that indents the anterior midline of the thecal sac and comes close to the traversing left L5 nerve root.  There is no foraminal at L4-5.  L5-S1 there is been previous surgery with left partial hemilaminectomy at L5 and facetectomy at L5-S1.      Medical Decision Making:      She presents office today for further evaluation of acute on chronic neck and low back pain.  Exam as noted above, no red flags.  She was found to have bilateral carpal tunnel syndrome and is pending a follow-up with a hand specialist to discuss options.  Otherwise, the cervical MRI shows mostly stable findings.  The lumbar MRI also  shows stable findings with no new issues.  There is some degenerative disc disease at L4-5, but fortunately she is asymptomatic with regards to this.  I have ordered physical therapy in hopes that this will help some of her chronic right-sided neck pain that I feel is more musculoskeletal in origin.  From our standpoint she is stable and we will see her back on an as-needed basis.    Plan: Referral to PT, return to office as needed    Diagnoses and all orders for this visit:    1. DDD (degenerative disc disease), lumbar (Primary)    2. Neck pain on right side  -     Ambulatory Referral to Physical Therapy Evaluate and treat; Stretching, ROM, Strengthening      Return if symptoms worsen or fail to improve.

## 2023-10-04 ENCOUNTER — OFFICE VISIT (OUTPATIENT)
Dept: NEUROSURGERY | Facility: CLINIC | Age: 43
End: 2023-10-04
Payer: COMMERCIAL

## 2023-10-04 VITALS — DIASTOLIC BLOOD PRESSURE: 80 MMHG | OXYGEN SATURATION: 96 % | SYSTOLIC BLOOD PRESSURE: 114 MMHG | HEART RATE: 108 BPM

## 2023-10-04 DIAGNOSIS — M51.36 DDD (DEGENERATIVE DISC DISEASE), LUMBAR: Primary | Chronic | ICD-10-CM

## 2023-10-04 DIAGNOSIS — M54.2 NECK PAIN ON RIGHT SIDE: ICD-10-CM

## 2023-10-05 DIAGNOSIS — E06.3 HYPOTHYROIDISM DUE TO HASHIMOTO'S THYROIDITIS: ICD-10-CM

## 2023-10-05 DIAGNOSIS — G47.01 INSOMNIA DUE TO MEDICAL CONDITION: ICD-10-CM

## 2023-10-05 DIAGNOSIS — E03.8 HYPOTHYROIDISM DUE TO HASHIMOTO'S THYROIDITIS: ICD-10-CM

## 2023-10-05 DIAGNOSIS — F41.1 GENERALIZED ANXIETY DISORDER: Chronic | ICD-10-CM

## 2023-10-06 RX ORDER — LEVOTHYROXINE SODIUM 112 UG/1
112 TABLET ORAL DAILY
Qty: 30 TABLET | Refills: 0 | Status: SHIPPED | OUTPATIENT
Start: 2023-10-06

## 2023-10-09 RX ORDER — HYDROXYZINE HYDROCHLORIDE 25 MG/1
100 TABLET, FILM COATED ORAL NIGHTLY PRN
Qty: 360 TABLET | Refills: 0 | Status: SHIPPED | OUTPATIENT
Start: 2023-10-09

## 2023-10-18 ENCOUNTER — TELEMEDICINE (OUTPATIENT)
Dept: PSYCHIATRY | Facility: CLINIC | Age: 43
End: 2023-10-18
Payer: COMMERCIAL

## 2023-10-18 DIAGNOSIS — F51.05 INSOMNIA DUE TO MENTAL CONDITION: ICD-10-CM

## 2023-10-18 DIAGNOSIS — F41.1 GENERALIZED ANXIETY DISORDER: Chronic | ICD-10-CM

## 2023-10-18 DIAGNOSIS — F33.1 MODERATE EPISODE OF RECURRENT MAJOR DEPRESSIVE DISORDER: Primary | Chronic | ICD-10-CM

## 2023-10-18 DIAGNOSIS — F40.10 SOCIAL ANXIETY DISORDER: ICD-10-CM

## 2023-10-18 DIAGNOSIS — F33.1 MODERATE EPISODE OF RECURRENT MAJOR DEPRESSIVE DISORDER: Chronic | ICD-10-CM

## 2023-10-18 RX ORDER — BUPROPION HYDROCHLORIDE 300 MG/1
300 TABLET ORAL EVERY MORNING
Qty: 90 TABLET | Refills: 0 | Status: SHIPPED | OUTPATIENT
Start: 2023-10-18

## 2023-10-18 RX ORDER — BUPROPION HYDROCHLORIDE 300 MG/1
300 TABLET ORAL EVERY MORNING
Qty: 90 TABLET | Refills: 0 | Status: CANCELLED | OUTPATIENT
Start: 2023-10-18

## 2023-10-18 NOTE — PROGRESS NOTES
This provider is located at [place], [place]. The Patient is seen remotely using Video. Patient is being seen via telehealth and confirm that they are in a secure environment for this session. Patient is located in [place], [place] at her work. The patient's condition being diagnosed/treated is appropriate for telemedicine. Provider identified as Rupa Olivas as well as credentials APRN MSN PMHNP-BC.   The client/patient gave consent to be seen remotely, and when consent is given they understand that the consent allows for patient identifiable information to be sent to a third party as needed.  They may refuse to be seen remotely at any time. The electronic data is encrypted and password protected, and the patient has been advised of the potential risks to privacy not withstanding such measures.    Chief Complaint  Depression, Anxiety, and Sleeping Problem    Subjective        Taty Romo presents to [facility] for   History of Present Illness  Patient is seen today for follow-up visit for depression, anxiety, and insomnia.  Patient reports she continues to do well.  She rates both her depression and anxiety a 1-2 on a 1-10 scale with 10 being the worst.  Denies any hopelessness.  Denies any suicidal or homicidal ideation.  States her sleep has been disrupted some due to some medical issues with her carpal tunnel syndrome, but other than that it has been okay.  States she is having another consultation to see what they can do about her carpal tunnel and could possibly lead to surgery.  States her appetite is good.  States she is currently pleased with her medications.  States she takes the hydroxyzine at night to help with sleep.  States she takes propranolol on occasion but not very often.  Denies any manic type symptoms.  Denies any paranoia.  Denies any auditory or visual hallucinations.  Denies any side effects to the medications  Objective   Vital Signs:   There were  no vitals taken for this visit.      Mental Status Exam:   Hygiene:   good  Cooperation:  Cooperative  Eye Contact:  Good  Psychomotor Behavior:  Appropriate  Affect:  Full range  Mood: normal  Speech:  Normal  Thought Process:  Goal directed  Thought Content:  Normal  Suicidal:  None  Homicidal:  None  Hallucinations:  None  Delusion:  None  Memory:  Intact  Orientation:  Person, Place, Time, and Situation  Reliability:  good  Insight:  Good  Judgement:  Good  Impulse Control:  Good  Physical/Medical Issues:  No      Previous Provider notes and available records reviewed by this APRN today.   Current Medications:   Current Outpatient Medications   Medication Sig Dispense Refill    acetaminophen (Tylenol 8 Hour Arthritis Pain) 650 MG 8 hr tablet Take 1 tablet by mouth Every 8 (Eight) Hours As Needed for Mild Pain  or Moderate Pain . 100 tablet 2    Biotin 5000 MCG tablet Take 1 tablet by mouth Daily.      buPROPion XL (Wellbutrin XL) 300 MG 24 hr tablet Take 1 tablet by mouth Every Morning. 90 tablet 0    Calcium Polycarbophil (FIBER-CAPS PO) Take 2 capsules by mouth Daily.      cetirizine (zyrTEC) 10 MG tablet Take 1 tablet by mouth Daily.      Cholecalciferol (VITAMIN D) 2000 UNITS capsule Take 5,000 Units by mouth Every Evening.      Cyanocobalamin (B-12-SL) 1000 MCG sublingual tablet Place 1 tablet under the tongue Daily.      dicyclomine (BENTYL) 10 MG capsule Take 1 capsule by mouth 4 (Four) Times a Day Before Meals & at Bedtime. 60 capsule 2    gabapentin (NEURONTIN) 600 MG tablet Take 1 tablet by mouth 2 (Two) Times a Day. 60 tablet 3    hydroxychloroquine (PLAQUENIL) 200 MG tablet Take 1 tablet by mouth 2 (Two) Times a Day. 180 tablet 1    hydrOXYzine (ATARAX) 25 MG tablet Take 4 tablets by mouth At Night As Needed (sleep). 360 tablet 0    levothyroxine (Synthroid) 112 MCG tablet Take 1 tablet by mouth Daily. 30 tablet 0    linaclotide (Linzess) 72 MCG capsule capsule Take 1 capsule by mouth Every Morning  Before Breakfast. 30 capsule 11    meloxicam (MOBIC) 15 MG tablet Take 1 tablet by mouth daily. 90 tablet 0    nortriptyline (PAMELOR) 10 MG capsule Take 2 capsules by mouth Every Night. 60 capsule 11    ondansetron ODT (ZOFRAN-ODT) 8 MG disintegrating tablet Place 1 tablet on the tongue Every 8 (Eight) Hours As Needed for Nausea or Vomiting. 12 tablet 0    pantoprazole (PROTONIX) 40 MG EC tablet Take 1 tablet by mouth Daily. 90 tablet 3    propranolol (INDERAL) 10 MG tablet Take 1 tablet by mouth 2 (Two) Times a Day As Needed (Anxiety). 60 tablet 0    Turmeric 500 MG tablet Take 2 tablets by mouth Daily.      Vortioxetine HBr (TRINTELLIX) 20 MG tablet Take 1 tablet by mouth Daily. 90 tablet 0     No current facility-administered medications for this visit.       Physical Exam   Result Review :    The following data was reviewed by: RENE Guadalupe on 10/18/2023:  Common labs          2/13/2023    15:05   Common Labs   Glucose 85    BUN 15    Creatinine 0.95    Sodium 141    Potassium 4.0    Chloride 102    Calcium 9.7    Total Protein 6.9    Albumin 4.6    Total Bilirubin 0.2    Alkaline Phosphatase 46    AST (SGOT) 22    ALT (SGPT) 29    WBC 3.6    Hemoglobin 13.7    Hematocrit 39.8    Platelets 158    Total Cholesterol 194    Triglycerides 150    HDL Cholesterol 62    LDL Cholesterol  106    Hemoglobin A1C 5.3         Assessment and Plan   Problem List Items Addressed This Visit          Mental Health    Moderate episode of recurrent major depressive disorder - Primary (Chronic)    Overview     Has taken medication since 19.   - Mom PASSED 5/2021     Sees psychiatrist currently          Relevant Medications    Vortioxetine HBr (TRINTELLIX) 20 MG tablet    buPROPion XL (Wellbutrin XL) 300 MG 24 hr tablet    Generalized anxiety disorder    Relevant Medications    Vortioxetine HBr (TRINTELLIX) 20 MG tablet    buPROPion XL (Wellbutrin XL) 300 MG 24 hr tablet    Social anxiety disorder    Relevant Medications     Vortioxetine HBr (TRINTELLIX) 20 MG tablet    buPROPion XL (Wellbutrin XL) 300 MG 24 hr tablet     Other Visit Diagnoses       Insomnia due to mental condition        Relevant Medications    Vortioxetine HBr (TRINTELLIX) 20 MG tablet    buPROPion XL (Wellbutrin XL) 300 MG 24 hr tablet          Discussed treatment options with patient.  Patient is currently pleased with her medications.  Continue Trintellix 20 mg daily for depression and anxiety.  Continue Wellbutrin  mg daily for depression.  Continue propranolol 10 mg twice daily as needed for anxiety.  Continue hydroxyzine 100 mg nightly as needed for sleep.  We will see patient again in 3 months to reassess.  Encouraged patient to contact the office if she has any issues sooner.    TREATMENT PLAN/GOALS: Continue supportive psychotherapy efforts and medications as indicated. Treatment and medication options discussed during today's visit. Patient ackowledged and verbally consented to continue with current treatment plan and was educated on the importance of compliance with treatment and follow-up appointments.    DEPRESSION:  Patient screened positive for depression based on a PHQ-9 score of 1 on 8/15/2023. Follow-up recommendations include: Prescribed antidepressant medication treatment.       MEDICATION ISSUES:  We discussed risks, benefits, and side effects of the above medications and the patient was agreeable with the plan. Patient was educated on the importance of compliance with treatment and follow-up appointments.  Patient is agreeable to call the office with any worsening of symptoms or onset of side effects. Patient is agreeable to call 911 or go to the nearest ER should he/she begin having SI/HI.      Counseled patient regarding multimodal approach with healthy nutrition, healthy sleep, regular physical activity, social activities, counseling, and medications.      Coping skills reviewed and encouraged positive framing of thoughts     Assisted  patient in processing above session content; acknowledged and normalized patient’s thoughts, feelings, and concerns.  Applied  positive coping skills and behavior management in session.  Allowed patient to freely discuss issues without interruption or judgment. Provided safe, confidential environment to facilitate the development of positive therapeutic relationship and encourage open, honest communication. Assisted patient in identifying risk factors which would indicate the need for higher level of care including thoughts to harm self or others and/or self-harming behavior and encouraged patient to contact this office, call 911, or present to the nearest emergency room should any of these events occur. Discussed crisis intervention services and means to access. If patient has any concerns or needs assistance they were instructed to call the [facility] at [phone].    MEDS ORDERED DURING VISIT:  New Medications Ordered This Visit   Medications    Vortioxetine HBr (TRINTELLIX) 20 MG tablet     Sig: Take 1 tablet by mouth Daily.     Dispense:  90 tablet     Refill:  0    buPROPion XL (Wellbutrin XL) 300 MG 24 hr tablet     Sig: Take 1 tablet by mouth Every Morning.     Dispense:  90 tablet     Refill:  0         Follow Up   Return in about 3 months (around 1/18/2024) for Video visit.    Patient was given instructions and counseling regarding her condition or for health maintenance advice. Please see specific information pulled into the AVS if appropriate.         This document has been electronically signed by RENE Guadalupe  October 18, 2023 14:45 EDT    Part of this note may be an electronic transcription/translation of spoken language to printed text using the Dragon Dictation System.

## 2023-10-24 ENCOUNTER — LAB (OUTPATIENT)
Dept: LAB | Facility: HOSPITAL | Age: 43
End: 2023-10-24
Payer: COMMERCIAL

## 2023-10-24 ENCOUNTER — TRANSCRIBE ORDERS (OUTPATIENT)
Dept: LAB | Facility: HOSPITAL | Age: 43
End: 2023-10-24
Payer: COMMERCIAL

## 2023-10-24 DIAGNOSIS — M32.19 DILATED CARDIOMYOPATHY SECONDARY TO SYSTEMIC LUPUS ERYTHEMATOSUS: Primary | ICD-10-CM

## 2023-10-24 DIAGNOSIS — M51.36 DEGENERATION OF LUMBAR INTERVERTEBRAL DISC: ICD-10-CM

## 2023-10-24 DIAGNOSIS — I43 DILATED CARDIOMYOPATHY SECONDARY TO SYSTEMIC LUPUS ERYTHEMATOSUS: Primary | ICD-10-CM

## 2023-10-24 DIAGNOSIS — M32.19 DILATED CARDIOMYOPATHY SECONDARY TO SYSTEMIC LUPUS ERYTHEMATOSUS: ICD-10-CM

## 2023-10-24 DIAGNOSIS — I43 DILATED CARDIOMYOPATHY SECONDARY TO SYSTEMIC LUPUS ERYTHEMATOSUS: ICD-10-CM

## 2023-10-24 LAB
ALT SERPL W P-5'-P-CCNC: 31 U/L (ref 1–33)
AST SERPL-CCNC: 22 U/L (ref 1–32)
BACTERIA UR QL AUTO: NORMAL /HPF
BASOPHILS # BLD AUTO: 0.03 10*3/MM3 (ref 0–0.2)
BASOPHILS NFR BLD AUTO: 0.8 % (ref 0–1.5)
BILIRUB UR QL STRIP: NEGATIVE
C4 SERPL-MCNC: 14 MG/DL (ref 14–44)
CLARITY UR: CLEAR
COLOR UR: YELLOW
CREAT SERPL-MCNC: 0.88 MG/DL (ref 0.57–1)
CREAT UR-MCNC: 113.3 MG/DL
CRP SERPL-MCNC: <0.3 MG/DL (ref 0–0.5)
DEPRECATED RDW RBC AUTO: 43.9 FL (ref 37–54)
EGFRCR SERPLBLD CKD-EPI 2021: 84.3 ML/MIN/1.73
EOSINOPHIL # BLD AUTO: 0.05 10*3/MM3 (ref 0–0.4)
EOSINOPHIL NFR BLD AUTO: 1.3 % (ref 0.3–6.2)
ERYTHROCYTE [DISTWIDTH] IN BLOOD BY AUTOMATED COUNT: 13 % (ref 12.3–15.4)
ERYTHROCYTE [SEDIMENTATION RATE] IN BLOOD: 6 MM/HR (ref 0–20)
GLUCOSE UR STRIP-MCNC: NEGATIVE MG/DL
HCT VFR BLD AUTO: 38.7 % (ref 34–46.6)
HGB BLD-MCNC: 13.3 G/DL (ref 12–15.9)
HGB UR QL STRIP.AUTO: NEGATIVE
HOLD SPECIMEN: NORMAL
HYALINE CASTS UR QL AUTO: NORMAL /LPF
IMM GRANULOCYTES # BLD AUTO: 0.01 10*3/MM3 (ref 0–0.05)
IMM GRANULOCYTES NFR BLD AUTO: 0.3 % (ref 0–0.5)
KETONES UR QL STRIP: NEGATIVE
LEUKOCYTE ESTERASE UR QL STRIP.AUTO: ABNORMAL
LYMPHOCYTES # BLD AUTO: 1.16 10*3/MM3 (ref 0.7–3.1)
LYMPHOCYTES NFR BLD AUTO: 29.8 % (ref 19.6–45.3)
MCH RBC QN AUTO: 31.8 PG (ref 26.6–33)
MCHC RBC AUTO-ENTMCNC: 34.4 G/DL (ref 31.5–35.7)
MCV RBC AUTO: 92.6 FL (ref 79–97)
MONOCYTES # BLD AUTO: 0.32 10*3/MM3 (ref 0.1–0.9)
MONOCYTES NFR BLD AUTO: 8.2 % (ref 5–12)
NEUTROPHILS NFR BLD AUTO: 2.32 10*3/MM3 (ref 1.7–7)
NEUTROPHILS NFR BLD AUTO: 59.6 % (ref 42.7–76)
NITRITE UR QL STRIP: NEGATIVE
NRBC BLD AUTO-RTO: 0 /100 WBC (ref 0–0.2)
PH UR STRIP.AUTO: 6 [PH] (ref 5–8)
PLATELET # BLD AUTO: 163 10*3/MM3 (ref 140–450)
PMV BLD AUTO: 11.5 FL (ref 6–12)
PROT ?TM UR-MCNC: 7.1 MG/DL
PROT UR QL STRIP: NEGATIVE
RBC # BLD AUTO: 4.18 10*6/MM3 (ref 3.77–5.28)
RBC # UR STRIP: NORMAL /HPF
REF LAB TEST METHOD: NORMAL
SP GR UR STRIP: 1.02 (ref 1–1.03)
SQUAMOUS #/AREA URNS HPF: NORMAL /HPF
UROBILINOGEN UR QL STRIP: ABNORMAL
VIT B12 BLD-MCNC: 668 PG/ML (ref 211–946)
WBC # UR STRIP: NORMAL /HPF
WBC NRBC COR # BLD: 3.89 10*3/MM3 (ref 3.4–10.8)

## 2023-10-24 PROCEDURE — 82607 VITAMIN B-12: CPT

## 2023-10-24 PROCEDURE — 82565 ASSAY OF CREATININE: CPT

## 2023-10-24 PROCEDURE — 86038 ANTINUCLEAR ANTIBODIES: CPT

## 2023-10-24 PROCEDURE — 85652 RBC SED RATE AUTOMATED: CPT

## 2023-10-24 PROCEDURE — 81001 URINALYSIS AUTO W/SCOPE: CPT

## 2023-10-24 PROCEDURE — 86140 C-REACTIVE PROTEIN: CPT

## 2023-10-24 PROCEDURE — 84156 ASSAY OF PROTEIN URINE: CPT

## 2023-10-24 PROCEDURE — 84450 TRANSFERASE (AST) (SGOT): CPT

## 2023-10-24 PROCEDURE — 86255 FLUORESCENT ANTIBODY SCREEN: CPT

## 2023-10-24 PROCEDURE — 86160 COMPLEMENT ANTIGEN: CPT

## 2023-10-24 PROCEDURE — 36415 COLL VENOUS BLD VENIPUNCTURE: CPT

## 2023-10-24 PROCEDURE — 86431 RHEUMATOID FACTOR QUANT: CPT

## 2023-10-24 PROCEDURE — 84460 ALANINE AMINO (ALT) (SGPT): CPT

## 2023-10-24 PROCEDURE — 82570 ASSAY OF URINE CREATININE: CPT

## 2023-10-24 PROCEDURE — 85025 COMPLETE CBC W/AUTO DIFF WBC: CPT

## 2023-10-25 LAB
ANA SER QL: POSITIVE
C3 SERPL-MCNC: 114 MG/DL (ref 82–167)
C4 SERPL-MCNC: 14 MG/DL (ref 12–38)
RHEUMATOID FACT SERPL-ACNC: 12.7 IU/ML

## 2023-10-26 LAB — DSDNA AB SER QL CLIF: NEGATIVE

## 2023-11-09 DIAGNOSIS — E03.8 HYPOTHYROIDISM DUE TO HASHIMOTO'S THYROIDITIS: ICD-10-CM

## 2023-11-09 DIAGNOSIS — E06.3 HYPOTHYROIDISM DUE TO HASHIMOTO'S THYROIDITIS: ICD-10-CM

## 2023-11-09 RX ORDER — LEVOTHYROXINE SODIUM 112 UG/1
112 TABLET ORAL DAILY
Qty: 90 TABLET | Refills: 1 | Status: SHIPPED | OUTPATIENT
Start: 2023-11-09

## 2024-01-03 DIAGNOSIS — J30.1 SEASONAL ALLERGIC RHINITIS DUE TO POLLEN: Primary | Chronic | ICD-10-CM

## 2024-01-03 RX ORDER — AMOXICILLIN AND CLAVULANATE POTASSIUM 500; 125 MG/1; MG/1
1 TABLET, FILM COATED ORAL 2 TIMES DAILY
Qty: 20 TABLET | Refills: 0 | Status: SHIPPED | OUTPATIENT
Start: 2024-01-03

## 2024-01-04 DIAGNOSIS — F41.1 GENERALIZED ANXIETY DISORDER: Chronic | ICD-10-CM

## 2024-01-04 DIAGNOSIS — G47.01 INSOMNIA DUE TO MEDICAL CONDITION: ICD-10-CM

## 2024-01-09 RX ORDER — HYDROXYZINE HYDROCHLORIDE 25 MG/1
100 TABLET, FILM COATED ORAL NIGHTLY PRN
Qty: 360 TABLET | Refills: 0 | Status: SHIPPED | OUTPATIENT
Start: 2024-01-09

## 2024-01-15 ENCOUNTER — TELEMEDICINE (OUTPATIENT)
Dept: PSYCHIATRY | Facility: CLINIC | Age: 44
End: 2024-01-15
Payer: COMMERCIAL

## 2024-01-15 DIAGNOSIS — F41.1 GENERALIZED ANXIETY DISORDER: Primary | Chronic | ICD-10-CM

## 2024-01-15 DIAGNOSIS — F33.1 MODERATE EPISODE OF RECURRENT MAJOR DEPRESSIVE DISORDER: Chronic | ICD-10-CM

## 2024-01-15 PROCEDURE — 99214 OFFICE O/P EST MOD 30 MIN: CPT

## 2024-01-15 RX ORDER — BUPROPION HYDROCHLORIDE 300 MG/1
300 TABLET ORAL EVERY MORNING
Qty: 90 TABLET | Refills: 0 | Status: SHIPPED | OUTPATIENT
Start: 2024-01-15

## 2024-01-15 NOTE — PROGRESS NOTES
This provider is located at [place], [place]. The Patient is seen remotely using Video. Patient is being seen via telehealth and confirm that they are in a secure environment for this session. Patient is located in [place], [place] in her car. The patient's condition being diagnosed/treated is appropriate for telemedicine. Provider identified as Rupa Olivas as well as credentials APRN MSN PMHNP-BC.   The client/patient gave consent to be seen remotely, and when consent is given they understand that the consent allows for patient identifiable information to be sent to a third party as needed.  They may refuse to be seen remotely at any time. The electronic data is encrypted and password protected, and the patient has been advised of the potential risks to privacy not withstanding such measures.    Chief Complaint  Depression and Anxiety    Subjective        Taty Romo presents to [facility] for   History of Present Illness  Patient is seen today for follow-up visit for depression and anxiety.  Patient reports that she has been doing well.  She states she did run out of hydroxyzine last week and has not been taking it.  States her sleep has remained good without the medication and she thinks she would like to stop the hydroxyzine.  She states her depression and anxiety have remained stable.  She rates both depression and anxiety of 1-2 on a 1-10 scale with 10 being the worst.  Denies any hopelessness.  Denies any suicidal or homicidal ideation.  Appetite is good.  Denies any panic attacks.  States she has used propranolol on occasion.  Denies any manic type symptoms.  Denies any paranoia.  Denies any auditory or visual hallucinations.  States she has been having weekly episodes of tachycardia.  Patient is asking if the Wellbutrin could be contributing to that.  Denies any other side effects to the medications.  Objective   Vital Signs:   There were no vitals taken for this  visit.      Mental Status Exam:   Hygiene:   good  Cooperation:  Cooperative  Eye Contact:  Good  Psychomotor Behavior:  Appropriate  Affect:  Full range  Mood: normal  Speech:  Normal  Thought Process:  Goal directed  Thought Content:  Normal  Suicidal:  None  Homicidal:  None  Hallucinations:  None  Delusion:  None  Memory:  Intact  Orientation:  Person, Place, Time, and Situation  Reliability:  good  Insight:  Good  Judgement:  Good  Impulse Control:  Good  Physical/Medical Issues:  No      PHQ-9 Depression Screening  Little interest or pleasure in doing things? (P) 1-->several days   Feeling down, depressed, or hopeless? (P) 1-->several days   Trouble falling or staying asleep, or sleeping too much? (P) 1-->several days   Feeling tired or having little energy? (P) 1-->several days   Poor appetite or overeating? (P) 1-->several days   Feeling bad about yourself - or that you are a failure or have let yourself or your family down? (P) 0-->not at all   Trouble concentrating on things, such as reading the newspaper or watching television? (P) 1-->several days   Moving or speaking so slowly that other people could have noticed? Or the opposite - being so fidgety or restless that you have been moving around a lot more than usual? (P) 0-->not at all   Thoughts that you would be better off dead, or of hurting yourself in some way? (P) 0-->not at all   PHQ-9 Total Score (P) 6   If you checked off any problems, how difficult have these problems made it for you to do your work, take care of things at home, or get along with other people? (P) not difficult at all        PHQ-9 Total Score: (P) 6     COURT 7 anxiety screening tool that patient filled out virtually reviewed by this APRN at today's encounter.    Previous Provider notes and available records reviewed by this APRN today.   Current Medications:   Current Outpatient Medications   Medication Sig Dispense Refill    acetaminophen (Tylenol 8 Hour Arthritis Pain) 650 MG 8  hr tablet Take 1 tablet by mouth Every 8 (Eight) Hours As Needed for Mild Pain  or Moderate Pain . 100 tablet 2    amoxicillin-clavulanate (Augmentin) 500-125 MG per tablet Take 1 tablet by mouth 2 (Two) Times a Day. 20 tablet 0    Biotin 5000 MCG tablet Take 1 tablet by mouth Daily.      buPROPion XL (Wellbutrin XL) 300 MG 24 hr tablet Take 1 tablet by mouth Every Morning. 90 tablet 0    Calcium Polycarbophil (FIBER-CAPS PO) Take 2 capsules by mouth Daily.      cetirizine (zyrTEC) 10 MG tablet Take 1 tablet by mouth Daily.      Cholecalciferol (VITAMIN D) 2000 UNITS capsule Take 5,000 Units by mouth Every Evening.      Cyanocobalamin (B-12-SL) 1000 MCG sublingual tablet Place 1 tablet under the tongue Daily.      dicyclomine (BENTYL) 10 MG capsule Take 1 capsule by mouth 4 (Four) Times a Day Before Meals & at Bedtime. 60 capsule 2    gabapentin (NEURONTIN) 600 MG tablet Take 1 tablet by mouth 2 (Two) Times a Day. 60 tablet 3    hydroxychloroquine (PLAQUENIL) 200 MG tablet Take 1 tablet by mouth 2 (Two) Times a Day. 180 tablet 1    hydrOXYzine (ATARAX) 25 MG tablet Take 4 tablets by mouth At Night As Needed for sleep. 360 tablet 0    levothyroxine (Synthroid) 112 MCG tablet Take 1 tablet by mouth Daily. 90 tablet 1    linaclotide (Linzess) 72 MCG capsule capsule Take 1 capsule by mouth Every Morning Before Breakfast. 30 capsule 11    meloxicam (MOBIC) 15 MG tablet Take 1 tablet by mouth daily. 90 tablet 0    nortriptyline (PAMELOR) 10 MG capsule Take 2 capsules by mouth Every Night. 60 capsule 11    ondansetron ODT (ZOFRAN-ODT) 8 MG disintegrating tablet Place 1 tablet on the tongue Every 8 (Eight) Hours As Needed for Nausea or Vomiting. 12 tablet 0    pantoprazole (PROTONIX) 40 MG EC tablet Take 1 tablet by mouth Daily. 90 tablet 3    propranolol (INDERAL) 10 MG tablet Take 1 tablet by mouth 2 (Two) Times a Day As Needed (Anxiety). 60 tablet 0    Turmeric 500 MG tablet Take 2 tablets by mouth Daily.       Vortioxetine HBr (TRINTELLIX) 20 MG tablet Take 1 tablet by mouth Daily. 90 tablet 0     No current facility-administered medications for this visit.       Physical Exam   Result Review :    The following data was reviewed by: RENE Guadalupe on 01/15/2024:  Common labs          2/13/2023    15:05 10/24/2023    14:53   Common Labs   Glucose 85     BUN 15     Creatinine 0.95  0.88    Sodium 141     Potassium 4.0     Chloride 102     Calcium 9.7     Total Protein 6.9     Albumin 4.6     Total Bilirubin 0.2     Alkaline Phosphatase 46     AST (SGOT) 22  22    ALT (SGPT) 29  31    WBC 3.6  3.89    Hemoglobin 13.7  13.3    Hematocrit 39.8  38.7    Platelets 158  163    Total Cholesterol 194     Triglycerides 150     HDL Cholesterol 62     LDL Cholesterol  106     Hemoglobin A1C 5.3          Assessment and Plan   Problem List Items Addressed This Visit          Mental Health    Moderate episode of recurrent major depressive disorder (Chronic)    Overview     Has taken medication since 19.   - Mom PASSED 5/2021     Sees psychiatrist currently          Relevant Medications    buPROPion XL (Wellbutrin XL) 300 MG 24 hr tablet    Generalized anxiety disorder - Primary    Relevant Medications    buPROPion XL (Wellbutrin XL) 300 MG 24 hr tablet     Discussed treatment options with patient.  Discussed with patient that Wellbutrin can increase blood pressure and pulse, but with the tachycardia she describes being intermittent that this APRN does not think it would be the culprit.  Encouraged patient to journal when she has these episodes of tachycardia to see if there are any other factors that may be contributing to it such as diet, stress, or anxiety.  Patient agreed.  Also discussed with patient that if the episodes continue and she wants to try to taper off of the Wellbutrin, that we can do that.  Patient states she will journal and see how often that is happening and if there are any precipitating factors and let this  RENE know.  Continue Wellbutrin  mg daily for depression.  Continue Trintellix 20 mg daily for depression and anxiety.  We will keep hydroxyzine 100 mg nightly as needed for sleep on her chart for now, but may discontinue in the future if patient's sleep remains good.  Continue propranolol 10 mg twice daily as needed for anxiety.  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  Patient reports that when these tachycardia episodes have happened that she has taken the propranolol and this has helped with her heart rate.    TREATMENT PLAN/GOALS: Continue supportive psychotherapy efforts and medications as indicated. Treatment and medication options discussed during today's visit. Patient ackowledged and verbally consented to continue with current treatment plan and was educated on the importance of compliance with treatment and follow-up appointments.    DEPRESSION:  Patient screened positive for depression based on a PHQ-9 score of 6 on 1/15/2024. Follow-up recommendations include: Prescribed antidepressant medication treatment.       MEDICATION ISSUES:  We discussed risks, benefits, and side effects of the above medications and the patient was agreeable with the plan. Patient was educated on the importance of compliance with treatment and follow-up appointments.  Patient is agreeable to call the office with any worsening of symptoms or onset of side effects. Patient is agreeable to call 911 or go to the nearest ER should he/she begin having SI/HI.      Counseled patient regarding multimodal approach with healthy nutrition, healthy sleep, regular physical activity, social activities, counseling, and medications.      Coping skills reviewed and encouraged positive framing of thoughts     Assisted patient in processing above session content; acknowledged and normalized patient’s thoughts, feelings, and  concerns.  Applied  positive coping skills and behavior management in session.  Allowed patient to freely discuss issues without interruption or judgment. Provided safe, confidential environment to facilitate the development of positive therapeutic relationship and encourage open, honest communication. Assisted patient in identifying risk factors which would indicate the need for higher level of care including thoughts to harm self or others and/or self-harming behavior and encouraged patient to contact this office, call 911, or present to the nearest emergency room should any of these events occur. Discussed crisis intervention services and means to access. If patient has any concerns or needs assistance they were instructed to call the [facility] at [phone].    MEDS ORDERED DURING VISIT:  New Medications Ordered This Visit   Medications    buPROPion XL (Wellbutrin XL) 300 MG 24 hr tablet     Sig: Take 1 tablet by mouth Every Morning.     Dispense:  90 tablet     Refill:  0         Follow Up   Return in about 3 months (around 4/15/2024) for Video visit.    Patient was given instructions and counseling regarding her condition or for health maintenance advice. Please see specific information pulled into the AVS if appropriate.         This document has been electronically signed by RENE Guadalupe  January 15, 2024 17:09 EST    Part of this note may be an electronic transcription/translation of spoken language to printed text using the Dragon Dictation System.

## 2024-01-19 ENCOUNTER — TELEPHONE (OUTPATIENT)
Dept: GASTROENTEROLOGY | Facility: CLINIC | Age: 44
End: 2024-01-19
Payer: COMMERCIAL

## 2024-01-19 DIAGNOSIS — R11.2 NAUSEA AND VOMITING, UNSPECIFIED VOMITING TYPE: Primary | ICD-10-CM

## 2024-01-23 ENCOUNTER — TELEPHONE (OUTPATIENT)
Dept: GASTROENTEROLOGY | Facility: CLINIC | Age: 44
End: 2024-01-23
Payer: COMMERCIAL

## 2024-01-24 ENCOUNTER — TELEPHONE (OUTPATIENT)
Dept: GASTROENTEROLOGY | Facility: CLINIC | Age: 44
End: 2024-01-24
Payer: COMMERCIAL

## 2024-01-24 PROBLEM — R11.2 NAUSEA AND VOMITING: Status: ACTIVE | Noted: 2024-01-19

## 2024-01-24 NOTE — TELEPHONE ENCOUNTER
LISA RODRIGUEZ IN SCHEDULING PT SCHEDULED ON 05/06/2024 ARRIVING AT 8:00AM.EGD PREP INSTRUCTIONS MAILED TO ADDRESS ON FILE VERIFIED BY PT.[employer] FOR HUB TO READ

## 2024-02-06 DIAGNOSIS — F33.1 MODERATE EPISODE OF RECURRENT MAJOR DEPRESSIVE DISORDER: Chronic | ICD-10-CM

## 2024-02-06 DIAGNOSIS — R11.10 VOMITING AND DIARRHEA: ICD-10-CM

## 2024-02-06 DIAGNOSIS — F40.10 SOCIAL ANXIETY DISORDER: ICD-10-CM

## 2024-02-06 DIAGNOSIS — R19.7 VOMITING AND DIARRHEA: ICD-10-CM

## 2024-02-06 DIAGNOSIS — Z86.16 HISTORY OF COVID-19: ICD-10-CM

## 2024-02-06 DIAGNOSIS — F41.1 GENERALIZED ANXIETY DISORDER: Chronic | ICD-10-CM

## 2024-02-06 RX ORDER — ONDANSETRON 8 MG/1
8 TABLET, ORALLY DISINTEGRATING ORAL EVERY 8 HOURS PRN
Qty: 12 TABLET | Refills: 0 | Status: SHIPPED | OUTPATIENT
Start: 2024-02-06

## 2024-02-08 RX ORDER — VORTIOXETINE 20 MG/1
20 TABLET, FILM COATED ORAL DAILY
Qty: 90 TABLET | Refills: 0 | Status: SHIPPED | OUTPATIENT
Start: 2024-02-08

## 2024-02-08 RX ORDER — BUPROPION HYDROCHLORIDE 300 MG/1
300 TABLET ORAL EVERY MORNING
Qty: 90 TABLET | Refills: 0 | Status: SHIPPED | OUTPATIENT
Start: 2024-02-08

## 2024-02-11 RX ORDER — PANTOPRAZOLE SODIUM 40 MG/1
40 TABLET, DELAYED RELEASE ORAL DAILY
Qty: 90 TABLET | Refills: 3 | Status: SHIPPED | OUTPATIENT
Start: 2024-02-11

## 2024-04-08 ENCOUNTER — TELEMEDICINE (OUTPATIENT)
Dept: PSYCHIATRY | Facility: CLINIC | Age: 44
End: 2024-04-08
Payer: COMMERCIAL

## 2024-04-08 DIAGNOSIS — F40.10 SOCIAL ANXIETY DISORDER: ICD-10-CM

## 2024-04-08 DIAGNOSIS — F41.1 GENERALIZED ANXIETY DISORDER: Chronic | ICD-10-CM

## 2024-04-08 DIAGNOSIS — G47.01 INSOMNIA DUE TO MEDICAL CONDITION: ICD-10-CM

## 2024-04-08 DIAGNOSIS — F33.1 MODERATE EPISODE OF RECURRENT MAJOR DEPRESSIVE DISORDER: Chronic | ICD-10-CM

## 2024-04-08 PROCEDURE — 99214 OFFICE O/P EST MOD 30 MIN: CPT

## 2024-04-08 RX ORDER — BUPROPION HYDROCHLORIDE 300 MG/1
300 TABLET ORAL EVERY MORNING
Qty: 90 TABLET | Refills: 0 | Status: SHIPPED | OUTPATIENT
Start: 2024-04-08

## 2024-04-08 RX ORDER — VORTIOXETINE 20 MG/1
20 TABLET, FILM COATED ORAL DAILY
Qty: 90 TABLET | Refills: 0 | Status: SHIPPED | OUTPATIENT
Start: 2024-04-08

## 2024-04-08 RX ORDER — HYDROXYZINE HYDROCHLORIDE 25 MG/1
100 TABLET, FILM COATED ORAL NIGHTLY PRN
Qty: 360 TABLET | Refills: 0 | Status: SHIPPED | OUTPATIENT
Start: 2024-04-08

## 2024-04-08 NOTE — PROGRESS NOTES
This provider is located at [place], [place]. The Patient is seen remotely using Video. Patient is being seen via telehealth and confirm that they are in a secure environment for this session. Patient is located in [place], [place] at her home. The patient's condition being diagnosed/treated is appropriate for telemedicine. Provider identified as Rupa Olivas as well as credentials RENE MSN PMHNP-BC.   The client/patient gave consent to be seen remotely, and when consent is given they understand that the consent allows for patient identifiable information to be sent to a third party as needed.  They may refuse to be seen remotely at any time. The electronic data is encrypted and password protected, and the patient has been advised of the potential risks to privacy not withstanding such measures.    Chief Complaint  Depression, Anxiety, and Sleeping Problem    Subjective        Taty Romo presents to [facility] for   History of Present Illness  Patient is seen today for follow-up visit for depression, anxiety, and insomnia.  Patient reports she did have to start taking the hydroxyzine again to help with sleep as her insomnia came back.  States her depression and anxiety have remained under control with current medications.  Rates both depression and anxiety a 1-2 on a 1-10 scale with 10 being the worst.  Denies any hopelessness.  Denies any suicidal or homicidal ideation.  States she has been having some weight gain and struggling to diet.  She states she thinks she is perimenopausal and currently undergoing some lab work with her primary care physician to find out about her hormone levels.  States also she has been having some vomiting episodes.  States she has an upcoming EGD to investigate that.  She states that tachycardic episodes that she described at last visit have become less and are currently not a problem for her.  Denies any manic type symptoms.  Denies any  paranoia.  Denies any auditory or visual hallucinations.  Denies any side effects to the medications.  States she has not really been having to take the propranolol at all.  Objective   Vital Signs:   There were no vitals taken for this visit.      Mental Status Exam:   Hygiene:   good  Cooperation:  Cooperative  Eye Contact:  Good  Psychomotor Behavior:  Appropriate  Affect:  Full range  Mood: normal  Speech:  Normal  Thought Process:  Goal directed  Thought Content:  Normal  Suicidal:  None  Homicidal:  None  Hallucinations:  None  Delusion:  None  Memory:  Intact  Orientation:  Person, Place, Time, and Situation  Reliability:  good  Insight:  Good  Judgement:  Good  Impulse Control:  Good  Physical/Medical Issues:  No      PHQ-9 Depression Screening  Little interest or pleasure in doing things? (P) 1-->several days   Feeling down, depressed, or hopeless? (P) 1-->several days   Trouble falling or staying asleep, or sleeping too much? (P) 3-->nearly every day   Feeling tired or having little energy? (P) 3-->nearly every day   Poor appetite or overeating? (P) 3-->nearly every day   Feeling bad about yourself - or that you are a failure or have let yourself or your family down? (P) 0-->not at all   Trouble concentrating on things, such as reading the newspaper or watching television? (P) 0-->not at all   Moving or speaking so slowly that other people could have noticed? Or the opposite - being so fidgety or restless that you have been moving around a lot more than usual? (P) 0-->not at all   Thoughts that you would be better off dead, or of hurting yourself in some way? (P) 0-->not at all   PHQ-9 Total Score (P) 11   If you checked off any problems, how difficult have these problems made it for you to do your work, take care of things at home, or get along with other people? (P) somewhat difficult        PHQ-9 Total Score: (P) 11     COURT 7 anxiety screening tool that patient filled out virtually reviewed by this APRN  at today's encounter.    Previous Provider notes and available records reviewed by this APRN today.   Current Medications:   Current Outpatient Medications   Medication Sig Dispense Refill    acetaminophen (Tylenol 8 Hour Arthritis Pain) 650 MG 8 hr tablet Take 1 tablet by mouth Every 8 (Eight) Hours As Needed for Mild Pain  or Moderate Pain . 100 tablet 2    amoxicillin-clavulanate (Augmentin) 500-125 MG per tablet Take 1 tablet by mouth 2 (Two) Times a Day. 20 tablet 0    Biotin 5000 MCG tablet Take 1 tablet by mouth Daily.      buPROPion XL (Wellbutrin XL) 300 MG 24 hr tablet Take 1 tablet by mouth Every Morning. 90 tablet 0    Calcium Polycarbophil (FIBER-CAPS PO) Take 2 capsules by mouth Daily.      cetirizine (zyrTEC) 10 MG tablet Take 1 tablet by mouth Daily.      Cholecalciferol (VITAMIN D) 2000 UNITS capsule Take 5,000 Units by mouth Every Evening.      Cyanocobalamin (B-12-SL) 1000 MCG sublingual tablet Place 1 tablet under the tongue Daily.      dicyclomine (BENTYL) 10 MG capsule Take 1 capsule by mouth 4 (Four) Times a Day Before Meals & at Bedtime. 60 capsule 2    gabapentin (NEURONTIN) 600 MG tablet Take 1 tablet by mouth 2 (Two) Times a Day. 60 tablet 3    hydroxychloroquine (PLAQUENIL) 200 MG tablet Take 1 tablet by mouth 2 (Two) Times a Day. 180 tablet 1    hydrOXYzine (ATARAX) 25 MG tablet Take 4 tablets by mouth At Night As Needed for sleep. 360 tablet 0    levothyroxine (Synthroid) 112 MCG tablet Take 1 tablet by mouth Daily. 90 tablet 1    linaclotide (Linzess) 72 MCG capsule capsule Take 1 capsule by mouth Every Morning Before Breakfast. 30 capsule 11    meloxicam (MOBIC) 15 MG tablet Take 1 tablet by mouth daily. 90 tablet 0    nortriptyline (PAMELOR) 10 MG capsule Take 2 capsules by mouth Every Night. 60 capsule 11    ondansetron ODT (ZOFRAN-ODT) 8 MG disintegrating tablet Place 1 tablet on the tongue Every 8 (Eight) Hours As Needed for Nausea or Vomiting. 12 tablet 0    pantoprazole  (PROTONIX) 40 MG EC tablet Take 1 tablet by mouth Daily. 90 tablet 3    propranolol (INDERAL) 10 MG tablet Take 1 tablet by mouth 2 (Two) Times a Day As Needed (Anxiety). 60 tablet 0    Turmeric 500 MG tablet Take 2 tablets by mouth Daily.      Vortioxetine HBr (Trintellix) 20 MG tablet Take 1 tablet by mouth Daily. 90 tablet 0     No current facility-administered medications for this visit.       Physical Exam   Result Review :    The following data was reviewed by: RENE Guadalupe on 04/08/2024:  Common labs          10/24/2023    14:53   Common Labs   Creatinine 0.88    AST (SGOT) 22    ALT (SGPT) 31    WBC 3.89    Hemoglobin 13.3    Hematocrit 38.7    Platelets 163         Assessment and Plan   Problem List Items Addressed This Visit          Mental Health    Moderate episode of recurrent major depressive disorder (Chronic)    Overview     Has taken medication since 19.   - Mom PASSED 5/2021     Sees psychiatrist currently          Relevant Medications    hydrOXYzine (ATARAX) 25 MG tablet    buPROPion XL (Wellbutrin XL) 300 MG 24 hr tablet    Vortioxetine HBr (Trintellix) 20 MG tablet    Generalized anxiety disorder    Relevant Medications    hydrOXYzine (ATARAX) 25 MG tablet    buPROPion XL (Wellbutrin XL) 300 MG 24 hr tablet    Vortioxetine HBr (Trintellix) 20 MG tablet    Social anxiety disorder    Relevant Medications    hydrOXYzine (ATARAX) 25 MG tablet    buPROPion XL (Wellbutrin XL) 300 MG 24 hr tablet    Vortioxetine HBr (Trintellix) 20 MG tablet       Sleep    Insomnia (Chronic)    Relevant Medications    hydrOXYzine (ATARAX) 25 MG tablet     Discussed treatment options with patient.  Patient is currently pleased with her medications.  Continue Trintellix 20 mg daily for depression and anxiety.  Continue Wellbutrin  mg daily for depression.  Continue hydroxyzine 25 mg take up to 4 tablets nightly as needed for sleep.  Continue propranolol 10 mg 2 times daily as needed for anxiety.  We will  see patient again in 3 months to reassess.  Encouraged patient to contact the office if she has any issues sooner.    TREATMENT PLAN/GOALS: Continue supportive psychotherapy efforts and medications as indicated. Treatment and medication options discussed during today's visit. Patient ackowledged and verbally consented to continue with current treatment plan and was educated on the importance of compliance with treatment and follow-up appointments.    DEPRESSION:  Patient screened positive for depression based on a PHQ-9 score of 11 on 4/7/2024. Follow-up recommendations include: Prescribed antidepressant medication treatment.       MEDICATION ISSUES:  We discussed risks, benefits, and side effects of the above medications and the patient was agreeable with the plan. Patient was educated on the importance of compliance with treatment and follow-up appointments.  Patient is agreeable to call the office with any worsening of symptoms or onset of side effects. Patient is agreeable to call 911 or go to the nearest ER should he/she begin having SI/HI.      Counseled patient regarding multimodal approach with healthy nutrition, healthy sleep, regular physical activity, social activities, counseling, and medications.      Coping skills reviewed and encouraged positive framing of thoughts     Assisted patient in processing above session content; acknowledged and normalized patient’s thoughts, feelings, and concerns.  Applied  positive coping skills and behavior management in session.  Allowed patient to freely discuss issues without interruption or judgment. Provided safe, confidential environment to facilitate the development of positive therapeutic relationship and encourage open, honest communication. Assisted patient in identifying risk factors which would indicate the need for higher level of care including thoughts to harm self or others and/or self-harming behavior and encouraged patient to contact this office, call  911, or present to the nearest emergency room should any of these events occur. Discussed crisis intervention services and means to access. If patient has any concerns or needs assistance they were instructed to call the [facility] at [phone].    MEDS ORDERED DURING VISIT:  New Medications Ordered This Visit   Medications    hydrOXYzine (ATARAX) 25 MG tablet     Sig: Take 4 tablets by mouth At Night As Needed for sleep.     Dispense:  360 tablet     Refill:  0    buPROPion XL (Wellbutrin XL) 300 MG 24 hr tablet     Sig: Take 1 tablet by mouth Every Morning.     Dispense:  90 tablet     Refill:  0    Vortioxetine HBr (Trintellix) 20 MG tablet     Sig: Take 1 tablet by mouth Daily.     Dispense:  90 tablet     Refill:  0         Follow Up   Return in about 4 weeks (around 5/6/2024) for Video visit.    Patient was given instructions and counseling regarding her condition or for health maintenance advice. Please see specific information pulled into the AVS if appropriate.         This document has been electronically signed by RENE Guadalupe  April 8, 2024 17:12 EDT    Part of this note may be an electronic transcription/translation of spoken language to printed text using the Dragon Dictation System.

## 2024-05-05 DIAGNOSIS — E06.3 HYPOTHYROIDISM DUE TO HASHIMOTO'S THYROIDITIS: ICD-10-CM

## 2024-05-05 DIAGNOSIS — E03.8 HYPOTHYROIDISM DUE TO HASHIMOTO'S THYROIDITIS: ICD-10-CM

## 2024-05-06 ENCOUNTER — ANESTHESIA (OUTPATIENT)
Dept: GASTROENTEROLOGY | Facility: HOSPITAL | Age: 44
End: 2024-05-06
Payer: COMMERCIAL

## 2024-05-06 ENCOUNTER — ANESTHESIA EVENT (OUTPATIENT)
Dept: GASTROENTEROLOGY | Facility: HOSPITAL | Age: 44
End: 2024-05-06
Payer: COMMERCIAL

## 2024-05-06 ENCOUNTER — HOSPITAL ENCOUNTER (OUTPATIENT)
Facility: HOSPITAL | Age: 44
Setting detail: HOSPITAL OUTPATIENT SURGERY
Discharge: HOME | End: 2024-05-06
Attending: INTERNAL MEDICINE | Admitting: INTERNAL MEDICINE
Payer: COMMERCIAL

## 2024-05-06 VITALS
HEART RATE: 75 BPM | DIASTOLIC BLOOD PRESSURE: 82 MMHG | SYSTOLIC BLOOD PRESSURE: 121 MMHG | RESPIRATION RATE: 16 BRPM | WEIGHT: 171.8 LBS | BODY MASS INDEX: 26.97 KG/M2 | HEIGHT: 67 IN | OXYGEN SATURATION: 96 %

## 2024-05-06 DIAGNOSIS — F33.1 MODERATE EPISODE OF RECURRENT MAJOR DEPRESSIVE DISORDER: Chronic | ICD-10-CM

## 2024-05-06 DIAGNOSIS — R11.2 NAUSEA AND VOMITING, UNSPECIFIED VOMITING TYPE: ICD-10-CM

## 2024-05-06 PROCEDURE — 43239 EGD BIOPSY SINGLE/MULTIPLE: CPT | Performed by: INTERNAL MEDICINE

## 2024-05-06 PROCEDURE — 25010000002 PROPOFOL 10 MG/ML EMULSION: Performed by: REGISTERED NURSE

## 2024-05-06 PROCEDURE — 81025 URINE PREGNANCY TEST: CPT | Performed by: INTERNAL MEDICINE

## 2024-05-06 PROCEDURE — 88305 TISSUE EXAM BY PATHOLOGIST: CPT | Performed by: INTERNAL MEDICINE

## 2024-05-06 PROCEDURE — 25810000003 LACTATED RINGERS PER 1000 ML: Performed by: INTERNAL MEDICINE

## 2024-05-06 PROCEDURE — S0260 H&P FOR SURGERY: HCPCS | Performed by: INTERNAL MEDICINE

## 2024-05-06 RX ORDER — SODIUM CHLORIDE, SODIUM LACTATE, POTASSIUM CHLORIDE, CALCIUM CHLORIDE 600; 310; 30; 20 MG/100ML; MG/100ML; MG/100ML; MG/100ML
30 INJECTION, SOLUTION INTRAVENOUS CONTINUOUS PRN
Status: DISCONTINUED | OUTPATIENT
Start: 2024-05-06 | End: 2024-05-06 | Stop reason: HOSPADM

## 2024-05-06 RX ORDER — LEVOTHYROXINE SODIUM 112 UG/1
112 TABLET ORAL DAILY
Qty: 90 TABLET | Refills: 0 | Status: SHIPPED | OUTPATIENT
Start: 2024-05-06

## 2024-05-06 RX ORDER — LIDOCAINE HYDROCHLORIDE 20 MG/ML
INJECTION, SOLUTION INFILTRATION; PERINEURAL AS NEEDED
Status: DISCONTINUED | OUTPATIENT
Start: 2024-05-06 | End: 2024-05-06 | Stop reason: SURG

## 2024-05-06 RX ORDER — ONDANSETRON 2 MG/ML
4 INJECTION INTRAMUSCULAR; INTRAVENOUS ONCE AS NEEDED
Status: DISCONTINUED | OUTPATIENT
Start: 2024-05-06 | End: 2024-05-06 | Stop reason: HOSPADM

## 2024-05-06 RX ORDER — NORTRIPTYLINE HYDROCHLORIDE 25 MG/1
25 CAPSULE ORAL NIGHTLY
Qty: 30 CAPSULE | Refills: 11 | Status: SHIPPED | OUTPATIENT
Start: 2024-05-06

## 2024-05-06 RX ORDER — PROPOFOL 10 MG/ML
VIAL (ML) INTRAVENOUS AS NEEDED
Status: DISCONTINUED | OUTPATIENT
Start: 2024-05-06 | End: 2024-05-06 | Stop reason: SURG

## 2024-05-06 RX ADMIN — PROPOFOL 100 MG: 10 INJECTION, EMULSION INTRAVENOUS at 09:01

## 2024-05-06 RX ADMIN — PROPOFOL 40 MG: 10 INJECTION, EMULSION INTRAVENOUS at 09:02

## 2024-05-06 RX ADMIN — LIDOCAINE HYDROCHLORIDE 100 MG: 20 INJECTION, SOLUTION INFILTRATION; PERINEURAL at 09:02

## 2024-05-06 RX ADMIN — SODIUM CHLORIDE, POTASSIUM CHLORIDE, SODIUM LACTATE AND CALCIUM CHLORIDE 30 ML/HR: 600; 310; 30; 20 INJECTION, SOLUTION INTRAVENOUS at 08:30

## 2024-05-06 RX ADMIN — PROPOFOL 180 MCG/KG/MIN: 10 INJECTION, EMULSION INTRAVENOUS at 09:03

## 2024-05-07 LAB
LAB AP CASE REPORT: NORMAL
PATH REPORT.FINAL DX SPEC: NORMAL
PATH REPORT.GROSS SPEC: NORMAL

## 2024-05-08 ENCOUNTER — OFFICE VISIT (OUTPATIENT)
Dept: INTERNAL MEDICINE | Age: 44
End: 2024-05-08
Payer: COMMERCIAL

## 2024-05-08 VITALS
OXYGEN SATURATION: 99 % | TEMPERATURE: 97.3 F | DIASTOLIC BLOOD PRESSURE: 82 MMHG | BODY MASS INDEX: 27.47 KG/M2 | SYSTOLIC BLOOD PRESSURE: 110 MMHG | HEIGHT: 67 IN | WEIGHT: 175 LBS | HEART RATE: 88 BPM

## 2024-05-08 DIAGNOSIS — E06.3 HYPOTHYROIDISM DUE TO HASHIMOTO'S THYROIDITIS: Chronic | ICD-10-CM

## 2024-05-08 DIAGNOSIS — E03.8 HYPOTHYROIDISM DUE TO HASHIMOTO'S THYROIDITIS: Chronic | ICD-10-CM

## 2024-05-08 DIAGNOSIS — M32.9 SYSTEMIC LUPUS ERYTHEMATOSUS, UNSPECIFIED SLE TYPE, UNSPECIFIED ORGAN INVOLVEMENT STATUS: Chronic | ICD-10-CM

## 2024-05-08 DIAGNOSIS — F33.1 MODERATE EPISODE OF RECURRENT MAJOR DEPRESSIVE DISORDER: Chronic | ICD-10-CM

## 2024-05-08 DIAGNOSIS — K58.2 IRRITABLE BOWEL SYNDROME WITH BOTH CONSTIPATION AND DIARRHEA: Chronic | ICD-10-CM

## 2024-05-08 DIAGNOSIS — K22.4 ESOPHAGEAL MOTILITY DISORDER: Primary | ICD-10-CM

## 2024-05-08 PROCEDURE — 99214 OFFICE O/P EST MOD 30 MIN: CPT | Performed by: INTERNAL MEDICINE

## 2024-05-08 RX ORDER — BUPROPION HYDROCHLORIDE 300 MG/1
300 TABLET ORAL EVERY MORNING
Qty: 90 TABLET | Refills: 0 | Status: SHIPPED | OUTPATIENT
Start: 2024-05-08

## 2024-05-24 ENCOUNTER — TELEPHONE (OUTPATIENT)
Dept: GASTROENTEROLOGY | Facility: CLINIC | Age: 44
End: 2024-05-24
Payer: COMMERCIAL

## 2024-05-24 NOTE — TELEPHONE ENCOUNTER
Murali Raygoza MD  [facility] [place] [facility] 2 Pool  Biopsies normal, check on patient's symptoms.  
Pt reviewed results via Etix.     Sent pt Etix msg advising of results and recommendations. Advised to call if any questions.     
Therese Sandoval

## 2024-07-08 ENCOUNTER — TELEMEDICINE (OUTPATIENT)
Dept: PSYCHIATRY | Facility: CLINIC | Age: 44
End: 2024-07-08
Payer: COMMERCIAL

## 2024-07-08 DIAGNOSIS — F33.1 MODERATE EPISODE OF RECURRENT MAJOR DEPRESSIVE DISORDER: Primary | Chronic | ICD-10-CM

## 2024-07-08 DIAGNOSIS — F41.1 GENERALIZED ANXIETY DISORDER: Chronic | ICD-10-CM

## 2024-07-08 DIAGNOSIS — G47.01 INSOMNIA DUE TO MEDICAL CONDITION: ICD-10-CM

## 2024-07-08 DIAGNOSIS — F40.10 SOCIAL ANXIETY DISORDER: ICD-10-CM

## 2024-07-08 PROCEDURE — 99214 OFFICE O/P EST MOD 30 MIN: CPT

## 2024-07-08 RX ORDER — BUPROPION HYDROCHLORIDE 300 MG/1
300 TABLET ORAL EVERY MORNING
Qty: 90 TABLET | Refills: 0 | Status: SHIPPED | OUTPATIENT
Start: 2024-07-08

## 2024-07-08 RX ORDER — VORTIOXETINE 20 MG/1
20 TABLET, FILM COATED ORAL DAILY
Qty: 90 TABLET | Refills: 0 | Status: SHIPPED | OUTPATIENT
Start: 2024-07-08

## 2024-07-08 RX ORDER — HYDROXYZINE HYDROCHLORIDE 25 MG/1
100 TABLET, FILM COATED ORAL NIGHTLY PRN
Qty: 360 TABLET | Refills: 0 | Status: SHIPPED | OUTPATIENT
Start: 2024-07-08

## 2024-07-08 NOTE — PROGRESS NOTES
This provider is located at [place], [place]. The Patient is seen remotely using Video. Patient is being seen via telehealth and confirm that they are in a secure environment for this session. Patient is located in [place], [place] in her car. The patient's condition being diagnosed/treated is appropriate for telemedicine. Provider identified as Rupa Olivas as well as credentials APRN MSN PMHNP-BC.   The client/patient gave consent to be seen remotely, and when consent is given they understand that the consent allows for patient identifiable information to be sent to a third party as needed.  They may refuse to be seen remotely at any time. The electronic data is encrypted and password protected, and the patient has been advised of the potential risks to privacy not withstanding such measures.    Chief Complaint  Depression, Anxiety, and Sleeping Problem    Subjective        Taty Romo presents to [facility] for   History of Present Illness  Patient is seen today for follow-up visit for depression, anxiety, and insomnia.  Patient reports that she continues to do well.  States she really has not felt depressed.  Denies any hopelessness.  Denies any suicidal or homicidal ideation.  States she continues to use hydroxyzine for sleep.  Appetite is good.  States anxiety has been manageable.  States she has not used propranolol very often, but it does help and situational anxiety.  States that the family took a trip to [place], [place] and they enjoyed that.  Denies any manic type symptoms.  Denies any paranoia.  Denies any auditory or visual hallucinations.  States that her hormone levels are in good range.  States she still feels fatigued but she thinks that is from her thyroid.  States she is currently pleased with medications.  Denies any side effects to the medications.  Objective   Vital Signs:   There were no vitals taken for this visit.      Mental Status Exam:    Hygiene:   good  Cooperation:  Cooperative  Eye Contact:  Good  Psychomotor Behavior:  Appropriate  Affect:  Full range  Mood: normal  Speech:  Normal  Thought Process:  Goal directed  Thought Content:  Normal  Suicidal:  None  Homicidal:  None  Hallucinations:  None  Delusion:  None  Memory:  Intact  Orientation:  Person, Place, Time, and Situation  Reliability:  good  Insight:  Good  Judgement:  Good  Impulse Control:  Good  Physical/Medical Issues:  No      PHQ-9 Depression Screening  Little interest or pleasure in doing things?     Feeling down, depressed, or hopeless? (P) 0-->not at all   Trouble falling or staying asleep, or sleeping too much? (P) 2-->more than half the days   Feeling tired or having little energy? (P) 3-->nearly every day   Poor appetite or overeating? (P) 1-->several days   Feeling bad about yourself - or that you are a failure or have let yourself or your family down? (P) 0-->not at all   Trouble concentrating on things, such as reading the newspaper or watching television? (P) 1-->several days   Moving or speaking so slowly that other people could have noticed? Or the opposite - being so fidgety or restless that you have been moving around a lot more than usual? (P) 0-->not at all   Thoughts that you would be better off dead, or of hurting yourself in some way? (P) 0-->not at all   PHQ-9 Total Score     If you checked off any problems, how difficult have these problems made it for you to do your work, take care of things at home, or get along with other people? (P) not difficult at all        PHQ-9 Total Score:       COURT 7 anxiety screening tool that patient filled out virtually reviewed by this APRN at today's encounter.    Previous Provider notes and available records reviewed by this APRN today.   Current Medications:   Current Outpatient Medications   Medication Sig Dispense Refill    acetaminophen (Tylenol 8 Hour Arthritis Pain) 650 MG 8 hr tablet Take 1 tablet by mouth Every 8  (Eight) Hours As Needed for Mild Pain  or Moderate Pain . 100 tablet 2    Biotin 5000 MCG tablet Take 1 tablet by mouth Daily.      buPROPion XL (Wellbutrin XL) 300 MG 24 hr tablet Take 1 tablet by mouth Every Morning. 90 tablet 0    cetirizine (zyrTEC) 10 MG tablet Take 1 tablet by mouth Daily.      Cholecalciferol (VITAMIN D) 2000 UNITS capsule Take 5,000 Units by mouth Every Evening.      Cyanocobalamin (B-12-SL) 1000 MCG sublingual tablet Place 1 tablet under the tongue Daily.      hydroxychloroquine (PLAQUENIL) 200 MG tablet Take 1 tablet by mouth 2 (Two) Times a Day. 180 tablet 1    hydrOXYzine (ATARAX) 25 MG tablet Take 4 tablets by mouth At Night As Needed for sleep. 360 tablet 0    levothyroxine (Synthroid) 112 MCG tablet Take 1 tablet by mouth Daily. 90 tablet 0    ondansetron ODT (ZOFRAN-ODT) 8 MG disintegrating tablet Place 1 tablet on the tongue Every 8 (Eight) Hours As Needed for Nausea or Vomiting. 12 tablet 0    pantoprazole (PROTONIX) 40 MG EC tablet Take 1 tablet by mouth Daily. 90 tablet 3    propranolol (INDERAL) 10 MG tablet Take 1 tablet by mouth 2 (Two) Times a Day As Needed (Anxiety). 60 tablet 0    Vortioxetine HBr (Trintellix) 20 MG tablet Take 1 tablet by mouth Daily. 90 tablet 0     No current facility-administered medications for this visit.       Physical Exam   Result Review :    The following data was reviewed by: RENE Guadalupe on 07/08/2024:  Common labs          10/24/2023    14:53 4/10/2024    15:37   Common Labs   Creatinine 0.88     AST (SGOT) 22  28       ALT (SGPT) 31  49       WBC 3.89  4.76       Hemoglobin 13.3  13.9       Hematocrit 38.7  41.3       Platelets 163  159          Details          This result is from an external source.                Assessment and Plan   Problem List Items Addressed This Visit          Mental Health    Moderate episode of recurrent major depressive disorder - Primary (Chronic)    Overview     Has taken medication since 19.   - Mom  PASSED 5/2021     Sees psychiatrist currently          Relevant Medications    buPROPion XL (Wellbutrin XL) 300 MG 24 hr tablet    Vortioxetine HBr (Trintellix) 20 MG tablet    hydrOXYzine (ATARAX) 25 MG tablet    Generalized anxiety disorder    Relevant Medications    buPROPion XL (Wellbutrin XL) 300 MG 24 hr tablet    Vortioxetine HBr (Trintellix) 20 MG tablet    hydrOXYzine (ATARAX) 25 MG tablet    Social anxiety disorder    Relevant Medications    buPROPion XL (Wellbutrin XL) 300 MG 24 hr tablet    Vortioxetine HBr (Trintellix) 20 MG tablet    hydrOXYzine (ATARAX) 25 MG tablet       Sleep    Insomnia (Chronic)    Relevant Medications    hydrOXYzine (ATARAX) 25 MG tablet     Discussed treatment options with patient.  Patient is currently pleased with her medications.  Continue Trintellix 20 mg daily for depression and anxiety.  Continue Wellbutrin  mg daily for depression.  Continue hydroxyzine 100 mg nightly as needed for sleep.  Patient has a supply of propranolol on hand if she needs it.  We will see patient again in 3 months to reassess.  Encouraged patient to contact the office if she has any issues    TREATMENT PLAN/GOALS: Continue supportive psychotherapy efforts and medications as indicated. Treatment and medication options discussed during today's visit. Patient ackowledged and verbally consented to continue with current treatment plan and was educated on the importance of compliance with treatment and follow-up appointments.    DEPRESSION:  Patient screened positive for depression based on a PHQ-9 score of 0 on 5/8/2024. Follow-up recommendations include: Prescribed antidepressant medication treatment.       MEDICATION ISSUES:  We discussed risks, benefits, and side effects of the above medications and the patient was agreeable with the plan. Patient was educated on the importance of compliance with treatment and follow-up appointments.  Patient is agreeable to call the office with any worsening  of symptoms or onset of side effects. Patient is agreeable to call 911 or go to the nearest ER should he/she begin having SI/HI.      Counseled patient regarding multimodal approach with healthy nutrition, healthy sleep, regular physical activity, social activities, counseling, and medications.      Coping skills reviewed and encouraged positive framing of thoughts     Assisted patient in processing above session content; acknowledged and normalized patient’s thoughts, feelings, and concerns.  Applied  positive coping skills and behavior management in session.  Allowed patient to freely discuss issues without interruption or judgment. Provided safe, confidential environment to facilitate the development of positive therapeutic relationship and encourage open, honest communication. Assisted patient in identifying risk factors which would indicate the need for higher level of care including thoughts to harm self or others and/or self-harming behavior and encouraged patient to contact this office, call 911, or present to the nearest emergency room should any of these events occur. Discussed crisis intervention services and means to access. If patient has any concerns or needs assistance they were instructed to call the [facility] at [phone].    MEDS ORDERED DURING VISIT:  New Medications Ordered This Visit   Medications    buPROPion XL (Wellbutrin XL) 300 MG 24 hr tablet     Sig: Take 1 tablet by mouth Every Morning.     Dispense:  90 tablet     Refill:  0    Vortioxetine HBr (Trintellix) 20 MG tablet     Sig: Take 1 tablet by mouth Daily.     Dispense:  90 tablet     Refill:  0    hydrOXYzine (ATARAX) 25 MG tablet     Sig: Take 4 tablets by mouth At Night As Needed for sleep.     Dispense:  360 tablet     Refill:  0         Follow Up   Return in about 3 months (around 10/8/2024) for Video visit.    Patient was given instructions and counseling regarding her condition or for health  maintenance advice. Please see specific information pulled into the AVS if appropriate.         This document has been electronically signed by RENE Guadalupe  July 8, 2024 18:07 EDT    Part of this note may be an electronic transcription/translation of spoken language to printed text using the Dragon Dictation System.

## 2024-07-17 DIAGNOSIS — E03.8 HYPOTHYROIDISM DUE TO HASHIMOTO'S THYROIDITIS: ICD-10-CM

## 2024-07-17 DIAGNOSIS — E06.3 HYPOTHYROIDISM DUE TO HASHIMOTO'S THYROIDITIS: ICD-10-CM

## 2024-07-21 RX ORDER — LEVOTHYROXINE SODIUM 112 UG/1
112 TABLET ORAL DAILY
Qty: 90 TABLET | Refills: 0 | Status: SHIPPED | OUTPATIENT
Start: 2024-07-21

## 2024-09-03 ENCOUNTER — APPOINTMENT (OUTPATIENT)
Dept: GENERAL RADIOLOGY | Facility: HOSPITAL | Age: 44
End: 2024-09-03
Payer: COMMERCIAL

## 2024-09-03 ENCOUNTER — HOSPITAL ENCOUNTER (EMERGENCY)
Facility: HOSPITAL | Age: 44
Discharge: HOME | End: 2024-09-03
Attending: EMERGENCY MEDICINE
Payer: COMMERCIAL

## 2024-09-03 VITALS
HEIGHT: 66 IN | WEIGHT: 168 LBS | DIASTOLIC BLOOD PRESSURE: 62 MMHG | HEART RATE: 90 BPM | TEMPERATURE: 99.7 F | RESPIRATION RATE: 16 BRPM | BODY MASS INDEX: 27 KG/M2 | SYSTOLIC BLOOD PRESSURE: 108 MMHG | OXYGEN SATURATION: 99 %

## 2024-09-03 DIAGNOSIS — R11.2 NAUSEA AND VOMITING, UNSPECIFIED VOMITING TYPE: ICD-10-CM

## 2024-09-03 DIAGNOSIS — U07.1 COVID-19: Primary | ICD-10-CM

## 2024-09-03 LAB
ALBUMIN SERPL-MCNC: 4.5 G/DL (ref 3.5–5.2)
ALBUMIN/GLOB SERPL: 1.5 G/DL
ALP SERPL-CCNC: 55 U/L (ref 39–117)
ALT SERPL W P-5'-P-CCNC: 36 U/L (ref 1–33)
ANION GAP SERPL CALCULATED.3IONS-SCNC: 10.4 MMOL/L (ref 5–15)
AST SERPL-CCNC: 30 U/L (ref 1–32)
BACTERIA UR QL AUTO: ABNORMAL /HPF
BASOPHILS # BLD AUTO: 0.01 10*3/MM3 (ref 0–0.2)
BASOPHILS NFR BLD AUTO: 0.2 % (ref 0–1.5)
BILIRUB SERPL-MCNC: 0.4 MG/DL (ref 0–1.2)
BILIRUB UR QL STRIP: NEGATIVE
BUN SERPL-MCNC: 11 MG/DL (ref 6–20)
BUN/CREAT SERPL: 12.4 (ref 7–25)
CALCIUM SPEC-SCNC: 9 MG/DL (ref 8.6–10.5)
CHLORIDE SERPL-SCNC: 103 MMOL/L (ref 98–107)
CLARITY UR: ABNORMAL
CO2 SERPL-SCNC: 22.6 MMOL/L (ref 22–29)
COLOR UR: YELLOW
CREAT SERPL-MCNC: 0.89 MG/DL (ref 0.57–1)
DEPRECATED RDW RBC AUTO: 44.3 FL (ref 37–54)
EGFRCR SERPLBLD CKD-EPI 2021: 82.6 ML/MIN/1.73
EOSINOPHIL # BLD AUTO: 0.01 10*3/MM3 (ref 0–0.4)
EOSINOPHIL NFR BLD AUTO: 0.2 % (ref 0.3–6.2)
ERYTHROCYTE [DISTWIDTH] IN BLOOD BY AUTOMATED COUNT: 12.9 % (ref 12.3–15.4)
FLUAV SUBTYP SPEC NAA+PROBE: NOT DETECTED
FLUBV RNA ISLT QL NAA+PROBE: NOT DETECTED
GLOBULIN UR ELPH-MCNC: 3 GM/DL
GLUCOSE SERPL-MCNC: 123 MG/DL (ref 65–99)
GLUCOSE UR STRIP-MCNC: NEGATIVE MG/DL
HCT VFR BLD AUTO: 39.9 % (ref 34–46.6)
HGB BLD-MCNC: 13.4 G/DL (ref 12–15.9)
HGB UR QL STRIP.AUTO: NEGATIVE
HOLD SPECIMEN: NORMAL
HYALINE CASTS UR QL AUTO: ABNORMAL /LPF
IMM GRANULOCYTES # BLD AUTO: 0.01 10*3/MM3 (ref 0–0.05)
IMM GRANULOCYTES NFR BLD AUTO: 0.2 % (ref 0–0.5)
KETONES UR QL STRIP: ABNORMAL
LEUKOCYTE ESTERASE UR QL STRIP.AUTO: ABNORMAL
LYMPHOCYTES # BLD AUTO: 0.45 10*3/MM3 (ref 0.7–3.1)
LYMPHOCYTES NFR BLD AUTO: 7.1 % (ref 19.6–45.3)
MAGNESIUM SERPL-MCNC: 2 MG/DL (ref 1.6–2.6)
MCH RBC QN AUTO: 31.4 PG (ref 26.6–33)
MCHC RBC AUTO-ENTMCNC: 33.6 G/DL (ref 31.5–35.7)
MCV RBC AUTO: 93.4 FL (ref 79–97)
MONOCYTES # BLD AUTO: 0.48 10*3/MM3 (ref 0.1–0.9)
MONOCYTES NFR BLD AUTO: 7.6 % (ref 5–12)
NEUTROPHILS NFR BLD AUTO: 5.38 10*3/MM3 (ref 1.7–7)
NEUTROPHILS NFR BLD AUTO: 84.7 % (ref 42.7–76)
NITRITE UR QL STRIP: NEGATIVE
PH UR STRIP.AUTO: 7 [PH] (ref 5–8)
PLATELET # BLD AUTO: 121 10*3/MM3 (ref 140–450)
PMV BLD AUTO: 11.4 FL (ref 6–12)
POTASSIUM SERPL-SCNC: 4 MMOL/L (ref 3.5–5.2)
PROT SERPL-MCNC: 7.5 G/DL (ref 6–8.5)
PROT UR QL STRIP: ABNORMAL
RBC # BLD AUTO: 4.27 10*6/MM3 (ref 3.77–5.28)
RBC # UR STRIP: ABNORMAL /HPF
REF LAB TEST METHOD: ABNORMAL
SARS-COV-2 RNA RESP QL NAA+PROBE: DETECTED
SODIUM SERPL-SCNC: 136 MMOL/L (ref 136–145)
SP GR UR STRIP: >=1.03 (ref 1–1.03)
SQUAMOUS #/AREA URNS HPF: ABNORMAL /HPF
UROBILINOGEN UR QL STRIP: ABNORMAL
WBC # UR STRIP: ABNORMAL /HPF
WBC NRBC COR # BLD AUTO: 6.34 10*3/MM3 (ref 3.4–10.8)

## 2024-09-03 PROCEDURE — 87636 SARSCOV2 & INF A&B AMP PRB: CPT | Performed by: EMERGENCY MEDICINE

## 2024-09-03 PROCEDURE — 80053 COMPREHEN METABOLIC PANEL: CPT | Performed by: EMERGENCY MEDICINE

## 2024-09-03 PROCEDURE — 96374 THER/PROPH/DIAG INJ IV PUSH: CPT

## 2024-09-03 PROCEDURE — 87086 URINE CULTURE/COLONY COUNT: CPT | Performed by: EMERGENCY MEDICINE

## 2024-09-03 PROCEDURE — 85025 COMPLETE CBC W/AUTO DIFF WBC: CPT | Performed by: EMERGENCY MEDICINE

## 2024-09-03 PROCEDURE — 96375 TX/PRO/DX INJ NEW DRUG ADDON: CPT

## 2024-09-03 PROCEDURE — 25010000002 ONDANSETRON PER 1 MG: Performed by: EMERGENCY MEDICINE

## 2024-09-03 PROCEDURE — 25810000003 SODIUM CHLORIDE 0.9 % SOLUTION: Performed by: EMERGENCY MEDICINE

## 2024-09-03 PROCEDURE — 83735 ASSAY OF MAGNESIUM: CPT | Performed by: EMERGENCY MEDICINE

## 2024-09-03 PROCEDURE — 99284 EMERGENCY DEPT VISIT MOD MDM: CPT | Performed by: EMERGENCY MEDICINE

## 2024-09-03 PROCEDURE — 99283 EMERGENCY DEPT VISIT LOW MDM: CPT

## 2024-09-03 PROCEDURE — 96361 HYDRATE IV INFUSION ADD-ON: CPT

## 2024-09-03 PROCEDURE — 81001 URINALYSIS AUTO W/SCOPE: CPT | Performed by: EMERGENCY MEDICINE

## 2024-09-03 PROCEDURE — 25010000002 KETOROLAC TROMETHAMINE PER 15 MG: Performed by: EMERGENCY MEDICINE

## 2024-09-03 PROCEDURE — 71045 X-RAY EXAM CHEST 1 VIEW: CPT

## 2024-09-03 RX ORDER — PROMETHAZINE HYDROCHLORIDE 25 MG/1
25 TABLET ORAL EVERY 6 HOURS PRN
Qty: 20 TABLET | Refills: 0 | Status: SHIPPED | OUTPATIENT
Start: 2024-09-03

## 2024-09-03 RX ORDER — KETOROLAC TROMETHAMINE 30 MG/ML
30 INJECTION, SOLUTION INTRAMUSCULAR; INTRAVENOUS ONCE
Status: COMPLETED | OUTPATIENT
Start: 2024-09-03 | End: 2024-09-03

## 2024-09-03 RX ORDER — ONDANSETRON 2 MG/ML
4 INJECTION INTRAMUSCULAR; INTRAVENOUS ONCE
Status: COMPLETED | OUTPATIENT
Start: 2024-09-03 | End: 2024-09-03

## 2024-09-03 RX ORDER — FAMOTIDINE 10 MG/ML
20 INJECTION, SOLUTION INTRAVENOUS ONCE
Status: COMPLETED | OUTPATIENT
Start: 2024-09-03 | End: 2024-09-03

## 2024-09-03 RX ORDER — ACETAMINOPHEN 500 MG
1000 TABLET ORAL ONCE
Status: COMPLETED | OUTPATIENT
Start: 2024-09-03 | End: 2024-09-03

## 2024-09-03 RX ORDER — ONDANSETRON 4 MG/1
4 TABLET, ORALLY DISINTEGRATING ORAL EVERY 6 HOURS PRN
Qty: 20 TABLET | Refills: 0 | Status: SHIPPED | OUTPATIENT
Start: 2024-09-03

## 2024-09-03 RX ORDER — DEXTROMETHORPHAN HYDROBROMIDE, GUAIFENESIN 5; 100 MG/5ML; MG/5ML
10 LIQUID ORAL EVERY 12 HOURS
Qty: 118 ML | Refills: 0 | Status: SHIPPED | OUTPATIENT
Start: 2024-09-03 | End: 2024-09-16

## 2024-09-03 RX ADMIN — SODIUM CHLORIDE 1000 ML: 9 INJECTION, SOLUTION INTRAVENOUS at 07:22

## 2024-09-03 RX ADMIN — KETOROLAC TROMETHAMINE 30 MG: 30 INJECTION, SOLUTION INTRAMUSCULAR at 08:02

## 2024-09-03 RX ADMIN — FAMOTIDINE 20 MG: 10 INJECTION INTRAVENOUS at 07:23

## 2024-09-03 RX ADMIN — SODIUM CHLORIDE 1000 ML: 9 INJECTION, SOLUTION INTRAVENOUS at 08:50

## 2024-09-03 RX ADMIN — ACETAMINOPHEN 1000 MG: 500 TABLET ORAL at 07:43

## 2024-09-03 RX ADMIN — ONDANSETRON 4 MG: 2 INJECTION INTRAMUSCULAR; INTRAVENOUS at 07:23

## 2024-09-03 NOTE — DISCHARGE INSTRUCTIONS
Today you are positive for COVID-19.  Your chest x-ray does not reveal any secondary pneumonia.  Your lungs are clear and your oxygen level is 100%.    You are quite dry as noted by your ketones in your urine.  I sent in both Zofran and Phenergan to your pharmacy for nausea.  Take as directed.  Please also look over the dietary restrictions on the attached nausea and vomiting instruction list.    On your CMP, your ALT was very minimally elevated at 36.    Lastly I did also send in some appropriate cough medication as needed    Continue Tylenol and/or ibuprofen every 6 hours as needed for fever    I gave you 2 separate work notes.  The first will allow you to return on Thursday and the other not until next Monday.  I suspect you will probably need to hold until Monday;  you may certainly utilize the work note most appropriate depending on how you feel.    Please read all of the instructions in this handout.  If you receive prescriptions please fill them and take them as directed.  Please call your primary care physician for follow-up appointment in the next 5 to 7 days.  If you do not have a physician you may call the [facility] referral line at [phone].    You may return to the emergency department at any time for any concerns such as worsening symptoms.  If you received a work or school note it will be printed at the back of this packet.

## 2024-09-03 NOTE — Clinical Note
[facility]  [address]  [place] [id number]    Taty Romo was seen and treated in our emergency department on 9/3/2024.  She may return to work on 09/05/2024.         Thank you for choosing [facility].    Pino Hollins MD

## 2024-09-03 NOTE — PROGRESS NOTES
Patient noted to be positive for COVID.  No complaints of dysuria.  No current treatment with antibiotic indicated.

## 2024-09-03 NOTE — FSED PROVIDER NOTE
EMERGENCY DEPARTMENT ENCOUNTER    Room Number:  01/01  Date seen:  9/3/2024  Time seen: 07:25 EDT  PCP: Jeremy Mcdonald MD  Historian:     Discussed/obtained information from independent historians:     HPI:  Patient is a 44-year-old female.  She presents with a 2-day history of upper respiratory congestion, productive cough with green sputum production.  Yesterday she developed a fever.  She likewise developed multiple episodes of nausea vomiting yesterday.  No associated diarrhea.  Some abdominal soreness.  She is a cardiac nurse with multiple exposures.  She likewise is on Plaquenil secondary to lupus.      External (non-ED) record review:        Chronic or social conditions impacting care:    ALLERGIES  Oxycodone    PAST MEDICAL HISTORY  Active Ambulatory Problems     Diagnosis Date Noted    Moderate episode of recurrent major depressive disorder 01/30/2019    Hypothyroidism due to Hashimoto's thyroiditis 01/30/2019    Insomnia 03/24/2015    Systemic lupus erythematosus 03/01/2007    Vitamin D deficiency 08/29/2015    Irritable bowel syndrome with both constipation and diarrhea 01/30/2019    Seasonal allergic rhinitis due to pollen 04/30/2019    DDD (degenerative disc disease), lumbar 04/30/2019    Allergic rhinitis 05/22/2019    Chronic bilateral low back pain with bilateral sciatica 09/18/2019    Spondylolisthesis at L5-S1 level 01/20/2020    Herniated lumbar intervertebral disc 04/15/2020    Degeneration of intervertebral disc at C4-C5 level 08/14/2020    Postprandial vomiting 10/04/2020    Cervical radiculopathy 11/18/2020    Paresthesia and pain of both upper extremities 02/22/2022    Generalized anxiety disorder 03/24/2022    Social anxiety disorder 03/24/2022    Sacroiliac joint dysfunction of right side 05/09/2022    Vomiting and diarrhea 01/20/2023    B12 deficiency 02/13/2023    Left leg pain 08/15/2023    Paresthesia of both hands 08/15/2023    Fibromyalgia 08/15/2023    Neck pain on right side  10/04/2023    Nausea and vomiting 01/19/2024    Esophageal motility disorder 05/08/2024     Resolved Ambulatory Problems     Diagnosis Date Noted    Neck pain 08/14/2020    History of lumbar surgery 01/28/2021    Hypothyroidism 08/29/2015     Past Medical History:   Diagnosis Date    Alopecia 08/2015    CHI positive 08/2014    Anal fissure 12/2016    Anxiety     Bilateral sciatica 01/15/2020    Biliary colic 05/2019    Bulging lumbar disc 02/2018    Cervicitis 04/04/2002    Chronic bilateral low back pain without sciatica     Chronic fatigue     Chronic ITP (idiopathic thrombocytopenia) 01/2012    Closed fracture of left distal radius 01/22/2020    Cold intolerance     Colon polyps     Contusion of back 03/04/2004    CTS (carpal tunnel syndrome)     Cystic disease of breast 01/2014    Depression     Dysphagia     Edema of both lower legs 10/2019    Excessive sweating 08/2017    Facet arthropathy, lumbar     Fibromyalgia, primary     Frequent UTI 03/2016    Goiter     Hashimoto's disease     Hemorrhoids     Hepatitis A antibody positive 10/2014    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    Idiopathic scoliosis of lumbosacral region     Irregular menses 03/2015    Irritable bowel syndrome with constipation 01/30/2019    Lumbar radiculopathy     Lumbosacral disc disease     Migraine     Neck sprain 07/11/2005    Paresthesia of upper extremity     PCOS (polycystic ovarian syndrome)     Peripheral neuropathy 2017    Polyarthralgia     Polydipsia 10/2016    Ruptured tympanic membrane, right 05/2015    Seasonal allergies     Tattoos     Tremor of both hands 06/2016    Trochanteric bursitis of both hips 10/2021    Urinary frequency 02/2015    Urinary urgency 02/2015       PAST SURGICAL HISTORY  Past Surgical History:   Procedure Laterality Date    ANAL SPHINCTEROTOMY N/A 04/01/2003    DR.RAYMOND HEAD AT [facility]    APPENDECTOMY N/A 04/13/2010    LAPAROSCOPIC, WITH PELVIC LAPAROSCOPY, DR. GIOVANNI RUBIN AT [facility]    BACK SURGERY   discectomy    CARPAL TUNNEL RELEASE      COLONOSCOPY N/A 11/21/2016    4 MM POLYP AT ANUS, NOT RESECTED D/T UPCPMING HEMORRHOIDECTOMY, OTHERWISE WNL, RESCOPE IN 5 YRS, DR. MANNY FARIAS AT [facility]    COLONOSCOPY N/A 01/18/2022    ENTIRE COLON WNL, RESCOPE IN 5 YRS, DR. MANNY FARIAS AT [facility]    ENDOSCOPY N/A 05/06/2024    Procedure: ESOPHAGOGASTRODUODENOSCOPY WITH BIOPSIES;  Surgeon: Murali Raygoza MD;  Location: [facility] ENDOSCOPY;  Service: Gastroenterology;  Laterality: N/A;  PRE: NAUSEA & VOMITING /  POST: GASTRITIS, HIATAL HERNIA, GASTRIC DYSMOTILITY    EPIDURAL BLOCK N/A 05/22/2020    DR. ZAIRA GAO AT [facility]    FINGER MASS EXCISION Right 05/16/2011    RIGHT INDEX FINGER, PATH: BENIGN WITH HYPERKERATOSIS, DR. LUZ ELENA HAY AT [facility]    HEMORRHOIDECTOMY N/A 04/01/2003    DR.RAYMOND HEAD AT [facility]    HEMORRHOIDECTOMY N/A 11/21/2016    Procedure: HEMORRHOIDECTOMY;  Surgeon: Manny Farias MD;  Location: [facility] OR;  Service:     INTRAUTERINE DEVICE INSERTION N/A 10/20/2014    DR. CICI LEMUS    LUMBAR DISCECTOMY Left 01/08/2021    Procedure: Outpatient left lumbar five/sacral one Metrx microdiscectomy;  Surgeon: Alessandro Thomas MD;  Location: [facility] MAIN OR;  Service: Neurosurgery;  Laterality: Left;    LUMBAR EPIDURAL INJECTION N/A 07/19/2021    DR. PARISA BEE AT [facility]    LUMBAR EPIDURAL INJECTION N/A 11/06/2020    DR. WALDO MUNOZ AT [facility]    LUMBAR EPIDURAL INJECTION N/A 08/28/2020    DR. MALORIE SHEA AT [facility]    THYROIDECTOMY, PARTIAL Right 06/17/2015    RIGHT LOBECTOMY, DR. ARABELLA HUNT AT [facility]    TONSILLECTOMY Bilateral     CHILDHOOD    TYMPANOSTOMY TUBE PLACEMENT      DURING CHIDHOOD    VAGINAL DELIVERY N/A 11/01/2002    DR. KEENAN LAUREN AT [facility]    VAGINAL DELIVERY N/A 11/16/2006    DR. JANNA DEL VALLE AT [facility]       FAMILY HISTORY  Family History   Problem Relation Age of Onset    Colon polyps Mother     Coronary artery disease Mother 60        non-smokers    Valvular heart disease Mother     Diabetes type  II Mother     Lung disease Mother         pulmonary hypertension    Clotting disorder Mother         superficial dvt    Depression Mother     Kidney failure Mother         due to diabetes    Irritable bowel syndrome Mother     Diabetes Mother     Heart disease Mother     Hyperlipidemia Mother     Hypertension Mother     Kidney disease Mother     Thyroid disease Mother     Vision loss Mother     Other Mother         Blood clot    Colon polyps Father     Atrial fibrillation Father         pacemaker SSS    Hypertension Father     Deep vein thrombosis Father     Spondylolysis Father         spinal stenosis, Degenerative disc disease     Arthritis Father     Other Father         AFIB, DVT    Autoimmune disease Brother     Depression Brother     Dementia Maternal Grandmother     Stroke Maternal Grandmother     Arthritis Paternal Grandmother     Colon cancer Paternal Grandfather     Vision loss Paternal Grandfather     Asthma Daughter     ADD / ADHD Daughter     Dementia Maternal Uncle     Colon cancer Maternal Uncle     Stroke Maternal Uncle     Colon cancer Maternal Uncle     Malig Hyperthermia Neg Hx        SOCIAL HISTORY  Social History     Socioeconomic History    Marital status:      Spouse name: Alexander*    Number of children: 2   Tobacco Use    Smoking status: Never    Smokeless tobacco: Never   Vaping Use    Vaping status: Never Used   Substance and Sexual Activity    Alcohol use: Not Currently     Comment: Few times a year    Drug use: No    Sexual activity: Yes     Partners: Male     Birth control/protection: I.U.D., Vasectomy       REVIEW OF SYSTEMS  Review of Systems    All systems reviewed and negative except for those discussed in HPI.       PHYSICAL EXAM    I have reviewed the triage vital signs and nursing notes.  Vitals:    09/03/24 0851   BP:    Pulse: 83   Resp: 16   Temp: 99.7 °F (37.6 °C)   SpO2: 100%     Physical Exam  Vital signs: Reviewed in nurses notes    General: Awake alert.  Patient does  appear to feel poorly but is nontoxic-appearing    HEENT: Normocephalic atraumatic nasopharynx is congested Prideaux pharynx is slightly erythematous.  I do not detect any evidence of an abscess    Neck:   Supple without lymphadenopathy    Respiratory:   Nonlabored respirations.  Clear to auscultation bilaterally.  Equal breath sounds bilaterally.  No wheezes or stridor noted.    Cardiovascular: Regular rate and rhythm.  No murmur.  Equal pulses in bilateral lower extremities without edema.    Abdomen: Soft nondistended.  There is mild diffuse tenderness in both upper quadrants without guarding or rebound    Skin:   Warm and dry.  No rashes noted    Neurological examination: Patient is awake alert oriented x4.  Speech is normal.  No facial palsy.  No focal motor or sensory deficits.    LAB RESULTS  Recent Results (from the past 24 hour(s))   COVID-19 and FLU A/B PCR, 1 HR TAT - Swab, Nasopharynx    Collection Time: 09/03/24  7:08 AM    Specimen: Nasopharynx; Swab   Result Value Ref Range    COVID19 Detected (C) Not Detected - Ref. Range    Influenza A PCR Not Detected Not Detected    Influenza B PCR Not Detected Not Detected   Comprehensive Metabolic Panel    Collection Time: 09/03/24  7:21 AM    Specimen: Blood   Result Value Ref Range    Glucose 123 (H) 65 - 99 mg/dL    BUN 11 6 - 20 mg/dL    Creatinine 0.89 0.57 - 1.00 mg/dL    Sodium 136 136 - 145 mmol/L    Potassium 4.0 3.5 - 5.2 mmol/L    Chloride 103 98 - 107 mmol/L    CO2 22.6 22.0 - 29.0 mmol/L    Calcium 9.0 8.6 - 10.5 mg/dL    Total Protein 7.5 6.0 - 8.5 g/dL    Albumin 4.5 3.5 - 5.2 g/dL    ALT (SGPT) 36 (H) 1 - 33 U/L    AST (SGOT) 30 1 - 32 U/L    Alkaline Phosphatase 55 39 - 117 U/L    Total Bilirubin 0.4 0.0 - 1.2 mg/dL    Globulin 3.0 gm/dL    A/G Ratio 1.5 g/dL    BUN/Creatinine Ratio 12.4 7.0 - 25.0    Anion Gap 10.4 5.0 - 15.0 mmol/L    eGFR 82.6 >60.0 mL/min/1.73   Magnesium    Collection Time: 09/03/24  7:21 AM    Specimen: Blood   Result Value  Ref Range    Magnesium 2.0 1.6 - 2.6 mg/dL   CBC Auto Differential    Collection Time: 09/03/24  7:21 AM    Specimen: Blood   Result Value Ref Range    WBC 6.34 3.40 - 10.80 10*3/mm3    RBC 4.27 3.77 - 5.28 10*6/mm3    Hemoglobin 13.4 12.0 - 15.9 g/dL    Hematocrit 39.9 34.0 - 46.6 %    MCV 93.4 79.0 - 97.0 fL    MCH 31.4 26.6 - 33.0 pg    MCHC 33.6 31.5 - 35.7 g/dL    RDW 12.9 12.3 - 15.4 %    RDW-SD 44.3 37.0 - 54.0 fl    MPV 11.4 6.0 - 12.0 fL    Platelets 121 (L) 140 - 450 10*3/mm3    Neutrophil % 84.7 (H) 42.7 - 76.0 %    Lymphocyte % 7.1 (L) 19.6 - 45.3 %    Monocyte % 7.6 5.0 - 12.0 %    Eosinophil % 0.2 (L) 0.3 - 6.2 %    Basophil % 0.2 0.0 - 1.5 %    Immature Grans % 0.2 0.0 - 0.5 %    Neutrophils, Absolute 5.38 1.70 - 7.00 10*3/mm3    Lymphocytes, Absolute 0.45 (L) 0.70 - 3.10 10*3/mm3    Monocytes, Absolute 0.48 0.10 - 0.90 10*3/mm3    Eosinophils, Absolute 0.01 0.00 - 0.40 10*3/mm3    Basophils, Absolute 0.01 0.00 - 0.20 10*3/mm3    Immature Grans, Absolute 0.01 0.00 - 0.05 10*3/mm3   Urinalysis With Culture If Indicated - Urine, Clean Catch    Collection Time: 09/03/24  7:51 AM    Specimen: Urine, Clean Catch   Result Value Ref Range    Color, UA Yellow Yellow, Straw    Appearance, UA Cloudy (A) Clear    pH, UA 7.0 5.0 - 8.0    Specific Gravity, UA >=1.030 1.005 - 1.030    Glucose, UA Negative Negative    Ketones, UA >=160 mg/dL (4+) (A) Negative    Bilirubin, UA Negative Negative    Blood, UA Negative Negative    Protein, UA 30 mg/dL (1+) (A) Negative    Leuk Esterase, UA Trace (A) Negative    Nitrite, UA Negative Negative    Urobilinogen, UA 0.2 E.U./dL 0.2 - 1.0 E.U./dL       Ordered the above labs and independently interpreted results.  My findings will be discussed in the ED course or medical decision making section below      PROCEDURES:  Procedures      RADIOLOGY RESULTS  XR Chest 1 View    Result Date: 9/3/2024  XR CHEST 1 VW-  INDICATION: Cough and fever  COMPARISON: Chest radiographs dating  back to 7/27/2022      No focal consolidation. No pleural effusion or pneumothorax.  Normal size cardiomediastinal silhouette.  No focal osseous abnormality.   This report was finalized on 9/3/2024 7:42 AM by Dr. Rafy Carroll M.D on Workstation: [id number]        Ordered the above noted radiological studies.  Independently interpreted by me.  My findings will be discussed in the medical decision section below.     PROGRESS, DATA ANALYSIS, CONSULTS AND MEDICAL DECISION MAKING    Please note that this section constitutes my independent interpretation of clinical data including lab results, radiology, EKG's.  This constitutes my independent professional opinion regarding differential diagnosis and management of this patient.  It may include any factors such as history from outside sources, review of external records, social determinants of health, management of medications, response to those treatments, and discussions with other providers.    ED Course as of 09/03/24 0921   Tue Sep 03, 2024   0915 Patient feeling much improved at this time.  Will continue Phenergan and Zofran as needed for nausea and vomiting.  I am also going to send in some Robitussin DM for severe cough. [TC]      ED Course User Index  [TC] Pino Hollins MD     Orders placed during this visit:  Orders Placed This Encounter   Procedures    COVID-19 and FLU A/B PCR, 1 HR TAT - Swab, Nasopharynx    XR Chest 1 View    Comprehensive Metabolic Panel    Magnesium    Urinalysis With Culture If Indicated -    CBC Auto Differential    Urinalysis, Microscopic Only - Urine, Clean Catch    [place] Urine Culture Tube -    CBC & Differential       Medications   sodium chloride 0.9 % bolus 1,000 mL (1,000 mL Intravenous New Bag 9/3/24 0850)   sodium chloride 0.9 % bolus 1,000 mL (0 mL Intravenous Stopped 9/3/24 0852)   ondansetron (ZOFRAN) injection 4 mg (4 mg Intravenous Given 9/3/24 0723)   famotidine (PEPCID) injection 20 mg (20 mg Intravenous  Given 9/3/24 0723)   acetaminophen (TYLENOL) tablet 1,000 mg (1,000 mg Oral Given 9/3/24 0743)   ketorolac (TORADOL) injection 30 mg (30 mg Intravenous Given 9/3/24 0802)            Medical Decision Making  Problems Addressed:  COVID-19: complicated acute illness or injury  Nausea and vomiting, unspecified vomiting type: complicated acute illness or injury    Amount and/or Complexity of Data Reviewed  Labs: ordered.  Radiology: ordered.    Risk  OTC drugs.  Prescription drug management.            DIAGNOSIS  Final diagnoses:   COVID-19   Nausea and vomiting, unspecified vomiting type          Medication List        New Prescriptions      dextromethorphan-guaifenesin  MG/5ML liquid  Commonly known as: ROBITUSSIN-DM  Take 5 mL by mouth Every 12 (Twelve) Hours for 7 days.     promethazine 25 MG tablet  Commonly known as: PHENERGAN  Take 1 tablet by mouth Every 6 (Six) Hours As Needed for Nausea or Vomiting.            Changed      * ondansetron ODT 8 MG disintegrating tablet  Commonly known as: ZOFRAN-ODT  Place 1 tablet on the tongue Every 8 (Eight) Hours As Needed for Nausea or Vomiting.  What changed: Another medication with the same name was added. Make sure you understand how and when to take each.     * ondansetron ODT 4 MG disintegrating tablet  Commonly known as: ZOFRAN-ODT  Place 1 tablet on the tongue Every 6 (Six) Hours As Needed for Vomiting or Nausea.  What changed: You were already taking a medication with the same name, and this prescription was added. Make sure you understand how and when to take each.           * This list has 2 medication(s) that are the same as other medications prescribed for you. Read the directions carefully, and ask your doctor or other care provider to review them with you.                   Where to Get Your Medications        These medications were sent to [place] [id number]      Hours: Monday to Friday 7 AM to 6  PM, Saturday & Sunday 8 AM to 4:30 PM (Closed 12 PM to 12:30 PM) Phone: [phone]   dextromethorphan-guaifenesin  MG/5ML liquid  ondansetron ODT 4 MG disintegrating tablet  promethazine 25 MG tablet         FOLLOW-UP  Jeremy Mcdonald MD  [id number]  [place] [address]  [phone]    In 1 week          Latest Documented Vital Signs:  As of 09:21 EDT  BP- 118/95 HR- 83 Temp- 99.7 °F (37.6 °C) (Oral) O2 sat- 100%    Appropriate PPE utilized throughout this patient encounter to include mask, if indicated, per current protocol. Hand hygiene was performed before donning PPE and after removal when leaving the room.    Please note that portions of this were completed with a voice recognition program.     Note Disclaimer: At [facility], we believe that sharing information builds trust and better relationships. You are receiving this note because you are receiving care at [facility] or recently visited. It is possible you will see health information before a provider has talked with you about it. This kind of information can be easy to misunderstand. To help you fully understand what it means for your health, we urge you to discuss this note with your provider.

## 2024-09-03 NOTE — Clinical Note
[facility]  [address]  [place] [address]    Taty Romo was seen and treated in our emergency department on 9/3/2024.  She may return to work on 09/09/2024.         Thank you for choosing [facility].    Pino Hollins MD

## 2024-09-05 LAB — BACTERIA SPEC AEROBE CULT: NO GROWTH

## 2024-09-06 RX ORDER — ONDANSETRON 8 MG/1
8 TABLET, FILM COATED ORAL EVERY 8 HOURS PRN
Qty: 20 TABLET | Refills: 0 | Status: SHIPPED | OUTPATIENT
Start: 2024-09-06

## 2024-09-30 ENCOUNTER — TELEMEDICINE (OUTPATIENT)
Dept: PSYCHIATRY | Facility: CLINIC | Age: 44
End: 2024-09-30
Payer: COMMERCIAL

## 2024-09-30 DIAGNOSIS — F33.1 MODERATE EPISODE OF RECURRENT MAJOR DEPRESSIVE DISORDER: Chronic | ICD-10-CM

## 2024-09-30 DIAGNOSIS — G47.01 INSOMNIA DUE TO MEDICAL CONDITION: ICD-10-CM

## 2024-09-30 DIAGNOSIS — F40.10 SOCIAL ANXIETY DISORDER: ICD-10-CM

## 2024-09-30 DIAGNOSIS — F41.1 GENERALIZED ANXIETY DISORDER: Chronic | ICD-10-CM

## 2024-09-30 PROCEDURE — 96127 BRIEF EMOTIONAL/BEHAV ASSMT: CPT

## 2024-09-30 PROCEDURE — 99214 OFFICE O/P EST MOD 30 MIN: CPT

## 2024-09-30 RX ORDER — VORTIOXETINE 20 MG/1
20 TABLET, FILM COATED ORAL DAILY
Qty: 90 TABLET | Refills: 1 | Status: SHIPPED | OUTPATIENT
Start: 2024-09-30

## 2024-09-30 RX ORDER — BUPROPION HYDROCHLORIDE 300 MG/1
300 TABLET ORAL EVERY MORNING
Qty: 90 TABLET | Refills: 1 | Status: SHIPPED | OUTPATIENT
Start: 2024-09-30

## 2024-09-30 RX ORDER — HYDROXYZINE HYDROCHLORIDE 25 MG/1
100 TABLET, FILM COATED ORAL NIGHTLY PRN
Qty: 360 TABLET | Refills: 1 | Status: SHIPPED | OUTPATIENT
Start: 2024-09-30

## 2024-09-30 NOTE — PROGRESS NOTES
This provider is located at [place], [place]. The Patient is seen remotely using Video. Patient is being seen via telehealth and confirm that they are in a secure environment for this session. Patient is located in [place], [place] in her car. The patient's condition being diagnosed/treated is appropriate for telemedicine. Provider identified as Rupa Olivas as well as credentials APRN MSN PMHNP-BC.   The client/patient gave consent to be seen remotely, and when consent is given they understand that the consent allows for patient identifiable information to be sent to a third party as needed.  They may refuse to be seen remotely at any time. The electronic data is encrypted and password protected, and the patient has been advised of the potential risks to privacy not withstanding such measures.    Chief Complaint  Depression, Anxiety, and Sleeping Problem    Subjective        Taty Romo presents to [facility] for   History of Present Illness  Patient seen today for follow-up visit for depression, anxiety, and insomnia.  Patient reports she continues to do well.  She states she has not had any issues with depression or anxiety over the last several months.  States she is currently pleased with her medications.  Continues to use the hydroxyzine to help with sleep.  Appetite is good.  Denies any hopelessness.  Denies any suicidal or homicidal ideation.  Denies any manic type symptoms.  Denies any paranoia.  Denies any auditory or visual hallucinations.  Denies any side effects to the medications.  Patient shares that her family is taking a trip to [place] next month and she is excited about that.  States her dad and brother are going as well.  Objective   Vital Signs:   There were no vitals taken for this visit.      Mental Status Exam:   Hygiene:   good  Cooperation:  Cooperative  Eye Contact:  Good  Psychomotor Behavior:  Appropriate  Affect:  Full range  Mood:  normal  Speech:  Normal  Thought Process:  Goal directed  Thought Content:  Normal  Suicidal:  None  Homicidal:  None  Hallucinations:  None  Delusion:  None  Memory:  Intact  Orientation:  Person, Place, Time, and Situation  Reliability:  good  Insight:  Good  Judgement:  Good  Impulse Control:  Good  Physical/Medical Issues:  No      PHQ-9 Depression Screening  Little interest or pleasure in doing things? (P) 0-->not at all   Feeling down, depressed, or hopeless? (P) 1-->several days   Trouble falling or staying asleep, or sleeping too much? (P) 1-->several days   Feeling tired or having little energy? (P) 1-->several days   Poor appetite or overeating? (P) 1-->several days   Feeling bad about yourself - or that you are a failure or have let yourself or your family down? (P) 0-->not at all   Trouble concentrating on things, such as reading the newspaper or watching television? (P) 0-->not at all   Moving or speaking so slowly that other people could have noticed? Or the opposite - being so fidgety or restless that you have been moving around a lot more than usual? (P) 0-->not at all   Thoughts that you would be better off dead, or of hurting yourself in some way? (P) 0-->not at all   PHQ-9 Total Score (P) 4   If you checked off any problems, how difficult have these problems made it for you to do your work, take care of things at home, or get along with other people? (P) not difficult at all        PHQ-9 Total Score: (P) 4     COURT 7 anxiety screening tool that patient filled out virtually reviewed by this APRN at today's encounter.      Previous Provider notes and available records reviewed by this APRN today.   Current Medications:   Current Outpatient Medications   Medication Sig Dispense Refill    acetaminophen (Tylenol 8 Hour Arthritis Pain) 650 MG 8 hr tablet Take 1 tablet by mouth Every 8 (Eight) Hours As Needed for Mild Pain  or Moderate Pain . 100 tablet 2    Biotin 5000 MCG tablet Take 1 tablet by mouth  Daily.      buPROPion XL (Wellbutrin XL) 300 MG 24 hr tablet Take 1 tablet by mouth Every Morning. 90 tablet 1    cetirizine (zyrTEC) 10 MG tablet Take 1 tablet by mouth Daily.      Cholecalciferol (VITAMIN D) 2000 UNITS capsule Take 5,000 Units by mouth Every Evening.      Cyanocobalamin (B-12-SL) 1000 MCG sublingual tablet Place 1 tablet under the tongue Daily.      hydroxychloroquine (PLAQUENIL) 200 MG tablet Take 1 tablet by mouth 2 (Two) Times a Day. 180 tablet 1    hydrOXYzine (ATARAX) 25 MG tablet Take 4 tablets by mouth At Night As Needed for sleep. 360 tablet 1    levothyroxine (Synthroid) 112 MCG tablet Take 1 tablet by mouth Daily. 90 tablet 0    ondansetron (Zofran) 8 MG tablet Take 1 tablet by mouth Every 8 (Eight) Hours As Needed for Nausea or Vomiting. 20 tablet 0    pantoprazole (PROTONIX) 40 MG EC tablet Take 1 tablet by mouth Daily. 90 tablet 3    promethazine (PHENERGAN) 25 MG tablet Take 1 tablet by mouth Every 6 (Six) Hours As Needed for Nausea or Vomiting. 20 tablet 0    propranolol (INDERAL) 10 MG tablet Take 1 tablet by mouth 2 (Two) Times a Day As Needed (Anxiety). 60 tablet 0    Vortioxetine HBr (Trintellix) 20 MG tablet Take 1 tablet by mouth Daily. 90 tablet 1     No current facility-administered medications for this visit.       Physical Exam   Result Review :    The following data was reviewed by: RENE Guadalupe on 09/30/2024:  Common labs          10/24/2023    14:53 4/10/2024    15:37 9/3/2024    07:21   Common Labs   Glucose   123    BUN   11    Creatinine 0.88   0.89    Sodium   136    Potassium   4.0    Chloride   103    Calcium   9.0    Albumin   4.5    Total Bilirubin   0.4    Alkaline Phosphatase   55    AST (SGOT) 22  28     30    ALT (SGPT) 31  49     36    WBC 3.89  4.76     6.34    Hemoglobin 13.3  13.9     13.4    Hematocrit 38.7  41.3     39.9    Platelets 163  159     121       Details          This result is from an external source.                Assessment and  Plan   Problem List Items Addressed This Visit          Mental Health    Moderate episode of recurrent major depressive disorder (Chronic)    Overview     Has taken medication since 19.   - Mom PASSED 5/2021     Sees psychiatrist currently          Relevant Medications    hydrOXYzine (ATARAX) 25 MG tablet    Vortioxetine HBr (Trintellix) 20 MG tablet    buPROPion XL (Wellbutrin XL) 300 MG 24 hr tablet    Generalized anxiety disorder    Relevant Medications    hydrOXYzine (ATARAX) 25 MG tablet    Vortioxetine HBr (Trintellix) 20 MG tablet    buPROPion XL (Wellbutrin XL) 300 MG 24 hr tablet    Social anxiety disorder    Relevant Medications    hydrOXYzine (ATARAX) 25 MG tablet    Vortioxetine HBr (Trintellix) 20 MG tablet    buPROPion XL (Wellbutrin XL) 300 MG 24 hr tablet       Sleep    Insomnia (Chronic)    Relevant Medications    hydrOXYzine (ATARAX) 25 MG tablet     Discussed treatment options with patient.  Patient is currently pleased with her medications.  Continue Trintellix 20 mg daily for depression and anxiety.  Continue Wellbutrin  mg daily for depression.  Continue hydroxyzine 100 mg take 4 tablets nightly as needed for sleep.  Patient has a supply of propranolol on hand if she needs it.  Gave patient the option to follow up in 6 months since she has been stable for several months.  Patient would like to do so.  Encouraged patient to contact the office if she has any issues prior to her next visit.    TREATMENT PLAN/GOALS: Continue supportive psychotherapy efforts and medications as indicated. Treatment and medication options discussed during today's visit. Patient ackowledged and verbally consented to continue with current treatment plan and was educated on the importance of compliance with treatment and follow-up appointments.    DEPRESSION:  Patient screened positive for depression based on a PHQ-9 score of 4 on 9/30/2024. Follow-up recommendations include: Prescribed antidepressant medication  treatment.       MEDICATION ISSUES:  We discussed risks, benefits, and side effects of the above medications and the patient was agreeable with the plan. Patient was educated on the importance of compliance with treatment and follow-up appointments.  Patient is agreeable to call the office with any worsening of symptoms or onset of side effects. Patient is agreeable to call 911 or go to the nearest ER should he/she begin having SI/HI.      Counseled patient regarding multimodal approach with healthy nutrition, healthy sleep, regular physical activity, social activities, counseling, and medications.      Coping skills reviewed and encouraged positive framing of thoughts     Assisted patient in processing above session content; acknowledged and normalized patient’s thoughts, feelings, and concerns.  Applied  positive coping skills and behavior management in session.  Allowed patient to freely discuss issues without interruption or judgment. Provided safe, confidential environment to facilitate the development of positive therapeutic relationship and encourage open, honest communication. Assisted patient in identifying risk factors which would indicate the need for higher level of care including thoughts to harm self or others and/or self-harming behavior and encouraged patient to contact this office, call 911, or present to the nearest emergency room should any of these events occur. Discussed crisis intervention services and means to access. If patient has any concerns or needs assistance they were instructed to call the [facility] at [phone].    MEDS ORDERED DURING VISIT:  New Medications Ordered This Visit   Medications    hydrOXYzine (ATARAX) 25 MG tablet     Sig: Take 4 tablets by mouth At Night As Needed for sleep.     Dispense:  360 tablet     Refill:  1    Vortioxetine HBr (Trintellix) 20 MG tablet     Sig: Take 1 tablet by mouth Daily.     Dispense:  90 tablet     Refill:  1     buPROPion XL (Wellbutrin XL) 300 MG 24 hr tablet     Sig: Take 1 tablet by mouth Every Morning.     Dispense:  90 tablet     Refill:  1         Follow Up   Return in about 6 months (around 3/30/2025) for Video visit.    Patient was given instructions and counseling regarding her condition or for health maintenance advice. Please see specific information pulled into the AVS if appropriate.         This document has been electronically signed by RENE Guadalupe  September 30, 2024 16:03 EDT    Part of this note may be an electronic transcription/translation of spoken language to printed text using the Dragon Dictation System.

## 2024-10-27 DIAGNOSIS — E06.3 HYPOTHYROIDISM DUE TO HASHIMOTO'S THYROIDITIS: ICD-10-CM

## 2024-10-28 RX ORDER — LEVOTHYROXINE SODIUM 112 UG/1
112 TABLET ORAL DAILY
Qty: 90 TABLET | Refills: 0 | Status: SHIPPED | OUTPATIENT
Start: 2024-10-28

## 2024-11-08 ENCOUNTER — OFFICE VISIT (OUTPATIENT)
Dept: INTERNAL MEDICINE | Age: 44
End: 2024-11-08
Payer: COMMERCIAL

## 2024-11-08 VITALS
DIASTOLIC BLOOD PRESSURE: 70 MMHG | WEIGHT: 172 LBS | BODY MASS INDEX: 27.64 KG/M2 | HEART RATE: 78 BPM | HEIGHT: 66 IN | TEMPERATURE: 97.1 F | OXYGEN SATURATION: 97 % | SYSTOLIC BLOOD PRESSURE: 110 MMHG

## 2024-11-08 DIAGNOSIS — M32.9 SYSTEMIC LUPUS ERYTHEMATOSUS, UNSPECIFIED SLE TYPE, UNSPECIFIED ORGAN INVOLVEMENT STATUS: Chronic | ICD-10-CM

## 2024-11-08 DIAGNOSIS — F33.1 MODERATE EPISODE OF RECURRENT MAJOR DEPRESSIVE DISORDER: Chronic | ICD-10-CM

## 2024-11-08 DIAGNOSIS — E66.3 OVERWEIGHT (BMI 25.0-29.9): ICD-10-CM

## 2024-11-08 DIAGNOSIS — E06.3 HYPOTHYROIDISM DUE TO HASHIMOTO'S THYROIDITIS: Primary | Chronic | ICD-10-CM

## 2024-11-08 LAB
T4 FREE SERPL-MCNC: 1.38 NG/DL (ref 0.92–1.68)
TSH SERPL DL<=0.005 MIU/L-ACNC: 2.66 UIU/ML (ref 0.27–4.2)

## 2024-11-08 NOTE — PROGRESS NOTES
J  U  N  O  H    K  I  M ,   M  D                  I  N  T  E  R  N  A  L    M  E  D  I  C  I  N  E         ENCOUNTER DATE:  11/08/2024    Taty Room / 44 y.o. / female    OFFICE VISIT ENCOUNTER       CHIEF COMPLAINT / REASON FOR OFFICE VISIT      Moderate episode of recurrent major depressive disorder and Hypothyroidism      ASSESSMENT & PLAN     Problem List Items Addressed This Visit          High    Hypothyroidism due to Hashimoto's thyroiditis - Primary (Chronic)    Overview     S/p partial thyroidectomy (right side) 2015 for adenoma.          Current Assessment & Plan     Lab Results   Component Value Date    TSH 2.050 04/04/2023    TSH 9.880 (H) 02/13/2023    TSH 0.879 08/10/2022    FREET4 1.18 04/04/2023    FREET4 1.19 02/13/2023    FREET4 1.45 08/10/2022      Continue levothyroxine 112 mcg.   Check TSH and Free T4 (titrate to optimal level).         Relevant Medications    propranolol (INDERAL) 10 MG tablet    levothyroxine (Synthroid) 112 MCG tablet    Other Relevant Orders    TSH+Free T4       Medium    Moderate episode of recurrent major depressive disorder (Chronic)    Overview     Has taken medication since 19.   - Mom PASSED 5/2021     Sees psychiatrist currently          Current Assessment & Plan     Stable.   Continue Trintellix 20 mg and Wellbutrin  mg daily.   Off nortriptyline (weight gain).   Continue follow-up with psychiatrist.          Relevant Medications    hydrOXYzine (ATARAX) 25 MG tablet    Vortioxetine HBr (Trintellix) 20 MG tablet    buPROPion XL (Wellbutrin XL) 300 MG 24 hr tablet    Systemic lupus erythematosus (Chronic)    Overview     Dx 2007 started on Plaquenil  *[place]ishi    Continue hydroxychloroquine and follow-up with rheumatologist.          Overweight (BMI 25.0-29.9) (Chronic)     Orders Placed This Encounter   Procedures    Tdap Vaccine Greater Than or Equal To 8yo IM    TSH+Free T4     Order Specific Question:   Release to patient     " Answer:   Routine Release [[id number]]     No orders of the defined types were placed in this encounter.      SUMMARY/DISCUSSION        TOTAL TIME OF ENCOUNTER:        Next Appointment with me: Visit date not found    Return in about 6 months (around 5/8/2025) for Reassess chronic medical problems.      VITAL SIGNS     Vitals:    11/08/24 1411   BP: 110/70   Pulse: 78   Temp: 97.1 °F (36.2 °C)   SpO2: 97%   Weight: 78 kg (172 lb)   Height: 167.6 cm (66\")       BP Readings from Last 3 Encounters:   11/08/24 110/70   09/03/24 108/62   05/08/24 110/82     Wt Readings from Last 3 Encounters:   11/08/24 78 kg (172 lb)   09/03/24 76.2 kg (168 lb)   05/08/24 79.4 kg (175 lb)     Body mass index is 27.76 kg/m².    Blood pressure readings recorded on patient flowsheet:       No data to display                  MEDICATIONS AT THE TIME OF OFFICE VISIT     Current Outpatient Medications on File Prior to Visit   Medication Sig    acetaminophen (Tylenol 8 Hour Arthritis Pain) 650 MG 8 hr tablet Take 1 tablet by mouth Every 8 (Eight) Hours As Needed for Mild Pain  or Moderate Pain .    Biotin 5000 MCG tablet Take 1 tablet by mouth Daily.    buPROPion XL (Wellbutrin XL) 300 MG 24 hr tablet Take 1 tablet by mouth Every Morning.    cetirizine (zyrTEC) 10 MG tablet Take 1 tablet by mouth Daily.    Cholecalciferol (VITAMIN D) 2000 UNITS capsule Take 5,000 Units by mouth Every Evening.    Cyanocobalamin (B-12-SL) 1000 MCG sublingual tablet Place 1 tablet under the tongue Daily.    hydroxychloroquine (PLAQUENIL) 200 MG tablet Take 1 tablet by mouth 2 (Two) Times a Day.    hydrOXYzine (ATARAX) 25 MG tablet Take 4 tablets by mouth At Night As Needed for sleep.    levothyroxine (Synthroid) 112 MCG tablet Take 1 tablet by mouth Daily.    ondansetron (Zofran) 8 MG tablet Take 1 tablet by mouth Every 8 (Eight) Hours As Needed for Nausea or Vomiting.    pantoprazole (PROTONIX) 40 MG EC tablet Take 1 tablet by mouth Daily.    promethazine " (PHENERGAN) 25 MG tablet Take 1 tablet by mouth Every 6 (Six) Hours As Needed for Nausea or Vomiting.    propranolol (INDERAL) 10 MG tablet Take 1 tablet by mouth 2 (Two) Times a Day As Needed (Anxiety).    Vortioxetine HBr (Trintellix) 20 MG tablet Take 1 tablet by mouth Daily.     No current facility-administered medications on file prior to visit.          HISTORY OF PRESENT ILLNESS     Since her last visit she has discontinued nortriptyline for irritable bowel syndrome due to concerns of weight gain.  Depression remains stable on Trintellix 20 mg daily and Wellbutrin  mg daily managed by her psychiatrist.  She expresses concern regarding difficulty losing weight.  She has lost about 3 pounds since discontinuing nortriptyline.  She does report to somewhat poor dietary habits including eating chocolate cookies late in the evening after dinner.  She is quite active during work hours but when she gets home she is relatively sedentary after dinner and cleaning up.  She is on levothyroxine 112 mcg daily for hypothyroidism due to Hashimoto's thyroiditis.    Patient Care Team:  Jeremy Mcdonald MD as PCP - General (Internal Medicine)  Shaheed Blanco MD as Consulting Physician (Rheumatology)  Angy Hunter MD as Consulting Physician (Obstetrics and Gynecology)  Rupa Olivas APRN as Nurse Practitioner (Nurse Practitioner)    REVIEW OF SYSTEMS     Review of Systems       PHYSICAL EXAMINATION     Physical Exam  Alert with normal thought and judgment.   Normal affect and mood.   Overweight       REVIEWED DATA     Labs:     Lab Results   Component Value Date     09/03/2024    K 4.0 09/03/2024    CALCIUM 9.0 09/03/2024    AST 30 09/03/2024    ALT 36 (H) 09/03/2024    BUN 11 09/03/2024    CREATININE 0.89 09/03/2024    CREATININE 0.88 10/24/2023    CREATININE 0.95 02/13/2023    EGFRRESULT 77 02/13/2023     Lab Results   Component Value Date    HGBA1C 5.3 02/13/2023    HGBA1C 5.6 07/27/2022     Lab Results  "  Component Value Date     (H) 02/13/2023    LDL 95 07/27/2022    HDL 62 02/13/2023    HDL 45 07/27/2022    TRIG 150 (H) 02/13/2023    TRIG 104 07/27/2022     Lab Results   Component Value Date    TSH 2.050 04/04/2023    TSH 9.880 (H) 02/13/2023    TSH 0.879 08/10/2022    FREET4 1.18 04/04/2023    FREET4 1.19 02/13/2023    FREET4 1.45 08/10/2022     Lab Results   Component Value Date    WBC 6.34 09/03/2024    HGB 13.4 09/03/2024     (L) 09/03/2024   No results found for: \"MALBCRERATIO\"        Imaging:               Medical Tests:               Summary of old records / correspondence / consultant report:             Request outside records:       "

## 2024-11-08 NOTE — ASSESSMENT & PLAN NOTE
Lab Results   Component Value Date    TSH 2.050 04/04/2023    TSH 9.880 (H) 02/13/2023    TSH 0.879 08/10/2022    FREET4 1.18 04/04/2023    FREET4 1.19 02/13/2023    FREET4 1.45 08/10/2022      Continue levothyroxine 112 mcg.   Check TSH and Free T4 (titrate to optimal level).

## 2024-11-08 NOTE — LETTER
[facility]  Vaccine Consent Form    Patient Name:  Taty Romo  Patient :  1980     Vaccine(s) Ordered    Tdap Vaccine Greater Than or Equal To 8yo IM        Screening Checklist  The following questions should be completed prior to vaccination. If you answer “yes” to any question, it does not necessarily mean you should not be vaccinated. It just means we may need to clarify or ask more questions. If a question is unclear, please ask your healthcare provider to explain it.    Yes No   Any fever or moderate to severe illness today (mild illness and/or antibiotic treatment are not contraindications)?     Do you have a history of a serious reaction to any previous vaccinations, such as anaphylaxis, encephalopathy within 7 days, Guillain-[place] syndrome within 6 weeks, seizure?     Have you received any live vaccine(s) (e.g MMR, JOYCELYN) or any other vaccines in the last month (to ensure duplicate doses aren't given)?     Do you have an anaphylactic allergy to latex (DTaP, DTaP-IPV, Hep A, Hep B, MenB, RV, Td, Tdap), baker’s yeast (Hep B, HPV), polysorbates (RSV, nirsevimab, PCV 20, Rotavirrus, Tdap, Shingrix), or gelatin (JOYCELYN, MMR)?     Do you have an anaphylactic allergy to neomycin (Rabies, JOYCELYN, MMR, IPV, Hep A), polymyxin B (IPV), or streptomycin (IPV)?      Any cancer, leukemia, AIDS, or other immune system disorder? (JOYCELYN, MMR, RV)     Do you have a parent, brother, or sister with an immune system problem (if immune competence of vaccine recipient clinically verified, can proceed)? (MMR, JOYCELYN)     Any recent steroid treatments for >2 weeks, chemotherapy, or radiation treatment? (JOYCELYN, MMR)     Have you received antibody-containing blood transfusions or IVIG in the past 11 months (recommended interval is dependent on product)? (MMR, JOYCELYN)     Have you taken antiviral drugs (acyclovir, famciclovir, valacyclovir for JOYCELYN) in the last 24 or 48 hours, respectively?      Are you pregnant or planning to become  "pregnant within 1 month? (JOYCELYN, MMR, HPV, IPV, MenB, Abrexvy; For Hep B- refer to Engerix-B; For RSV - Abrysvo is indicated for 32-36 weeks of pregnancy from September to January)     For infants, have you ever been told your child has had intussusception or a medical emergency involving obstruction of the intestine (Rotavirus)? If not for an infant, can skip this question.         *Ordering Physicians/APC should be consulted if \"yes\" is checked by the patient or guardian above.  I have received, read, and understand the Vaccine Information Statement (VIS) for each vaccine ordered.  I have considered my or my child's health status as well as the health status of my close contacts.  I have taken the opportunity to discuss my vaccine questions with my or my child's health care provider.   I have requested that the ordered vaccine(s) be given to me or my child.  I understand the benefits and risks of the vaccines.  I understand that I should remain in the clinic for 15 minutes after receiving the vaccine(s).  _________________________________________________________  Signature of Patient or Parent/Legal Guardian ____________________  Date     "

## 2024-11-08 NOTE — ASSESSMENT & PLAN NOTE
Stable.   Continue Trintellix 20 mg and Wellbutrin  mg daily.   Off nortriptyline (weight gain).   Continue follow-up with psychiatrist.

## 2024-11-11 DIAGNOSIS — E06.3 HYPOTHYROIDISM DUE TO HASHIMOTO'S THYROIDITIS: ICD-10-CM

## 2024-11-11 RX ORDER — LEVOTHYROXINE SODIUM 125 UG/1
125 TABLET ORAL DAILY
Qty: 30 TABLET | Refills: 1 | Status: SHIPPED | OUTPATIENT
Start: 2024-11-11

## 2024-11-11 NOTE — PROGRESS NOTES
CALL PATIENT with results:    Thyroid level can be optimized.  Verify taking 112 mcg. Adjust dose to 125 mcg. Recheck TFT in 6-8 weeks. Advise to not taking levothyroxine within 2 hours of lab appointment.

## 2024-11-11 NOTE — ASSESSMENT & PLAN NOTE
Increase levothyroxine to 125 mcg. Recheck labs 6 weeks.     Lab Results   Component Value Date    TSH 2.660 11/08/2024    TSH 2.050 04/04/2023    TSH 9.880 (H) 02/13/2023    FREET4 1.38 11/08/2024    FREET4 1.18 04/04/2023    FREET4 1.19 02/13/2023

## 2025-01-04 DIAGNOSIS — E06.3 HYPOTHYROIDISM DUE TO HASHIMOTO'S THYROIDITIS: ICD-10-CM

## 2025-01-04 RX ORDER — LEVOTHYROXINE SODIUM 125 UG/1
125 TABLET ORAL DAILY
Qty: 30 TABLET | Refills: 1 | Status: CANCELLED | OUTPATIENT
Start: 2025-01-04

## 2025-01-07 DIAGNOSIS — E06.3 HYPOTHYROIDISM DUE TO HASHIMOTO'S THYROIDITIS: ICD-10-CM

## 2025-01-09 RX ORDER — LEVOTHYROXINE SODIUM 125 UG/1
125 TABLET ORAL DAILY
Qty: 30 TABLET | Refills: 5 | Status: SHIPPED | OUTPATIENT
Start: 2025-01-09

## 2025-01-10 ENCOUNTER — LAB (OUTPATIENT)
Dept: LAB | Facility: HOSPITAL | Age: 45
End: 2025-01-10
Payer: COMMERCIAL

## 2025-01-10 PROCEDURE — 84439 ASSAY OF FREE THYROXINE: CPT | Performed by: INTERNAL MEDICINE

## 2025-01-10 PROCEDURE — 84443 ASSAY THYROID STIM HORMONE: CPT | Performed by: INTERNAL MEDICINE

## 2025-01-24 ENCOUNTER — OFFICE VISIT (OUTPATIENT)
Dept: INTERNAL MEDICINE | Age: 45
End: 2025-01-24
Payer: COMMERCIAL

## 2025-01-24 VITALS
TEMPERATURE: 97.4 F | OXYGEN SATURATION: 97 % | DIASTOLIC BLOOD PRESSURE: 60 MMHG | BODY MASS INDEX: 27.13 KG/M2 | WEIGHT: 168.8 LBS | HEART RATE: 80 BPM | HEIGHT: 66 IN | SYSTOLIC BLOOD PRESSURE: 106 MMHG

## 2025-01-24 DIAGNOSIS — G43.011 INTRACTABLE MIGRAINE WITHOUT AURA AND WITH STATUS MIGRAINOSUS: Primary | ICD-10-CM

## 2025-01-24 PROCEDURE — 99214 OFFICE O/P EST MOD 30 MIN: CPT | Performed by: PHYSICIAN ASSISTANT

## 2025-01-24 RX ORDER — SUMATRIPTAN 50 MG/1
TABLET, FILM COATED ORAL
Qty: 10 TABLET | Refills: 0 | Status: SHIPPED | OUTPATIENT
Start: 2025-01-24

## 2025-01-24 RX ORDER — TOPIRAMATE 25 MG/1
25 TABLET, FILM COATED ORAL 2 TIMES DAILY
Qty: 60 TABLET | Refills: 0 | Status: SHIPPED | OUTPATIENT
Start: 2025-01-24

## 2025-01-24 NOTE — PROGRESS NOTES
"                            KP LUBIN PA-C                  I  N  T  E  R  N  A  L    M  E  D  I  C  I  N  E         ENCOUNTER DATE:  01/24/2025    Taty Romo / 44 y.o. / female    OFFICE VISIT ENCOUNTER       CHIEF COMPLAINT / REASON FOR OFFICE VISIT     Headache and Vomiting (Off and on for couple years getting more freq.)      ASSESSMENT & PLAN     Problem List Items Addressed This Visit    None  Visit Diagnoses       Intractable migraine without aura and with status migrainosus    -  Primary    Relevant Medications    topiramate (Topamax) 25 MG tablet    SUMAtriptan (Imitrex) 50 MG tablet    rimegepant sulfate (NURTEC) 75 MG tablet          No orders of the defined types were placed in this encounter.    New Medications Ordered This Visit   Medications    topiramate (Topamax) 25 MG tablet     Sig: Take 1 tablet by mouth 2 (Two) Times a Day.     Dispense:  60 tablet     Refill:  0    SUMAtriptan (Imitrex) 50 MG tablet     Sig: Take one tablet at onset of headache. May repeat dose one time in 2 hours if headache not relieved.     Dispense:  10 tablet     Refill:  0    rimegepant sulfate (NURTEC) 75 MG tablet     Sig: Place 1 tablet under the tongue As Needed (migraine).     Dispense:  2 tablet     Refill:  0       SUMMARY/DISCUSSION  Start migraine prophylaxis with trial of Topamax 25 mg BID.  Start migraine abortive with Sumatriptan 50 mg as needed.   Nurtec 2-tab sample provided in office on today.         Next Appointment:   Return in about 2 weeks (around 2/7/2025), or if symptoms worsen or fail to improve. And, for Recheck migraine.      VITAL SIGNS     Vitals:    01/24/25 1452   BP: 106/60   Pulse: 80   Temp: 97.4 °F (36.3 °C)   SpO2: 97%   Weight: 76.6 kg (168 lb 12.8 oz)   Height: 167.6 cm (66\")       BP Readings from Last 3 Encounters:   01/24/25 106/60   11/08/24 110/70   09/03/24 108/62     Wt Readings from Last 3 Encounters:   01/24/25 76.6 kg (168 lb 12.8 oz)   11/08/24 78 kg (172 lb) "   09/03/24 76.2 kg (168 lb)     Body mass index is 27.25 kg/m².         No data to display                   MEDICATIONS AT THE TIME OF OFFICE VISIT     Current Outpatient Medications on File Prior to Visit   Medication Sig    acetaminophen (Tylenol 8 Hour Arthritis Pain) 650 MG 8 hr tablet Take 1 tablet by mouth Every 8 (Eight) Hours As Needed for Mild Pain  or Moderate Pain .    Biotin 5000 MCG tablet Take 1 tablet by mouth Daily.    buPROPion XL (Wellbutrin XL) 300 MG 24 hr tablet Take 1 tablet by mouth Every Morning.    cetirizine (zyrTEC) 10 MG tablet Take 1 tablet by mouth Daily.    Cholecalciferol (VITAMIN D) 2000 UNITS capsule Take 5,000 Units by mouth Every Evening.    hydroxychloroquine (PLAQUENIL) 200 MG tablet Take 1 tablet by mouth 2 (Two) Times a Day.    hydrOXYzine (ATARAX) 25 MG tablet Take 4 tablets by mouth At Night As Needed for sleep.    levothyroxine (SYNTHROID, LEVOTHROID) 125 MCG tablet Take 1 tablet by mouth Daily.    ondansetron (Zofran) 8 MG tablet Take 1 tablet by mouth Every 8 (Eight) Hours As Needed for Nausea or Vomiting.    pantoprazole (PROTONIX) 40 MG EC tablet Take 1 tablet by mouth Daily.    propranolol (INDERAL) 10 MG tablet Take 1 tablet by mouth 2 (Two) Times a Day As Needed (Anxiety).    Vortioxetine HBr (Trintellix) 20 MG tablet Take 1 tablet by mouth Daily.    Cyanocobalamin (B-12-SL) 1000 MCG sublingual tablet Place 1 tablet under the tongue Daily. (Patient not taking: Reported on 1/24/2025)    promethazine (PHENERGAN) 25 MG tablet Take 1 tablet by mouth Every 6 (Six) Hours As Needed for Nausea or Vomiting. (Patient not taking: Reported on 1/24/2025)     No current facility-administered medications on file prior to visit.          HISTORY OF PRESENT ILLNESS     PT is a 43y/o wf with hypothyroidism and fibromyalgia, who presents with complaint of increasingly intense headaches.     She reports bad headaches intermittently since her initial diagnosis with COVID in October  2021. The headaches have increased in both frequency and intensity since and now are often joined commonly by vomiting, blurred vision, and photophobia. These headaches will sometimes even awaken her from her sleep in the middle of the night. She also reports nearly 1 strong headache weekly. After starting, they usually last 4 to 6 hours on average. The current episode however, began around 1:30 AM on this Wednesday morning and was again accompanied by vomiting and is still lasting today (48 hours later). She denies ever experiencing symptoms of aura. She attempts Tylenol or Excedrin with only occasional success.     Patient Care Team:  Jeremy Mcdonald MD as PCP - General (Internal Medicine)  Shaheed Blanco MD as Consulting Physician (Rheumatology)  Rupa Olivas APRN as Nurse Practitioner (Nurse Practitioner)    REVIEW OF SYSTEMS     Review of Systems   As Per HPI.  All other systems negative.     PHYSICAL EXAMINATION     Physical Exam  Vitals and nursing note reviewed.   Constitutional:       General: She is not in acute distress.     Appearance: Normal appearance. She is not ill-appearing.   Cardiovascular:      Rate and Rhythm: Normal rate and regular rhythm.      Pulses: Normal pulses.      Heart sounds: Normal heart sounds. No murmur heard.     No friction rub. No gallop.   Pulmonary:      Effort: Pulmonary effort is normal. No respiratory distress.      Breath sounds: Normal breath sounds. No stridor. No wheezing.   Neurological:      Mental Status: She is alert.   Psychiatric:         Mood and Affect: Mood normal.         Behavior: Behavior normal.             REVIEWED DATA     Labs:     Lab Results   Component Value Date     09/03/2024    K 4.0 09/03/2024    CALCIUM 9.0 09/03/2024    AST 30 09/03/2024    ALT 36 (H) 09/03/2024    BUN 11 09/03/2024    CREATININE 0.89 09/03/2024    CREATININE 0.88 10/24/2023    CREATININE 0.95 02/13/2023    EGFRRESULT 77 02/13/2023     Lab Results   Component Value  "Date    HGBA1C 5.3 02/13/2023    HGBA1C 5.6 07/27/2022     Lab Results   Component Value Date     (H) 02/13/2023    LDL 95 07/27/2022    HDL 62 02/13/2023    HDL 45 07/27/2022    TRIG 150 (H) 02/13/2023    TRIG 104 07/27/2022     Lab Results   Component Value Date    TSH 0.741 01/10/2025    TSH 2.660 11/08/2024    TSH 2.050 04/04/2023    FREET4 1.31 01/10/2025    FREET4 1.38 11/08/2024    FREET4 1.18 04/04/2023     Lab Results   Component Value Date    WBC 6.34 09/03/2024    HGB 13.4 09/03/2024     (L) 09/03/2024   No results found for: \"MALBCRERATIO\"          Imaging:               Medical Tests:               Summary of old records / correspondence / consultant report:             Request outside records:       "

## 2025-02-03 DIAGNOSIS — G43.011 INTRACTABLE MIGRAINE WITHOUT AURA AND WITH STATUS MIGRAINOSUS: Primary | ICD-10-CM

## 2025-02-04 ENCOUNTER — SPECIALTY PHARMACY (OUTPATIENT)
Dept: PHARMACY | Facility: TELEHEALTH | Age: 45
End: 2025-02-04
Payer: COMMERCIAL

## 2025-02-04 PROBLEM — G43.501 MIGRAINE AURA, PERSISTENT, WITH STATUS MIGRAINOSUS: Status: ACTIVE | Noted: 2025-02-04

## 2025-02-04 NOTE — PROGRESS NOTES
"Specialty Pharmacy Refill Coordination Note     Taty \"An\" is a 44 y.o. female contacted today regarding refills of  qulipta specialty medication(s).    Reviewed and verified with patient:       Specialty medication(s) and dose(s) confirmed: yes                   Follow-up: 28 day(s)     Renetta Diaz, Pharmacy Technician  Specialty Pharmacy Technician        "

## 2025-02-07 ENCOUNTER — OFFICE VISIT (OUTPATIENT)
Dept: INTERNAL MEDICINE | Age: 45
End: 2025-02-07
Payer: COMMERCIAL

## 2025-02-07 VITALS
WEIGHT: 168.4 LBS | DIASTOLIC BLOOD PRESSURE: 76 MMHG | BODY MASS INDEX: 27.06 KG/M2 | TEMPERATURE: 96.6 F | OXYGEN SATURATION: 97 % | HEART RATE: 68 BPM | SYSTOLIC BLOOD PRESSURE: 104 MMHG | HEIGHT: 66 IN

## 2025-02-07 DIAGNOSIS — G43.011 INTRACTABLE MIGRAINE WITHOUT AURA AND WITH STATUS MIGRAINOSUS: Primary | ICD-10-CM

## 2025-02-07 PROCEDURE — 99213 OFFICE O/P EST LOW 20 MIN: CPT | Performed by: PHYSICIAN ASSISTANT

## 2025-02-07 RX ORDER — FEXOFENADINE HCL 180 MG/1
180 TABLET ORAL DAILY
COMMUNITY

## 2025-02-07 RX ORDER — RIZATRIPTAN BENZOATE 10 MG/1
10 TABLET ORAL ONCE AS NEEDED
Qty: 10 TABLET | Refills: 0 | Status: SHIPPED | OUTPATIENT
Start: 2025-02-07

## 2025-02-07 NOTE — PROGRESS NOTES
"                            KP LUBIN PA-C                  I  N  T  E  R  N  A  L    M  E  D  I  C  I  N  E         ENCOUNTER DATE:  02/07/2025    Taty Romo / 44 y.o. / female    OFFICE VISIT ENCOUNTER       CHIEF COMPLAINT / REASON FOR OFFICE VISIT     Intractable migraine      ASSESSMENT & PLAN     Problem List Items Addressed This Visit    None  Visit Diagnoses       Intractable migraine without aura and with status migrainosus    -  Primary    Relevant Medications    rizatriptan (Maxalt) 10 MG tablet    rimegepant sulfate ODT (NURTEC) 75 MG tablet          No orders of the defined types were placed in this encounter.    New Medications Ordered This Visit   Medications    rizatriptan (Maxalt) 10 MG tablet     Sig: Take 1 tablet by mouth 1 (One) Time As Needed for Migraine. May repeat in 2 hours if needed     Dispense:  10 tablet     Refill:  0    rimegepant sulfate ODT (NURTEC) 75 MG tablet     Sig: Place 1 tablet under the tongue As Needed (migraine).     Dispense:  16 tablet     Refill:  3       SUMMARY/DISCUSSION  Unable to tolerate Topamax for migraine prophylaxis due to dizziness and feeling \"drunk\".  Unable to tolerate Sumatriptan for migraine abortive therapy similarly for dizziness, as well as somnolence.   Rizatriptan causes some somnolence, so she is unable to take during workday, but is tolerable for at home use.   Start trial of Qulipta ([employer] card provided) for prevention and Rizatriptan vs. Nurtec for Abortive Therapy.   I am not this patient's primary care provider, as she is followed by Dr. Jeremy Mcdonald here at the [facility] location.  I am employed as a physician associate/assistant at the same practice as, and under indirect supervision of Dr. Mcdonald.  It may be necessary for me to follow up with this patient on a ongoing basis concerning chronicity of this specific disease process, and for continuity of care.         Next Appointment:     Return if symptoms worsen or fail " "to improve. And, for Follow-up with Dr. Mcdonald or myself in the next 4-to-6 weeks. .      VITAL SIGNS     Vitals:    02/07/25 1551   BP: 104/76   Pulse: 68   Temp: 96.6 °F (35.9 °C)   SpO2: 97%   Weight: 76.4 kg (168 lb 6.4 oz)   Height: 167.6 cm (66\")       BP Readings from Last 3 Encounters:   02/07/25 104/76   01/24/25 106/60   11/08/24 110/70     Wt Readings from Last 3 Encounters:   02/07/25 76.4 kg (168 lb 6.4 oz)   01/24/25 76.6 kg (168 lb 12.8 oz)   11/08/24 78 kg (172 lb)     Body mass index is 27.18 kg/m².         No data to display                   MEDICATIONS AT THE TIME OF OFFICE VISIT     Current Outpatient Medications on File Prior to Visit   Medication Sig    acetaminophen (Tylenol 8 Hour Arthritis Pain) 650 MG 8 hr tablet Take 1 tablet by mouth Every 8 (Eight) Hours As Needed for Mild Pain  or Moderate Pain .    Atogepant 30 MG tablet Take 1 tablet by mouth Daily.    Biotin 5000 MCG tablet Take 1 tablet by mouth Daily.    buPROPion XL (Wellbutrin XL) 300 MG 24 hr tablet Take 1 tablet by mouth Every Morning.    Cholecalciferol (VITAMIN D) 2000 UNITS capsule Take 5,000 Units by mouth Every Evening.    fexofenadine (ALLEGRA) 180 MG tablet Take 1 tablet by mouth Daily.    hydroxychloroquine (PLAQUENIL) 200 MG tablet Take 1 tablet by mouth 2 (Two) Times a Day.    hydrOXYzine (ATARAX) 25 MG tablet Take 4 tablets by mouth At Night As Needed for sleep.    levothyroxine (SYNTHROID, LEVOTHROID) 125 MCG tablet Take 1 tablet by mouth Daily.    ondansetron (Zofran) 8 MG tablet Take 1 tablet by mouth Every 8 (Eight) Hours As Needed for Nausea or Vomiting.    pantoprazole (PROTONIX) 40 MG EC tablet Take 1 tablet by mouth Daily.    propranolol (INDERAL) 10 MG tablet Take 1 tablet by mouth 2 (Two) Times a Day As Needed (Anxiety).    Vortioxetine HBr (Trintellix) 20 MG tablet Take 1 tablet by mouth Daily.    [DISCONTINUED] rimegepant sulfate (NURTEC) 75 MG tablet Place 1 tablet under the tongue As Needed (migraine).    " "cetirizine (zyrTEC) 10 MG tablet Take 1 tablet by mouth Daily. (Patient not taking: Reported on 2/7/2025)    Cyanocobalamin (B-12-SL) 1000 MCG sublingual tablet Place 1 tablet under the tongue Daily. (Patient not taking: Reported on 2/7/2025)    promethazine (PHENERGAN) 25 MG tablet Take 1 tablet by mouth Every 6 (Six) Hours As Needed for Nausea or Vomiting. (Patient not taking: Reported on 2/7/2025)     No current facility-administered medications on file prior to visit.          HISTORY OF PRESENT ILLNESS     PT is a 43y/o wf with hypothyroidism and fibromyalgia, who presents for migraine follow up.     At her last office visit on 1/24/25 she was started on a trial of Topamax 25 mg BID for migraine prophylaxis. She states that only one day of taking the medication as prescribed she began to feel a \"little different\". She says that she began to experience side effects of dizziness and drowsiness, which made her appear to be \"drunk\" at work. She was informed on 2/03/25 that she should discontinue the medication and that she would be started on Qulipta for migraine prevention.  She states that she has not yet received her prescribed Qulipta, but states that she did receive an email from the , but has not yet registered as required. As for abortive therapies she has failed a trial of Sumatriptan due to dizziness and somnolence. She also reports good efficacy with sample trial of Nurtec (Rimegepant), and would like to receive a prescription of possible.     Patient Care Team:  Jeremy Mcdonald MD as PCP - General (Internal Medicine)  Shaheed Blanco MD as Consulting Physician (Rheumatology)  Rupa Olivas APRN as Nurse Practitioner (Nurse Practitioner)    REVIEW OF SYSTEMS     Review of Systems   As Per HPI.  All other systems negative.     PHYSICAL EXAMINATION     Physical Exam  Vitals and nursing note reviewed.   Constitutional:       General: She is not in acute distress.     Appearance: Normal " "appearance. She is not ill-appearing.   Cardiovascular:      Rate and Rhythm: Normal rate and regular rhythm.      Pulses: Normal pulses.      Heart sounds: Normal heart sounds. No murmur heard.     No friction rub. No gallop.   Pulmonary:      Effort: Pulmonary effort is normal. No respiratory distress.      Breath sounds: Normal breath sounds. No stridor. No wheezing.   Neurological:      Mental Status: She is alert.   Psychiatric:         Mood and Affect: Mood normal.         Behavior: Behavior normal.             REVIEWED DATA     Labs:     Lab Results   Component Value Date     09/03/2024    K 4.0 09/03/2024    CALCIUM 9.0 09/03/2024    AST 30 09/03/2024    ALT 36 (H) 09/03/2024    BUN 11 09/03/2024    CREATININE 0.89 09/03/2024    CREATININE 0.88 10/24/2023    CREATININE 0.95 02/13/2023    EGFRRESULT 77 02/13/2023     Lab Results   Component Value Date    HGBA1C 5.3 02/13/2023    HGBA1C 5.6 07/27/2022     Lab Results   Component Value Date     (H) 02/13/2023    LDL 95 07/27/2022    HDL 62 02/13/2023    HDL 45 07/27/2022    TRIG 150 (H) 02/13/2023    TRIG 104 07/27/2022     Lab Results   Component Value Date    TSH 0.741 01/10/2025    TSH 2.660 11/08/2024    TSH 2.050 04/04/2023    FREET4 1.31 01/10/2025    FREET4 1.38 11/08/2024    FREET4 1.18 04/04/2023     Lab Results   Component Value Date    WBC 6.34 09/03/2024    HGB 13.4 09/03/2024     (L) 09/03/2024   No results found for: \"MALBCRERATIO\"          Imaging:               Medical Tests:               Summary of old records / correspondence / consultant report:             Request outside records:       "

## 2025-02-10 ENCOUNTER — SPECIALTY PHARMACY (OUTPATIENT)
Dept: PHARMACY | Facility: TELEHEALTH | Age: 45
End: 2025-02-10
Payer: COMMERCIAL

## 2025-02-10 NOTE — PROGRESS NOTES
Specialty Pharmacy Patient Management Program  Initial Assessment     Taty Romo is a 44 y.o. female with chronic migraine and enrolled in the Patient Management program offered by [facility]. An initial outreach was conducted, including assessment of therapy appropriateness and specialty medication education for Qulipta 30 mg tablet. The patient was introduced to services offered by [facility], including: regular assessments, refill coordination, curbside pick-up or mail order delivery options, prior authorization maintenance, and financial assistance programs as applicable. The patient was also provided with contact information for the pharmacy team.     Insurance Coverage & Financial Support  Affirmed and  copay card      Relevant Past Medical History and Comorbidities  Relevant medical history and concomitant health conditions were discussed with the patient. The patient's chart has been reviewed for relevant past medical history and comorbid health conditions and updated as necessary.   Past Medical History:   Diagnosis Date    Allergic     Allergic rhinitis 05/22/2019    Alopecia 08/2015    CHI positive 08/2014    Anal fissure 12/2016    Anxiety     Bilateral sciatica 01/15/2020    Biliary colic 05/2019    Bulging lumbar disc 02/2018    Cervical radiculopathy 11/18/2020    Cervicitis 04/04/2002    SEEN AT [facility]    Chronic bilateral low back pain without sciatica     Chronic fatigue     Chronic ITP (idiopathic thrombocytopenia) 01/2012    FOLLOWED BY DR. PEÑA PIÑA    Closed fracture of left distal radius 01/22/2020    SEEN AT [facility]    Cold intolerance     Colon polyps     FOLLOWED BY DR. 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    Hypothyroidism     Idiopathic scoliosis of lumbosacral region     Insomnia 03/24/2015    Continue trazodone 200 mg qHS     Irregular menses 03/2015    Irritable bowel syndrome with constipation 01/30/2019    Lumbar radiculopathy     Lumbosacral disc disease     Migraine     Neck sprain 07/11/2005    SEEN AT [facility]    Paresthesia of upper extremity     BILATERAL    PCOS (polycystic ovarian syndrome)     Peripheral neuropathy 2017    KNEES DOWN    Polyarthralgia     Polydipsia 10/2016    Ruptured tympanic membrane, right 05/2015    Seasonal allergies     Spondylolisthesis at L5-S1 level     Systemic lupus erythematosus 03/2007    FOLLOWED BY DR. ERNESTO DILLARD    Tattoos     Tremor of both hands 06/2016    Trochanteric bursitis of both hips 10/2021    Urinary frequency 02/2015    Urinary urgency 02/2015    Vitamin D deficiency 08/29/2015     Social History     Socioeconomic History    Marital status:      Spouse name: Alexander*    Number of children: 2   Tobacco Use    Smoking status: Never    Smokeless tobacco: Never   Vaping Use    Vaping status: Never Used   Substance and Sexual Activity    Alcohol use: Not Currently     Comment: Few times a year    Drug use: No    Sexual activity: Yes     Partners: Male     Birth control/protection: I.U.D., Vasectomy     Problem list reviewed by Perla Browne, PharmD on 2/10/2025 at 11:12 AM    Allergies  Known allergies and reactions were discussed with the patient. The patient's chart has been reviewed for allergy information and updated as necessary.   Oxycodone, Sumatriptan, and Topamax [topiramate]  Allergies reviewed  by Perla Browne, Jelani on 2/10/2025 at 11:11 AM    Current Medication List  This medication list has been reviewed with the patient and evaluated for any interactions or necessary modifications/recommendations, and updated to include all prescription medications, OTC medications, and supplements the patient is currently taking. This list reflects what is contained in the patient's profile, which has also been marked as reviewed to communicate to other providers it is the most up to date version of the patient's current medication therapy.     Current Outpatient Medications:     acetaminophen (Tylenol 8 Hour Arthritis Pain) 650 MG 8 hr tablet, Take 1 tablet by mouth Every 8 (Eight) Hours As Needed for Mild Pain  or Moderate Pain ., Disp: 100 tablet, Rfl: 2    Atogepant 30 MG tablet, Take 1 tablet by mouth Daily., Disp: 30 tablet, Rfl: 1    Biotin 5000 MCG tablet, Take 1 tablet by mouth Daily., Disp: , Rfl:     buPROPion XL (Wellbutrin XL) 300 MG 24 hr tablet, Take 1 tablet by mouth Every Morning., Disp: 90 tablet, Rfl: 1    cetirizine (zyrTEC) 10 MG tablet, Take 1 tablet by mouth Daily. (Patient not taking: Reported on 2/7/2025), Disp: , Rfl:     Cholecalciferol (VITAMIN D) 2000 UNITS capsule, Take 5,000 Units by mouth Every Evening., Disp: , Rfl:     Cyanocobalamin (B-12-SL) 1000 MCG sublingual tablet, Place 1 tablet under the tongue Daily. (Patient not taking: Reported on 2/7/2025), Disp: , Rfl:     fexofenadine (ALLEGRA) 180 MG tablet, Take 1 tablet by mouth Daily., Disp: , Rfl:     hydroxychloroquine (PLAQUENIL) 200 MG tablet, Take 1 tablet by mouth 2 (Two) Times a Day., Disp: 180 tablet, Rfl: 1    hydrOXYzine (ATARAX) 25 MG tablet, Take 4 tablets by mouth At Night As Needed for sleep., Disp: 360 tablet, Rfl: 1    levothyroxine (SYNTHROID, LEVOTHROID) 125 MCG tablet, Take 1 tablet by mouth Daily., Disp: 30 tablet, Rfl: 5    ondansetron (Zofran) 8 MG tablet, Take 1 tablet by mouth Every 8 (Eight) Hours As  Needed for Nausea or Vomiting., Disp: 20 tablet, Rfl: 0    pantoprazole (PROTONIX) 40 MG EC tablet, Take 1 tablet by mouth Daily., Disp: 90 tablet, Rfl: 3    promethazine (PHENERGAN) 25 MG tablet, Take 1 tablet by mouth Every 6 (Six) Hours As Needed for Nausea or Vomiting. (Patient not taking: Reported on 2/7/2025), Disp: 20 tablet, Rfl: 0    propranolol (INDERAL) 10 MG tablet, Take 1 tablet by mouth 2 (Two) Times a Day As Needed (Anxiety)., Disp: 60 tablet, Rfl: 0    rimegepant sulfate ODT (NURTEC) 75 MG tablet, Place 1 tablet under the tongue As Needed (migraine)., Disp: 16 tablet, Rfl: 3    rizatriptan (Maxalt) 10 MG tablet, Take 1 tablet by mouth 1 (One) Time As Needed for Migraine. May repeat in 2 hours if needed, Disp: 10 tablet, Rfl: 0    Vortioxetine HBr (Trintellix) 20 MG tablet, Take 1 tablet by mouth Daily., Disp: 90 tablet, Rfl: 1       Drug Interactions  none     Relevant Laboratory Values  Lab Results   Component Value Date    GLUCOSE 123 (H) 09/03/2024    CALCIUM 9.0 09/03/2024     09/03/2024    K 4.0 09/03/2024    CO2 22.6 09/03/2024     09/03/2024    BUN 11 09/03/2024    CREATININE 0.89 09/03/2024    EGFRRESULT 77 02/13/2023    BCR 12.4 09/03/2024    ANIONGAP 10.4 09/03/2024     Lab Results   Component Value Date    WBC 6.34 09/03/2024    HGB 13.4 09/03/2024    HCT 39.9 09/03/2024    MCV 93.4 09/03/2024     (L) 09/03/2024    INR 1.06 09/11/2022     Lab Value Review  The above lab values have been reviewed; the following specialty medication(s) dose adjustment(s) are recommended: none.    Initial Education Provided for Specialty Medication  The patient has been provided with the following education and any applicable administration techniques (i.e. self-injection) have been demonstrated for the therapies indicated. All questions and concerns have been addressed prior to the patient receiving the medication, and the patient has verbalized understanding of the education and any  materials provided. Additional patient education shall be provided and documented upon request by the patient, provider or payer.      Atogepant (Qulipta)        Medication Expectations   Why am I taking this medication? You are taking this medication for migraine prophylaxis.   What should I expect while on this medication? You should expect to a decrease in the frequency and severity of your migraines.   How does the medication work? Qulipta is a monoclonal antibody that binds to calcitonin gene-related peptide (CGRP) and blocks its binding to the receptor decreasing the severity of migraines.   How long will I be on this medication for? The amount of time you will be on this medication will be determined by your doctor and your response to the medication.    How do I take this medication? Take as directed on your prescription label.   Take one tablet daily with or without food.   What are some possible side effects? Potential side effects including, but not limited to nausea, constipation and fatigue. Pt verbalized understanding.   What happens if I miss a dose? If you miss a dose of this medicine, take it as soon as possible. However, if it is almost time for your next dose, skip the missed dose and go back to your regular dosing schedule. Do not double doses.                  Medication Safety   What are things I should warn my doctor immediately about? Hypersensitivity reactions, such as trouble breathing or swallowing.   What are things that I should be cautious of? Be cautious driving until you know how this drug will affect you.   What are some medications that can interact with this one? Ketoconazole, Itraconazole, cyclosporin, clarithromycin, rifampin, carbamazepine, phenytion, Mady's Wort, efavirenz, and etravine.            Medication Storage/Handling   How should I handle this medication? Keep this medication our of reach of pets/children in original container.   How does this medication need to be  stored? Store at room temperature away from heat/cold, sunlight or moisture.   How should I dispose of this medication? There should not be a need to dispose of this medication unless your provider decides to change the dose or therapy. If that is the case, take to your local police station for proper disposal. Some pharmacies also have take-back bins for medication drop-off.             Resources/Support   How can I remind myself to take this medication? You can download reminder apps to help you manage your refills. You may also set an alarm on your phone to remind you. The pharmacy carries pill boxes that you can place next to an area you pass everyday (such as where you place your car keys or where you charge your phone)   Is financial support available?  Yes, [employer] can provide co-pay cards if you have commercial insurance or patient assistance if you have Medicare or no insurance.    Which vaccines are recommended for me? Talk to your doctor about these vaccines: Flu, Coronavirus (COVID-19), Pneumococcal (pneumonia), Tdap, Hepatitis B, Zoster (shingles)          Adherence, Self-Administration, and Current Therapy Problems  Adherence related to the patient's specialty therapy was discussed with the patient. The Adherence segment of this outreach has been reviewed and updated.          Additional Barriers to Patient Self-Administration: None  Methods for Supporting Patient Self-Administration: N/A    Open Medication Therapy Problems  No medication therapy recommendations to display    Goals of Therapy   Goals Addressed Today        Specialty Pharmacy General Goal      Reduce frequency of migraines by at least 50%              Reassessment Plan & Follow-Up  Medication Therapy Changes: Patient to initiate Qulipta for prevention.  Additional Plans, Therapy Recommendations, or Therapy Problems to Be Addressed:  Patient received sample from provider and has tolerated medication thus far. Patient has failed Topiramate  for prevention and Sumatriptan for acute treatment due to side effects.    Pharmacist to perform regular reassessments no more than (6) months from the previous assessment.  Welcome information and patient satisfaction survey to be sent by retail team with patient's initial fill.  Care Coordinator to set up future refill outreaches, coordinate prescription delivery, and escalate clinical questions to pharmacist.     Attestation  I attest the patient was actively involved in and has agreed to the above plan of care. I attest that the initiated specialty medication(s) are appropriate for the patient based on my assessment. If the prescribed therapy is at any point deemed not appropriate based on the current or future assessments, a consultation will be initiated with the patient's specialty care provider to determine the best course of action. The revised plan of therapy will be documented along with any reassessments and/or additional patient education provided.     Electronically signed by Perla Browne PharmD, 02/10/25, 11:12 AM EST.

## 2025-02-18 ENCOUNTER — HOSPITAL ENCOUNTER (EMERGENCY)
Facility: HOSPITAL | Age: 45
Discharge: HOME | End: 2025-02-18
Attending: EMERGENCY MEDICINE | Admitting: EMERGENCY MEDICINE
Payer: COMMERCIAL

## 2025-02-18 VITALS
SYSTOLIC BLOOD PRESSURE: 166 MMHG | WEIGHT: 165 LBS | BODY MASS INDEX: 26.52 KG/M2 | TEMPERATURE: 98.9 F | HEIGHT: 66 IN | DIASTOLIC BLOOD PRESSURE: 66 MMHG | OXYGEN SATURATION: 99 % | HEART RATE: 92 BPM | RESPIRATION RATE: 18 BRPM

## 2025-02-18 DIAGNOSIS — R11.2 NAUSEA AND VOMITING, UNSPECIFIED VOMITING TYPE: ICD-10-CM

## 2025-02-18 DIAGNOSIS — J10.1 INFLUENZA A: Primary | ICD-10-CM

## 2025-02-18 LAB
FLUAV SUBTYP SPEC NAA+PROBE: DETECTED
FLUBV RNA ISLT QL NAA+PROBE: NOT DETECTED
RSV RNA NPH QL NAA+NON-PROBE: NOT DETECTED
SARS-COV-2 RNA RESP QL NAA+PROBE: NOT DETECTED

## 2025-02-18 PROCEDURE — 25010000002 DIPHENHYDRAMINE PER 50 MG: Performed by: EMERGENCY MEDICINE

## 2025-02-18 PROCEDURE — 25010000002 PROCHLORPERAZINE 10 MG/2ML SOLUTION: Performed by: EMERGENCY MEDICINE

## 2025-02-18 PROCEDURE — 25810000003 SODIUM CHLORIDE 0.9 % SOLUTION: Performed by: EMERGENCY MEDICINE

## 2025-02-18 PROCEDURE — 96374 THER/PROPH/DIAG INJ IV PUSH: CPT

## 2025-02-18 PROCEDURE — 96375 TX/PRO/DX INJ NEW DRUG ADDON: CPT

## 2025-02-18 PROCEDURE — 99283 EMERGENCY DEPT VISIT LOW MDM: CPT

## 2025-02-18 PROCEDURE — 87637 SARSCOV2&INF A&B&RSV AMP PRB: CPT

## 2025-02-18 RX ORDER — ONDANSETRON 4 MG/1
4 TABLET, ORALLY DISINTEGRATING ORAL ONCE
Status: DISCONTINUED | OUTPATIENT
Start: 2025-02-18 | End: 2025-02-18

## 2025-02-18 RX ORDER — PROCHLORPERAZINE EDISYLATE 5 MG/ML
10 INJECTION INTRAMUSCULAR; INTRAVENOUS ONCE
Status: COMPLETED | OUTPATIENT
Start: 2025-02-18 | End: 2025-02-18

## 2025-02-18 RX ORDER — DIPHENHYDRAMINE HYDROCHLORIDE 50 MG/ML
25 INJECTION INTRAMUSCULAR; INTRAVENOUS ONCE
Status: COMPLETED | OUTPATIENT
Start: 2025-02-18 | End: 2025-02-18

## 2025-02-18 RX ORDER — DEXTROMETHORPHAN HYDROBROMIDE AND PROMETHAZINE HYDROCHLORIDE 15; 6.25 MG/5ML; MG/5ML
5 SYRUP ORAL 4 TIMES DAILY PRN
Qty: 160 ML | Refills: 0 | Status: SHIPPED | OUTPATIENT
Start: 2025-02-18

## 2025-02-18 RX ORDER — ONDANSETRON 4 MG/1
4 TABLET, ORALLY DISINTEGRATING ORAL EVERY 6 HOURS PRN
Qty: 30 TABLET | Refills: 0 | Status: SHIPPED | OUTPATIENT
Start: 2025-02-18

## 2025-02-18 RX ORDER — SODIUM CHLORIDE 0.9 % (FLUSH) 0.9 %
10 SYRINGE (ML) INJECTION AS NEEDED
Status: DISCONTINUED | OUTPATIENT
Start: 2025-02-18 | End: 2025-02-18 | Stop reason: HOSPADM

## 2025-02-18 RX ORDER — ONDANSETRON 2 MG/ML
4 INJECTION INTRAMUSCULAR; INTRAVENOUS ONCE
Status: DISCONTINUED | OUTPATIENT
Start: 2025-02-18 | End: 2025-02-18

## 2025-02-18 RX ADMIN — DIPHENHYDRAMINE HYDROCHLORIDE 25 MG: 50 INJECTION, SOLUTION INTRAMUSCULAR; INTRAVENOUS at 02:22

## 2025-02-18 RX ADMIN — SODIUM CHLORIDE 1000 ML: 9 INJECTION, SOLUTION INTRAVENOUS at 02:08

## 2025-02-18 RX ADMIN — PROCHLORPERAZINE EDISYLATE 10 MG: 5 INJECTION INTRAMUSCULAR; INTRAVENOUS at 02:22

## 2025-02-18 NOTE — Clinical Note
[facility]  [address]  [place] [id number]    Taty Romo was seen and treated in our emergency department on 2/18/2025.  She may return to work on 02/24/2025.         Thank you for choosing [facility].    Jairon Bautista MD

## 2025-02-18 NOTE — DISCHARGE INSTRUCTIONS
Buy any immune boosters that include elderberry and take twice a day for the next 2 weeks . Stay well hydrated with decaff fluids 2 liters a day for the next 1 wk

## 2025-02-19 NOTE — FSED PROVIDER NOTE
Subjective   History of Present Illness  44-year-old white female with history of URI flulike symptoms persistent cough body aches and low-grade fever however main complaint that brought her in with is: intractable nausea and vomiting.  No abdominal pain no urinary complaints no GI bleeding upper or lower.  HPI is limited due to her severe nausea and anxiety because of it.    History provided by:  Patient and spouse  History limited by:  Acuity of condition (very nauseated and anxious b/c of it)      Review of Systems   Constitutional:  Positive for activity change, appetite change, chills, fatigue and fever.   HENT:  Positive for congestion and rhinorrhea. Negative for sore throat, trouble swallowing and voice change.    Eyes: Negative.    Respiratory:  Positive for cough. Negative for chest tightness and shortness of breath.    Cardiovascular: Negative.  Negative for chest pain.   Gastrointestinal:  Positive for nausea and vomiting. Negative for abdominal pain.   Genitourinary: Negative.    Musculoskeletal:  Positive for arthralgias and myalgias. Negative for neck stiffness.   Skin:  Negative for color change and pallor.   Neurological:  Positive for weakness. Negative for syncope.   Psychiatric/Behavioral:  Positive for agitation. The patient is nervous/anxious.        Past Medical History:   Diagnosis Date    Allergic     Allergic rhinitis 05/22/2019    Alopecia 08/2015    CHI positive 08/2014    Anal fissure 12/2016    Anxiety     Bilateral sciatica 01/15/2020    Biliary colic 05/2019    Bulging lumbar disc 02/2018    Cervical radiculopathy 11/18/2020    Cervicitis 04/04/2002    SEEN AT [facility] ER    Chronic bilateral low back pain without sciatica     Chronic fatigue     Chronic ITP (idiopathic thrombocytopenia) 01/2012    FOLLOWED BY DR. PEÑA PIÑA    Closed fracture of left distal radius 01/22/2020    SEEN AT [facility] ER    Cold intolerance     Colon polyps     FOLLOWED BY DR. 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    Hypothyroidism     Idiopathic scoliosis of lumbosacral region     Insomnia 03/24/2015    Continue trazodone 200 mg qHS     Irregular menses 03/2015    Irritable bowel syndrome with constipation 01/30/2019    Lumbar radiculopathy     Lumbosacral disc disease     Migraine     Neck sprain 07/11/2005    SEEN AT [facility] ER    Paresthesia of upper extremity     BILATERAL    PCOS (polycystic ovarian syndrome)     Peripheral neuropathy 2017    KNEES DOWN    Polyarthralgia     Polydipsia 10/2016    Ruptured tympanic membrane, right 05/2015    Seasonal allergies     Spondylolisthesis at L5-S1 level     Systemic lupus erythematosus 03/2007    FOLLOWED BY DR. ERNESTO DILLARD    Tattoos     Tremor of both hands 06/2016    Trochanteric bursitis of both hips 10/2021    Urinary frequency 02/2015    Urinary urgency 02/2015    Vitamin D deficiency 08/29/2015       Allergies   Allergen Reactions    Oxycodone Nausea And Vomiting     States can take as long as has zofran     Sumatriptan Dizziness    Topamax [Topiramate] Other (See Comments) and Dizziness     Sleepiness       Past Surgical History:   Procedure Laterality Date    ANAL SPHINCTEROTOMY N/A 04/01/2003    DR.RAYMOND HEAD AT [facility]    APPENDECTOMY N/A 04/13/2010    LAPAROSCOPIC, WITH PELVIC LAPAROSCOPY, DR. GIOVANNI RUBIN AT [facility]    BACK SURGERY  discectomy    CARPAL TUNNEL RELEASE Right 07/26/2024    COLONOSCOPY N/A  11/21/2016    4 MM POLYP AT ANUS, NOT RESECTED D/T UPCPMING HEMORRHOIDECTOMY, OTHERWISE WNL, RESCOPE IN 5 YRS, DR. MANNY FARIAS AT Located [facility]    COLONOSCOPY N/A 01/18/2022    ENTIRE COLON WNL, RESCOPE IN 5 YRS, DR. MANNY FARIAS AT [facility]    ENDOSCOPY N/A 05/06/2024    Procedure: ESOPHAGOGASTRODUODENOSCOPY WITH BIOPSIES;  Surgeon: Murali Raygoza MD;  Location: [facility] ENDOSCOPY;  Service: Gastroenterology;  Laterality: N/A;  PRE: NAUSEA & VOMITING /  POST: GASTRITIS, HIATAL HERNIA, GASTRIC DYSMOTILITY    EPIDURAL BLOCK N/A 05/22/2020    DR. ZAIRA GAO AT Located [facility]    FINGER MASS EXCISION Right 05/16/2011    RIGHT INDEX FINGER, PATH: BENIGN WITH HYPERKERATOSIS, DR. LUZ ELENA HAY AT [facility]    HEMORRHOIDECTOMY N/A 04/01/2003    DR.RAYMOND HEAD AT [facility]    HEMORRHOIDECTOMY N/A 11/21/2016    Procedure: HEMORRHOIDECTOMY;  Surgeon: Manny Farias MD;  Location: [facility] MAIN OR;  Service:     INTRAUTERINE DEVICE INSERTION N/A 10/20/2014    MARIAH, DR. CICI LUCIANO    LUMBAR DISCECTOMY Left 01/08/2021    Procedure: Outpatient left lumbar five/sacral one Metrx microdiscectomy;  Surgeon: Alessandro Thomas MD;  Location: [facility] MAIN OR;  Service: Neurosurgery;  Laterality: Left;    LUMBAR EPIDURAL INJECTION N/A 07/19/2021    DR. PARISA BEE AT [facility]    LUMBAR EPIDURAL INJECTION N/A 11/06/2020    DR. WALDO MUNOZ AT [facility]    LUMBAR EPIDURAL INJECTION N/A 08/28/2020    DR. MALORIE SHEA AT [facility]    THYROIDECTOMY, PARTIAL Right 06/17/2015    RIGHT LOBECTOMY, DR. ARABELLA HUNT AT [place]    TONSILLECTOMY Bilateral     CHILDHOOD    TYMPANOSTOMY TUBE PLACEMENT      DURING CHIDHOOD    VAGINAL DELIVERY N/A 11/01/2002    DR. KEENAN LAUREN AT [facility]    VAGINAL DELIVERY N/A 11/16/2006    DR. JANNA DEL VALLE AT [facility]       Family History   Problem Relation Age of Onset    Colon polyps Mother     Coronary artery disease Mother 60        non-smokers    Valvular heart disease Mother     Diabetes type II Mother     Lung disease Mother         pulmonary hypertension     Clotting disorder Mother         superficial dvt    Depression Mother     Kidney failure Mother         due to diabetes    Irritable bowel syndrome Mother     Diabetes Mother     Heart disease Mother     Hyperlipidemia Mother     Hypertension Mother     Kidney disease Mother     Thyroid disease Mother     Vision loss Mother     Colon polyps Father     Atrial fibrillation Father         pacemaker SSS    Hypertension Father     Deep vein thrombosis Father     Spondylolysis Father         spinal stenosis, Degenerative disc disease     Arthritis Father     Autoimmune disease Brother     Depression Brother     Dementia Maternal Grandmother     Stroke Maternal Grandmother     Arthritis Maternal Grandmother     Arthritis Paternal Grandmother     Colon cancer Paternal Grandfather     Vision loss Paternal Grandfather     Asthma Daughter     ADD / ADHD Daughter     Dementia Maternal Uncle     Colon cancer Maternal Uncle     Stroke Maternal Uncle     Colon cancer Maternal Uncle     Malig Hyperthermia Neg Hx        Social History     Socioeconomic History    Marital status:      Spouse name: Alexander*    Number of children: 2   Tobacco Use    Smoking status: Never    Smokeless tobacco: Never   Vaping Use    Vaping status: Never Used   Substance and Sexual Activity    Alcohol use: Not Currently     Comment: Few times a year    Drug use: No    Sexual activity: Yes     Partners: Male     Birth control/protection: I.U.D., Vasectomy           Objective   Physical Exam  Vitals and nursing note reviewed.   Constitutional:       General: She is in acute distress.      Appearance: She is well-developed and normal weight. She is ill-appearing. She is not toxic-appearing or diaphoretic.   HENT:      Head: Normocephalic and atraumatic.      Nose: No congestion or rhinorrhea.      Mouth/Throat:      Mouth: Mucous membranes are moist.      Pharynx: Oropharynx is clear.   Eyes:      Conjunctiva/sclera: Conjunctivae normal.      Pupils:  Pupils are equal, round, and reactive to light.   Cardiovascular:      Rate and Rhythm: Normal rate and regular rhythm.      Heart sounds: Normal heart sounds. No murmur heard.     No friction rub.   Pulmonary:      Effort: Pulmonary effort is normal.      Breath sounds: Normal breath sounds.   Abdominal:      General: Bowel sounds are normal.      Palpations: Abdomen is soft.      Tenderness: There is no abdominal tenderness. There is no guarding or rebound.   Musculoskeletal:      Cervical back: Normal range of motion and neck supple.   Skin:     General: Skin is warm.      Capillary Refill: Capillary refill takes less than 2 seconds.      Coloration: Skin is not pale.      Findings: No erythema or rash.   Neurological:      General: No focal deficit present.      Mental Status: She is alert and oriented to person, place, and time.   Psychiatric:         Behavior: Behavior normal.      Comments: Curled up dry-heaving and anxious           Procedures           ED Course  ED Course as of 02/19/25 0027   Tue Feb 18, 2025   0223 Influenza A PCR(!): Detected [GN]   Wed Feb 19, 2025   0026 Influenza A positive all others negative patient was given a normal saline 1 L bolus Benadryl 25 mg and Compazine 10 mg were all given with improvement. [GN]      ED Course User Index  [GN] Jairon Bautista MD                                           Medical Decision Making  Problems Addressed:  Influenza A: complicated acute illness or injury  Nausea and vomiting, unspecified vomiting type: complicated acute illness or injury    Amount and/or Complexity of Data Reviewed  Labs: ordered. Decision-making details documented in ED Course.    Risk  Prescription drug management.        Final diagnoses:   Influenza A   Nausea and vomiting, unspecified vomiting type       ED Disposition  ED Disposition       ED Disposition   Discharge    Condition   Stable    Comment   --               Jeremy Mcdonald MD  [address] [facility]  [address]  [place] [address]  [phone]      As needed, for re-check    [facility]  [address]  [facility] [address]  Go to   As needed, SIGNIFICANT SHORTNESS OF BREATH DEVELOPS, If symptoms worsen         Medication List        New Prescriptions      ondansetron ODT 4 MG disintegrating tablet  Commonly known as: ZOFRAN-ODT  Take 1 tablet by mouth Every 6 (Six) Hours As Needed for Nausea or Vomiting.     promethazine-dextromethorphan 6.25-15 MG/5ML syrup  Commonly known as: PROMETHAZINE-DM  Take 5 mL by mouth 4 (Four) Times a Day As Needed for Cough (nausea).               Where to Get Your Medications        These medications were sent to [facility] [place]  [address] [address]      Hours: Monday to Friday 7 AM to 6 PM, Saturday & Sunday 8 AM to 4:30 PM (Closed 12 PM to 12:30 PM) Phone: [phone]   ondansetron ODT 4 MG disintegrating tablet  promethazine-dextromethorphan 6.25-15 MG/5ML syrup

## 2025-02-28 ENCOUNTER — APPOINTMENT (OUTPATIENT)
Dept: GENERAL RADIOLOGY | Facility: HOSPITAL | Age: 45
End: 2025-02-28
Payer: COMMERCIAL

## 2025-02-28 ENCOUNTER — APPOINTMENT (OUTPATIENT)
Dept: CT IMAGING | Facility: HOSPITAL | Age: 45
End: 2025-02-28
Payer: COMMERCIAL

## 2025-02-28 ENCOUNTER — HOSPITAL ENCOUNTER (EMERGENCY)
Facility: HOSPITAL | Age: 45
Discharge: HOME | End: 2025-02-28
Attending: STUDENT IN AN ORGANIZED HEALTH CARE EDUCATION/TRAINING PROGRAM
Payer: COMMERCIAL

## 2025-02-28 VITALS
OXYGEN SATURATION: 98 % | RESPIRATION RATE: 16 BRPM | HEIGHT: 66 IN | BODY MASS INDEX: 26.5 KG/M2 | DIASTOLIC BLOOD PRESSURE: 83 MMHG | HEART RATE: 87 BPM | WEIGHT: 164.9 LBS | TEMPERATURE: 98.2 F | SYSTOLIC BLOOD PRESSURE: 103 MMHG

## 2025-02-28 DIAGNOSIS — R11.2 NAUSEA AND VOMITING, UNSPECIFIED VOMITING TYPE: ICD-10-CM

## 2025-02-28 DIAGNOSIS — R07.9 CHEST PAIN, UNSPECIFIED TYPE: Primary | ICD-10-CM

## 2025-02-28 LAB
ALBUMIN SERPL-MCNC: 4.8 G/DL (ref 3.5–5.2)
ALBUMIN/GLOB SERPL: 1.5 G/DL
ALP SERPL-CCNC: 68 U/L (ref 39–117)
ALT SERPL W P-5'-P-CCNC: 43 U/L (ref 1–33)
ANION GAP SERPL CALCULATED.3IONS-SCNC: 12.2 MMOL/L (ref 5–15)
AST SERPL-CCNC: 26 U/L (ref 1–32)
BASOPHILS # BLD AUTO: 0.02 10*3/MM3 (ref 0–0.2)
BASOPHILS NFR BLD AUTO: 0.4 % (ref 0–1.5)
BILIRUB SERPL-MCNC: 0.6 MG/DL (ref 0–1.2)
BUN SERPL-MCNC: 14 MG/DL (ref 6–20)
BUN/CREAT SERPL: 14 (ref 7–25)
CALCIUM SPEC-SCNC: 9.8 MG/DL (ref 8.6–10.5)
CHLORIDE SERPL-SCNC: 104 MMOL/L (ref 98–107)
CO2 SERPL-SCNC: 21.8 MMOL/L (ref 22–29)
CREAT SERPL-MCNC: 1 MG/DL (ref 0.57–1)
D DIMER PPP FEU-MCNC: 0.21 MCGFEU/ML (ref 0–0.5)
DEPRECATED RDW RBC AUTO: 42.3 FL (ref 37–54)
EGFRCR SERPLBLD CKD-EPI 2021: 71.8 ML/MIN/1.73
EOSINOPHIL # BLD AUTO: 0.06 10*3/MM3 (ref 0–0.4)
EOSINOPHIL NFR BLD AUTO: 1.3 % (ref 0.3–6.2)
ERYTHROCYTE [DISTWIDTH] IN BLOOD BY AUTOMATED COUNT: 12.7 % (ref 12.3–15.4)
GEN 5 1HR TROPONIN T REFLEX: <6 NG/L
GLOBULIN UR ELPH-MCNC: 3.3 GM/DL
GLUCOSE SERPL-MCNC: 116 MG/DL (ref 65–99)
HCT VFR BLD AUTO: 47.6 % (ref 34–46.6)
HGB BLD-MCNC: 15.8 G/DL (ref 12–15.9)
IMM GRANULOCYTES # BLD AUTO: 0.01 10*3/MM3 (ref 0–0.05)
IMM GRANULOCYTES NFR BLD AUTO: 0.2 % (ref 0–0.5)
LIPASE SERPL-CCNC: 30 U/L (ref 13–60)
LYMPHOCYTES # BLD AUTO: 1.44 10*3/MM3 (ref 0.7–3.1)
LYMPHOCYTES NFR BLD AUTO: 30 % (ref 19.6–45.3)
MCH RBC QN AUTO: 30.1 PG (ref 26.6–33)
MCHC RBC AUTO-ENTMCNC: 33.2 G/DL (ref 31.5–35.7)
MCV RBC AUTO: 90.7 FL (ref 79–97)
MONOCYTES # BLD AUTO: 0.5 10*3/MM3 (ref 0.1–0.9)
MONOCYTES NFR BLD AUTO: 10.4 % (ref 5–12)
NEUTROPHILS NFR BLD AUTO: 2.77 10*3/MM3 (ref 1.7–7)
NEUTROPHILS NFR BLD AUTO: 57.7 % (ref 42.7–76)
NT-PROBNP SERPL-MCNC: <36 PG/ML (ref 0–450)
PLATELET # BLD AUTO: 232 10*3/MM3 (ref 140–450)
PMV BLD AUTO: 11.5 FL (ref 6–12)
POTASSIUM SERPL-SCNC: 3.6 MMOL/L (ref 3.5–5.2)
PROT SERPL-MCNC: 8.1 G/DL (ref 6–8.5)
QT INTERVAL: 339 MS
QTC INTERVAL: 451 MS
RBC # BLD AUTO: 5.25 10*6/MM3 (ref 3.77–5.28)
SODIUM SERPL-SCNC: 138 MMOL/L (ref 136–145)
TROPONIN T NUMERIC DELTA: NORMAL
TROPONIN T SERPL HS-MCNC: <6 NG/L
WBC NRBC COR # BLD AUTO: 4.8 10*3/MM3 (ref 3.4–10.8)

## 2025-02-28 PROCEDURE — 83880 ASSAY OF NATRIURETIC PEPTIDE: CPT | Performed by: STUDENT IN AN ORGANIZED HEALTH CARE EDUCATION/TRAINING PROGRAM

## 2025-02-28 PROCEDURE — 71045 X-RAY EXAM CHEST 1 VIEW: CPT

## 2025-02-28 PROCEDURE — 83690 ASSAY OF LIPASE: CPT | Performed by: STUDENT IN AN ORGANIZED HEALTH CARE EDUCATION/TRAINING PROGRAM

## 2025-02-28 PROCEDURE — 85025 COMPLETE CBC W/AUTO DIFF WBC: CPT | Performed by: STUDENT IN AN ORGANIZED HEALTH CARE EDUCATION/TRAINING PROGRAM

## 2025-02-28 PROCEDURE — 36415 COLL VENOUS BLD VENIPUNCTURE: CPT

## 2025-02-28 PROCEDURE — 25010000002 ONDANSETRON PER 1 MG: Performed by: STUDENT IN AN ORGANIZED HEALTH CARE EDUCATION/TRAINING PROGRAM

## 2025-02-28 PROCEDURE — 80053 COMPREHEN METABOLIC PANEL: CPT | Performed by: STUDENT IN AN ORGANIZED HEALTH CARE EDUCATION/TRAINING PROGRAM

## 2025-02-28 PROCEDURE — 74177 CT ABD & PELVIS W/CONTRAST: CPT

## 2025-02-28 PROCEDURE — 84484 ASSAY OF TROPONIN QUANT: CPT | Performed by: STUDENT IN AN ORGANIZED HEALTH CARE EDUCATION/TRAINING PROGRAM

## 2025-02-28 PROCEDURE — 99284 EMERGENCY DEPT VISIT MOD MDM: CPT | Performed by: STUDENT IN AN ORGANIZED HEALTH CARE EDUCATION/TRAINING PROGRAM

## 2025-02-28 PROCEDURE — 99285 EMERGENCY DEPT VISIT HI MDM: CPT

## 2025-02-28 PROCEDURE — 93005 ELECTROCARDIOGRAM TRACING: CPT | Performed by: STUDENT IN AN ORGANIZED HEALTH CARE EDUCATION/TRAINING PROGRAM

## 2025-02-28 PROCEDURE — 93010 ELECTROCARDIOGRAM REPORT: CPT | Performed by: INTERNAL MEDICINE

## 2025-02-28 PROCEDURE — 25510000001 IOPAMIDOL PER 1 ML: Performed by: STUDENT IN AN ORGANIZED HEALTH CARE EDUCATION/TRAINING PROGRAM

## 2025-02-28 PROCEDURE — 96374 THER/PROPH/DIAG INJ IV PUSH: CPT

## 2025-02-28 PROCEDURE — 85379 FIBRIN DEGRADATION QUANT: CPT | Performed by: STUDENT IN AN ORGANIZED HEALTH CARE EDUCATION/TRAINING PROGRAM

## 2025-02-28 RX ORDER — ONDANSETRON 2 MG/ML
4 INJECTION INTRAMUSCULAR; INTRAVENOUS ONCE
Status: COMPLETED | OUTPATIENT
Start: 2025-02-28 | End: 2025-02-28

## 2025-02-28 RX ORDER — ONDANSETRON 4 MG/1
4 TABLET, ORALLY DISINTEGRATING ORAL EVERY 8 HOURS PRN
Qty: 15 TABLET | Refills: 0 | Status: SHIPPED | OUTPATIENT
Start: 2025-02-28 | End: 2025-03-07

## 2025-02-28 RX ORDER — IOPAMIDOL 755 MG/ML
100 INJECTION, SOLUTION INTRAVASCULAR
Status: COMPLETED | OUTPATIENT
Start: 2025-02-28 | End: 2025-02-28

## 2025-02-28 RX ADMIN — IOPAMIDOL 85 ML: 755 INJECTION, SOLUTION INTRAVENOUS at 11:21

## 2025-02-28 RX ADMIN — ONDANSETRON 4 MG: 2 INJECTION INTRAMUSCULAR; INTRAVENOUS at 09:49

## 2025-02-28 NOTE — FSED PROVIDER NOTE
Subjective   History of Present Illness  Pt is a 44 y.o. female with PMH as listed who presents for   Chief Complaint   Patient presents with    Chest Pain     PT C/O MID-STERNAL CP RADIATING INTO THE R SIDE STARTING 3 DAYS AGO; RECENTLY DX WITH THE FLU; STATES SHE ALSO HAS BEEN HAVING N/V/D AND BEING UNABLE TO EAT OVER THE PAST WEEK        Patient is a 44-year-old female presents for 3 to 4 days of right sided chest pressure dyspnea intermittent fevers with a Tmax of 101.  Was recently diagnosed with flu about a week ago, has had flulike symptoms persistent since that time but seems to be worsening.      Review of Systems    Past Medical History:   Diagnosis Date    Allergic     Allergic rhinitis 05/22/2019    Alopecia 08/2015    CHI positive 08/2014    Anal fissure 12/2016    Anxiety     Bilateral sciatica 01/15/2020    Biliary colic 05/2019    Bulging lumbar disc 02/2018    Cervical radiculopathy 11/18/2020    Cervicitis 04/04/2002    SEEN AT [facility] ER    Chronic bilateral low back pain without sciatica     Chronic fatigue     Chronic ITP (idiopathic thrombocytopenia) 01/2012    FOLLOWED BY DR. PEÑA PIÑA    Closed fracture of left distal radius 01/22/2020    SEEN AT [facility] ER    Cold intolerance     Colon polyps     FOLLOWED BY DR. 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    Hypothyroidism     Idiopathic scoliosis of lumbosacral region     Insomnia 03/24/2015     Continue trazodone 200 mg qHS     Irregular menses 03/2015    Irritable bowel syndrome with constipation 01/30/2019    Lumbar radiculopathy     Lumbosacral disc disease     Migraine     Neck sprain 07/11/2005    SEEN AT [facility] ER    Paresthesia of upper extremity     BILATERAL    PCOS (polycystic ovarian syndrome)     Peripheral neuropathy 2017    KNEES DOWN    Polyarthralgia     Polydipsia 10/2016    Ruptured tympanic membrane, right 05/2015    Seasonal allergies     Spondylolisthesis at L5-S1 level     Systemic lupus erythematosus 03/2007    FOLLOWED BY DR. ERNESTO DILLARD    Tattoos     Tremor of both hands 06/2016    Trochanteric bursitis of both hips 10/2021    Urinary frequency 02/2015    Urinary urgency 02/2015    Vitamin D deficiency 08/29/2015       Allergies   Allergen Reactions    Oxycodone Nausea And Vomiting     States can take as long as has zofran     Sumatriptan Dizziness    Topamax [Topiramate] Other (See Comments) and Dizziness     Sleepiness       Past Surgical History:   Procedure Laterality Date    ANAL SPHINCTEROTOMY N/A 04/01/2003    DR.RAYMOND HEAD AT [facility]    APPENDECTOMY N/A 04/13/2010    LAPAROSCOPIC, WITH PELVIC LAPAROSCOPY, DR. GIOVANNI RUBIN AT [facility]    BACK SURGERY  discectomy    CARPAL TUNNEL RELEASE Right 07/26/2024    COLONOSCOPY N/A 11/21/2016    4 MM POLYP AT ANUS, NOT RESECTED D/T UPCPMING HEMORRHOIDECTOMY, OTHERWISE WNL, RESCOPE IN 5 YRS, DR. MANNY BONILLA AT [facility]    COLONOSCOPY N/A 01/18/2022    ENTIRE COLON WNL, RESCOPE IN 5 YRS, DR. MANNY BONILLA AT [facility]    ENDOSCOPY N/A 05/06/2024    Procedure: ESOPHAGOGASTRODUODENOSCOPY WITH BIOPSIES;  Surgeon: Murali Raygoza MD;  Location: [facility] ENDOSCOPY;  Service: Gastroenterology;  Laterality: N/A;  PRE: NAUSEA & VOMITING /  POST: GASTRITIS, HIATAL HERNIA, GASTRIC DYSMOTILITY    EPIDURAL BLOCK N/A 05/22/2020    DR. ZAIRA GAO AT [facility]    FINGER MASS EXCISION Right 05/16/2011    RIGHT INDEX FINGER, PATH: BENIGN WITH HYPERKERATOSIS,   LUZ ELENA HAY AT [facility]    HEMORRHOIDECTOMY N/A 04/01/2003    DR.RAYMOND HEAD AT [facility]    HEMORRHOIDECTOMY N/A 11/21/2016    Procedure: HEMORRHOIDECTOMY;  Surgeon: Natalya Farisa MD;  Location: [facility] MAIN OR;  Service:     INTRAUTERINE DEVICE INSERTION N/A 10/20/2014    MARIAH, DR. CICI LUCIANO    LUMBAR DISCECTOMY Left 01/08/2021    Procedure: Outpatient left lumbar five/sacral one Metrx microdiscectomy;  Surgeon: Alessandro Thomas MD;  Location: [facility] MAIN OR;  Service: Neurosurgery;  Laterality: Left;    LUMBAR EPIDURAL INJECTION N/A 07/19/2021    DR. PARISA BEE AT [facility]    LUMBAR EPIDURAL INJECTION N/A 11/06/2020    DR. WALDO MUNOZ AT [facility]    LUMBAR EPIDURAL INJECTION N/A 08/28/2020    DR. MALORIE SHEA AT [facility]    THYROIDECTOMY, PARTIAL Right 06/17/2015    RIGHT LOBECTOMY, DR. ARABELLA HUNT AT [place]    TONSILLECTOMY Bilateral     CHILDHOOD    TYMPANOSTOMY TUBE PLACEMENT      DURING CHIDHOOD    VAGINAL DELIVERY N/A 11/01/2002    DR. KEENAN LAUREN AT [facility]    VAGINAL DELIVERY N/A 11/16/2006    DR. JANNA DEL VALLE AT [facility]       Family History   Problem Relation Age of Onset    Colon polyps Mother     Coronary artery disease Mother 60        non-smokers    Valvular heart disease Mother     Diabetes type II Mother     Lung disease Mother         pulmonary hypertension    Clotting disorder Mother         superficial dvt    Depression Mother     Kidney failure Mother         due to diabetes    Irritable bowel syndrome Mother     Diabetes Mother     Heart disease Mother     Hyperlipidemia Mother     Hypertension Mother     Kidney disease Mother     Thyroid disease Mother     Vision loss Mother     Colon polyps Father     Atrial fibrillation Father         pacemaker SSS    Hypertension Father     Deep vein thrombosis Father     Spondylolysis Father         spinal stenosis, Degenerative disc disease     Arthritis Father     Autoimmune disease Brother     Depression Brother     Dementia Maternal Grandmother     Stroke Maternal  Grandmother     Arthritis Maternal Grandmother     Arthritis Paternal Grandmother     Colon cancer Paternal Grandfather     Vision loss Paternal Grandfather     Asthma Daughter     ADD / ADHD Daughter     Dementia Maternal Uncle     Colon cancer Maternal Uncle     Stroke Maternal Uncle     Colon cancer Maternal Uncle     Malig Hyperthermia Neg Hx        Social History     Socioeconomic History    Marital status:      Spouse name: Alexander*    Number of children: 2   Tobacco Use    Smoking status: Never    Smokeless tobacco: Never   Vaping Use    Vaping status: Never Used   Substance and Sexual Activity    Alcohol use: Not Currently     Comment: Few times a year    Drug use: No    Sexual activity: Yes     Partners: Male     Birth control/protection: I.U.D., Vasectomy           Objective   Physical Exam  Constitutional:       Appearance: Normal appearance.   HENT:      Head: Normocephalic and atraumatic.      Mouth/Throat:      Mouth: Mucous membranes are moist.      Pharynx: Oropharynx is clear.   Eyes:      Conjunctiva/sclera: Conjunctivae normal.   Cardiovascular:      Rate and Rhythm: Regular rhythm. Tachycardia present.      Heart sounds: Normal heart sounds.   Pulmonary:      Effort: Pulmonary effort is normal.      Breath sounds: Normal breath sounds.   Chest:      Chest wall: No tenderness.   Abdominal:      General: Abdomen is flat.      Palpations: Abdomen is soft.      Tenderness: There is no abdominal tenderness.   Musculoskeletal:      Cervical back: Neck supple.   Skin:     General: Skin is warm and dry.   Neurological:      Mental Status: She is alert.   Psychiatric:         Mood and Affect: Mood normal.         Procedures           ED Course  ED Course as of 02/28/25 1202   Fri Feb 28, 2025   0645 Patient is a 44-year-old female presents for 3 to 4 days of right sided chest pressure dyspnea intermittent fevers with a Tmax of 101.  Was recently diagnosed with flu about a week ago, has had flulike  "symptoms persistent since that time but seems to be worsening.  Given chest pressure will obtain full chest pain workup including CBC CMP lipase BNP troponin D-dimer EKG and chest x-ray.  Patient given Zofran for nausea. [JF]   1104 Patient states that \"there is something wrong\".  She is states that every time she has a viral illness she has vomiting for 1 to 2 weeks afterwards and this has been going on for the past 2 years.  Denies any abdominal pain currently and states that the Zofran has helped but continually reiterates that \"something is wrong\".  In she does not know what to do from here.  She has seen her PCP and GI for these issues, has received EGD and was told that she had a very small hiatal hernia but otherwise no acute findings.  Patient is afebrile at this time heart rate is within normal limits her symptoms have resolved with Zofran.  Discussed with her that I do not feel that CT would likely show anything new today that would change her course of management but she is adamant that she will go home and her symptoms will recur and she will likely have to return for further management.  In order to try and prevent patient from having to return to the ED for second emergency visit will obtain CT abdomen pelvis to reassure patient and further evaluate. [JF]   1200 All lab work unremarkable for any acute findings.  Troponin negative x 2 BNP normal D-dimer normal.  CT and pelvis shows no acute findings, evidence of possible gastritis which is expected given patient's recurrent vomiting that she has been experiencing recently.  Chest x-ray shows no acute pulm process.  All questions answered. [JF]      ED Course User Index  [JF] Jose Guillaume MD                HEART Score: 0                            Medical Decision Making  My differential diagnosis for chest pain includes but is not limited to:  Muscle strain, costochondritis, myositis, pleurisy, rib fracture, intercostal neuritis, herpes zoster, " tumor, myocardial infarction, coronary syndrome, unstable angina, angina, aortic dissection, mitral valve prolapse, pericarditis, palpitations, pulmonary embolus, pneumonia, pneumothorax, lung cancer, GERD, esophagitis, esophageal spasm      Problems Addressed:  Chest pain, unspecified type: complicated acute illness or injury  Nausea and vomiting, unspecified vomiting type: complicated acute illness or injury    Amount and/or Complexity of Data Reviewed  Labs: ordered. Decision-making details documented in ED Course.  Radiology: ordered. Decision-making details documented in ED Course.  ECG/medicine tests: ordered.     Details: EKG  2/28/2025 at 922  Rhythm sinus tachycardia, rate 107  Normal axis, normal QT interval, no ST changes  EKG interpreted by me contemporaneously by me with care    Risk  Prescription drug management.        Final diagnoses:   Chest pain, unspecified type   Nausea and vomiting, unspecified vomiting type       ED Disposition  ED Disposition       ED Disposition   Discharge    Condition   Stable    Comment   --               Jeremy Mcdonald MD  [address] [address]  [phone]    Schedule an appointment as soon as possible for a visit in 2 days  For re-evaluation    Murali Raygoza MD    Call today  For re-evaluation         Medication List        Changed      * ondansetron ODT 4 MG disintegrating tablet  Commonly known as: ZOFRAN-ODT  Take 1 tablet by mouth Every 6 (Six) Hours As Needed for Nausea or Vomiting.  What changed: Another medication with the same name was added. Make sure you understand how and when to take each.     * ondansetron ODT 4 MG disintegrating tablet  Commonly known as: ZOFRAN-ODT  Place 1 tablet on the tongue Every 8 (Eight) Hours As Needed for Vomiting or Nausea for up to 5 days.  What changed: You were already taking a medication with the same name, and this prescription was added. Make sure you understand how and when to take each.            * This list has 2 medication(s) that are the same as other medications prescribed for you. Read the directions carefully, and ask your doctor or other care provider to review them with you.                   Where to Get Your Medications        These medications were sent to [place] [address]      Hours: Monday to Friday 7 AM to 6 PM, Saturday & Sunday 8 AM to 4:30 PM (Closed 12 PM to 12:30 PM) Phone: [phone]   ondansetron ODT 4 MG disintegrating tablet

## 2025-02-28 NOTE — ED NOTES
PT INFORMED THAT VISIT TODAY WILL BE TRANSITIONED FROM URGENT CARE TO EMERGENCY ROOM CARE AND BILLING. ER ACKNOWLEDGEMENT FORM SIGNED AND ALL QUESTIONS ANSWERED.

## 2025-03-06 RX ORDER — PANTOPRAZOLE SODIUM 40 MG/1
40 TABLET, DELAYED RELEASE ORAL DAILY
Qty: 90 TABLET | Refills: 3 | Status: CANCELLED | OUTPATIENT
Start: 2025-02-28

## 2025-03-07 ENCOUNTER — PATIENT MESSAGE (OUTPATIENT)
Dept: INTERNAL MEDICINE | Age: 45
End: 2025-03-07
Payer: COMMERCIAL

## 2025-03-07 DIAGNOSIS — K22.4 ESOPHAGEAL MOTILITY DISORDER: Primary | ICD-10-CM

## 2025-03-07 RX ORDER — PANTOPRAZOLE SODIUM 40 MG/1
40 TABLET, DELAYED RELEASE ORAL DAILY
Qty: 90 TABLET | Refills: 1 | Status: SHIPPED | OUTPATIENT
Start: 2025-03-07

## 2025-03-07 RX ORDER — PANTOPRAZOLE SODIUM 40 MG/1
40 TABLET, DELAYED RELEASE ORAL DAILY
Qty: 90 TABLET | Refills: 1 | Status: SHIPPED | OUTPATIENT
Start: 2025-03-07 | End: 2025-03-07 | Stop reason: SDUPTHER

## 2025-03-24 ENCOUNTER — TELEMEDICINE (OUTPATIENT)
Dept: PSYCHIATRY | Facility: CLINIC | Age: 45
End: 2025-03-24
Payer: COMMERCIAL

## 2025-03-24 DIAGNOSIS — F33.0 MILD EPISODE OF RECURRENT MAJOR DEPRESSIVE DISORDER: Primary | ICD-10-CM

## 2025-03-24 DIAGNOSIS — F51.05 INSOMNIA DUE TO MENTAL CONDITION: ICD-10-CM

## 2025-03-24 DIAGNOSIS — F40.10 SOCIAL ANXIETY DISORDER: ICD-10-CM

## 2025-03-24 DIAGNOSIS — F41.1 GENERALIZED ANXIETY DISORDER: Chronic | ICD-10-CM

## 2025-03-24 PROCEDURE — 99214 OFFICE O/P EST MOD 30 MIN: CPT

## 2025-03-24 PROCEDURE — 96127 BRIEF EMOTIONAL/BEHAV ASSMT: CPT

## 2025-03-24 RX ORDER — VORTIOXETINE 20 MG/1
20 TABLET, FILM COATED ORAL DAILY
Qty: 90 TABLET | Refills: 1 | Status: SHIPPED | OUTPATIENT
Start: 2025-03-24

## 2025-03-24 RX ORDER — TRAZODONE HYDROCHLORIDE 50 MG/1
50 TABLET ORAL NIGHTLY
Qty: 90 TABLET | Refills: 1 | Status: SHIPPED | OUTPATIENT
Start: 2025-03-24

## 2025-03-24 RX ORDER — BUPROPION HYDROCHLORIDE 300 MG/1
300 TABLET ORAL EVERY MORNING
Qty: 90 TABLET | Refills: 1 | Status: SHIPPED | OUTPATIENT
Start: 2025-03-24

## 2025-03-24 NOTE — PROGRESS NOTES
This provider is located at [place], [place]. The Patient is seen remotely using Video. Patient is being seen via telehealth and confirm that they are in a secure environment for this session. Patient is located in [place], [place] in her car. The patient's condition being diagnosed/treated is appropriate for telemedicine. Provider identified as Rupa Olivas as well as credentials APRN MSN PMHNP-BC.   The client/patient gave consent to be seen remotely, and when consent is given they understand that the consent allows for patient identifiable information to be sent to a third party as needed.  They may refuse to be seen remotely at any time. The electronic data is encrypted and password protected, and the patient has been advised of the potential risks to privacy not withstanding such measures.    Chief Complaint  Depression, Anxiety, and Sleeping Problem    Subjective        Taty Romo presents to [facility] for   History of Present Illness  Patient seen today for follow-up visit for depression, anxiety, and insomnia.  Patient reports her depression and anxiety have been well-managed over the last several months.  States she has not had any issues in that area.  States the hydroxyzine has stopped working for sleep.  States she took some old trazodone that she had from the past and it seems to be working better for her.  She ask about restarting the trazodone.  Appetite is good.  States they have been doing some sprucing up around their house with pouring a new concrete deck.  States she is currently pleased with her other medications. Denies hopelessness. Denies suicidal or homicidal ideation. Denies any manic type symptoms. Denies any paranoia. Denies any auditory of visual hallucinations. Denies any side effects to the medications.    Objective   Vital Signs:   There were no vitals taken for this visit.      Mental Status Exam:   Hygiene:   good  Cooperation:   Cooperative  Eye Contact:  Good  Psychomotor Behavior:  Appropriate  Affect:  Full range  Mood: normal  Speech:  Normal  Thought Process:  Goal directed  Thought Content:  Normal  Suicidal:  None  Homicidal:  None  Hallucinations:  None  Delusion:  None  Memory:  Intact  Orientation:  Person, Place, Time, and Situation  Reliability:  good  Insight:  Good  Judgement:  Good  Impulse Control:  Good  Physical/Medical Issues:  No      PHQ-9 Depression Screening  Little interest or pleasure in doing things? (Patient-Rptd) Several days   Feeling down, depressed, or hopeless? (Patient-Rptd) Not at all   PHQ-2 Total Score (Patient-Rptd) 1   Trouble falling or staying asleep, or sleeping too much? (Patient-Rptd) Several days   Feeling tired or having little energy? (Patient-Rptd) Almost all   Poor appetite or overeating? (Patient-Rptd) Several days   Feeling bad about yourself - or that you are a failure or have let yourself or your family down? (Patient-Rptd) Not at all   Trouble concentrating on things, such as reading the newspaper or watching television? (Patient-Rptd) Not at all   Moving or speaking so slowly that other people could have noticed? Or the opposite - being so fidgety or restless that you have been moving around a lot more than usual? (Patient-Rptd) Not at all   Thoughts that you would be better off dead, or of hurting yourself in some way? (Patient-Rptd) Not at all   PHQ-9 Total Score (Patient-Rptd) 6   If you checked off any problems, how difficult have these problems made it for you to do your work, take care of things at home, or get along with other people? (Patient-Rptd) Not difficult at all         PHQ-9 Total Score: (Patient-Rptd) 6     COURT-7  Feeling nervous, anxious or on edge: (Patient-Rptd) Not at all  Not being able to stop or control worrying: (Patient-Rptd) Several days  Worrying too much about different things: (Patient-Rptd) Not at all  Trouble Relaxing: (Patient-Rptd) Several days  Being so  restless that it is hard to sit still: (Patient-Rptd) Not at all  Feeling afraid as if something awful might happen: (Patient-Rptd) Not at all  Becoming easily annoyed or irritable: (Patient-Rptd) Several days  COURT 7 Total Score: (Patient-Rptd) 3  If you checked any problems, how difficult have these problems made it for you to do your work, take care of things at home, or get along with other people: (Patient-Rptd) Not difficult at all    Previous Provider notes and available records reviewed by this APRN today.   Current Medications:   Current Outpatient Medications   Medication Sig Dispense Refill    acetaminophen (Tylenol 8 Hour Arthritis Pain) 650 MG 8 hr tablet Take 1 tablet by mouth Every 8 (Eight) Hours As Needed for Mild Pain  or Moderate Pain . 100 tablet 2    Atogepant 30 MG tablet Take 1 tablet by mouth Daily. 30 tablet 1    Biotin 5000 MCG tablet Take 1 tablet by mouth Daily.      buPROPion XL (Wellbutrin XL) 300 MG 24 hr tablet Take 1 tablet by mouth Every Morning. 90 tablet 1    Cholecalciferol (VITAMIN D) 2000 UNITS capsule Take 5,000 Units by mouth Every Evening.      fexofenadine (ALLEGRA) 180 MG tablet Take 1 tablet by mouth Daily.      hydroxychloroquine (PLAQUENIL) 200 MG tablet Take 1 tablet by mouth 2 (Two) Times a Day. 180 tablet 1    levothyroxine (SYNTHROID, LEVOTHROID) 125 MCG tablet Take 1 tablet by mouth Daily. 30 tablet 5    ondansetron (Zofran) 8 MG tablet Take 1 tablet by mouth Every 8 (Eight) Hours As Needed for Nausea or Vomiting. 20 tablet 0    ondansetron ODT (ZOFRAN-ODT) 4 MG disintegrating tablet Take 1 tablet by mouth Every 6 (Six) Hours As Needed for Nausea or Vomiting. 30 tablet 0    pantoprazole (PROTONIX) 40 MG EC tablet Take 1 tablet by mouth Daily. 90 tablet 1    propranolol (INDERAL) 10 MG tablet Take 1 tablet by mouth 2 (Two) Times a Day As Needed (Anxiety). 60 tablet 0    rimegepant sulfate ODT (NURTEC-ODT) 75 MG disintegrating tablet Place 1 tablet under the tongue As  Needed (migraine). 16 tablet 3    rizatriptan (Maxalt) 10 MG tablet Take 1 tablet by mouth 1 (One) Time As Needed for Migraine. May repeat in 2 hours if needed 10 tablet 0    Vortioxetine HBr (Trintellix) 20 MG tablet Take 1 tablet by mouth Daily. 90 tablet 1    traZODone (DESYREL) 50 MG tablet Take 1 tablet by mouth Every Night. 90 tablet 1     No current facility-administered medications for this visit.       Physical Exam   Result Review :    The following data was reviewed by: RENE Guadalupe on 03/24/2025:  Common labs          4/10/2024    15:37 9/3/2024    07:21 2/28/2025    09:35   Common Labs   Glucose  123  116    BUN  11  14    Creatinine  0.89  1.00    Sodium  136  138    Potassium  4.0  3.6    Chloride  103  104    Calcium  9.0  9.8    Albumin  4.5  4.8    Total Bilirubin  0.4  0.6    Alkaline Phosphatase  55  68    AST (SGOT) 28     30  26    ALT (SGPT) 49     36  43    WBC 4.76     6.34  4.80    Hemoglobin 13.9     13.4  15.8    Hematocrit 41.3     39.9  47.6    Platelets 159     121  232       Details          This result is from an external source.                Assessment and Plan   Problem List Items Addressed This Visit          Mental Health    Moderate episode of recurrent major depressive disorder - Primary (Chronic)    Overview   Has taken medication since 19.   - Mom PASSED 5/2021     Sees psychiatrist currently          Relevant Medications    traZODone (DESYREL) 50 MG tablet    buPROPion XL (Wellbutrin XL) 300 MG 24 hr tablet    Vortioxetine HBr (Trintellix) 20 MG tablet    Insomnia due to mental condition    Relevant Medications    traZODone (DESYREL) 50 MG tablet    buPROPion XL (Wellbutrin XL) 300 MG 24 hr tablet    Vortioxetine HBr (Trintellix) 20 MG tablet    Generalized anxiety disorder    Relevant Medications    traZODone (DESYREL) 50 MG tablet    buPROPion XL (Wellbutrin XL) 300 MG 24 hr tablet    Vortioxetine HBr (Trintellix) 20 MG tablet    Social anxiety disorder     Relevant Medications    traZODone (DESYREL) 50 MG tablet    buPROPion XL (Wellbutrin XL) 300 MG 24 hr tablet    Vortioxetine HBr (Trintellix) 20 MG tablet     Discussed treatment options with patient.  Continue Trintellix 20 mg daily for depression and anxiety.  Continue Wellbutrin  mg daily for depression.  We will discontinue the hydroxyzine as it is no longer effective for her.  Start trazodone 50 mg take half to 1 tablet nightly as needed for sleep.  We will see patient again in 6 months to reassess.  Encouraged patient to contact the office if she has any issues sooner.    TREATMENT PLAN/GOALS: Continue supportive psychotherapy efforts and medications as indicated. Treatment and medication options discussed during today's visit. Patient ackowledged and verbally consented to continue with current treatment plan and was educated on the importance of compliance with treatment and follow-up appointments.    DEPRESSION:  Patient screened positive for depression based on a PHQ-9 score of 6 on 3/24/2025. Follow-up recommendations include: Prescribed antidepressant medication treatment.       MEDICATION ISSUES:  We discussed risks, benefits, and side effects of the above medications and the patient was agreeable with the plan. Patient was educated on the importance of compliance with treatment and follow-up appointments.  Patient is agreeable to call the office with any worsening of symptoms or onset of side effects. Patient is agreeable to call 911 or go to the nearest ER should he/she begin having SI/HI.      Counseled patient regarding multimodal approach with healthy nutrition, healthy sleep, regular physical activity, social activities, counseling, and medications.      Coping skills reviewed and encouraged positive framing of thoughts     Assisted patient in processing above session content; acknowledged and normalized patient’s thoughts, feelings, and concerns.  Applied  positive coping skills and behavior  management in session.  Allowed patient to freely discuss issues without interruption or judgment. Provided safe, confidential environment to facilitate the development of positive therapeutic relationship and encourage open, honest communication. Assisted patient in identifying risk factors which would indicate the need for higher level of care including thoughts to harm self or others and/or self-harming behavior and encouraged patient to contact this office, call 911, or present to the nearest emergency room should any of these events occur. Discussed crisis intervention services and means to access. If patient has any concerns or needs assistance they were instructed to call the [facility] at [phone].    MEDS ORDERED DURING VISIT:  New Medications Ordered This Visit   Medications    traZODone (DESYREL) 50 MG tablet     Sig: Take 1 tablet by mouth Every Night.     Dispense:  90 tablet     Refill:  1    buPROPion XL (Wellbutrin XL) 300 MG 24 hr tablet     Sig: Take 1 tablet by mouth Every Morning.     Dispense:  90 tablet     Refill:  1    Vortioxetine HBr (Trintellix) 20 MG tablet     Sig: Take 1 tablet by mouth Daily.     Dispense:  90 tablet     Refill:  1         Follow Up   Return in about 6 months (around 9/24/2025) for Video visit.    Patient was given instructions and counseling regarding her condition or for health maintenance advice. Please see specific information pulled into the AVS if appropriate.         This document has been electronically signed by RENE Guadalupe  March 24, 2025 15:51 EDT    Part of this note may be an electronic transcription/translation of spoken language to printed text using the Dragon Dictation System.

## 2025-05-21 ENCOUNTER — OFFICE VISIT (OUTPATIENT)
Dept: INTERNAL MEDICINE | Age: 45
End: 2025-05-21
Payer: COMMERCIAL

## 2025-05-21 VITALS
BODY MASS INDEX: 27.16 KG/M2 | DIASTOLIC BLOOD PRESSURE: 82 MMHG | WEIGHT: 169 LBS | HEART RATE: 88 BPM | OXYGEN SATURATION: 100 % | TEMPERATURE: 97.1 F | HEIGHT: 66 IN | SYSTOLIC BLOOD PRESSURE: 110 MMHG

## 2025-05-21 DIAGNOSIS — F33.1 MODERATE EPISODE OF RECURRENT MAJOR DEPRESSIVE DISORDER: Chronic | ICD-10-CM

## 2025-05-21 DIAGNOSIS — K22.4 ESOPHAGEAL MOTILITY DISORDER: Chronic | ICD-10-CM

## 2025-05-21 DIAGNOSIS — E06.3 HYPOTHYROIDISM DUE TO HASHIMOTO'S THYROIDITIS: Primary | Chronic | ICD-10-CM

## 2025-05-21 DIAGNOSIS — E66.3 OVERWEIGHT (BMI 25.0-29.9): Chronic | ICD-10-CM

## 2025-05-21 DIAGNOSIS — M32.9 SYSTEMIC LUPUS ERYTHEMATOSUS, UNSPECIFIED SLE TYPE, UNSPECIFIED ORGAN INVOLVEMENT STATUS: Chronic | ICD-10-CM

## 2025-05-21 PROBLEM — R11.10 POSTPRANDIAL VOMITING: Status: RESOLVED | Noted: 2020-10-04 | Resolved: 2025-05-21

## 2025-05-21 NOTE — ASSESSMENT & PLAN NOTE
Can improve eating habits (sweet baked goods at night).   Decrease carbs in the evening.   Incorporate light aerobic exercise daily if possible.   Stays relative active on her feet during work.     Wt Readings from Last 3 Encounters:   05/21/25 76.7 kg (169 lb)   02/28/25 74.8 kg (164 lb 14.5 oz)   02/18/25 74.8 kg (165 lb)     Body mass index is 27.29 kg/m².

## 2025-05-21 NOTE — ASSESSMENT & PLAN NOTE
Lab Results   Component Value Date    TSH 0.741 01/10/2025    TSH 2.660 11/08/2024    TSH 2.050 04/04/2023    FREET4 1.31 01/10/2025    FREET4 1.38 11/08/2024    FREET4 1.18 04/04/2023      Level optimal. Continue 125 mcg.

## 2025-05-21 NOTE — ASSESSMENT & PLAN NOTE
Reviewed previously EGD findings showing abnormal motility / small HH but unfortunately was lost to follow-up to GI due to Dr. Raygoza leaving.   Pathology was unremarkable.   Advised to discuss esophageal motility / nausea / vomiting issue with rheumatologist on next visit.   Advised to see GI regarding ongoing problem and previous abnormal EGD findings regarding motility.    Recommended using Zofran ODT PRN for nausea/vomiting.  Change Allegra to Zyrtec.   Continue PPI therapy.   Reflux precautions (avoid eating late at night/evening).

## 2025-05-21 NOTE — PROGRESS NOTES
J  U  N  O  H    K  I  M ,   M  D                               I  N  T  E  R  N  A  L    M  E  D  I  C  I  N  E         ENCOUNTER DATE:  05/21/2025    Taty Romo / 45 y.o. / female    OFFICE VISIT ENCOUNTER       CHIEF COMPLAINT / REASON FOR OFFICE VISIT     Hypothyroidism, Moderate episode of recurrent major depressive disorder , Lupus, and Nausea / vomiting (intermittent)      ASSESSMENT & PLAN     Problem List Items Addressed This Visit          High    Hypothyroidism due to Hashimoto's thyroiditis - Primary (Chronic)    Overview   S/p partial thyroidectomy (right side) 2015 for adenoma    Continue levothyroxine 125 mcg qam.          Current Assessment & Plan   Lab Results   Component Value Date    TSH 0.741 01/10/2025    TSH 2.660 11/08/2024    TSH 2.050 04/04/2023    FREET4 1.31 01/10/2025    FREET4 1.38 11/08/2024    FREET4 1.18 04/04/2023      Level optimal. Continue 125 mcg.          Relevant Medications    propranolol (INDERAL) 10 MG tablet    levothyroxine (SYNTHROID, LEVOTHROID) 125 MCG tablet    Esophageal motility disorder (Chronic)    Current Assessment & Plan   Reviewed previously EGD findings showing abnormal motility / small HH but unfortunately was lost to follow-up to GI due to Dr. Raygoza leaving.   Pathology was unremarkable.   Advised to discuss esophageal motility / nausea / vomiting issue with rheumatologist on next visit.   Advised to see GI regarding ongoing problem and previous abnormal EGD findings regarding motility.    Recommended using Zofran ODT PRN for nausea/vomiting.  Change Allegra to Zyrtec.   Continue PPI therapy.   Reflux precautions (avoid eating late at night/evening).         Relevant Medications    pantoprazole (PROTONIX) 40 MG EC tablet       Medium    Moderate episode of recurrent major depressive disorder (Chronic)    Overview   Has taken medication since 19.   - Mom PASSED 5/2021     Sees psychiatrist currently           "Current Assessment & Plan   Stable overall. Continue Trintellix and bupropion  mg along with trazodone qHS.   Continue follow-up with behavioral health.          Relevant Medications    traZODone (DESYREL) 50 MG tablet    buPROPion XL (Wellbutrin XL) 300 MG 24 hr tablet    Vortioxetine HBr (Trintellix) 20 MG tablet    Systemic lupus erythematosus (Chronic)    Overview   Dx 2007 started on Plaquenil  *[place]ishi    Continue hydroxychloroquine and follow-up with rheumatologist         Current Assessment & Plan   Advised to discuss esophageal motility / nausea / vomiting issue with rheumatologist on next visit.   Advised to see GI regarding ongoing problem and previous abnormal EGD findings regarding motility.         Overweight (BMI 25.0-29.9) (Chronic)    Current Assessment & Plan   Can improve eating habits (sweet baked goods at night).   Decrease carbs in the evening.   Incorporate light aerobic exercise daily if possible.   Stays relative active on her feet during work.     Wt Readings from Last 3 Encounters:   05/21/25 76.7 kg (169 lb)   02/28/25 74.8 kg (164 lb 14.5 oz)   02/18/25 74.8 kg (165 lb)     Body mass index is 27.29 kg/m².             No orders of the defined types were placed in this encounter.    No orders of the defined types were placed in this encounter.      SUMMARY/DISCUSSION        TOTAL TIME OF ENCOUNTER:        Next Appointment with me: Visit date not found     Return in about 6 months (around 11/21/2025) for Reassess chronic medical problems.      VITAL SIGNS     Vitals:    05/21/25 1545   BP: 110/82   Pulse: 88   Temp: 97.1 °F (36.2 °C)   SpO2: 100%   Weight: 76.7 kg (169 lb)   Height: 167.6 cm (65.98\")       BP Readings from Last 3 Encounters:   05/21/25 110/82   02/28/25 103/83   02/18/25 166/66     Wt Readings from Last 3 Encounters:   05/21/25 76.7 kg (169 lb)   02/28/25 74.8 kg (164 lb 14.5 oz)   02/18/25 74.8 kg (165 lb)     Body mass index is 27.29 kg/m².    Blood pressure " readings recorded on patient flowsheet:       No data to display                  MEDICATIONS AT THE TIME OF OFFICE VISIT     Current Outpatient Medications on File Prior to Visit   Medication Sig    acetaminophen (Tylenol 8 Hour Arthritis Pain) 650 MG 8 hr tablet Take 1 tablet by mouth Every 8 (Eight) Hours As Needed for Mild Pain  or Moderate Pain .    Atogepant 30 MG tablet Take 1 tablet by mouth Daily. (Patient taking differently: Take 1 tablet by mouth Daily. prn)    Biotin 5000 MCG tablet Take 1 tablet by mouth Daily.    buPROPion XL (Wellbutrin XL) 300 MG 24 hr tablet Take 1 tablet by mouth Every Morning.    Cholecalciferol (VITAMIN D) 2000 UNITS capsule Take 5,000 Units by mouth Every Evening.    fexofenadine (ALLEGRA) 180 MG tablet Take 1 tablet by mouth Daily.    hydroxychloroquine (PLAQUENIL) 200 MG tablet Take 1 tablet by mouth 2 (Two) Times a Day.    levothyroxine (SYNTHROID, LEVOTHROID) 125 MCG tablet Take 1 tablet by mouth Daily.    ondansetron ODT (ZOFRAN-ODT) 4 MG disintegrating tablet Take 1 tablet by mouth Every 6 (Six) Hours As Needed for Nausea or Vomiting.    pantoprazole (PROTONIX) 40 MG EC tablet Take 1 tablet by mouth Daily.    propranolol (INDERAL) 10 MG tablet Take 1 tablet by mouth 2 (Two) Times a Day As Needed (Anxiety).    rimegepant sulfate ODT (NURTEC-ODT) 75 MG disintegrating tablet Place 1 tablet under the tongue As Needed (migraine).    rizatriptan (Maxalt) 10 MG tablet Take 1 tablet by mouth 1 (One) Time As Needed for Migraine. May repeat in 2 hours if needed    traZODone (DESYREL) 50 MG tablet Take 1 tablet by mouth Every Night.    Vortioxetine HBr (Trintellix) 20 MG tablet Take 1 tablet by mouth Daily.    [DISCONTINUED] ondansetron (Zofran) 8 MG tablet Take 1 tablet by mouth Every 8 (Eight) Hours As Needed for Nausea or Vomiting.     No current facility-administered medications on file prior to visit.          HISTORY OF PRESENT ILLNESS     Saw PA recently for migraines, overall  "better and migraines are manageable with medications.     Ongoing intermittent nausea/vomiting in mornings, previous EGD with Dr. Raygoza showed abnormal esophageal motility, small hiatal hernia.  Pathology was overall unremarkable without eosinophilia.  Unfortunately she has been lost to follow-up to GI due to Dr. Raygoza's situation.  She is on pantoprazole 40 mg daily for GERD.  She takes Allegra daily for allergies.  She is followed by rheumatologist for lupus and is on Plaquenil.  She is on Trintellix, bupropion and trazodone for her mental health issues.  This is managed by behavioral health.  She is on stable dose of levothyroxine 125 mcg for hypothyroidism.  She does express frustration with her weight.  She reports certain poor eating habits including sweet snacks later in the evening or night about 5 days a week.  She remains relatively active during work but does not exercise routinely.    Patient Care Team:  Jeremy Mcdonald MD as PCP - General (Internal Medicine)  Shaheed Blanco MD as Consulting Physician (Rheumatology)  Rupa Olivas APRN as Nurse Practitioner (Nurse Practitioner)    REVIEW OF SYSTEMS     Review of Systems       PHYSICAL EXAMINATION     Physical Exam  Alert with normal thought and judgment.   Normal affect and mood.   Mild weight gain noted       REVIEWED DATA     Labs:     Lab Results   Component Value Date     02/28/2025    K 3.6 02/28/2025    CALCIUM 9.8 02/28/2025    AST 26 02/28/2025    ALT 43 (H) 02/28/2025    BUN 14 02/28/2025    CREATININE 1.00 02/28/2025    CREATININE 0.89 09/03/2024    CREATININE 0.88 10/24/2023    EGFR 71.8 02/28/2025     Lab Results   Component Value Date    HGBA1C 5.3 02/13/2023    HGBA1C 5.6 07/27/2022   No results found for: \"MALBCRERATIO\"  Lab Results   Component Value Date     (H) 02/13/2023    LDL 95 07/27/2022    HDL 62 02/13/2023    HDL 45 07/27/2022    TRIG 150 (H) 02/13/2023    CHOLHDLRATIO 3.1 02/13/2023     Lab Results   Component " Value Date    TSH 0.741 01/10/2025    TSH 2.660 11/08/2024    TSH 2.050 04/04/2023    FREET4 1.31 01/10/2025    FREET4 1.38 11/08/2024    FREET4 1.18 04/04/2023     Lab Results   Component Value Date    WBC 4.80 02/28/2025    HGB 15.8 02/28/2025     02/28/2025           Imaging:               Medical Tests:     Upper GI Endoscopy (05/06/2024 08:46)   Findings: - The Z-line was regular and was found at the gastroesophageal junction. - Abnormal motility was noted in the esophagus. The cricopharyngeus was normal. There is primary peristalsis and extra peristaltic waves of the esophageal body. The distal esophagus/lower esophageal sphincter is open. Primary and tertiary peristaltic waves are noted. Biopsies were taken with a cold forceps for histology. Estimated blood loss was minimal. - A small hiatal hernia was present. - Striped mildly erythematous mucosa without bleeding was found in the gastric antrum. Biopsies were taken with a cold forceps for histology. Estimated blood loss was minimal. - The examined duodenum was normal. Biopsies for histology were taken with a cold forceps for evaluation of celiac disease. Estimated blood loss was minimal    - Normal mucosa was found in the entire esophagus. - Z-line regular, at the gastroesophageal junction. - Abnormal esophageal motility. Biopsied. - Small hiatal hernia. - Erythematous mucosa in the antrum. Biopsied. - Normal examined duodenum. Biopsied      Final Diagnosis   1.  Duodenal biopsy: Duodenal mucosa with  A. Normal villous architecture.  B. No active inflammation, granulomatous inflammation nor intestinal parasites identified by routine staining.     2.  Antral biopsy: Gastric mucosa with               A.  Minimal chronic inflammation with mild mucosal alteration noted suggesting chemical gastropathy                       (bile reflux, drug effect).               B.  No H. pylori-like organisms identified by routine staining.                C.  Reactive  change noted, no dysplasia nor malignancy identified.     3.  Esophagus biopsy: Squamous mucosa with  A. No significant inflammation nor eosinophilia identified.  B. No intestinal metaplasia identified by routine staining.  C. No dysplasia nor malignancy identified.       Summary of old records / correspondence / consultant report:             Request outside records:

## 2025-05-21 NOTE — ASSESSMENT & PLAN NOTE
Stable overall. Continue Trintellix and bupropion  mg along with trazodone qHS.   Continue follow-up with behavioral health.

## 2025-05-21 NOTE — ASSESSMENT & PLAN NOTE
Advised to discuss esophageal motility / nausea / vomiting issue with rheumatologist on next visit.   Advised to see GI regarding ongoing problem and previous abnormal EGD findings regarding motility.

## 2025-06-23 ENCOUNTER — SPECIALTY PHARMACY (OUTPATIENT)
Dept: PHARMACY | Facility: TELEHEALTH | Age: 45
End: 2025-06-23
Payer: COMMERCIAL

## 2025-06-23 NOTE — PROGRESS NOTES
"   Specialty Pharmacy Patient Management Program  Refill Outreach     Taty \"An\" was contacted today regarding refills of their medication(s).    Refill Questions      Flowsheet Row Most Recent Value   Changes to allergies? No   Changes to medications? No   New conditions or infections since last clinic visit No   Unplanned office visit, urgent care, ED, or hospital admission in the last 4 weeks  No   How does patient/caregiver feel medication is working? Very good   Financial problems or insurance changes  No   Since the previous refill, were any specialty medication doses or scheduled injections missed or delayed?  No   Does this patient require a clinical escalation to a pharmacist? No            Delivery Questions      Flowsheet Row Most Recent Value   Delivery method UPS   Delivery address verified with patient/caregiver? Yes   Delivery address Home   Other address preferred n/a   Number of medications in delivery 1   Medication(s) being filled and delivered Atogepant   Doses left of specialty medications 5   Copay verified? Yes   Copay amount $0.00   Copay form of payment No copayment ($0)   Delivery Date Selection 06/24/25   Signature Required No   Do you consent to receive electronic handouts?  Yes                 Follow-up: 30 day(s)     Renetta Diaz Pharmacy Technician  6/23/2025  09:08 EDT    "

## 2025-07-11 DIAGNOSIS — G43.501 MIGRAINE AURA, PERSISTENT, WITH STATUS MIGRAINOSUS: Primary | ICD-10-CM

## 2025-07-11 RX ORDER — LANOLIN ALCOHOL/MO/W.PET/CERES
400 CREAM (GRAM) TOPICAL DAILY
Qty: 30 TABLET | Refills: 2 | Status: SHIPPED | OUTPATIENT
Start: 2025-07-11

## 2025-07-13 ENCOUNTER — PATIENT MESSAGE (OUTPATIENT)
Dept: INTERNAL MEDICINE | Age: 45
End: 2025-07-13
Payer: COMMERCIAL

## 2025-07-13 DIAGNOSIS — E06.3 HYPOTHYROIDISM DUE TO HASHIMOTO'S THYROIDITIS: ICD-10-CM

## 2025-07-14 ENCOUNTER — TELEPHONE (OUTPATIENT)
Dept: PSYCHIATRY | Facility: CLINIC | Age: 45
End: 2025-07-14
Payer: COMMERCIAL

## 2025-07-14 RX ORDER — HYDROXYZINE PAMOATE 25 MG/1
100 CAPSULE ORAL NIGHTLY PRN
Qty: 360 CAPSULE | Refills: 0 | Status: SHIPPED | OUTPATIENT
Start: 2025-07-14

## 2025-07-14 RX ORDER — LEVOTHYROXINE SODIUM 125 UG/1
125 TABLET ORAL DAILY
Qty: 30 TABLET | Refills: 5 | Status: SHIPPED | OUTPATIENT
Start: 2025-07-14

## 2025-07-14 NOTE — TELEPHONE ENCOUNTER
Lvm for patient to call the office patient lvm on medline need verification on what medication she's needs refilled

## 2025-07-14 NOTE — TELEPHONE ENCOUNTER
I had amount of that we were going to discontinue the hydroxyzine and just with the trazodone.  However, she can take both.  I would recommend trying to solely take the trazodone and see how she does.  I will go ahead and send in the hydroxyzine though.  Thanks

## 2025-07-15 ENCOUNTER — SPECIALTY PHARMACY (OUTPATIENT)
Dept: PHARMACY | Facility: TELEHEALTH | Age: 45
End: 2025-07-15
Payer: COMMERCIAL

## 2025-07-15 DIAGNOSIS — G43.011 INTRACTABLE MIGRAINE WITHOUT AURA AND WITH STATUS MIGRAINOSUS: ICD-10-CM

## 2025-07-15 RX ORDER — ATOGEPANT 30 MG/1
30 TABLET ORAL DAILY
Qty: 30 TABLET | Refills: 5 | Status: SHIPPED | OUTPATIENT
Start: 2025-07-15

## 2025-07-16 ENCOUNTER — SPECIALTY PHARMACY (OUTPATIENT)
Dept: PHARMACY | Facility: TELEHEALTH | Age: 45
End: 2025-07-16
Payer: COMMERCIAL

## 2025-07-17 ENCOUNTER — SPECIALTY PHARMACY (OUTPATIENT)
Dept: PHARMACY | Facility: TELEHEALTH | Age: 45
End: 2025-07-17
Payer: COMMERCIAL

## 2025-07-21 ENCOUNTER — PRIOR AUTHORIZATION (OUTPATIENT)
Dept: INTERNAL MEDICINE | Age: 45
End: 2025-07-21
Payer: COMMERCIAL

## 2025-07-30 ENCOUNTER — SPECIALTY PHARMACY (OUTPATIENT)
Dept: PHARMACY | Facility: TELEHEALTH | Age: 45
End: 2025-07-30
Payer: COMMERCIAL

## 2025-07-30 NOTE — PROGRESS NOTES
Specialty Pharmacy Patient Management Program  Initial Assessment and Reassessment     Taty Romo is a 45 y.o. female with migraine and enrolled in the Patient Management program offered by [facility]. An initial outreach was conducted, including assessment of therapy appropriateness and specialty medication education for Ubrelvy 100 mg tablet. The patient is also enrolled with migraine and a follow-up outreach was conducted, including assessment of continued therapy appropriateness, medication adherence, and side effect incidence and management for Qulipta 30 mg tablet.     Insurance Coverage & Financial Support, including any changes  none     Relevant Past Medical History and Comorbidities  Relevant medical history and concomitant health conditions were discussed with the patient. The patient's chart has been reviewed for relevant past medical history and comorbid health conditions and updated as necessary.   Past Medical History:   Diagnosis Date    Allergic     Allergic rhinitis 05/22/2019    Alopecia 08/2015    CHI positive 08/2014    Anal fissure 12/2016    Anxiety     Bilateral sciatica 01/15/2020    Biliary colic 05/2019    Bulging lumbar disc 02/2018    Cervical radiculopathy 11/18/2020    Cervicitis 04/04/2002    SEEN AT [facility]    Chronic bilateral low back pain without sciatica     Chronic fatigue     Chronic ITP (idiopathic thrombocytopenia) 01/2012    FOLLOWED BY DR. PEÑA PIÑA    Closed fracture of left distal radius 01/22/2020    SEEN AT [facility]    Cold intolerance     Colon polyps     FOLLOWED BY DR. 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    Hypothyroidism     Idiopathic scoliosis of lumbosacral region     Insomnia 03/24/2015    Continue trazodone 200 mg qHS     Irregular menses 03/2015    Irritable bowel syndrome with constipation 01/30/2019    Lumbar radiculopathy     Lumbosacral disc disease     Migraine     Neck sprain 07/11/2005    SEEN AT [facility] ER    Paresthesia of upper extremity     BILATERAL    PCOS (polycystic ovarian syndrome)     Peripheral neuropathy 2017    KNEES DOWN    Polyarthralgia     Polydipsia 10/2016    Ruptured tympanic membrane, right 05/2015    Seasonal allergies     Spondylolisthesis at L5-S1 level     Systemic lupus erythematosus 03/2007    FOLLOWED BY DR. ERNESTO DILLARD    Tattoos     Tremor of both hands 06/2016    Trochanteric bursitis of both hips 10/2021    Urinary frequency 02/2015    Urinary urgency 02/2015    Vitamin D deficiency 08/29/2015     Social History     Socioeconomic History    Marital status:      Spouse name: Alexander*    Number of children: 2   Tobacco Use    Smoking status: Never    Smokeless tobacco: Never   Vaping Use    Vaping status: Never Used   Substance and Sexual Activity    Alcohol use: Not Currently     Comment: Few times a year    Drug use: No    Sexual activity: Yes     Partners: Male     Birth control/protection: I.U.D., Vasectomy     Problem list reviewed by Perla Browne, PharmD on 7/30/2025 at 10:34 AM    Allergies  Known allergies and reactions were discussed with the patient. The patient's chart has been reviewed for allergy information and updated as necessary.   Allergies   Allergen Reactions    Oxycodone Nausea And Vomiting     States can take as long as has zofran     Sumatriptan Dizziness    Topamax [Topiramate] Other (See Comments) and Dizziness      Christy     @ARMIN([id number]:)    Current Medication List  This medication list has been reviewed with the patient and evaluated for any interactions or necessary modifications/recommendations, and updated to include all prescription medications, OTC medications, and supplements the patient is currently taking. This list reflects what is contained in the patient's profile, which has also been marked as reviewed to communicate to other providers it is the most up to date version of the patient's current medication therapy.     Current Outpatient Medications:     acetaminophen (Tylenol 8 Hour Arthritis Pain) 650 MG 8 hr tablet, Take 1 tablet by mouth Every 8 (Eight) Hours As Needed for Mild Pain  or Moderate Pain ., Disp: 100 tablet, Rfl: 2    Atogepant (Qulipta) 30 MG tablet, Take 1 tablet by mouth Daily., Disp: 30 tablet, Rfl: 5    Biotin 5000 MCG tablet, Take 1 tablet by mouth Daily., Disp: , Rfl:     buPROPion XL (Wellbutrin XL) 300 MG 24 hr tablet, Take 1 tablet by mouth Every Morning., Disp: 90 tablet, Rfl: 1    Cholecalciferol (VITAMIN D) 2000 UNITS capsule, Take 5,000 Units by mouth Every Evening., Disp: , Rfl:     fexofenadine (ALLEGRA) 180 MG tablet, Take 1 tablet by mouth Daily., Disp: , Rfl:     hydroxychloroquine (PLAQUENIL) 200 MG tablet, Take 1 tablet by mouth 2 (Two) Times a Day., Disp: 180 tablet, Rfl: 1    hydrOXYzine pamoate (VISTARIL) 25 MG capsule, Take 4 capsules by mouth At Night As Needed for Anxiety (sleep)., Disp: 360 capsule, Rfl: 0    levothyroxine (SYNTHROID, LEVOTHROID) 125 MCG tablet, Take 1 tablet by mouth Daily., Disp: 30 tablet, Rfl: 5    Magnesium Oxide -Mg Supplement (MAGnesium-Oxide) 400 (240 Mg) MG tablet, Take 1 tablet by mouth Daily. Migraine prevention, Disp: 30 tablet, Rfl: 2    ondansetron ODT (ZOFRAN-ODT) 4 MG disintegrating tablet, Take 1 tablet by mouth Every 6 (Six) Hours As Needed for Nausea or Vomiting., Disp: 30 tablet, Rfl: 0    pantoprazole (PROTONIX)  40 MG EC tablet, Take 1 tablet by mouth Daily., Disp: 90 tablet, Rfl: 1    propranolol (INDERAL) 10 MG tablet, Take 1 tablet by mouth 2 (Two) Times a Day As Needed (Anxiety)., Disp: 60 tablet, Rfl: 0    rimegepant sulfate ODT (NURTEC-ODT) 75 MG disintegrating tablet, Place 1 tablet under the tongue As Needed (migraine)., Disp: 16 tablet, Rfl: 3    rizatriptan (Maxalt) 10 MG tablet, Take 1 tablet by mouth 1 (One) Time As Needed for Migraine. May repeat in 2 hours if needed, Disp: 10 tablet, Rfl: 0    traZODone (DESYREL) 50 MG tablet, Take 1 tablet by mouth Every Night., Disp: 90 tablet, Rfl: 1    ubrogepant (Ubrelvy) 100 MG tablet, Take 1 tablet by mouth As Needed for migraine. may repeat in 2 hours if needed. max 2 tablets per 24 hours, Disp: 16 tablet, Rfl: 1    Vortioxetine HBr (Trintellix) 20 MG tablet, Take 1 tablet by mouth Daily., Disp: 90 tablet, Rfl: 1  Medicines reviewed by Perla Browne, PharmD on 7/30/2025 at 10:34 AM    Drug Interactions  none     Relevant Laboratory Values  Lab Results   Component Value Date    GLUCOSE 116 (H) 02/28/2025    CALCIUM 9.8 02/28/2025     02/28/2025    K 3.6 02/28/2025    CO2 21.8 (L) 02/28/2025     02/28/2025    BUN 14 02/28/2025    CREATININE 1.00 02/28/2025    BCR 14.0 02/28/2025    ANIONGAP 12.2 02/28/2025     Lab Results   Component Value Date    WBC 4.80 02/28/2025    HGB 15.8 02/28/2025    HCT 47.6 (H) 02/28/2025    MCV 90.7 02/28/2025     02/28/2025    INR 1.06 09/11/2022     Lab Value Review  The above lab values have been reviewed; the following specialty medication(s) dose adjustment(s) are recommended: none.    Initial Education Provided for new Specialty Medication  The patient has been provided with the following education and any applicable administration techniques (i.e. self-injection) have been demonstrated for the therapies indicated. All questions and concerns have been addressed prior to the patient receiving the medication, and the  patient has verbalized understanding of the education and any materials provided. Additional patient education shall be provided and documented upon request by the patient, provider or payer.          Adherence, Self-Administration, and Current Therapy Problems  Adherence related to the patient's specialty therapy was discussed with the patient. The Adherence segment of this outreach has been reviewed and updated.     Adherence Questions  Linked Medication(s) Assessed: Atogepant (Qulipta)  On average, how many doses/injections does the patient miss per month?: 8  What are the identified reasons for non-adherence or missed doses? : adverse effects  What is the estimated medication adherence level?: <70%  Based on the patient/caregiver response and refill history, does this patient require an MTP to track adherence improvements?: no (Patient is only taking a few times per week due to side effect of night terror. She is having good results of migraine prevention with this use. Experiencing 3-4 migraine days per month.)    Additional Barriers to Patient Self-Administration: none  Methods for Supporting Patient Self-Administration: n/a    Open Medication Therapy Problems  No medication therapy recommendations to display    Adverse Drug Reactions  Medication tolerability: Patient reported common adverse drug reaction  Medication plan: Continue therapy with normal follow-up  Plan for ADR Management: Patient not concerned, will continue use a few times per week.    Adherence and Self-Administration (currently medication)  Approximate Number of Doses Missed Since Last Assessment: 8, using only a few times per week due to SE  Ongoing or New Barriers to Patient Adherence and/or Self-Administration: none   Methods for Supporting Patient Adherence and/or Self-Administration: Patient will continue use of taking a few times per week     Goals of Therapy  Goals related to the patient's specialty therapy were discussed with the  patient. The Patient Goals segment of this outreach has been reviewed and updated.   Goals Addressed Today        Specialty Pharmacy General Goal      Reduce frequency, duration and severity of migraines by at least 50%    7/30/25 Patient reports 3-4 migraines per month. She has been using Qulipta only a few days a week because she believes it causes her to have night terrors. Counseled patient it is recommended to take every day for best effect. She is adding Ubrelvy for acute treatment.               Progress Toward Meeting Patient-Identified Goals of Therapy: On Track  New Patient-Identified Goals, If Applicable:     Progress Toward Meeting Clinical Goals or Therapeutic Targets: On Track  New Clinical Goals or Therapeutic Targets, If Applicable:     Hospitalizations and Urgent Care Since Last Assessment  Hospitalizations or Admissions: none  ED Visits: none  Urgent Office Visits: none     Quality of Life Assessment   Quality of Life related to the patient's enrollment in the patient management program and services provided was discussed with the patient. The QOL segment of this outreach has been reviewed and updated.  Quality of Life Improvement Scale: 6-A little better    Reassessment Plan & Follow-Up  Medication Therapy Changes: Patient adding Ubrelvy for acute treatment.  Additional Plans, Therapy Recommendations, or Therapy Problems to Be Addressed: none   Pharmacist to perform regular reassessments no more than (6) months from the previous assessment.  Welcome information and patient satisfaction survey to be sent by retail team with patient's initial fill.  Care Coordinator to set up future refill outreaches, coordinate prescription delivery, and escalate clinical questions to pharmacist.     Attestation  I attest the patient was actively involved in and has agreed to the above plan of care. I attest that the initiated specialty medication(s) are appropriate for the patient based on my assessment. If the  prescribed therapy is at any point deemed not appropriate based on the current or future assessments, a consultation will be initiated with the patient's specialty care provider to determine the best course of action. The revised plan of therapy will be documented along with any required assessments and/or additional patient education provided.     Electronically signed by Perla Browne PharmD, 07/30/25, 10:37 AM EDT.

## 2025-07-30 NOTE — PROGRESS NOTES
" Specialty Pharmacy Patient Management Program  Call Center Refill Outreach      Taty \"An\" is a 45 y.o. female contacted today regarding refills of her medication(s).    Specialty medication(s) and dose(s) confirmed: Qulipta 30 mg tablet  Other medications being refilled: none    Refill Questions      Flowsheet Row Most Recent Value   Changes to allergies? No   Changes to medications? No   New conditions or infections since last clinic visit No   Unplanned office visit, urgent care, ED, or hospital admission in the last 4 weeks  No   How does patient/caregiver feel medication is working? Good   Financial problems or insurance changes  No   Since the previous refill, were any specialty medication doses or scheduled injections missed or delayed?  Yes   If yes, please provide the amount Patient taking Qulipta only a few times per week due to side effects.   Why were doses missed? Side effects, has been using a few times per week with good result Having 3-4 migraine days per month.   Does this patient require a clinical escalation to a pharmacist? Yes            Delivery Questions      Flowsheet Row Most Recent Value   Delivery method UPS   Delivery address verified with patient/caregiver? Yes   Delivery address Home   Other address preferred n/a   Number of medications in delivery 2   Medication(s) being filled and delivered Atogepant (Qulipta), Ubrogepant (UBRELVY)   Doses left of specialty medications initial fill Ubrelvy. A few doses Qulipta   Copay verified? Yes   Copay amount $0.00   Copay form of payment No copayment ($0)   Delivery Date Selection 07/31/25   Signature Required No   Do you consent to receive electronic handouts?  Yes                   Follow-up: 75 days     Perla Browne RPH  Specialty Pharmacist  7/30/2025  10:36 EDT   "

## 2025-08-24 DIAGNOSIS — K22.4 ESOPHAGEAL MOTILITY DISORDER: ICD-10-CM

## 2025-08-25 RX ORDER — PANTOPRAZOLE SODIUM 40 MG/1
40 TABLET, DELAYED RELEASE ORAL DAILY
Qty: 90 TABLET | Refills: 1 | Status: SHIPPED | OUTPATIENT
Start: 2025-08-25

## 2025-08-26 ENCOUNTER — SPECIALTY PHARMACY (OUTPATIENT)
Dept: PHARMACY | Facility: TELEHEALTH | Age: 45
End: 2025-08-26
Payer: COMMERCIAL

## (undated) DEVICE — GLV SURG PREMIERPRO ORTHO LTX PF SZ8 BRN

## (undated) DEVICE — ADAPT CLN BIOGUARD AIR/H2O DISP

## (undated) DEVICE — BLCK/BITE BLOX W/DENTL/RIM W/STRAP 54F

## (undated) DEVICE — 3M™ STERI-STRIP™ REINFORCED ADHESIVE SKIN CLOSURES, R1547, 1/2 IN X 4 IN (12 MM X 100 MM), 6 STRIPS/ENVELOPE: Brand: 3M™ STERI-STRIP™

## (undated) DEVICE — ELECTRD BLD EZ CLN STD 6.5IN

## (undated) DEVICE — DISPOSABLE BIPOLAR FORCEPS 7 3/4" (19.7CM) SCOVILLE BAYONET, 1.5MM TIP AND 12 FT. (3.6M) CABLE: Brand: KIRWAN

## (undated) DEVICE — DRP C/ARM 41X74IN

## (undated) DEVICE — ANTIBACTERIAL UNDYED BRAIDED (POLYGLACTIN 910), SYNTHETIC ABSORBABLE SUTURE: Brand: COATED VICRYL

## (undated) DEVICE — APPL CHLORAPREP HI/LITE 26ML ORNG

## (undated) DEVICE — SUT VIC 4/0 P3 18IN J494G

## (undated) DEVICE — CANN O2 ETCO2 FITS ALL CONN CO2 SMPL A/ 7IN DISP LF

## (undated) DEVICE — GOWN,NON-REINFORCED,SIRUS,SET IN SLV,XXL: Brand: MEDLINE

## (undated) DEVICE — DRSNG WND BORDR/ADHS NONADHR/GZ LF 4X4IN STRL

## (undated) DEVICE — SYR CONTRL LUERLOK 10CC

## (undated) DEVICE — TUBING, SUCTION, 1/4" X 10', STRAIGHT: Brand: MEDLINE

## (undated) DEVICE — SENSR O2 OXIMAX FNGR A/ 18IN NONSTR

## (undated) DEVICE — KT ORCA ORCAPOD DISP STRL

## (undated) DEVICE — GOWN SURG AERO CHROME XL

## (undated) DEVICE — CONN TBG Y 5 IN 1 LF STRL

## (undated) DEVICE — LN SMPL CO2 SHTRM SD STREAM W/M LUER

## (undated) DEVICE — DRP MICROSCOPE 4 BINOCULAR CV 54X150IN

## (undated) DEVICE — SMOKE EVACUATION TUBING WITH 7/8 IN TO 1/4 IN REDUCER: Brand: BUFFALO FILTER

## (undated) DEVICE — NEEDLE, QUINCKE 22GX3.5": Brand: MEDLINE INDUSTRIES, INC.

## (undated) DEVICE — GLV SURG BIOGEL LTX PF 6 1/2

## (undated) DEVICE — FRCP BX RADJAW4 NDL 2.8 240CM LG OG BX40

## (undated) DEVICE — PK NEURO SPINE 40